# Patient Record
Sex: FEMALE | Race: WHITE | NOT HISPANIC OR LATINO | Employment: PART TIME | URBAN - METROPOLITAN AREA
[De-identification: names, ages, dates, MRNs, and addresses within clinical notes are randomized per-mention and may not be internally consistent; named-entity substitution may affect disease eponyms.]

---

## 2017-01-05 ENCOUNTER — OFFICE VISIT (OUTPATIENT)
Dept: PSYCHIATRY | Facility: CLINIC | Age: 47
End: 2017-01-05
Attending: PSYCHIATRY & NEUROLOGY
Payer: MEDICARE

## 2017-01-05 VITALS
BODY MASS INDEX: 21.74 KG/M2 | SYSTOLIC BLOOD PRESSURE: 149 MMHG | DIASTOLIC BLOOD PRESSURE: 93 MMHG | WEIGHT: 155.8 LBS | HEART RATE: 99 BPM

## 2017-01-05 DIAGNOSIS — F33.1 MAJOR DEPRESSIVE DISORDER, RECURRENT EPISODE, MODERATE (H): Primary | ICD-10-CM

## 2017-01-05 PROCEDURE — 99212 OFFICE O/P EST SF 10 MIN: CPT | Mod: ZF

## 2017-01-05 NOTE — PATIENT INSTRUCTIONS
No medication changes today. Continue therapy. Call if you do not have your January Adderall prescription at home.

## 2017-01-05 NOTE — NURSING NOTE
Chief Complaint   Patient presents with     Recheck Medication     Reviewed allergies, smoking status, and pharmacy preference  Administered abuse screening questions   Obtained weight, blood pressure and heart rate
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
## 2017-01-10 ASSESSMENT — PATIENT HEALTH QUESTIONNAIRE - PHQ9: SUM OF ALL RESPONSES TO PHQ QUESTIONS 1-9: 5

## 2017-02-06 ENCOUNTER — OFFICE VISIT (OUTPATIENT)
Dept: PSYCHIATRY | Facility: CLINIC | Age: 47
End: 2017-02-06
Attending: PSYCHIATRY & NEUROLOGY
Payer: MEDICARE

## 2017-02-06 VITALS
SYSTOLIC BLOOD PRESSURE: 146 MMHG | WEIGHT: 157.6 LBS | DIASTOLIC BLOOD PRESSURE: 102 MMHG | BODY MASS INDEX: 21.99 KG/M2 | HEART RATE: 106 BPM

## 2017-02-06 DIAGNOSIS — Z79.899 ENCOUNTER FOR LONG-TERM (CURRENT) USE OF MEDICATIONS: ICD-10-CM

## 2017-02-06 DIAGNOSIS — F33.1 MODERATE EPISODE OF RECURRENT MAJOR DEPRESSIVE DISORDER (H): ICD-10-CM

## 2017-02-06 DIAGNOSIS — Z79.899 ENCOUNTER FOR LONG-TERM (CURRENT) USE OF MEDICATIONS: Primary | ICD-10-CM

## 2017-02-06 LAB
ALBUMIN SERPL-MCNC: 3.4 G/DL (ref 3.4–5)
ALP SERPL-CCNC: 64 U/L (ref 40–150)
ALT SERPL W P-5'-P-CCNC: 17 U/L (ref 0–50)
AST SERPL W P-5'-P-CCNC: 17 U/L (ref 0–45)
BILIRUB DIRECT SERPL-MCNC: <0.1 MG/DL (ref 0–0.2)
BILIRUB SERPL-MCNC: 0.3 MG/DL (ref 0.2–1.3)
PROT SERPL-MCNC: 7 G/DL (ref 6.8–8.8)
TSH SERPL DL<=0.005 MIU/L-ACNC: 1.81 MU/L (ref 0.4–4)

## 2017-02-06 PROCEDURE — 80076 HEPATIC FUNCTION PANEL: CPT | Performed by: PSYCHIATRY & NEUROLOGY

## 2017-02-06 PROCEDURE — 36415 COLL VENOUS BLD VENIPUNCTURE: CPT | Performed by: PSYCHIATRY & NEUROLOGY

## 2017-02-06 PROCEDURE — 99212 OFFICE O/P EST SF 10 MIN: CPT | Mod: 25,ZF

## 2017-02-06 PROCEDURE — 84443 ASSAY THYROID STIM HORMONE: CPT | Performed by: PSYCHIATRY & NEUROLOGY

## 2017-02-06 RX ORDER — DEXTROAMPHETAMINE SACCHARATE, AMPHETAMINE ASPARTATE, DEXTROAMPHETAMINE SULFATE AND AMPHETAMINE SULFATE 2.5; 2.5; 2.5; 2.5 MG/1; MG/1; MG/1; MG/1
10 TABLET ORAL 2 TIMES DAILY
Qty: 60 TABLET | Refills: 0 | Status: SHIPPED | OUTPATIENT
Start: 2017-04-08 | End: 2017-04-17

## 2017-02-06 RX ORDER — VILAZODONE HYDROCHLORIDE 40 MG/1
40 TABLET ORAL DAILY
Qty: 30 TABLET | Refills: 2 | Status: SHIPPED
Start: 2017-02-06 | End: 2017-04-17

## 2017-02-06 RX ORDER — DEXTROAMPHETAMINE SACCHARATE, AMPHETAMINE ASPARTATE, DEXTROAMPHETAMINE SULFATE AND AMPHETAMINE SULFATE 2.5; 2.5; 2.5; 2.5 MG/1; MG/1; MG/1; MG/1
10 TABLET ORAL 2 TIMES DAILY
Qty: 60 TABLET | Refills: 0 | Status: SHIPPED | OUTPATIENT
Start: 2017-02-10 | End: 2017-04-17

## 2017-02-06 RX ORDER — DEXTROAMPHETAMINE SACCHARATE, AMPHETAMINE ASPARTATE, DEXTROAMPHETAMINE SULFATE AND AMPHETAMINE SULFATE 2.5; 2.5; 2.5; 2.5 MG/1; MG/1; MG/1; MG/1
10 TABLET ORAL 2 TIMES DAILY
Qty: 60 TABLET | Refills: 0 | Status: SHIPPED | OUTPATIENT
Start: 2017-03-08 | End: 2017-04-17

## 2017-02-06 RX ORDER — QUETIAPINE FUMARATE 25 MG/1
25-50 TABLET, FILM COATED ORAL
Qty: 60 TABLET | Refills: 2 | Status: SHIPPED
Start: 2017-02-06 | End: 2017-04-17

## 2017-02-06 RX ORDER — LAMOTRIGINE 150 MG/1
150 TABLET ORAL DAILY
Qty: 30 TABLET | Refills: 2 | Status: SHIPPED
Start: 2017-02-06 | End: 2017-04-17

## 2017-02-06 RX ORDER — NALTREXONE HYDROCHLORIDE 50 MG/1
150 TABLET, FILM COATED ORAL DAILY
Qty: 90 TABLET | Refills: 3 | Status: SHIPPED
Start: 2017-02-06 | End: 2017-04-17
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
## 2017-02-06 NOTE — NURSING NOTE
Chief Complaint   Patient presents with     RECHECK     Major Depressive Disorder     Reviewed allergies, smoking status, and pharmacy preference  Administered abuse screening questions   Obtained weight, blood pressure and heart rate   Hilda Parry LPN    Blood pressure HIGH,  checked x 2 patient given sticky note to give to provider.  Patient stated she has high blood pressure, not on medication currently. Did  Smoke a cigarette 10 minutes ago. Hilda Parry LPN

## 2017-02-07 ASSESSMENT — PATIENT HEALTH QUESTIONNAIRE - PHQ9: SUM OF ALL RESPONSES TO PHQ QUESTIONS 1-9: 5

## 2017-02-14 ENCOUNTER — OFFICE VISIT (OUTPATIENT)
Dept: INTERNAL MEDICINE | Facility: CLINIC | Age: 47
End: 2017-02-14

## 2017-02-14 VITALS
DIASTOLIC BLOOD PRESSURE: 97 MMHG | WEIGHT: 154.7 LBS | BODY MASS INDEX: 21.58 KG/M2 | HEART RATE: 106 BPM | SYSTOLIC BLOOD PRESSURE: 143 MMHG

## 2017-02-14 DIAGNOSIS — T50.905D MEDICATION SIDE EFFECTS, SUBSEQUENT ENCOUNTER: ICD-10-CM

## 2017-02-14 DIAGNOSIS — Z23 NEED FOR PROPHYLACTIC VACCINATION AND INOCULATION AGAINST INFLUENZA: ICD-10-CM

## 2017-02-14 DIAGNOSIS — I10 ESSENTIAL HYPERTENSION, BENIGN: Primary | ICD-10-CM

## 2017-02-14 RX ORDER — LISINOPRIL 10 MG/1
20 TABLET ORAL DAILY
Qty: 30 TABLET | Refills: 0 | Status: SHIPPED | OUTPATIENT
Start: 2017-02-14 | End: 2017-03-02

## 2017-02-14 ASSESSMENT — PAIN SCALES - GENERAL: PAINLEVEL: NO PAIN (0)

## 2017-02-14 NOTE — MR AVS SNAPSHOT
After Visit Summary   2/14/2017    Mariaelena Jean Baptiste    MRN: [id number]           Patient Information     Date Of Birth          1970        Visit Information        Provider Department      2/14/2017 10:25 AM Sav Roy MD [facility]        Today's Diagnoses     Need for prophylactic vaccination and inoculation against influenza    -  1    Medication side effects, subsequent encounter        Essential hypertension, benign          Care Instructions    Primary Care Center Medication Refill Request Information:  * Please contact your pharmacy regarding ANY request for medication refills.  ** [facility] Prescription Fax = [phone]  * Please allow 3 business days for routine medication refills.  * Please allow 5 business days for controlled substance medication refills.     Primary Care Center Test Result notification information:  *You will be notified with in 7-10 days of your appointment day regarding the results of your test.  If you are on MyChart you will be notified as soon as the provider has reviewed the results and signed off on them.          Follow-ups after your visit        Your next 10 appointments already scheduled     Feb 27, 2017 12:00 PM CST   LAB with [facility] ([facility])    [address] [place] [id number] [phone]           Patient must bring picture ID.  Patient should be prepared to give a urine specimen  Please do not eat 10-12 hours before your appointment if you are coming in fasting for labs on lipids, cholesterol, or glucose (sugar).  Pregnant women should follow their Care Team instructions. Water with medications is okay. Do not drink coffee or other fluids.   If you have concerns about taking  your medications, please ask at office or if scheduling via [employer], send a message by clicking on Secure Messaging, Message Your Care Team.            Mar 06, 2017  2:00 PM CST   Adult  Med Follow UP with Shani Ya MD   Psychiatry Clinic ([facility])    [address]  [address] [facility] [id number] [phone]            Mar 17, 2017 10:30 AM CDT   (Arrive by 10:15 AM)   Return Visit with Thalia Bradford MD   [facility] ([facility])    [address]  4th [address]  [facility] [id number] [phone]            Apr 17, 2017  1:00 PM CDT   Adult Med Follow UP with Shani Ya MD   Psychiatry Clinic ([facility])    [address]  [address] [facility] [address] [phone]              Future tests that were ordered for you today     Open Future Orders        Priority Expected Expires Ordered    Basic metabolic panel **(2 WKS) Routine 2/28/2017 2/14/2018 2/14/2017            Who to contact     Please call your clinic at [phone] to:    Ask questions about your health    Make or cancel appointments    Discuss your medicines    Learn about your test results    Speak to your doctor   If you have compliments or concerns about an experience at your clinic, or if you wish to file a complaint, please contact [facility] Patient Relations at [phone] or email us at [email or web address]         Additional Information About Your Visit        MyChart Information     [employer] gives you secure access to your electronic health record. If you see a primary care provider, you can also send messages to your care team and make appointments. If you have questions, please call your primary care clinic.  If you do not have a primary care provider, please call [phone] and they will assist you.      [employer] is an electronic gateway that provides easy, online access to your medical records. With [employer], you can request a clinic appointment, read your test results, renew a prescription or communicate with  your care team.     To access your existing account, please contact your [facility] or call [phone] for assistance.        Care EveryWhere ID     This is your Care EveryWhere ID. This could be used by other organizations to access your [place] medical records  [id number]        Your Vitals Were     Pulse BMI (Body Mass Index)                106 21.58 kg/m2           Blood Pressure from Last 3 Encounters:   02/14/17 (!) 143/97   12/07/16 141/90   09/28/16 (!) 154/108    Weight from Last 3 Encounters:   02/14/17 70.2 kg (154 lb 11.2 oz)   12/07/16 70.2 kg (154 lb 11.2 oz)   09/28/16 73.4 kg (161 lb 12.8 oz)              We Performed the Following     FLU VACCINE, 3 YRS +, IM  [[id number]]          Today's Medication Changes          These changes are accurate as of: 2/14/17 11:29 AM.  If you have any questions, ask your nurse or doctor.               Start taking these medicines.        Dose/Directions    lisinopril 10 MG tablet   Commonly known as:  PRINIVIL/ZESTRIL   Used for:  Essential hypertension, benign        Dose:  20 mg   Take 2 tablets (20 mg) by mouth daily   Quantity:  30 tablet   Refills:  0            Where to get your medicines      These medications were sent to [facility] [address] [place] [address] & [address] [address]    Hours:  24-hours Phone:  [phone]     lisinopril 10 MG tablet                Primary Care Provider Office Phone # Fax #    Thalia Bradford [phone]       [address] [address]        Thank you!     Thank you for choosing [facility]  for your care. Our goal is always to provide you with excellent care. Hearing back from our patients is one way we can continue to improve our services. Please take a few minutes to complete the written survey that you may receive in the mail after  your visit with us. Thank you!             Your Updated Medication List - Protect others around you: Learn how to safely use, store and throw away your medicines at www.disposemymeds.org.          This list is accurate as of: 2/14/17 11:29 AM.  Always use your most recent med list.                   Brand Name Dispense Instructions for use    * amphetamine-dextroamphetamine 10 MG per tablet    ADDERALL    60 tablet    Take 1 tablet (10 mg) by mouth 2 times daily       * amphetamine-dextroamphetamine 10 MG per tablet   Start taking on:  3/8/2017    ADDERALL    60 tablet    Take 1 tablet (10 mg) by mouth 2 times daily       * amphetamine-dextroamphetamine 10 MG per tablet   Start taking on:  4/8/2017    ADDERALL    60 tablet    Take 1 tablet (10 mg) by mouth 2 times daily       aspirin-acetaminophen-caffeine 250-250-65 MG per tablet    EXCEDRIN MIGRAINE    80 tablet    Take 1 tablet by mouth every 6 hours as needed for headaches       cetirizine 10 MG tablet    zyrTEC     Take 10 mg by mouth daily as needed for allergies       ibuprofen 800 MG tablet    ADVIL/MOTRIN     Take 800 mg by mouth       lamoTRIgine 150 MG tablet    LAMICTAL    30 tablet    Take 1 tablet (150 mg) by mouth daily       LANsoprazole 15 MG CR capsule    PREVACID    30 capsule    Take 1 capsule (15 mg) by mouth every morning (before breakfast)       levomilnacipran 120 MG 24 hr capsule    FETZIMA ER    30 capsule    Take 1 capsule (120 mg) by mouth daily       lisinopril 10 MG tablet    PRINIVIL/ZESTRIL    30 tablet    Take 2 tablets (20 mg) by mouth daily       naltrexone 50 MG tablet    DEPADE;REVIA    90 tablet    Take 3 tablets (150 mg) by mouth daily       polyethylene glycol Packet    MIRALAX/GLYCOLAX     Take 1 packet by mouth every evening       QUEtiapine 25 MG tablet    SEROQUEL    60 tablet    Take 1-2 tablets (25-50 mg) by mouth nightly as needed       vilazodone 40 MG Tabs tablet    VIIBRYD    30 tablet    Take 1 tablet (40 mg) by  mouth daily       VITAMIN D (CHOLECALCIFEROL) PO      Take 1,000 Units by mouth 2 times daily       * Notice:  This list has 3 medication(s) that are the same as other medications prescribed for you. Read the directions carefully, and ask your doctor or other care provider to review them with you.

## 2017-02-14 NOTE — PATIENT INSTRUCTIONS
Primary Care Center Medication Refill Request Information:  * Please contact your pharmacy regarding ANY request for medication refills.  ** [facility] Prescription Fax = [phone]  * Please allow 3 business days for routine medication refills.  * Please allow 5 business days for controlled substance medication refills.     Primary Care Center Test Result notification information:  *You will be notified with in 7-10 days of your appointment day regarding the results of your test.  If you are on MyChart you will be notified as soon as the provider has reviewed the results and signed off on them.

## 2017-02-14 NOTE — PROGRESS NOTES
PRIMARY CARE CLINIC    Resident Clinic Note        Visit Date: February 14, 2017    Mariaelena Jean Baptiste  MRN: [id number]  YOB: 1970    HPI:  Mariaelena Jean Baptiste is a 46 year old female  has a past medical history of Anxiety (1988); Chronic osteoarthritis; Depressive disorder; Dissociative identity disorder; Gastro-oesophageal reflux disease; Migraines; PTSD (post-traumatic stress disorder); and Social phobia.    Patient reports feeling good since last visit and is here today to follow up on her blood pressure.  She was previously taking 10 mg of Lisinopril, but reports that she hasn't taken this medication for 3 weeks.  She stopped taking the medication because she had been taking her BPs at the drugstore and they were still elevated (140-143/90s).  She also wanted to have a clinic visit before she continued to take the medication.  No blurred vision, chest pain, SOB, or headaches reported.  Primary care provider recommended increasing her Lisinopril if she was not having significant control on 10 mg.     Reports that her mental health is currently well managed. She is in the process of going back to school and has been able to do well taking one class this semester.    ROS:  7 point ROS was reviewed and negative other than stated in HPI    Past medical history reviewed.    Past Medical History   Diagnosis Date     Anxiety 1988     Chronic osteoarthritis      Depressive disorder      Dissociative identity disorder      Gastro-oesophageal reflux disease      Migraines      PTSD (post-traumatic stress disorder)      Social phobia        No Known Allergies    Past Surgical History   Procedure Laterality Date     Rectal prolapse repair       Laparoscopic tubal ligation  1999     Cholecystectomy         Family History   Problem Relation Age of Onset     Depression Father      Hypertension Father      Substance Abuse Father      CANCER Father      non hodgkins lymphoma     Depression Sister      CANCER Maternal  Grandmother      breast cancer (mastectomy in 40's), melanoma, leukemia     CANCER Maternal Grandfather      leukemia     Substance Abuse Maternal Grandfather      CANCER Paternal Grandmother      lung cancer, nonsmoker     Substance Abuse Brother      Substance Abuse Sister        Social History     Social History     Marital status: Single     Spouse name: N/A     Number of children: N/A     Years of education: N/A     Occupational History     Not on file.     Social History Main Topics     Smoking status: Current Every Day Smoker     Packs/day: 1.00     Years: 20.00     Types: Cigarettes     Start date: 5/1/1995     Smokeless tobacco: Former User     Alcohol use No     Drug use: No     Sexual activity: Not Currently     Partners: Female, Male     Other Topics Concern     Not on file     Social History Narrative     Current Outpatient Prescriptions   Medication     lisinopril (PRINIVIL/ZESTRIL) 10 MG tablet     [START ON 4/8/2017] amphetamine-dextroamphetamine (ADDERALL) 10 MG per tablet     [START ON 3/8/2017] amphetamine-dextroamphetamine (ADDERALL) 10 MG per tablet     amphetamine-dextroamphetamine (ADDERALL) 10 MG per tablet     lamoTRIgine (LAMICTAL) 150 MG tablet     levomilnacipran (FETZIMA ER) 120 MG 24 hr capsule     naltrexone (DEPADE;REVIA) 50 MG tablet     QUEtiapine (SEROQUEL) 25 MG tablet     vilazodone (VIIBRYD) 40 MG TABS tablet     ibuprofen (ADVIL,MOTRIN) 800 MG tablet     aspirin-acetaminophen-caffeine (EXCEDRIN MIGRAINE) 250-250-65 MG per tablet     cetirizine (ZYRTEC) 10 MG tablet     LANsoprazole (PREVACID) 15 MG capsule     VITAMIN D, CHOLECALCIFEROL, PO     polyethylene glycol (MIRALAX/GLYCOLAX) packet     No current facility-administered medications for this visit.      Vitals:  BP (!) 143/97  Pulse 106  Wt 70.2 kg (154 lb 11.2 oz)  BMI 21.58 kg/m2    Physical Exam:  General: NAD  HEENT: Oral mucosa moist and non-erythematous, PERRLA, EOM intact  CV: RRR, normal S1S2, no m/c/r  Resp: Clear  to auscultation bilaterally, no wheezes or crackles  Abd: Soft, non-tender, BS+, no masses appreciated    Assessment/Plan:  Mariaelena was seen today for hypertension.    # Essential hypertension, benign:  Patient continues to report elevated pressures on 10 mg of Lisinopril.  Recommend restarting her lisinopril with appropriate follow up labs today, increase dose to 20mg per her PCP recommendations.   -     lisinopril (PRINIVIL/ZESTRIL) 10 MG tablet; Take 2 tablets (20 mg) by mouth daily  -     Basic metabolic panel **(2 WKS); Future    Need for prophylactic vaccination and inoculation against influenza  -     FLU VACCINE, 3 YRS +, IM    Health Maintenance: Flu shot    Follow-up: Lab work in two weeks, and follow up with her PCP Dr. Bradford, in one month    Patient seen and discussed with Dr. Flakito Roy MD, [facility]  PGY-1, Internal Medicine   [phone]       Teaching Physician Note:     I discussed the case with the resident, verified the history with the patient and examined the patient. I agree with the findings and plan of care as documented in the resident's note.    Additional comments:  None.    Marli Fraga M.D.  Internal Medicine   pager [phone]

## 2017-02-14 NOTE — NURSING NOTE
Chief Complaint   Patient presents with     Hypertension     Patient here for blood pressure check.     Parris Montoya LPN at 10:36 AM on 2/14/2017.

## 2017-02-14 NOTE — NURSING NOTE
"FLU VACCINE QUESTIONNAIRE:  Ask the following questions of all parties who want influenza vaccination:     CONTRAINDICATIONS  1.  Is the patient age less than 6 months?  NO  2.  Has the person to be vaccinated ever had Guillain-Chili syndrome? NO  3.  Has the person to be vaccinated had the vaccine this year? NO  4.  Is the person to be vaccinated sick today? NO  5.  Does the person to be vaccinated have an allergy to eggs or a component of the vaccine? NO  6.  Has the person to vaccinated ever had a serious reaction to an influenza vaccination in the past? NO    If the answer to ALL of the above questions is \"No\", then please administer the influenza vaccine per the standard protocol.  If the patient answered \"Yes\" to questions 1 or 2, do not administer the vaccine. If the patient answered \"Yes\" to question 3, do not administer the vaccine unless the patient is a child receiving the vaccine in two doses. If the patient answered \"Yes\" to questions 4, 5, and/or 6, get additional details on sickness and/or reaction and refer to provider. If you have any questions regarding contraindications, please refer to the provider.                                                         INFLUENZA VACCINATION NOTE      Information sheet given to patient and questions answered.      Administered Influenza Fluzone Quadrivalent (see Immunizations in Chart Review). Patient tolerated well.  Rojelio Paula CMA at 11:32 AM on 2/14/2017       "

## 2017-02-24 ENCOUNTER — MYC MEDICAL ADVICE (OUTPATIENT)
Dept: INTERNAL MEDICINE | Facility: CLINIC | Age: 47
End: 2017-02-24

## 2017-02-27 DIAGNOSIS — T50.905D MEDICATION SIDE EFFECTS, SUBSEQUENT ENCOUNTER: ICD-10-CM

## 2017-02-27 LAB
ANION GAP SERPL CALCULATED.3IONS-SCNC: 7 MMOL/L (ref 3–14)
BUN SERPL-MCNC: 14 MG/DL (ref 7–30)
CALCIUM SERPL-MCNC: 8.5 MG/DL (ref 8.5–10.1)
CHLORIDE SERPL-SCNC: 107 MMOL/L (ref 94–109)
CO2 SERPL-SCNC: 26 MMOL/L (ref 20–32)
CREAT SERPL-MCNC: 0.66 MG/DL (ref 0.52–1.04)
GFR SERPL CREATININE-BSD FRML MDRD: NORMAL ML/MIN/1.7M2
GLUCOSE SERPL-MCNC: 93 MG/DL (ref 70–99)
POTASSIUM SERPL-SCNC: 3.8 MMOL/L (ref 3.4–5.3)
SODIUM SERPL-SCNC: 141 MMOL/L (ref 133–144)

## 2017-03-02 DIAGNOSIS — I10 ESSENTIAL HYPERTENSION, BENIGN: ICD-10-CM

## 2017-03-02 RX ORDER — LISINOPRIL 10 MG/1
20 TABLET ORAL DAILY
Qty: 60 TABLET | Refills: 0 | Status: SHIPPED | OUTPATIENT
Start: 2017-03-02 | End: 2017-03-17

## 2017-03-02 NOTE — TELEPHONE ENCOUNTER
lisinopril (PRINIVIL/ZESTRIL) 10 MG tablet  Last Written Prescription Date:  2/14/17  Last Fill Quantity: 30,   # refills: 0  Last Office Visit with [facility], [facility] or [facility] prescribing provider: 2/14/17  Future Office visit:   3/17/17    Potassium   Date Value Ref Range Status   02/27/2017 3.8 3.4 - 5.3 mmol/L Final   ]  Creatinine   Date Value Ref Range Status   02/27/2017 0.66 0.52 - 1.04 mg/dL Final   ]  BP Readings from Last 3 Encounters:   02/14/17 (!) 143/97   12/07/16 141/90   09/28/16 (!) 154/108       60 tabs to pharmacy. Pt has 1 mth f/u w/  DR Bradford 3/17/17

## 2017-03-06 ENCOUNTER — OFFICE VISIT (OUTPATIENT)
Dept: PSYCHIATRY | Facility: CLINIC | Age: 47
End: 2017-03-06
Attending: PSYCHIATRY & NEUROLOGY
Payer: MEDICARE

## 2017-03-06 VITALS
BODY MASS INDEX: 21.59 KG/M2 | SYSTOLIC BLOOD PRESSURE: 151 MMHG | WEIGHT: 154.8 LBS | HEART RATE: 101 BPM | DIASTOLIC BLOOD PRESSURE: 99 MMHG

## 2017-03-06 DIAGNOSIS — F33.1 MAJOR DEPRESSIVE DISORDER, RECURRENT EPISODE, MODERATE (H): Primary | ICD-10-CM

## 2017-03-06 PROCEDURE — 99212 OFFICE O/P EST SF 10 MIN: CPT | Mod: ZF

## 2017-03-06 NOTE — MR AVS SNAPSHOT
After Visit Summary   3/6/2017    Mariaelena Jean Baptiste    MRN: [id number]           Patient Information     Date Of Birth          1970        Visit Information        Provider Department      3/6/2017 2:00 PM Shani Ya MD [facility]        Today's Diagnoses     Major depressive disorder, recurrent episode, moderate (H)    -  1      Care Instructions    No medication changes today. Continue with therapy and follow-up with your PCP as scheduled.        Follow-ups after your visit        Follow-up notes from your care team     Return in about 1 month (around 4/6/2017).      Your next 10 appointments already scheduled     Mar 17, 2017 10:30 AM CDT   (Arrive by 10:15 AM)   Return Visit with Thalia Bradford MD   [facility] ([facility])    [address] [place] [id number] [phone]            Apr 17, 2017  1:00 PM CDT   Adult Med Follow UP with Shani Ya MD   Psychiatry Clinic ([facility])    [place] [id number] [facility] [id number] [phone]            May 15, 2017  1:00 PM CDT   Adult Med Follow UP with Shani Ya MD   Psychiatry Clinic ([facility])    [place] [id number] [facility] [id number] [phone]              Who to contact     Please call your clinic at [phone] to:    Ask questions about your health    Make or cancel appointments    Discuss your medicines    Learn about your test results    Speak to your doctor   If you have compliments or concerns about an experience at your clinic, or if you wish to file a complaint, please contact [facility] Patient Relations at [phone] or email us at [email or web address]         Additional Information About Your Visit        MyChart Information     [employer] gives you secure access to your  electronic health record. If you see a primary care provider, you can also send messages to your care team and make appointments. If you have questions, please call your primary care clinic.  If you do not have a primary care provider, please call [phone] and they will assist you.      [employer] is an electronic gateway that provides easy, online access to your medical records. With [employer], you can request a clinic appointment, read your test results, renew a prescription or communicate with your care team.     To access your existing account, please contact your [facility] or call [phone] for assistance.        Care EveryWhere ID     This is your Care EveryWhere ID. This could be used by other organizations to access your [place] medical records  [id number]        Your Vitals Were     Pulse BMI (Body Mass Index)                101 21.59 kg/m2           Blood Pressure from Last 3 Encounters:   03/06/17 (!) 151/99   02/14/17 (!) 143/97   02/06/17 (!) 146/102    Weight from Last 3 Encounters:   03/06/17 70.2 kg (154 lb 12.8 oz)   02/14/17 70.2 kg (154 lb 11.2 oz)   02/06/17 71.5 kg (157 lb 9.6 oz)              Today, you had the following     No orders found for display       Primary Care Provider Office Phone # Fax #    Thalia Lisbeth Bradford [phone]       [address] [address]        Thank you!     Thank you for choosing [facility]  for your care. Our goal is always to provide you with excellent care. Hearing back from our patients is one way we can continue to improve our services. Please take a few minutes to complete the written survey that you may receive in the mail after your visit with us. Thank you!             Your Updated Medication List - Protect others around you: Learn how to safely use, store and throw away your medicines at www.disposemymeds.org.          This list is accurate as of: 3/6/17  11:59 PM.  Always use your most recent med list.                   Brand Name Dispense Instructions for use    * amphetamine-dextroamphetamine 10 MG per tablet    ADDERALL    60 tablet    Take 1 tablet (10 mg) by mouth 2 times daily       * amphetamine-dextroamphetamine 10 MG per tablet   Start taking on:  3/8/2017    ADDERALL    60 tablet    Take 1 tablet (10 mg) by mouth 2 times daily       * amphetamine-dextroamphetamine 10 MG per tablet   Start taking on:  4/8/2017    ADDERALL    60 tablet    Take 1 tablet (10 mg) by mouth 2 times daily       aspirin-acetaminophen-caffeine 250-250-65 MG per tablet    EXCEDRIN MIGRAINE    80 tablet    Take 1 tablet by mouth every 6 hours as needed for headaches       cetirizine 10 MG tablet    zyrTEC     Take 10 mg by mouth daily as needed for allergies       ibuprofen 800 MG tablet    ADVIL/MOTRIN     Take 800 mg by mouth       lamoTRIgine 150 MG tablet    LAMICTAL    30 tablet    Take 1 tablet (150 mg) by mouth daily       LANsoprazole 15 MG CR capsule    PREVACID    30 capsule    Take 1 capsule (15 mg) by mouth every morning (before breakfast)       levomilnacipran 120 MG 24 hr capsule    FETZIMA ER    30 capsule    Take 1 capsule (120 mg) by mouth daily       lisinopril 10 MG tablet    PRINIVIL/ZESTRIL    60 tablet    Take 2 tablets (20 mg) by mouth daily       naltrexone 50 MG tablet    DEPADE;REVIA    90 tablet    Take 3 tablets (150 mg) by mouth daily       polyethylene glycol Packet    MIRALAX/GLYCOLAX     Take 1 packet by mouth every evening       QUEtiapine 25 MG tablet    SEROQUEL    60 tablet    Take 1-2 tablets (25-50 mg) by mouth nightly as needed       vilazodone 40 MG Tabs tablet    VIIBRYD    30 tablet    Take 1 tablet (40 mg) by mouth daily       VITAMIN D (CHOLECALCIFEROL) PO      Take 1,000 Units by mouth 2 times daily       * Notice:  This list has 3 medication(s) that are the same as other medications prescribed for you. Read the directions carefully, and  ask your doctor or other care provider to review them with you.

## 2017-03-06 NOTE — NURSING NOTE
Chief Complaint   Patient presents with     Recheck Medication     MDD    Reviewed allergies, smoking status, and pharmacy preference  Administered abuse screening questions   Obtained weight, blood pressure and heart rate
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
## 2017-03-07 ASSESSMENT — PATIENT HEALTH QUESTIONNAIRE - PHQ9: SUM OF ALL RESPONSES TO PHQ QUESTIONS 1-9: 5

## 2017-03-17 ENCOUNTER — OFFICE VISIT (OUTPATIENT)
Dept: INTERNAL MEDICINE | Facility: CLINIC | Age: 47
End: 2017-03-17

## 2017-03-17 VITALS
DIASTOLIC BLOOD PRESSURE: 81 MMHG | RESPIRATION RATE: 16 BRPM | HEART RATE: 113 BPM | BODY MASS INDEX: 20.81 KG/M2 | WEIGHT: 149.2 LBS | OXYGEN SATURATION: 100 % | SYSTOLIC BLOOD PRESSURE: 126 MMHG

## 2017-03-17 DIAGNOSIS — I10 ESSENTIAL HYPERTENSION, BENIGN: ICD-10-CM

## 2017-03-17 RX ORDER — LISINOPRIL 20 MG/1
20 TABLET ORAL DAILY
Qty: 90 TABLET | Refills: 3 | Status: SHIPPED | OUTPATIENT
Start: 2017-03-17 | End: 2018-03-07

## 2017-03-17 ASSESSMENT — PAIN SCALES - GENERAL: PAINLEVEL: NO PAIN (0)

## 2017-03-17 NOTE — PATIENT INSTRUCTIONS
Primary Care Center Phone Number [phone]  Primary Care Center Medication Refill Request Information:  * Please contact your pharmacy regarding ANY request for medication refills.  ** [facility] Prescription Fax = [phone]  * Please allow 3 business days for routine medication refills.  * Please allow 5 business days for controlled substance medication refills.     Primary Care Center Test Result notification information:  *You will be notified with in 7-10 days of your appointment day regarding the results of your test.  If you are on MyChart you will be notified as soon as the provider has reviewed the results and signed off on them.

## 2017-03-17 NOTE — NURSING NOTE
Chief Complaint   Patient presents with     Hypertension     pt is here for a blood pressure follow up        Janene Grider CMA at 10:17 AM on 3/17/2017

## 2017-03-17 NOTE — PROGRESS NOTES
Chief complaint:  Mariaelena Jean Baptiste is a 46 year old female presents for   Chief Complaint   Patient presents with     Hypertension     pt is here for a blood pressure follow up         SUBJECTIVE:  Recently lisinopril was increased to 20mg.  She is not having and SE.  BP is under good control today.    Lots of stress going on.  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  She has not worked as a cook for many years and is yessy but enjoying the change.  Feels that stress levels are improving.  No thoughts of hurting herself.    Medications and allergies were reviewed by me today.     SocHx:   History   Smoking Status     Current Every Day Smoker     Packs/day: 1.00     Years: 20.00     Types: Cigarettes     Start date: 5/1/1995   Smokeless Tobacco     Former User        Patient Active Problem List   Diagnosis     Suicidal ideation     Major depressive disorder, recurrent, mild (H)     Tobacco abuse     Hx of psychiatric care     Major depressive disorder, single episode, severe without psychotic features (H)     Scar       Review Of Systems   5 point ROS completed and negative except noted above, including Gen, CV, Resp, GI, MS    PE:  /81  Pulse 113  Resp 16  Wt 67.7 kg (149 lb 3.2 oz)  LMP 03/16/2017  SpO2 100%  Breastfeeding? No  BMI 20.81 kg/m2  Gen: no distress, comfortable, pleasant   Eyes: anicteric, normal extra-ocular movements   Skin: no concerning lesions, no jaundice   Psychological: appropriate mood       ASSESSMENT/PLAN:    Essential hypertension, benign  Will continue current dose of lisinopril  - lisinopril (PRINIVIL/ZESTRIL) 20 MG tablet  Dispense: 90 tablet; Refill: 3    Stress: improving with new job.  No SI/HI    >15 minutes, over half the time of the visit, spent as face to face time in discussion regarding her BP and mood      HM:  UTD    RTC: wu Coon  MD Suzette

## 2017-03-17 NOTE — MR AVS SNAPSHOT
After Visit Summary   3/17/2017    Mariaelena Jean Baptiste    MRN: [id number]           Patient Information     Date Of Birth          1970        Visit Information        Provider Department      3/17/2017 10:30 AM Thalia Bradford MD [facility]        Today's Diagnoses     Essential hypertension, benign          Care Instructions    Primary Care Center Phone Number [phone]  Primary Care Center Medication Refill Request Information:  * Please contact your pharmacy regarding ANY request for medication refills.  ** [facility] Prescription Fax = [phone]  * Please allow 3 business days for routine medication refills.  * Please allow 5 business days for controlled substance medication refills.     [facility] Center Test Result notification information:  *You will be notified with in 7-10 days of your appointment day regarding the results of your test.  If you are on MyChart you will be notified as soon as the provider has reviewed the results and signed off on them.                  Follow-ups after your visit        Your next 10 appointments already scheduled     Apr 17, 2017  1:00 PM CDT   Adult Med Follow UP with Shani Ya MD   Psychiatry Clinic ([facility])    [place] [id number] [facility] [id number] [phone]            May 15, 2017  1:00 PM CDT   Adult Med Follow UP with Shani Ya MD   Psychiatry Clinic ([facility])    [place] [id number] [facility] [id number] [phone]              Who to contact     Please call your clinic at [phone] to:    Ask questions about your health    Make or cancel appointments    Discuss your medicines    Learn about your test results    Speak to your doctor   If you have compliments or concerns about an experience at your clinic, or if you wish to file a complaint, please contact [facility]  Physicians Patient Relations at [phone] or email us at [email or web address]         Additional Information About Your Visit        AppJethart Information     Liqu[employer]t gives you secure access to your electronic health record. If you see a primary care provider, you can also send messages to your care team and make appointments. If you have questions, please call your primary care clinic.  If you do not have a primary care provider, please call [phone] and they will assist you.      GlocalReach is an electronic gateway that provides easy, online access to your medical records. With Gl[employer]Reach, you can request a clinic appointment, read your test results, renew a prescription or communicate with your care team.     To access your existing account, please contact your [facility] or call [phone] for assistance.        Care EveryWhere ID     This is your Care EveryWhere ID. This could be used by other organizations to access your [place] medical records  [id number]        Your Vitals Were     Pulse Respirations Last Period Pulse Oximetry Breastfeeding? BMI (Body Mass Index)    113 16 03/16/2017 100% No 20.81 kg/m2       Blood Pressure from Last 3 Encounters:   03/17/17 126/81   03/06/17 (!) 151/99   02/14/17 (!) 143/97    Weight from Last 3 Encounters:   03/17/17 67.7 kg (149 lb 3.2 oz)   03/06/17 70.2 kg (154 lb 12.8 oz)   02/14/17 70.2 kg (154 lb 11.2 oz)              Today, you had the following     No orders found for display         Today's Medication Changes          These changes are accurate as of: 3/17/17  4:10 PM.  If you have any questions, ask your nurse or doctor.               These medicines have changed or have updated prescriptions.        Dose/Directions    lisinopril 20 MG tablet   Commonly known as:  PRINIVIL/ZESTRIL   This may have changed:  medication strength   Used for:  Essential hypertension, benign   Changed by:  Thalia Bradford MD         Dose:  20 mg   Take 1 tablet (20 mg) by mouth daily   Quantity:  90 tablet   Refills:  3            Where to get your medicines      These medications were sent to [facility] [address] [place] [address] AT [facility]  [address] [place] [id number]    Hours:  24-hours Phone:  [phone]     lisinopril 20 MG tablet                Primary Care Provider Office Phone # Fax #    Thalia Lisbeth Bradford [phone]       [address]  [facility] [address]        Thank you!     Thank you for choosing [facility]  for your care. Our goal is always to provide you with excellent care. Hearing back from our patients is one way we can continue to improve our services. Please take a few minutes to complete the written survey that you may receive in the mail after your visit with us. Thank you!             Your Updated Medication List - Protect others around you: Learn how to safely use, store and throw away your medicines at www.disposemymeds.org.          This list is accurate as of: 3/17/17  4:10 PM.  Always use your most recent med list.                   Brand Name Dispense Instructions for use    * amphetamine-dextroamphetamine 10 MG per tablet    ADDERALL    60 tablet    Take 1 tablet (10 mg) by mouth 2 times daily       * amphetamine-dextroamphetamine 10 MG per tablet    ADDERALL    60 tablet    Take 1 tablet (10 mg) by mouth 2 times daily       * amphetamine-dextroamphetamine 10 MG per tablet   Start taking on:  4/8/2017    ADDERALL    60 tablet    Take 1 tablet (10 mg) by mouth 2 times daily       aspirin-acetaminophen-caffeine 250-250-65 MG per tablet    EXCEDRIN MIGRAINE    80 tablet    Take 1 tablet by mouth every 6 hours as needed for headaches       cetirizine 10 MG tablet    zyrTEC     Take 10 mg by mouth daily as needed for allergies       ibuprofen 800 MG tablet    ADVIL/MOTRIN     Take  800 mg by mouth       lamoTRIgine 150 MG tablet    LAMICTAL    30 tablet    Take 1 tablet (150 mg) by mouth daily       LANsoprazole 15 MG CR capsule    PREVACID    30 capsule    Take 1 capsule (15 mg) by mouth every morning (before breakfast)       levomilnacipran 120 MG 24 hr capsule    FETZIMA ER    30 capsule    Take 1 capsule (120 mg) by mouth daily       lisinopril 20 MG tablet    PRINIVIL/ZESTRIL    90 tablet    Take 1 tablet (20 mg) by mouth daily       naltrexone 50 MG tablet    DEPADE;REVIA    90 tablet    Take 3 tablets (150 mg) by mouth daily       polyethylene glycol Packet    MIRALAX/GLYCOLAX     Take 1 packet by mouth every evening       QUEtiapine 25 MG tablet    SEROQUEL    60 tablet    Take 1-2 tablets (25-50 mg) by mouth nightly as needed       vilazodone 40 MG Tabs tablet    VIIBRYD    30 tablet    Take 1 tablet (40 mg) by mouth daily       VITAMIN D (CHOLECALCIFEROL) PO      Take 1,000 Units by mouth 2 times daily       * Notice:  This list has 3 medication(s) that are the same as other medications prescribed for you. Read the directions carefully, and ask your doctor or other care provider to review them with you.

## 2017-04-17 ENCOUNTER — OFFICE VISIT (OUTPATIENT)
Dept: PSYCHIATRY | Facility: CLINIC | Age: 47
End: 2017-04-17
Attending: PSYCHIATRY & NEUROLOGY
Payer: MEDICARE

## 2017-04-17 ENCOUNTER — TELEPHONE (OUTPATIENT)
Dept: PSYCHIATRY | Facility: CLINIC | Age: 47
End: 2017-04-17

## 2017-04-17 VITALS
WEIGHT: 156 LBS | HEART RATE: 96 BPM | BODY MASS INDEX: 21.76 KG/M2 | SYSTOLIC BLOOD PRESSURE: 135 MMHG | DIASTOLIC BLOOD PRESSURE: 89 MMHG

## 2017-04-17 DIAGNOSIS — Z79.899 ENCOUNTER FOR LONG-TERM (CURRENT) USE OF MEDICATIONS: Primary | ICD-10-CM

## 2017-04-17 DIAGNOSIS — F33.1 MODERATE EPISODE OF RECURRENT MAJOR DEPRESSIVE DISORDER (H): ICD-10-CM

## 2017-04-17 PROCEDURE — 99212 OFFICE O/P EST SF 10 MIN: CPT | Mod: ZF

## 2017-04-17 RX ORDER — VILAZODONE HYDROCHLORIDE 40 MG/1
40 TABLET ORAL DAILY
Qty: 30 TABLET | Refills: 2 | Status: SHIPPED | OUTPATIENT
Start: 2017-04-17 | End: 2017-06-19

## 2017-04-17 RX ORDER — DEXTROAMPHETAMINE SACCHARATE, AMPHETAMINE ASPARTATE, DEXTROAMPHETAMINE SULFATE AND AMPHETAMINE SULFATE 2.5; 2.5; 2.5; 2.5 MG/1; MG/1; MG/1; MG/1
10 TABLET ORAL 2 TIMES DAILY
Qty: 60 TABLET | Refills: 0 | Status: SHIPPED | OUTPATIENT
Start: 2017-07-06 | End: 2017-08-07

## 2017-04-17 RX ORDER — QUETIAPINE FUMARATE 25 MG/1
25-50 TABLET, FILM COATED ORAL
Qty: 60 TABLET | Refills: 2 | Status: SHIPPED | OUTPATIENT
Start: 2017-04-17 | End: 2017-06-19

## 2017-04-17 RX ORDER — LAMOTRIGINE 150 MG/1
150 TABLET ORAL DAILY
Qty: 30 TABLET | Refills: 2 | Status: SHIPPED | OUTPATIENT
Start: 2017-04-17 | End: 2017-06-19

## 2017-04-17 RX ORDER — DEXTROAMPHETAMINE SACCHARATE, AMPHETAMINE ASPARTATE, DEXTROAMPHETAMINE SULFATE AND AMPHETAMINE SULFATE 2.5; 2.5; 2.5; 2.5 MG/1; MG/1; MG/1; MG/1
10 TABLET ORAL 2 TIMES DAILY
Qty: 60 TABLET | Refills: 0 | Status: SHIPPED | OUTPATIENT
Start: 2017-05-06 | End: 2017-05-04

## 2017-04-17 RX ORDER — NALTREXONE HYDROCHLORIDE 50 MG/1
150 TABLET, FILM COATED ORAL DAILY
Qty: 90 TABLET | Refills: 3 | Status: SHIPPED | OUTPATIENT
Start: 2017-04-17 | End: 2017-06-19

## 2017-04-17 RX ORDER — DEXTROAMPHETAMINE SACCHARATE, AMPHETAMINE ASPARTATE, DEXTROAMPHETAMINE SULFATE AND AMPHETAMINE SULFATE 2.5; 2.5; 2.5; 2.5 MG/1; MG/1; MG/1; MG/1
10 TABLET ORAL 2 TIMES DAILY
Qty: 60 TABLET | Refills: 0 | Status: SHIPPED | OUTPATIENT
Start: 2017-06-06 | End: 2017-05-04

## 2017-04-17 NOTE — TELEPHONE ENCOUNTER
Per Dr. Ya:  I called Mal (nurse at [facility]) and left a message requesting Mariaelena get back in for another series of TMS as her depression is returning. She completed her last series summer of 2016 while Dr. Blanc was still here at the [facility].     Dr. Blanc had said another series is an option for Mariaelena if her depression were to return. I requested Mal call back to see if they need anything further from me in this process and to update me on a timeline for Mariaelena.     Mal' #: [phone]

## 2017-04-17 NOTE — TELEPHONE ENCOUNTER
----- Message from Shani Ya MD sent at 4/17/2017  2:25 PM CDT -----  Regarding: KRIS PONCE   I called Mal (nurse at [facility]) and left a message requesting Mariaelena get back in for another series of TMS as her depression is returning. She completed her last series summer of 2016 while Dr. Blanc was still here at the [facility].     Dr. Blanc had said another series is an option for Mariaelena if her depression were to return. I requested Mla call back to see if they need anything further from me in this process and to update me on a timeline for Mariaelena.    Mal' #: [phone]
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
## 2017-04-17 NOTE — MR AVS SNAPSHOT
After Visit Summary   4/17/2017    Mariaelena Jean Baptiste    MRN: [id number]           Patient Information     Date Of Birth          1970        Visit Information        Provider Department      4/17/2017 1:00 PM Shani Ya MD Psychiatry Clinic        Today's Diagnoses     Encounter for long-term (current) use of medications    -  1    Moderate episode of recurrent major depressive disorder (H)          Care Instructions    Re-start TMS. No medication changes today. Continue with DBT.        Follow-ups after your visit        Follow-up notes from your care team     Return in about 1 month (around 5/17/2017).      Your next 10 appointments already scheduled     May 15, 2017  1:00 PM CDT   Adult Med Follow UP with Shani Ya MD   Psychiatry Clinic ([facility])    [address] [facility] [address] [phone]            Jun 19, 2017  1:45 PM CDT   Adult Med Follow UP with Shani Ya MD   Psychiatry Clinic ([facility])    [address] [facility] [address] [phone]              Who to contact     Please call your clinic at [phone] to:    Ask questions about your health    Make or cancel appointments    Discuss your medicines    Learn about your test results    Speak to your doctor   If you have compliments or concerns about an experience at your clinic, or if you wish to file a complaint, please contact [facility] Patient Relations at [phone] or email us at [email or web address]         Additional Information About Your Visit        MyChart Information     [employer] gives you secure access to your electronic health record. If you see a primary care provider, you can also send messages to your care team and make appointments. If you have questions, please call your primary care clinic.  If you do not have a primary  care provider, please call [phone] and they will assist you.      [employer] is an electronic gateway that provides easy, online access to your medical records. With [employer], you can request a clinic appointment, read your test results, renew a prescription or communicate with your care team.     To access your existing account, please contact your [facility] or call [phone] for assistance.        Care EveryWhere ID     This is your Care EveryWhere ID. This could be used by other organizations to access your [facility] medical records  [id number]        Your Vitals Were     Pulse BMI (Body Mass Index)                96 21.76 kg/m2           Blood Pressure from Last 3 Encounters:   04/17/17 135/89   03/17/17 126/81   03/06/17 (!) 151/99    Weight from Last 3 Encounters:   04/17/17 70.8 kg (156 lb)   03/17/17 67.7 kg (149 lb 3.2 oz)   03/06/17 70.2 kg (154 lb 12.8 oz)                 Where to get your medicines      These medications were sent to [facility] [address] [place] [address] & [address] [address]    Hours:  24-hours Phone:  [phone]     lamoTRIgine 150 MG tablet    levomilnacipran 120 MG 24 hr capsule    naltrexone 50 MG tablet    QUEtiapine 25 MG tablet    vilazodone 40 MG Tabs tablet         Some of these will need a paper prescription and others can be bought over the counter.  Ask your nurse if you have questions.     Bring a paper prescription for each of these medications     amphetamine-dextroamphetamine 10 MG per tablet    amphetamine-dextroamphetamine 10 MG per tablet    amphetamine-dextroamphetamine 10 MG per tablet          Primary Care Provider Office Phone # Fax #    Thalia Bradford [phone]       [address] [place] [address]        Thank you!     Thank you for choosing PSYCHIATRY CLINIC  for your care. Our goal  is always to provide you with excellent care. Hearing back from our patients is one way we can continue to improve our services. Please take a few minutes to complete the written survey that you may receive in the mail after your visit with us. Thank you!             Your Updated Medication List - Protect others around you: Learn how to safely use, store and throw away your medicines at www.disposemymeds.org.          This list is accurate as of: 4/17/17 11:59 PM.  Always use your most recent med list.                   Brand Name Dispense Instructions for use    * amphetamine-dextroamphetamine 10 MG per tablet   Start taking on:  5/6/2017    ADDERALL    60 tablet    Take 1 tablet (10 mg) by mouth 2 times daily       * amphetamine-dextroamphetamine 10 MG per tablet   Start taking on:  6/6/2017    ADDERALL    60 tablet    Take 1 tablet (10 mg) by mouth 2 times daily       * amphetamine-dextroamphetamine 10 MG per tablet   Start taking on:  7/6/2017    ADDERALL    60 tablet    Take 1 tablet (10 mg) by mouth 2 times daily       aspirin-acetaminophen-caffeine 250-250-65 MG per tablet    EXCEDRIN MIGRAINE    80 tablet    Take 1 tablet by mouth every 6 hours as needed for headaches       cetirizine 10 MG tablet    zyrTEC     Take 10 mg by mouth daily as needed for allergies       ibuprofen 800 MG tablet    ADVIL/MOTRIN     Take 800 mg by mouth       lamoTRIgine 150 MG tablet    LAMICTAL    30 tablet    Take 1 tablet (150 mg) by mouth daily       LANsoprazole 15 MG CR capsule    PREVACID    30 capsule    Take 1 capsule (15 mg) by mouth every morning (before breakfast)       levomilnacipran 120 MG 24 hr capsule    FETZIMA ER    30 capsule    Take 1 capsule (120 mg) by mouth daily       lisinopril 20 MG tablet    PRINIVIL/ZESTRIL    90 tablet    Take 1 tablet (20 mg) by mouth daily       naltrexone 50 MG tablet    DEPADE;REVIA    90 tablet    Take 3 tablets (150 mg) by mouth daily       polyethylene glycol Packet     MIRALAX/GLYCOLAX     Take 1 packet by mouth every evening       QUEtiapine 25 MG tablet    SEROQUEL    60 tablet    Take 1-2 tablets (25-50 mg) by mouth nightly as needed       vilazodone 40 MG Tabs tablet    VIIBRYD    30 tablet    Take 1 tablet (40 mg) by mouth daily       VITAMIN D (CHOLECALCIFEROL) PO      Take 1,000 Units by mouth 2 times daily       * Notice:  This list has 3 medication(s) that are the same as other medications prescribed for you. Read the directions carefully, and ask your doctor or other care provider to review them with you.

## 2017-04-17 NOTE — NURSING NOTE
Chief Complaint   Patient presents with     Recheck Medication     major depressive disorder     Reviewed allergies, smoking status, and pharmacy preference  Administered abuse screening questions   Obtained weight, blood pressure and heart rate

## 2017-04-18 ASSESSMENT — PATIENT HEALTH QUESTIONNAIRE - PHQ9: SUM OF ALL RESPONSES TO PHQ QUESTIONS 1-9: 18

## 2017-05-04 ENCOUNTER — OFFICE VISIT (OUTPATIENT)
Dept: INTERNAL MEDICINE | Facility: CLINIC | Age: 47
End: 2017-05-04

## 2017-05-04 VITALS
SYSTOLIC BLOOD PRESSURE: 135 MMHG | HEART RATE: 70 BPM | BODY MASS INDEX: 21.2 KG/M2 | DIASTOLIC BLOOD PRESSURE: 89 MMHG | WEIGHT: 152 LBS

## 2017-05-04 DIAGNOSIS — M54.6 ACUTE BILATERAL THORACIC BACK PAIN: Primary | ICD-10-CM

## 2017-05-04 ASSESSMENT — PAIN SCALES - GENERAL: PAINLEVEL: EXTREME PAIN (8)

## 2017-05-04 NOTE — NURSING NOTE
Post-it placed on computer screening informing MD of elevated BP. Shayy Rivera LPN May 4, 2017 3:12 PM

## 2017-05-04 NOTE — NURSING NOTE
AHA BP protocol done. Please see vitals for further details. Janene Grider CMA at 2:17 PM on 5/4/2017

## 2017-05-04 NOTE — MR AVS SNAPSHOT
After Visit Summary   5/4/2017    Mariaelena Jean Baptiste    MRN: [id number]           Patient Information     Date Of Birth          1970        Visit Information        Provider Department      5/4/2017 1:40 PM Jaylin Blackwell MD [facility]        Today's Diagnoses     Acute bilateral thoracic back pain    -  1      Care Instructions    Primary Care Center Medication Refill Request Information:  * Please contact your pharmacy regarding ANY request for medication refills.  ** [facility] Prescription Fax = [phone]  * Please allow 3 business days for routine medication refills.  * Please allow 5 business days for controlled substance medication refills.     Primary Care Center Test Result notification information:  *You will be notified within 7-10 days of your appointment day regarding the results of your test.  If you are on MyChart you will be notified as soon as the provider has reviewed the results and signed off on them.          Follow-ups after your visit        Additional Services     PHYSICAL THERAPY REFERRAL       *This therapy referral will be filtered to a centralized scheduling office at [facility] and the patient will receive a call to schedule an appointment at a [place] location most convenient for them. *     [facility] provides Physical Therapy evaluation and treatment and many specialty services across the [place] system.  If requesting a specialty program, please choose from the list below.    If you have not heard from the scheduling office within 2 business days, please call [phone] for all locations, with the exception of [place], please call [phone].  Treatment: Evaluation & Treatment  Special Instructions/Modalities:   Special Programs: None      Please be aware that coverage of these services is subject to the terms and limitations of your health insurance plan.  Call member services at your health plan  with any benefit or coverage questions.      **Note to Provider:  If you are referring outside of [place] for the therapy appointment, please list the name of the location in the  special instructions  above, print the referral and give to the patient to schedule the appointment.                  Your next 10 appointments already scheduled     May 15, 2017  1:00 PM CDT   Adult Med Follow UP with Shani Ya MD   Psychiatry Clinic ([facility])    [place] [id number] [facility] [id number] [phone]            Jun 19, 2017  1:45 PM CDT   Adult Med Follow UP with Shani Ya MD   Psychiatry Clinic ([facility])    [address] [facility] [address] [phone]              Who to contact     Please call your clinic at [phone] to:    Ask questions about your health    Make or cancel appointments    Discuss your medicines    Learn about your test results    Speak to your doctor   If you have compliments or concerns about an experience at your clinic, or if you wish to file a complaint, please contact [facility] Patient Relations at [phone] or email us at [email or web address]         Additional Information About Your Visit        [email or web address] Information     [employer] gives you secure access to your electronic health record. If you see a primary care provider, you can also send messages to your care team and make appointments. If you have questions, please call your primary care clinic.  If you do not have a primary care provider, please call [phone] and they will assist you.      [employer] is an electronic gateway that provides easy, online access to your medical records. With [employer], you can request a clinic appointment, read your test results, renew a prescription or communicate with your care team.     To access your existing account, please  contact your [facility] or call [phone] for assistance.        Care EveryWhere ID     This is your Care EveryWhere ID. This could be used by other organizations to access your [place] medical records  [id number]        Your Vitals Were     Pulse BMI (Body Mass Index)                70 21.2 kg/m2           Blood Pressure from Last 3 Encounters:   05/04/17 135/89   03/17/17 126/81   02/14/17 (!) 143/97    Weight from Last 3 Encounters:   05/04/17 68.9 kg (152 lb)   03/17/17 67.7 kg (149 lb 3.2 oz)   02/14/17 70.2 kg (154 lb 11.2 oz)              We Performed the Following     PHYSICAL THERAPY REFERRAL        Primary Care Provider Office Phone # Fax #    Thalia Bradford [phone]       [address]  [facility] [address]        Thank you!     Thank you for choosing [facility]  for your care. Our goal is always to provide you with excellent care. Hearing back from our patients is one way we can continue to improve our services. Please take a few minutes to complete the written survey that you may receive in the mail after your visit with us. Thank you!             Your Updated Medication List - Protect others around you: Learn how to safely use, store and throw away your medicines at www.disposemymeds.org.          This list is accurate as of: 5/4/17  2:18 PM.  Always use your most recent med list.                   Brand Name Dispense Instructions for use    amphetamine-dextroamphetamine 10 MG per tablet   Start taking on:  7/6/2017    ADDERALL    60 tablet    Take 1 tablet (10 mg) by mouth 2 times daily       aspirin-acetaminophen-caffeine 250-250-65 MG per tablet    EXCEDRIN MIGRAINE    80 tablet    Take 1 tablet by mouth every 6 hours as needed for headaches       cetirizine 10 MG tablet    zyrTEC     Take 10 mg by mouth daily as needed for allergies       ibuprofen 800 MG tablet    ADVIL/MOTRIN     Take  800 mg by mouth       lamoTRIgine 150 MG tablet    LAMICTAL    30 tablet    Take 1 tablet (150 mg) by mouth daily       LANsoprazole 15 MG CR capsule    PREVACID    30 capsule    Take 1 capsule (15 mg) by mouth every morning (before breakfast)       levomilnacipran 120 MG 24 hr capsule    FETZIMA ER    30 capsule    Take 1 capsule (120 mg) by mouth daily       lisinopril 20 MG tablet    PRINIVIL/ZESTRIL    90 tablet    Take 1 tablet (20 mg) by mouth daily       naltrexone 50 MG tablet    DEPADE;REVIA    90 tablet    Take 3 tablets (150 mg) by mouth daily       polyethylene glycol Packet    MIRALAX/GLYCOLAX     Take 1 packet by mouth every evening       QUEtiapine 25 MG tablet    SEROQUEL    60 tablet    Take 1-2 tablets (25-50 mg) by mouth nightly as needed       vilazodone 40 MG Tabs tablet    VIIBRYD    30 tablet    Take 1 tablet (40 mg) by mouth daily       VITAMIN D (CHOLECALCIFEROL) PO      Take 1,000 Units by mouth 2 times daily

## 2017-05-04 NOTE — PROGRESS NOTES
PRIMARY CARE CENTER      Mariaelena Jean Baptiste MRN# [id number]   YOB: 1970 Age: 46 year old     CC: Back Pain    HPI: Mariaelena is a 45 yo female with HTN, depression here for evaluation of upper back pain. Has been ongoing for several months has a dull pain in her upper back , however for the past month this has worsened , and is waking her up at night. Pain is worse on the left side along her scapula, and neck and radiates up into the head as well. 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     So far she has tried tylenol, ibuprofen, Heat and cold pack with minimal relief. Denies any other trauma, weakness, or restriction of arm movement other than the pain.    Past Medical History:   Diagnosis Date     Anxiety 1988     Chronic osteoarthritis      Depressive disorder      Dissociative identity disorder      Gastro-oesophageal reflux disease      Migraines      PTSD (post-traumatic stress disorder)      Social phobia      Past Surgical History:   Procedure Laterality Date     CHOLECYSTECTOMY       LAPAROSCOPIC TUBAL LIGATION  1999     rectal prolapse repair        No Known Allergies  Current Outpatient Prescriptions   Medication Sig Dispense Refill     lamoTRIgine (LAMICTAL) 150 MG tablet Take 1 tablet (150 mg) by mouth daily 30 tablet 2     levomilnacipran (FETZIMA ER) 120 MG 24 hr capsule Take 1 capsule (120 mg) by mouth daily 30 capsule 2     naltrexone (DEPADE;REVIA) 50 MG tablet Take 3 tablets (150 mg) by mouth daily 90 tablet 3     QUEtiapine (SEROQUEL) 25 MG tablet Take 1-2 tablets (25-50 mg) by mouth nightly as needed 60 tablet 2     vilazodone (VIIBRYD) 40 MG TABS tablet Take 1 tablet (40 mg) by mouth daily 30 tablet 2     [START ON 7/6/2017] amphetamine-dextroamphetamine (ADDERALL) 10 MG per tablet Take 1 tablet (10 mg) by mouth 2 times daily 60 tablet 0     lisinopril  (PRINIVIL/ZESTRIL) 20 MG tablet Take 1 tablet (20 mg) by mouth daily 90 tablet 3     ibuprofen (ADVIL,MOTRIN) 800 MG tablet Take 800 mg by mouth       aspirin-acetaminophen-caffeine (EXCEDRIN MIGRAINE) 250-250-65 MG per tablet Take 1 tablet by mouth every 6 hours as needed for headaches 80 tablet      cetirizine (ZYRTEC) 10 MG tablet Take 10 mg by mouth daily as needed for allergies        LANsoprazole (PREVACID) 15 MG capsule Take 1 capsule (15 mg) by mouth every morning (before breakfast) 30 capsule 0     VITAMIN D, CHOLECALCIFEROL, PO Take 1,000 Units by mouth 2 times daily        polyethylene glycol (MIRALAX/GLYCOLAX) packet Take 1 packet by mouth every evening        Family History   Problem Relation Age of Onset     Depression Father      Hypertension Father      Substance Abuse Father      CANCER Father      non hodgkins lymphoma     Depression Sister      CANCER Maternal Grandmother      breast cancer (mastectomy in 40's), melanoma, leukemia     CANCER Maternal Grandfather      leukemia     Substance Abuse Maternal Grandfather      CANCER Paternal Grandmother      lung cancer, nonsmoker     Substance Abuse Brother      Substance Abuse Sister      Social History     Social History     Marital status: Single     Spouse name: N/A     Number of children: N/A     Years of education: N/A     Occupational History     Not on file.     Social History Main Topics     Smoking status: Current Every Day Smoker     Packs/day: 1.00     Years: 20.00     Types: Cigarettes     Start date: 5/1/1995     Smokeless tobacco: Former User     Alcohol use No     Drug use: No     Sexual activity: Not Currently     Partners: Female, Male     Other Topics Concern     Not on file     Social History Narrative     ROS: a 4 point review of systems negative unless mentioned in HPI    Exam:  /89  Pulse 70  Wt 68.9 kg (152 lb)  BMI 21.2 kg/m2  General: Alert, NAD  HEENT: Sclera anicteric,  EOMI, PERRLA, mucus membranes moist, no oral  lesions.  Neck: No JVD, carotid bruits or cervical lymphadenopathy  Resp: CTAB, no wheezes/rhonchi/rhales  Cardiac: RRR, no murmur or extra heart sounds  Abdomen: Soft, nontender, nondistended. Active BS.    Extremities/Back: Tenderness to deep palpation over the left scapula, shoulder/ nape of neck. Good ROM. No bruising  Skin: Warm and dry, no jaundice or rash  Neuro: moves all extremities equally without difficulty    Reviewed prior medical records.    Assessment/ Plan:  # Upper back strain: Imaging not indicated  - Tylenol 1000 mg TID.  - instructed to stop NSAIDs 2/2 HTN  - OTC TENS unit.  - PT referral placed    Health Maintenance:   1. Follow-up Plans: As needed    Patient seen and plan discussed with Dr. Balta Blackwell MD  Internal Medicine, PGY-3  [phone]    The Patient was seen in [facility] Continuity [facility] by DANNY BLACKWELL. Patient was seen and examined by me with the resident.   I reviewed the history & exam. Assessment and plan were jointly made.    Kya Gifford MD

## 2017-05-04 NOTE — PATIENT INSTRUCTIONS
Primary Care Center Medication Refill Request Information:  * Please contact your pharmacy regarding ANY request for medication refills.  ** [facility] Prescription Fax = [phone]  * Please allow 3 business days for routine medication refills.  * Please allow 5 business days for controlled substance medication refills.     Primary Care Center Test Result notification information:  *You will be notified within 7-10 days of your appointment day regarding the results of your test.  If you are on MyChart you will be notified as soon as the provider has reviewed the results and signed off on them.

## 2017-05-04 NOTE — NURSING NOTE
Chief Complaint   Patient presents with     Back Pain     Pt states she has been having upper back pain x 1 week.      Shayy Rivera LPN May 4, 2017 1:38 PM

## 2017-05-15 ENCOUNTER — OFFICE VISIT (OUTPATIENT)
Dept: PSYCHIATRY | Facility: CLINIC | Age: 47
End: 2017-05-15
Attending: PSYCHIATRY & NEUROLOGY
Payer: MEDICARE

## 2017-05-15 VITALS
BODY MASS INDEX: 21.42 KG/M2 | DIASTOLIC BLOOD PRESSURE: 77 MMHG | WEIGHT: 153.6 LBS | SYSTOLIC BLOOD PRESSURE: 137 MMHG | HEART RATE: 103 BPM

## 2017-05-15 DIAGNOSIS — F33.1 MODERATE EPISODE OF RECURRENT MAJOR DEPRESSIVE DISORDER (H): Primary | ICD-10-CM

## 2017-05-15 PROCEDURE — 99212 OFFICE O/P EST SF 10 MIN: CPT | Mod: ZF
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
## 2017-05-15 NOTE — MR AVS SNAPSHOT
After Visit Summary   5/15/2017    Mariaelena Jean Baptiste    MRN: [id number]           Patient Information     Date Of Birth          1970        Visit Information        Provider Department      5/15/2017 1:00 PM Shani Ya MD Psychiatry Clinic        Today's Diagnoses     Moderate episode of recurrent major depressive disorder (H)    -  1      Care Instructions    No medication changes today. Let Dr. Ya know if you would like to get on our [facility] referral list (if [facility] does not work out). Get labs done (fasting) at next appt.        Follow-ups after your visit        Follow-up notes from your care team     Return in about 1 month (around 6/15/2017).      Your next 10 appointments already scheduled     Jun 19, 2017  1:45 PM CDT   Adult Med Follow UP with Shani Ya MD   Psychiatry Clinic ([facility])    [id number] [facility] [address] [phone]              Who to contact     Please call your clinic at [phone] to:    Ask questions about your health    Make or cancel appointments    Discuss your medicines    Learn about your test results    Speak to your doctor   If you have compliments or concerns about an experience at your clinic, or if you wish to file a complaint, please contact [facility] Patient Relations at [phone] or email us at [email or web address]         Additional Information About Your Visit        MyChart Information     [employer] gives you secure access to your electronic health record. If you see a primary care provider, you can also send messages to your care team and make appointments. If you have questions, please call your primary care clinic.  If you do not have a primary care provider, please call [phone] and they will assist you.      [employer] is an electronic gateway that provides easy, online access to your medical records.  With [employer], you can request a clinic appointment, read your test results, renew a prescription or communicate with your care team.     To access your existing account, please contact your [facility] or call [phone] for assistance.        Care EveryWhere ID     This is your Care EveryWhere ID. This could be used by other organizations to access your [facility] medical records  [id number]        Your Vitals Were     Pulse BMI (Body Mass Index)                103 21.42 kg/m2           Blood Pressure from Last 3 Encounters:   05/15/17 137/77   05/04/17 135/89   04/17/17 135/89    Weight from Last 3 Encounters:   05/15/17 69.7 kg (153 lb 9.6 oz)   05/04/17 68.9 kg (152 lb)   04/17/17 70.8 kg (156 lb)              Today, you had the following     No orders found for display       Primary Care Provider Office Phone # Fax #    Thalia Lisbeth Bradford [phone]       [address] [address]        Thank you!     Thank you for choosing PSYCHIATRY CLINIC  for your care. Our goal is always to provide you with excellent care. Hearing back from our patients is one way we can continue to improve our services. Please take a few minutes to complete the written survey that you may receive in the mail after your visit with us. Thank you!             Your Updated Medication List - Protect others around you: Learn how to safely use, store and throw away your medicines at www.disposemymeds.org.          This list is accurate as of: 5/15/17 11:59 PM.  Always use your most recent med list.                   Brand Name Dispense Instructions for use    amphetamine-dextroamphetamine 10 MG per tablet   Start taking on:  7/6/2017    ADDERALL    60 tablet    Take 1 tablet (10 mg) by mouth 2 times daily       aspirin-acetaminophen-caffeine 250-250-65 MG per tablet    EXCEDRIN MIGRAINE    80 tablet    Take 1 tablet by mouth every 6 hours as needed for  headaches       cetirizine 10 MG tablet    zyrTEC     Take 10 mg by mouth daily as needed for allergies       ibuprofen 800 MG tablet    ADVIL/MOTRIN     Take 800 mg by mouth       lamoTRIgine 150 MG tablet    LAMICTAL    30 tablet    Take 1 tablet (150 mg) by mouth daily       LANsoprazole 15 MG CR capsule    PREVACID    30 capsule    Take 1 capsule (15 mg) by mouth every morning (before breakfast)       levomilnacipran 120 MG 24 hr capsule    FETZIMA ER    30 capsule    Take 1 capsule (120 mg) by mouth daily       lisinopril 20 MG tablet    PRINIVIL/ZESTRIL    90 tablet    Take 1 tablet (20 mg) by mouth daily       naltrexone 50 MG tablet    DEPADE;REVIA    90 tablet    Take 3 tablets (150 mg) by mouth daily       polyethylene glycol Packet    MIRALAX/GLYCOLAX     Take 1 packet by mouth every evening       QUEtiapine 25 MG tablet    SEROQUEL    60 tablet    Take 1-2 tablets (25-50 mg) by mouth nightly as needed       vilazodone 40 MG Tabs tablet    VIIBRYD    30 tablet    Take 1 tablet (40 mg) by mouth daily       VITAMIN D (CHOLECALCIFEROL) PO      Take 1,000 Units by mouth 2 times daily

## 2017-05-15 NOTE — PATIENT INSTRUCTIONS
No medication changes today. Let Dr. Ya know if you would like to get on our [facility] TMS referral list (if [facility] does not work out). Get labs done (fasting) at next appt.

## 2017-05-17 ASSESSMENT — PATIENT HEALTH QUESTIONNAIRE - PHQ9: SUM OF ALL RESPONSES TO PHQ QUESTIONS 1-9: 14

## 2017-06-19 ENCOUNTER — OFFICE VISIT (OUTPATIENT)
Dept: PSYCHIATRY | Facility: CLINIC | Age: 47
End: 2017-06-19
Attending: PSYCHIATRY & NEUROLOGY
Payer: MEDICARE

## 2017-06-19 VITALS
DIASTOLIC BLOOD PRESSURE: 76 MMHG | BODY MASS INDEX: 20.92 KG/M2 | WEIGHT: 150 LBS | HEART RATE: 116 BPM | SYSTOLIC BLOOD PRESSURE: 128 MMHG

## 2017-06-19 DIAGNOSIS — F33.1 MODERATE EPISODE OF RECURRENT MAJOR DEPRESSIVE DISORDER (H): ICD-10-CM

## 2017-06-19 DIAGNOSIS — Z79.899 ENCOUNTER FOR LONG-TERM (CURRENT) USE OF MEDICATIONS: Primary | ICD-10-CM

## 2017-06-19 PROCEDURE — 99212 OFFICE O/P EST SF 10 MIN: CPT | Mod: ZF

## 2017-06-19 RX ORDER — VILAZODONE HYDROCHLORIDE 40 MG/1
40 TABLET ORAL DAILY
Qty: 30 TABLET | Refills: 2 | Status: SHIPPED | OUTPATIENT
Start: 2017-06-19 | End: 2017-08-07

## 2017-06-19 RX ORDER — QUETIAPINE FUMARATE 25 MG/1
25-50 TABLET, FILM COATED ORAL
Qty: 60 TABLET | Refills: 2 | Status: SHIPPED | OUTPATIENT
Start: 2017-06-19 | End: 2017-08-07

## 2017-06-19 RX ORDER — LAMOTRIGINE 150 MG/1
150 TABLET ORAL DAILY
Qty: 30 TABLET | Refills: 2 | Status: SHIPPED | OUTPATIENT
Start: 2017-06-19 | End: 2017-08-07

## 2017-06-19 RX ORDER — NALTREXONE HYDROCHLORIDE 50 MG/1
150 TABLET, FILM COATED ORAL DAILY
Qty: 90 TABLET | Refills: 3 | Status: SHIPPED | OUTPATIENT
Start: 2017-06-19 | End: 2017-08-07
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
## 2017-06-19 NOTE — MR AVS SNAPSHOT
After Visit Summary   6/19/2017    Mariaelena Jean Baptiste    MRN: [id number]           Patient Information     Date Of Birth          1970        Visit Information        Provider Department      6/19/2017 1:45 PM Shani Ya MD Psychiatry Clinic        Today's Diagnoses     Encounter for long-term (current) use of medications    -  1    Moderate episode of recurrent major depressive disorder (H)          Care Instructions    Get labs drawn. No medication changes today. Will continue to await TMS referral.          Follow-ups after your visit        Follow-up notes from your care team     Return in about 1 month (around 7/19/2017).      Your next 10 appointments already scheduled     Aug 07, 2017 12:45 PM CDT   Adult Med Follow UP with Shani Ireland MD   Psychiatry Clinic ([facility])    [address] [facility] [address] [phone]              Who to contact     Please call your clinic at [phone] to:    Ask questions about your health    Make or cancel appointments    Discuss your medicines    Learn about your test results    Speak to your doctor   If you have compliments or concerns about an experience at your clinic, or if you wish to file a complaint, please contact [facility] Patient Relations at [phone] or email us at [email or web address]         Additional Information About Your Visit        MyChart Information     [facility] gives you secure access to your electronic health record. If you see a primary care provider, you can also send messages to your care team and make appointments. If you have questions, please call your primary care clinic.  If you do not have a primary care provider, please call [phone] and they will assist you.      [facility] [place] is an electronic gateway that provides easy, online access to your medical records. With [facility], you can request a clinic appointment,  read your test results, renew a prescription or communicate with your care team.     To access your existing account, please contact your [facility] or call [phone] for assistance.        Care EveryWhere ID     This is your Care EveryWhere ID. This could be used by other organizations to access your [place] medical records  [id number]        Your Vitals Were     Pulse BMI (Body Mass Index)                116 20.92 kg/m2           Blood Pressure from Last 3 Encounters:   06/19/17 128/76   05/15/17 137/77   05/04/17 135/89    Weight from Last 3 Encounters:   06/19/17 68 kg (150 lb)   05/15/17 69.7 kg (153 lb 9.6 oz)   05/04/17 68.9 kg (152 lb)                 Where to get your medicines      These medications were sent to [facility] [address] [place] [address] & [address] [address]    Hours:  24-hours Phone:  [phone]     lamoTRIgine 150 MG tablet    levomilnacipran 120 MG 24 hr capsule    naltrexone 50 MG tablet    QUEtiapine 25 MG tablet    vilazodone 40 MG Tabs tablet          Primary Care Provider Office Phone # Fax #    Thalia Lisbeth Bradford [phone]       [address] [address]        Equal Access to Services     WAN DYKES AH: Hadii aad ku hadasho Soshawn, waaxda luqadaha, qaybta kaalmada adebrookeda, ivory marin . So [facility] [phone].    ATENCIÓN: Si habla español, tiene a alegria disposición servicios gratuitos de asistencia lingüística. Llame al [phone].    We comply with applicable federal civil rights laws and [place] laws. We do not discriminate on the basis of race, color, national origin, age, disability sex, sexual orientation or gender identity.            Thank you!     Thank you for choosing PSYCHIATRY CLINIC  for your care. Our goal is always to provide you with excellent care.  Hearing back from our patients is one way we can continue to improve our services. Please take a few minutes to complete the written survey that you may receive in the mail after your visit with us. Thank you!             Your Updated Medication List - Protect others around you: Learn how to safely use, store and throw away your medicines at www.disposemymeds.org.          This list is accurate as of: 6/19/17 11:59 PM.  Always use your most recent med list.                   Brand Name Dispense Instructions for use Diagnosis    amphetamine-dextroamphetamine 10 MG per tablet   Start taking on:  7/6/2017    ADDERALL    60 tablet    Take 1 tablet (10 mg) by mouth 2 times daily    Moderate episode of recurrent major depressive disorder (H)       aspirin-acetaminophen-caffeine 250-250-65 MG per tablet    EXCEDRIN MIGRAINE    80 tablet    Take 1 tablet by mouth every 6 hours as needed for headaches    Tension headache       cetirizine 10 MG tablet    zyrTEC     Take 10 mg by mouth daily as needed for allergies        ibuprofen 800 MG tablet    ADVIL/MOTRIN     Take 800 mg by mouth        lamoTRIgine 150 MG tablet    LAMICTAL    30 tablet    Take 1 tablet (150 mg) by mouth daily    Moderate episode of recurrent major depressive disorder (H)       LANsoprazole 15 MG CR capsule    PREVACID    30 capsule    Take 1 capsule (15 mg) by mouth every morning (before breakfast)    Esophageal reflux       levomilnacipran 120 MG 24 hr capsule    FETZIMA ER    30 capsule    Take 1 capsule (120 mg) by mouth daily    Moderate episode of recurrent major depressive disorder (H)       lisinopril 20 MG tablet    PRINIVIL/ZESTRIL    90 tablet    Take 1 tablet (20 mg) by mouth daily    Essential hypertension, benign       MAGNESIUM OXIDE PO           naltrexone 50 MG tablet    DEPADE;REVIA    90 tablet    Take 3 tablets (150 mg) by mouth daily    Moderate episode of recurrent major depressive disorder (H)       polyethylene glycol Packet     MIRALAX/GLYCOLAX     Take 1 packet by mouth every evening        QUEtiapine 25 MG tablet    SEROQUEL    60 tablet    Take 1-2 tablets (25-50 mg) by mouth nightly as needed    Moderate episode of recurrent major depressive disorder (H)       vilazodone 40 MG Tabs tablet    VIIBRYD    30 tablet    Take 1 tablet (40 mg) by mouth daily    Moderate episode of recurrent major depressive disorder (H)       VITAMIN D (CHOLECALCIFEROL) PO      Take 1,000 Units by mouth 2 times daily

## 2017-06-21 ASSESSMENT — PATIENT HEALTH QUESTIONNAIRE - PHQ9: SUM OF ALL RESPONSES TO PHQ QUESTIONS 1-9: 12

## 2017-08-07 ENCOUNTER — OFFICE VISIT (OUTPATIENT)
Dept: PSYCHIATRY | Facility: CLINIC | Age: 47
End: 2017-08-07
Attending: PSYCHIATRY & NEUROLOGY
Payer: MEDICARE

## 2017-08-07 VITALS
BODY MASS INDEX: 21.26 KG/M2 | SYSTOLIC BLOOD PRESSURE: 128 MMHG | HEART RATE: 94 BPM | WEIGHT: 152.4 LBS | DIASTOLIC BLOOD PRESSURE: 85 MMHG

## 2017-08-07 DIAGNOSIS — F17.219 CIGARETTE NICOTINE DEPENDENCE WITH NICOTINE-INDUCED DISORDER: Primary | ICD-10-CM

## 2017-08-07 DIAGNOSIS — F33.1 MODERATE EPISODE OF RECURRENT MAJOR DEPRESSIVE DISORDER (H): ICD-10-CM

## 2017-08-07 PROCEDURE — 99212 OFFICE O/P EST SF 10 MIN: CPT | Mod: ZF

## 2017-08-07 RX ORDER — VILAZODONE HYDROCHLORIDE 40 MG/1
40 TABLET ORAL DAILY
Qty: 30 TABLET | Refills: 2 | Status: SHIPPED | OUTPATIENT
Start: 2017-08-07 | End: 2017-11-13

## 2017-08-07 RX ORDER — NALTREXONE HYDROCHLORIDE 50 MG/1
150 TABLET, FILM COATED ORAL DAILY
Qty: 90 TABLET | Refills: 3 | Status: SHIPPED | OUTPATIENT
Start: 2017-08-07 | End: 2017-11-13

## 2017-08-07 RX ORDER — LAMOTRIGINE 150 MG/1
150 TABLET ORAL DAILY
Qty: 30 TABLET | Refills: 2 | Status: SHIPPED | OUTPATIENT
Start: 2017-08-07 | End: 2017-11-13

## 2017-08-07 RX ORDER — QUETIAPINE FUMARATE 25 MG/1
25-50 TABLET, FILM COATED ORAL
Qty: 60 TABLET | Refills: 2 | Status: SHIPPED | OUTPATIENT
Start: 2017-08-07 | End: 2017-11-13

## 2017-08-07 RX ORDER — DEXTROAMPHETAMINE SACCHARATE, AMPHETAMINE ASPARTATE, DEXTROAMPHETAMINE SULFATE AND AMPHETAMINE SULFATE 2.5; 2.5; 2.5; 2.5 MG/1; MG/1; MG/1; MG/1
10 TABLET ORAL 2 TIMES DAILY
Qty: 60 TABLET | Refills: 0 | Status: SHIPPED | OUTPATIENT
Start: 2017-08-07 | End: 2017-09-01
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
## 2017-08-07 NOTE — NURSING NOTE
Chief Complaint   Patient presents with     Recheck Medication     MDD     Reviewed allergies, smoking status, and pharmacy preference  Administered abuse screening question  Obtained weight, blood pressure and heart rate

## 2017-08-07 NOTE — MR AVS SNAPSHOT
After Visit Summary   8/7/2017    Mariaelena Jean Baptiste    MRN: [id number]           Patient Information     Date Of Birth          1970        Visit Information        Provider Department      8/7/2017 12:45 PM Shani Ireland MD Psychiatry Clinic        Today's Diagnoses     Cigarette nicotine dependence with nicotine-induced disorder    -  1    Moderate episode of recurrent major depressive disorder (H)           Follow-ups after your visit        Follow-up notes from your care team     Return in about 4 weeks (around 9/4/2017).      Your next 10 appointments already scheduled     Sep 01, 2017  9:55 AM CDT   Adult Med Follow UP with Shani Ireland MD   Psychiatry Clinic ([facility])    [address]  [address] [facility] [address] [phone]              Who to contact     Please call your clinic at [phone] to:    Ask questions about your health    Make or cancel appointments    Discuss your medicines    Learn about your test results    Speak to your doctor   If you have compliments or concerns about an experience at your clinic, or if you wish to file a complaint, please contact [facility] Patient Relations at [phone] or email us at [email or web address]         Additional Information About Your Visit        MyChart Information     [employer] gives you secure access to your electronic health record. If you see a primary care provider, you can also send messages to your care team and make appointments. If you have questions, please call your primary care clinic.  If you do not have a primary care provider, please call [phone] and they will assist you.      [employer] is an electronic gateway that provides easy, online access to your medical records. With [employer], you can request a clinic appointment, read your test results, renew a prescription or communicate with your care team.     To access your existing  account, please contact your [facility] or call [phone] for assistance.        Care EveryWhere ID     This is your Care EveryWhere ID. This could be used by other organizations to access your [facility] medical records  [id number]        Your Vitals Were     Pulse BMI (Body Mass Index)                94 21.26 kg/m2           Blood Pressure from Last 3 Encounters:   08/07/17 128/85   06/19/17 128/76   05/15/17 137/77    Weight from Last 3 Encounters:   08/07/17 69.1 kg (152 lb 6.4 oz)   06/19/17 68 kg (150 lb)   05/15/17 69.7 kg (153 lb 9.6 oz)              Today, you had the following     No orders found for display         Today's Medication Changes          These changes are accurate as of: 8/7/17 11:59 PM.  If you have any questions, ask your nurse or doctor.               Start taking these medicines.        Dose/Directions    varenicline 0.5 MG X 11 & 1 MG X 42 tablet   Commonly known as:  CHANTIX STARTING MONTH PAK   Used for:  Cigarette nicotine dependence with nicotine-induced disorder   Started by:  Shani Ireland MD        Take 0.5 mg tab daily for 3 days, then 0.5 mg tab twice daily for 4 days, then 1 mg twice daily.   Quantity:  53 tablet   Refills:  0            Where to get your medicines      These medications were sent to [facility] [address] [place] [address] & [address] [address]    Hours:  24-hours Phone:  [phone]     lamoTRIgine 150 MG tablet    levomilnacipran 120 MG 24 hr capsule    naltrexone 50 MG tablet    QUEtiapine 25 MG tablet    varenicline 0.5 MG X 11 & 1 MG X 42 tablet    vilazodone 40 MG Tabs tablet         Some of these will need a paper prescription and others can be bought over the counter.  Ask your nurse if you have questions.     Bring a paper prescription for each of these medications     amphetamine-dextroamphetamine 10 MG per tablet                Primary  Care Provider Office Phone # Fax #    Thalia Lisbeth Bradford [phone]       [address] [place] [address] [address]  [facility] [address]        Equal Access to Services     WAN DYKES : Hadtorsten jen sanchez adamao Soshahbazali, waaxda luqadaha, qaybta kaalmada adeegashokda, ivory scottgen renteria. [facility] [phone].    ATENCIÓN: Si habla español, tiene a alergia disposición servicios gratuitos de asistencia lingüística. [facility] [phone].    We comply with applicable federal civil rights laws and [place] laws. We do not discriminate on the basis of race, color, national origin, age, disability sex, sexual orientation or gender identity.            Thank you!     Thank you for choosing PSYCHIATRY CLINIC  for your care. Our goal is always to provide you with excellent care. Hearing back from our patients is one way we can continue to improve our services. Please take a few minutes to complete the written survey that you may receive in the mail after your visit with us. Thank you!             Your Updated Medication List - Protect others around you: Learn how to safely use, store and throw away your medicines at www.disposemymeds.org.          This list is accurate as of: 8/7/17 11:59 PM.  Always use your most recent med list.                   Brand Name Dispense Instructions for use Diagnosis    amphetamine-dextroamphetamine 10 MG per tablet    ADDERALL    60 tablet    Take 1 tablet (10 mg) by mouth 2 times daily    Moderate episode of recurrent major depressive disorder (H)       aspirin-acetaminophen-caffeine 250-250-65 MG per tablet    EXCEDRIN MIGRAINE    80 tablet    Take 1 tablet by mouth every 6 hours as needed for headaches    Tension headache       cetirizine 10 MG tablet    zyrTEC     Take 10 mg by mouth daily as needed for allergies        ibuprofen 800 MG tablet    ADVIL/MOTRIN     Take 800 mg by mouth        lamoTRIgine 150 MG tablet    LAMICTAL    30 tablet    Take 1 tablet (150 mg)  by mouth daily    Moderate episode of recurrent major depressive disorder (H)       LANsoprazole 15 MG CR capsule    PREVACID    30 capsule    Take 1 capsule (15 mg) by mouth every morning (before breakfast)    Esophageal reflux       levomilnacipran 120 MG 24 hr capsule    FETZIMA ER    30 capsule    Take 1 capsule (120 mg) by mouth daily    Moderate episode of recurrent major depressive disorder (H)       lisinopril 20 MG tablet    PRINIVIL/ZESTRIL    90 tablet    Take 1 tablet (20 mg) by mouth daily    Essential hypertension, benign       MAGNESIUM OXIDE PO           naltrexone 50 MG tablet    DEPADE;REVIA    90 tablet    Take 3 tablets (150 mg) by mouth daily    Moderate episode of recurrent major depressive disorder (H)       polyethylene glycol Packet    MIRALAX/GLYCOLAX     Take 1 packet by mouth every evening        QUEtiapine 25 MG tablet    SEROQUEL    60 tablet    Take 1-2 tablets (25-50 mg) by mouth nightly as needed    Moderate episode of recurrent major depressive disorder (H)       varenicline 0.5 MG X 11 & 1 MG X 42 tablet    CHANTIX STARTING MONTH JORDY    53 tablet    Take 0.5 mg tab daily for 3 days, then 0.5 mg tab twice daily for 4 days, then 1 mg twice daily.    Cigarette nicotine dependence with nicotine-induced disorder       vilazodone 40 MG Tabs tablet    VIIBRYD    30 tablet    Take 1 tablet (40 mg) by mouth daily    Moderate episode of recurrent major depressive disorder (H)       VITAMIN D (CHOLECALCIFEROL) PO      Take 1,000 Units by mouth 2 times daily
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
## 2017-08-10 ASSESSMENT — PATIENT HEALTH QUESTIONNAIRE - PHQ9: SUM OF ALL RESPONSES TO PHQ QUESTIONS 1-9: 7

## 2017-09-01 ENCOUNTER — OFFICE VISIT (OUTPATIENT)
Dept: PSYCHIATRY | Facility: CLINIC | Age: 47
End: 2017-09-01
Attending: PSYCHIATRY & NEUROLOGY
Payer: MEDICARE

## 2017-09-01 VITALS
HEIGHT: 71 IN | SYSTOLIC BLOOD PRESSURE: 128 MMHG | HEART RATE: 92 BPM | BODY MASS INDEX: 21.7 KG/M2 | WEIGHT: 155 LBS | DIASTOLIC BLOOD PRESSURE: 84 MMHG

## 2017-09-01 DIAGNOSIS — F33.1 MODERATE EPISODE OF RECURRENT MAJOR DEPRESSIVE DISORDER (H): ICD-10-CM

## 2017-09-01 DIAGNOSIS — F17.219 CIGARETTE NICOTINE DEPENDENCE WITH NICOTINE-INDUCED DISORDER: ICD-10-CM

## 2017-09-01 PROCEDURE — 99212 OFFICE O/P EST SF 10 MIN: CPT | Mod: ZF

## 2017-09-01 RX ORDER — DEXTROAMPHETAMINE SACCHARATE, AMPHETAMINE ASPARTATE, DEXTROAMPHETAMINE SULFATE AND AMPHETAMINE SULFATE 2.5; 2.5; 2.5; 2.5 MG/1; MG/1; MG/1; MG/1
10 TABLET ORAL 2 TIMES DAILY
Qty: 60 TABLET | Refills: 0 | Status: SHIPPED | OUTPATIENT
Start: 2017-10-07 | End: 2017-11-13

## 2017-09-01 RX ORDER — VARENICLINE TARTRATE 1 MG/1
1 TABLET, FILM COATED ORAL 2 TIMES DAILY
Qty: 60 TABLET | Refills: 1 | Status: SHIPPED | OUTPATIENT
Start: 2017-09-01 | End: 2017-10-02

## 2017-09-01 RX ORDER — DEXTROAMPHETAMINE SACCHARATE, AMPHETAMINE ASPARTATE, DEXTROAMPHETAMINE SULFATE AND AMPHETAMINE SULFATE 2.5; 2.5; 2.5; 2.5 MG/1; MG/1; MG/1; MG/1
10 TABLET ORAL 2 TIMES DAILY
Qty: 60 TABLET | Refills: 0 | Status: SHIPPED | OUTPATIENT
Start: 2017-09-07 | End: 2017-09-01

## 2017-09-01 NOTE — NURSING NOTE
Chief Complaint   Patient presents with     Recheck Medication     Cigarette nicotine disorder      Reviewed allergies, smoking status, and pharmacy preference  Obtained weight, blood pressure and heart rate     Pippa Burrows CMA
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
## 2017-09-01 NOTE — MR AVS SNAPSHOT
After Visit Summary   9/1/2017    Mariaelena Jean Baptiste    MRN: [id number]           Patient Information     Date Of Birth          1970        Visit Information        Provider Department      9/1/2017 9:55 AM Shani Ireland MD Psychiatry Clinic        Today's Diagnoses     Moderate episode of recurrent major depressive disorder (H)        Cigarette nicotine dependence with nicotine-induced disorder           Follow-ups after your visit        Follow-up notes from your care team     Return in about 4 weeks (around 9/29/2017).      Your next 10 appointments already scheduled     Oct 02, 2017 12:45 PM CDT   Adult Med Follow UP with Gloria Slater MD   Psychiatry Clinic ([facility])    [address]  [address] [facility] [address] [phone]              Who to contact     Please call your clinic at [phone] to:    Ask questions about your health    Make or cancel appointments    Discuss your medicines    Learn about your test results    Speak to your doctor   If you have compliments or concerns about an experience at your clinic, or if you wish to file a complaint, please contact [facility] Patient Relations at [phone] or email us at [email or web address]         Additional Information About Your Visit        MyChart Information     [employer] gives you secure access to your electronic health record. If you see a primary care provider, you can also send messages to your care team and make appointments. If you have questions, please call your primary care clinic.  If you do not have a primary care provider, please call [phone] and they will assist you.      [employer] is an electronic gateway that provides easy, online access to your medical records. With [employer] you can request a clinic appointment, read your test results, renew a prescription or communicate with your care team.     To access your existing  "account, please contact your [facility] or call [phone] for assistance.        Care EveryWhere ID     This is your Care EveryWhere ID. This could be used by other organizations to access your [place] medical records  [id number]        Your Vitals Were     Pulse Height BMI (Body Mass Index)             92 1.803 m (5' 11\") 21.62 kg/m2          Blood Pressure from Last 3 Encounters:   09/01/17 128/84   08/07/17 128/85   06/19/17 128/76    Weight from Last 3 Encounters:   09/01/17 70.3 kg (155 lb)   08/07/17 69.1 kg (152 lb 6.4 oz)   06/19/17 68 kg (150 lb)              Today, you had the following     No orders found for display         Today's Medication Changes          These changes are accurate as of: 9/1/17 11:59 PM.  If you have any questions, ask your nurse or doctor.               Start taking these medicines.        Dose/Directions    amphetamine-dextroamphetamine 10 MG per tablet   Commonly known as:  ADDERALL   Used for:  Moderate episode of recurrent major depressive disorder (H)        Dose:  10 mg   Start taking on:  10/7/2017   Take 1 tablet (10 mg) by mouth 2 times daily   Quantity:  60 tablet   Refills:  0         These medicines have changed or have updated prescriptions.        Dose/Directions    * varenicline 0.5 MG X 11 & 1 MG X 42 tablet   Commonly known as:  CHANTIX STARTING MONTH PAK   This may have changed:  Another medication with the same name was added. Make sure you understand how and when to take each.   Used for:  Cigarette nicotine dependence with nicotine-induced disorder        Take 0.5 mg tab daily for 3 days, then 0.5 mg tab twice daily for 4 days, then 1 mg twice daily.   Quantity:  53 tablet   Refills:  0       * varenicline 1 MG tablet   Commonly known as:  CHANTIX   This may have changed:  You were already taking a medication with the same name, and this prescription was added. Make sure you understand how and when to take each.   Used for:  " Cigarette nicotine dependence with nicotine-induced disorder        Dose:  1 mg   Take 1 tablet (1 mg) by mouth 2 times daily   Quantity:  60 tablet   Refills:  1       * Notice:  This list has 2 medication(s) that are the same as other medications prescribed for you. Read the directions carefully, and ask your doctor or other care provider to review them with you.         Where to get your medicines      [facility] [address] [place] [address] & [address] [address]    Hours:  24-hours Phone:  [phone]     varenicline 1 MG tablet         Some of these will need a paper prescription and others can be bought over the counter.  Ask your nurse if you have questions.     Bring a paper prescription for each of these medications     amphetamine-dextroamphetamine 10 MG per tablet                Primary Care Provider Office Phone # Fax #    Thalia Lisbeth Bradford [phone]       [address] [place] [address] [address] [place] [id number]        Equal Access to Services     EVELYN DYKES : Hadii jen ku hadasho Soomaali, waaxda luqadaha, qaybta kaalmada adeegyada, waxay orlandoin haygen marin . So [facility] [phone].    ATENCIÓN: Si habla español, tiene a alegria disposición servicios gratuitos de asistencia lingüística. [place] [phone].    We comply with applicable federal civil rights laws and [place] laws. We do not discriminate on the basis of race, color, national origin, age, disability sex, sexual orientation or gender identity.            Thank you!     Thank you for choosing PSYCHIATRY CLINIC  for your care. Our goal is always to provide you with excellent care. Hearing back from our patients is one way we can continue to improve our services. Please take a few minutes to complete the written survey that you may receive in the mail after your visit with us. Thank you!              Your Updated Medication List - Protect others around you: Learn how to safely use, store and throw away your medicines at www.disposemymeds.org.          This list is accurate as of: 9/1/17 11:59 PM.  Always use your most recent med list.                   Brand Name Dispense Instructions for use Diagnosis    amphetamine-dextroamphetamine 10 MG per tablet   Start taking on:  10/7/2017    ADDERALL    60 tablet    Take 1 tablet (10 mg) by mouth 2 times daily    Moderate episode of recurrent major depressive disorder (H)       aspirin-acetaminophen-caffeine 250-250-65 MG per tablet    EXCEDRIN MIGRAINE    80 tablet    Take 1 tablet by mouth every 6 hours as needed for headaches    Tension headache       cetirizine 10 MG tablet    zyrTEC     Take 10 mg by mouth daily as needed for allergies        ibuprofen 800 MG tablet    ADVIL/MOTRIN     Take 800 mg by mouth        lamoTRIgine 150 MG tablet    LAMICTAL    30 tablet    Take 1 tablet (150 mg) by mouth daily    Moderate episode of recurrent major depressive disorder (H)       LANsoprazole 15 MG CR capsule    PREVACID    30 capsule    Take 1 capsule (15 mg) by mouth every morning (before breakfast)    Esophageal reflux       levomilnacipran 120 MG 24 hr capsule    FETZIMA ER    30 capsule    Take 1 capsule (120 mg) by mouth daily    Moderate episode of recurrent major depressive disorder (H)       lisinopril 20 MG tablet    PRINIVIL/ZESTRIL    90 tablet    Take 1 tablet (20 mg) by mouth daily    Essential hypertension, benign       MAGNESIUM OXIDE PO           naltrexone 50 MG tablet    DEPADE;REVIA    90 tablet    Take 3 tablets (150 mg) by mouth daily    Moderate episode of recurrent major depressive disorder (H)       polyethylene glycol Packet    MIRALAX/GLYCOLAX     Take 1 packet by mouth every evening        QUEtiapine 25 MG tablet    SEROQUEL    60 tablet    Take 1-2 tablets (25-50 mg) by mouth nightly as needed    Moderate episode of recurrent major depressive  disorder (H)       * varenicline 0.5 MG X 11 & 1 MG X 42 tablet    CHANTIX STARTING MONTH JORDY    53 tablet    Take 0.5 mg tab daily for 3 days, then 0.5 mg tab twice daily for 4 days, then 1 mg twice daily.    Cigarette nicotine dependence with nicotine-induced disorder       * varenicline 1 MG tablet    CHANTIX    60 tablet    Take 1 tablet (1 mg) by mouth 2 times daily    Cigarette nicotine dependence with nicotine-induced disorder       vilazodone 40 MG Tabs tablet    VIIBRYD    30 tablet    Take 1 tablet (40 mg) by mouth daily    Moderate episode of recurrent major depressive disorder (H)       VITAMIN D (CHOLECALCIFEROL) PO      Take 1,000 Units by mouth 2 times daily        * Notice:  This list has 2 medication(s) that are the same as other medications prescribed for you. Read the directions carefully, and ask your doctor or other care provider to review them with you.

## 2017-10-02 ENCOUNTER — OFFICE VISIT (OUTPATIENT)
Dept: PSYCHIATRY | Facility: CLINIC | Age: 47
End: 2017-10-02
Attending: PSYCHIATRY & NEUROLOGY
Payer: MEDICARE

## 2017-10-02 VITALS
BODY MASS INDEX: 21.7 KG/M2 | DIASTOLIC BLOOD PRESSURE: 80 MMHG | SYSTOLIC BLOOD PRESSURE: 138 MMHG | WEIGHT: 155.6 LBS | HEART RATE: 101 BPM

## 2017-10-02 DIAGNOSIS — F17.219 CIGARETTE NICOTINE DEPENDENCE WITH NICOTINE-INDUCED DISORDER: ICD-10-CM

## 2017-10-02 PROCEDURE — 99212 OFFICE O/P EST SF 10 MIN: CPT | Mod: ZF

## 2017-10-02 RX ORDER — VARENICLINE TARTRATE 1 MG/1
1 TABLET, FILM COATED ORAL 2 TIMES DAILY
Qty: 60 TABLET | Refills: 1 | Status: SHIPPED | OUTPATIENT
Start: 2017-10-02 | End: 2017-11-13
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
## 2017-10-02 NOTE — MR AVS SNAPSHOT
After Visit Summary   10/2/2017    Mariaelena Jean Baptiste    MRN: [id number]           Patient Information     Date Of Birth          1970        Visit Information        Provider Department      10/2/2017 12:45 PM Gloria Slater MD Psychiatry Clinic        Today's Diagnoses     Cigarette nicotine dependence with nicotine-induced disorder           Follow-ups after your visit        Follow-up notes from your care team     Return in about 4 weeks (around 10/30/2017).      Your next 10 appointments already scheduled     Nov 13, 2017 12:45 PM CST   Adult Med Follow UP with Gloria Slater MD   Psychiatry Clinic ([facility])    [address]  [address] [facility] [id number] [phone]              Who to contact     Please call your clinic at [phone] to:    Ask questions about your health    Make or cancel appointments    Discuss your medicines    Learn about your test results    Speak to your doctor   If you have compliments or concerns about an experience at your clinic, or if you wish to file a complaint, please contact [facility] Patient Relations at [phone] or email us at [email or web address]         Additional Information About Your Visit        MyChart Information     [email or web address] gives you secure access to your electronic health record. If you see a primary care provider, you can also send messages to your care team and make appointments. If you have questions, please call your primary care clinic.  If you do not have a primary care provider, please call [phone] and they will assist you.      RACTIV is an electronic gateway that provides easy, online access to your medical records. With RACTIV, you can request a clinic appointment, read your test results, renew a prescription or communicate with your care team.     To access your existing account, please contact your [facility]  Clinic or call [phone] for assistance.        Care EveryWhere ID     This is your Care EveryWhere ID. This could be used by other organizations to access your [facility] medical records  [id number]        Your Vitals Were     Pulse BMI (Body Mass Index)                101 21.7 kg/m2           Blood Pressure from Last 3 Encounters:   10/02/17 138/80   09/01/17 128/84   08/07/17 128/85    Weight from Last 3 Encounters:   10/02/17 70.6 kg (155 lb 9.6 oz)   09/01/17 70.3 kg (155 lb)   08/07/17 69.1 kg (152 lb 6.4 oz)              Today, you had the following     No orders found for display         Today's Medication Changes          These changes are accurate as of: 10/2/17 11:59 PM.  If you have any questions, ask your nurse or doctor.               These medicines have changed or have updated prescriptions.        Dose/Directions    varenicline 1 MG tablet   Commonly known as:  CHANTIX   This may have changed:  Another medication with the same name was removed. Continue taking this medication, and follow the directions you see here.   Used for:  Cigarette nicotine dependence with nicotine-induced disorder   Changed by:  Gloria Slater MD        Dose:  1 mg   Take 1 tablet (1 mg) by mouth 2 times daily   Quantity:  60 tablet   Refills:  1            Where to get your medicines      These medications were sent to [facility] [address] [place] [address] & [address] [address]    Hours:  24-hours Phone:  [phone]     varenicline 1 MG tablet                Primary Care Provider Office Phone # Fax #    Thalia Lisbeth Bradford [phone]       [address] [place] [address] [address] [place] [id number]        Equal Access to Services     WAN DYKES AH: Harry Santos, wamazin rivera, qaangeliqueta kaalcamilo tovar, ivory renteria. So [facility] [phone].    ATENCIÓN: vanessa Samuels  alegria disposición servicios gratuitos de asistencia lingüística. Hair garcia [phone].    We comply with applicable federal civil rights laws and [place] laws. We do not discriminate on the basis of race, color, national origin, age, disability, sex, sexual orientation, or gender identity.            Thank you!     Thank you for choosing PSYCHIATRY CLINIC  for your care. Our goal is always to provide you with excellent care. Hearing back from our patients is one way we can continue to improve our services. Please take a few minutes to complete the written survey that you may receive in the mail after your visit with us. Thank you!             Your Updated Medication List - Protect others around you: Learn how to safely use, store and throw away your medicines at www.disposemymeds.org.          This list is accurate as of: 10/2/17 11:59 PM.  Always use your most recent med list.                   Brand Name Dispense Instructions for use Diagnosis    amphetamine-dextroamphetamine 10 MG per tablet    ADDERALL    60 tablet    Take 1 tablet (10 mg) by mouth 2 times daily    Moderate episode of recurrent major depressive disorder (H)       aspirin-acetaminophen-caffeine 250-250-65 MG per tablet    EXCEDRIN MIGRAINE    80 tablet    Take 1 tablet by mouth every 6 hours as needed for headaches    Tension headache       cetirizine 10 MG tablet    zyrTEC     Take 10 mg by mouth daily as needed for allergies        ibuprofen 800 MG tablet    ADVIL/MOTRIN     Take 800 mg by mouth        lamoTRIgine 150 MG tablet    LAMICTAL    30 tablet    Take 1 tablet (150 mg) by mouth daily    Moderate episode of recurrent major depressive disorder (H)       LANsoprazole 15 MG CR capsule    PREVACID    30 capsule    Take 1 capsule (15 mg) by mouth every morning (before breakfast)    Esophageal reflux       levomilnacipran 120 MG 24 hr capsule    FETZIMA ER    30 capsule    Take 1 capsule (120 mg) by mouth daily    Moderate episode of  recurrent major depressive disorder (H)       lisinopril 20 MG tablet    PRINIVIL/ZESTRIL    90 tablet    Take 1 tablet (20 mg) by mouth daily    Essential hypertension, benign       MAGNESIUM OXIDE PO           naltrexone 50 MG tablet    DEPADE;REVIA    90 tablet    Take 3 tablets (150 mg) by mouth daily    Moderate episode of recurrent major depressive disorder (H)       polyethylene glycol Packet    MIRALAX/GLYCOLAX     Take 1 packet by mouth every evening        QUEtiapine 25 MG tablet    SEROQUEL    60 tablet    Take 1-2 tablets (25-50 mg) by mouth nightly as needed    Moderate episode of recurrent major depressive disorder (H)       varenicline 1 MG tablet    CHANTIX    60 tablet    Take 1 tablet (1 mg) by mouth 2 times daily    Cigarette nicotine dependence with nicotine-induced disorder       vilazodone 40 MG Tabs tablet    VIIBRYD    30 tablet    Take 1 tablet (40 mg) by mouth daily    Moderate episode of recurrent major depressive disorder (H)       VITAMIN D (CHOLECALCIFEROL) PO      Take 1,000 Units by mouth 2 times daily

## 2017-11-13 ENCOUNTER — OFFICE VISIT (OUTPATIENT)
Dept: PSYCHIATRY | Facility: CLINIC | Age: 47
End: 2017-11-13
Attending: PSYCHIATRY & NEUROLOGY
Payer: MEDICARE

## 2017-11-13 VITALS
DIASTOLIC BLOOD PRESSURE: 70 MMHG | SYSTOLIC BLOOD PRESSURE: 121 MMHG | WEIGHT: 157.6 LBS | HEART RATE: 107 BPM | BODY MASS INDEX: 21.98 KG/M2

## 2017-11-13 DIAGNOSIS — F33.1 MODERATE EPISODE OF RECURRENT MAJOR DEPRESSIVE DISORDER (H): ICD-10-CM

## 2017-11-13 PROCEDURE — 99212 OFFICE O/P EST SF 10 MIN: CPT | Mod: ZF

## 2017-11-13 RX ORDER — DEXTROAMPHETAMINE SACCHARATE, AMPHETAMINE ASPARTATE, DEXTROAMPHETAMINE SULFATE AND AMPHETAMINE SULFATE 2.5; 2.5; 2.5; 2.5 MG/1; MG/1; MG/1; MG/1
10 TABLET ORAL 2 TIMES DAILY
Qty: 60 TABLET | Refills: 0 | Status: SHIPPED | OUTPATIENT
Start: 2017-11-13 | End: 2018-01-03

## 2017-11-13 RX ORDER — NALTREXONE HYDROCHLORIDE 50 MG/1
150 TABLET, FILM COATED ORAL DAILY
Qty: 90 TABLET | Refills: 2 | Status: SHIPPED | OUTPATIENT
Start: 2017-11-13 | End: 2018-08-06

## 2017-11-13 RX ORDER — QUETIAPINE FUMARATE 25 MG/1
25-50 TABLET, FILM COATED ORAL
Qty: 60 TABLET | Refills: 2 | Status: SHIPPED | OUTPATIENT
Start: 2017-11-13 | End: 2018-05-10

## 2017-11-13 RX ORDER — VILAZODONE HYDROCHLORIDE 40 MG/1
40 TABLET ORAL DAILY
Qty: 30 TABLET | Refills: 2 | Status: SHIPPED | OUTPATIENT
Start: 2017-11-13 | End: 2018-05-10

## 2017-11-13 RX ORDER — LAMOTRIGINE 150 MG/1
150 TABLET ORAL DAILY
Qty: 30 TABLET | Refills: 2 | Status: SHIPPED | OUTPATIENT
Start: 2017-11-13 | End: 2018-03-07

## 2017-11-13 ASSESSMENT — PATIENT HEALTH QUESTIONNAIRE - PHQ9: SUM OF ALL RESPONSES TO PHQ QUESTIONS 1-9: 5

## 2017-11-13 NOTE — NURSING NOTE
Chief Complaint   Patient presents with     Recheck Medication     MDD, tobacco dependence     Reviewed allergies, smoking status, and pharmacy preference  Administered abuse screening questions   Obtained weight, blood pressure and heart rate

## 2017-11-13 NOTE — MR AVS SNAPSHOT
After Visit Summary   11/13/2017    Mariaelena Jean Baptiste    MRN: [id number]           Patient Information     Date Of Birth          1970        Visit Information        Provider Department      11/13/2017 12:45 PM Gloria Slater MD Psychiatry Clinic        Today's Diagnoses     Moderate episode of recurrent major depressive disorder (H)           Follow-ups after your visit        Follow-up notes from your care team     Return in about 6 weeks (around 12/25/2017).      Your next 10 appointments already scheduled     Jan 03, 2018  1:45 PM CST   Adult Med Follow UP with Shani Ireland MD   Psychiatry Clinic ([facility])    [address]  [address] [facility] [id number] [phone]              Who to contact     Please call your clinic at [phone] to:    Ask questions about your health    Make or cancel appointments    Discuss your medicines    Learn about your test results    Speak to your doctor   If you have compliments or concerns about an experience at your clinic, or if you wish to file a complaint, please contact [facility] Patient Relations at [phone] or email us at [email or web address]         Additional Information About Your Visit        MyChart Information     [employer] gives you secure access to your electronic health record. If you see a primary care provider, you can also send messages to your care team and make appointments. If you have questions, please call your primary care clinic.  If you do not have a primary care provider, please call [phone] and they will assist you.      [employer] is an electronic gateway that provides easy, online access to your medical records. With [employer], you can request a clinic appointment, read your test results, renew a prescription or communicate with your care team.     To access your existing account, please contact your [facility]  Clinic or call [phone] for assistance.        Care EveryWhere ID     This is your Care EveryWhere ID. This could be used by other organizations to access your [facility] medical records  [id number]        Your Vitals Were     Pulse BMI (Body Mass Index)                107 21.98 kg/m2           Blood Pressure from Last 3 Encounters:   11/13/17 121/70   10/02/17 138/80   09/01/17 128/84    Weight from Last 3 Encounters:   11/13/17 71.5 kg (157 lb 9.6 oz)   10/02/17 70.6 kg (155 lb 9.6 oz)   09/01/17 70.3 kg (155 lb)              Today, you had the following     No orders found for display         Today's Medication Changes          These changes are accurate as of: 11/13/17 11:59 PM.  If you have any questions, ask your nurse or doctor.               Stop taking these medicines if you haven't already. Please contact your care team if you have questions.     LANsoprazole 15 MG CR capsule   Commonly known as:  PREVACID   Stopped by:  Gloria Slater MD           varenicline 1 MG tablet   Commonly known as:  CHANTIX   Stopped by:  Gloria Slater MD                Where to get your medicines      These medications were sent to [facility] [address] [place] [address] & [address] [address]    Hours:  24-hours Phone:  [phone]     lamoTRIgine 150 MG tablet    levomilnacipran 120 MG 24 hr capsule    naltrexone 50 MG tablet    QUEtiapine 25 MG tablet    vilazodone 40 MG Tabs tablet         Some of these will need a paper prescription and others can be bought over the counter.  Ask your nurse if you have questions.     Bring a paper prescription for each of these medications     amphetamine-dextroamphetamine 10 MG per tablet                Primary Care Provider Office Phone # Fax #    Thalia Bradford [phone]       [address] [place] [address] [address] [place] [id number]        Equal Access to Services     WAN  GAAR : Hadii aad ku danuta Santos, waaxda luqadaha, qaybta kailianada guy, ivory angelia winifredasif albrecht davidkhoi marin . So [facility] [phone].    ATENCIÓN: Si habla español, tiene a alegria disposición servicios gratuitos de asistencia lingüística. Llame al [phone].    We comply with applicable federal civil rights laws and [place] laws. We do not discriminate on the basis of race, color, national origin, age, disability, sex, sexual orientation, or gender identity.            Thank you!     Thank you for choosing PSYCHIATRY CLINIC  for your care. Our goal is always to provide you with excellent care. Hearing back from our patients is one way we can continue to improve our services. Please take a few minutes to complete the written survey that you may receive in the mail after your visit with us. Thank you!             Your Updated Medication List - Protect others around you: Learn how to safely use, store and throw away your medicines at www.disposemymeds.org.          This list is accurate as of: 11/13/17 11:59 PM.  Always use your most recent med list.                   Brand Name Dispense Instructions for use Diagnosis    amphetamine-dextroamphetamine 10 MG per tablet    ADDERALL    60 tablet    Take 1 tablet (10 mg) by mouth 2 times daily    Moderate episode of recurrent major depressive disorder (H)       aspirin-acetaminophen-caffeine 250-250-65 MG per tablet    EXCEDRIN MIGRAINE    80 tablet    Take 1 tablet by mouth every 6 hours as needed for headaches    Tension headache       cetirizine 10 MG tablet    zyrTEC     Take 10 mg by mouth daily as needed for allergies        ibuprofen 800 MG tablet    ADVIL/MOTRIN     Take 800 mg by mouth        lamoTRIgine 150 MG tablet    LAMICTAL    30 tablet    Take 1 tablet (150 mg) by mouth daily    Moderate episode of recurrent major depressive disorder (H)       levomilnacipran 120 MG 24 hr capsule    FETZIMA ER    30 capsule    Take 1 capsule (120 mg) by mouth  daily    Moderate episode of recurrent major depressive disorder (H)       lisinopril 20 MG tablet    PRINIVIL/ZESTRIL    90 tablet    Take 1 tablet (20 mg) by mouth daily    Essential hypertension, benign       MAGNESIUM OXIDE PO           naltrexone 50 MG tablet    DEPADE;REVIA    90 tablet    Take 3 tablets (150 mg) by mouth daily    Moderate episode of recurrent major depressive disorder (H)       polyethylene glycol Packet    MIRALAX/GLYCOLAX     Take 1 packet by mouth every evening        QUEtiapine 25 MG tablet    SEROQUEL    60 tablet    Take 1-2 tablets (25-50 mg) by mouth nightly as needed    Moderate episode of recurrent major depressive disorder (H)       vilazodone 40 MG Tabs tablet    VIIBRYD    30 tablet    Take 1 tablet (40 mg) by mouth daily    Moderate episode of recurrent major depressive disorder (H)       VITAMIN D (CHOLECALCIFEROL) PO      Take 1,000 Units by mouth 2 times daily
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
## 2017-11-13 NOTE — Clinical Note
Did not staff in clinic - treatment plan to be done with Dr. Ireland as I am covering during her maternity leave.

## 2018-01-03 ENCOUNTER — OFFICE VISIT (OUTPATIENT)
Dept: PSYCHIATRY | Facility: CLINIC | Age: 48
End: 2018-01-03
Attending: PSYCHIATRY & NEUROLOGY
Payer: MEDICARE

## 2018-01-03 VITALS
SYSTOLIC BLOOD PRESSURE: 143 MMHG | DIASTOLIC BLOOD PRESSURE: 92 MMHG | BODY MASS INDEX: 22.29 KG/M2 | HEART RATE: 106 BPM | WEIGHT: 159.8 LBS

## 2018-01-03 DIAGNOSIS — F33.1 MODERATE EPISODE OF RECURRENT MAJOR DEPRESSIVE DISORDER (H): ICD-10-CM

## 2018-01-03 PROCEDURE — G0463 HOSPITAL OUTPT CLINIC VISIT: HCPCS | Mod: ZF

## 2018-01-03 RX ORDER — DEXTROAMPHETAMINE SACCHARATE, AMPHETAMINE ASPARTATE, DEXTROAMPHETAMINE SULFATE AND AMPHETAMINE SULFATE 2.5; 2.5; 2.5; 2.5 MG/1; MG/1; MG/1; MG/1
10 TABLET ORAL 2 TIMES DAILY
Qty: 60 TABLET | Refills: 0 | Status: SHIPPED | OUTPATIENT
Start: 2018-01-03 | End: 2018-02-05
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
## 2018-01-03 NOTE — MR AVS SNAPSHOT
After Visit Summary   1/3/2018    Mariaelena Jean Baptiste    MRN: [id number]           Patient Information     Date Of Birth          1970        Visit Information        Provider Department      1/3/2018 1:45 PM Shani Ireland MD Psychiatry Clinic        Today's Diagnoses     Moderate episode of recurrent major depressive disorder (H)          Care Instructions    1. No medication changes  2. Return in 4-6 weeks or earlier if needed          Follow-ups after your visit        Follow-up notes from your care team     Return in about 4 weeks (around 1/31/2018).      Your next 10 appointments already scheduled     Feb 05, 2018  1:45 PM CST   Adult Med Follow UP with Shani Ireland MD   Psychiatry Clinic ([facility])    [address] [facility] [address] [phone]              Who to contact     Please call your clinic at [phone] to:    Ask questions about your health    Make or cancel appointments    Discuss your medicines    Learn about your test results    Speak to your doctor   If you have compliments or concerns about an experience at your clinic, or if you wish to file a complaint, please contact [facility] Patient Relations at [phone] or email us at [email or web address]         Additional Information About Your Visit        MyChart Information     [employer] gives you secure access to your electronic health record. If you see a primary care provider, you can also send messages to your care team and make appointments. If you have questions, please call your primary care clinic.  If you do not have a primary care provider, please call [phone] and they will assist you.      [employer] is an electronic gateway that provides easy, online access to your medical records. With [employer], you can request a clinic appointment, read your test results, renew a prescription or communicate with your care team.      To access your existing account, please contact your [facility] or call [phone] for assistance.        Care EveryWhere ID     This is your Care EveryWhere ID. This could be used by other organizations to access your [facility] medical records  [id number]        Your Vitals Were     Pulse BMI (Body Mass Index)                106 22.29 kg/m2           Blood Pressure from Last 3 Encounters:   01/03/18 (!) 143/92   11/13/17 121/70   10/02/17 138/80    Weight from Last 3 Encounters:   01/03/18 72.5 kg (159 lb 12.8 oz)   11/13/17 71.5 kg (157 lb 9.6 oz)   10/02/17 70.6 kg (155 lb 9.6 oz)              Today, you had the following     No orders found for display         Where to get your medicines      Some of these will need a paper prescription and others can be bought over the counter.  Ask your nurse if you have questions.     Bring a paper prescription for each of these medications     amphetamine-dextroamphetamine 10 MG per tablet          Primary Care Provider Office Phone # Fax #    Thalia Lisbeth Bradford [phone]       [address] [place] [address] [address] [place] [address]        Equal Access to Services     WAN DYKES : Hadii jen galano Soshawn, waaxda nicole, qaybta kaalmada ademichael, ivory renteria. So [facility] [phone].    ATENCIÓN: Si habla español, tiene a alegria disposición servicios gratuitos de asistencia lingüística. Llame al [phone].    We comply with applicable federal civil rights laws and [place] laws. We do not discriminate on the basis of race, color, national origin, age, disability, sex, sexual orientation, or gender identity.            Thank you!     Thank you for choosing PSYCHIATRY CLINIC  for your care. Our goal is always to provide you with excellent care. Hearing back from our patients is one way we can continue to improve our services. Please take a few minutes to complete the written survey that  you may receive in the mail after your visit with us. Thank you!             Your Updated Medication List - Protect others around you: Learn how to safely use, store and throw away your medicines at www.disposemymeds.org.          This list is accurate as of: 1/3/18 11:59 PM.  Always use your most recent med list.                   Brand Name Dispense Instructions for use Diagnosis    amphetamine-dextroamphetamine 10 MG per tablet    ADDERALL    60 tablet    Take 1 tablet (10 mg) by mouth 2 times daily    Moderate episode of recurrent major depressive disorder (H)       aspirin-acetaminophen-caffeine 250-250-65 MG per tablet    EXCEDRIN MIGRAINE    80 tablet    Take 1 tablet by mouth every 6 hours as needed for headaches    Tension headache       cetirizine 10 MG tablet    zyrTEC     Take 10 mg by mouth daily as needed for allergies        ibuprofen 800 MG tablet    ADVIL/MOTRIN     Take 800 mg by mouth        lamoTRIgine 150 MG tablet    LAMICTAL    30 tablet    Take 1 tablet (150 mg) by mouth daily    Moderate episode of recurrent major depressive disorder (H)       levomilnacipran 120 MG 24 hr capsule    FETZIMA ER    30 capsule    Take 1 capsule (120 mg) by mouth daily    Moderate episode of recurrent major depressive disorder (H)       lisinopril 20 MG tablet    PRINIVIL/ZESTRIL    90 tablet    Take 1 tablet (20 mg) by mouth daily    Essential hypertension, benign       MAGNESIUM OXIDE PO           naltrexone 50 MG tablet    DEPADE;REVIA    90 tablet    Take 3 tablets (150 mg) by mouth daily    Moderate episode of recurrent major depressive disorder (H)       polyethylene glycol Packet    MIRALAX/GLYCOLAX     Take 1 packet by mouth every evening        QUEtiapine 25 MG tablet    SEROQUEL    60 tablet    Take 1-2 tablets (25-50 mg) by mouth nightly as needed    Moderate episode of recurrent major depressive disorder (H)       vilazodone 40 MG Tabs tablet    VIIBRYD    30 tablet    Take 1 tablet (40 mg) by mouth  daily    Moderate episode of recurrent major depressive disorder (H)       VITAMIN D (CHOLECALCIFEROL) PO      Take 1,000 Units by mouth 2 times daily

## 2018-02-05 ENCOUNTER — OFFICE VISIT (OUTPATIENT)
Dept: PSYCHIATRY | Facility: CLINIC | Age: 48
End: 2018-02-05
Attending: PSYCHIATRY & NEUROLOGY
Payer: MEDICARE

## 2018-02-05 VITALS
SYSTOLIC BLOOD PRESSURE: 125 MMHG | WEIGHT: 158.8 LBS | BODY MASS INDEX: 22.15 KG/M2 | HEART RATE: 101 BPM | DIASTOLIC BLOOD PRESSURE: 87 MMHG

## 2018-02-05 DIAGNOSIS — F33.1 MODERATE EPISODE OF RECURRENT MAJOR DEPRESSIVE DISORDER (H): ICD-10-CM

## 2018-02-05 PROCEDURE — G0463 HOSPITAL OUTPT CLINIC VISIT: HCPCS | Mod: ZF

## 2018-02-05 RX ORDER — DEXTROAMPHETAMINE SACCHARATE, AMPHETAMINE ASPARTATE, DEXTROAMPHETAMINE SULFATE AND AMPHETAMINE SULFATE 2.5; 2.5; 2.5; 2.5 MG/1; MG/1; MG/1; MG/1
10 TABLET ORAL DAILY
Qty: 30 TABLET | Refills: 0 | Status: SHIPPED | OUTPATIENT
Start: 2018-02-05 | End: 2018-03-07

## 2018-02-05 RX ORDER — DEXTROAMPHETAMINE SACCHARATE, AMPHETAMINE ASPARTATE, DEXTROAMPHETAMINE SULFATE AND AMPHETAMINE SULFATE 2.5; 2.5; 2.5; 2.5 MG/1; MG/1; MG/1; MG/1
10 TABLET ORAL DAILY
Qty: 30 TABLET | Refills: 0 | Status: SHIPPED | OUTPATIENT
Start: 2018-04-08 | End: 2018-04-02

## 2018-02-05 RX ORDER — DEXTROAMPHETAMINE SACCHARATE, AMPHETAMINE ASPARTATE, DEXTROAMPHETAMINE SULFATE AND AMPHETAMINE SULFATE 2.5; 2.5; 2.5; 2.5 MG/1; MG/1; MG/1; MG/1
10 TABLET ORAL DAILY
Qty: 30 TABLET | Refills: 0 | Status: SHIPPED | OUTPATIENT
Start: 2018-03-08 | End: 2018-03-07

## 2018-02-05 ASSESSMENT — PAIN SCALES - GENERAL: PAINLEVEL: NO PAIN (0)

## 2018-02-05 NOTE — NURSING NOTE
Chief Complaint   Patient presents with     Recheck Medication     Moderate episode of recurrent major depressive disorder     Reviewed Allergies, Medications, Pharmacy, Smoking Status, and Pain Level  Administered Abuse Screening Questions   Obtained Weight, Blood Pressure, Heart Rate

## 2018-02-05 NOTE — MR AVS SNAPSHOT
After Visit Summary   2/5/2018    Mariaelena Jean Baptiste    MRN: [id number]           Patient Information     Date Of Birth          1970        Visit Information        Provider Department      2/5/2018 1:45 PM Shani Ireland MD [facility]        Today's Diagnoses     Moderate episode of recurrent major depressive disorder (H)           Follow-ups after your visit        Your next 10 appointments already scheduled     Apr 02, 2018 12:45 PM CDT   Adult Med Follow UP with Shani Ireland MD   Psychiatry Clinic ([facility])    [address]  [address] [facility] [id number] [phone]              Who to contact     Please call your clinic at [phone] to:    Ask questions about your health    Make or cancel appointments    Discuss your medicines    Learn about your test results    Speak to your doctor            Additional Information About Your Visit        MyChart Information     [employer] gives you secure access to your electronic health record. If you see a primary care provider, you can also send messages to your care team and make appointments. If you have questions, please call your primary care clinic.  If you do not have a primary care provider, please call [phone] and they will assist you.      [employer] is an electronic gateway that provides easy, online access to your medical records. With [employer], you can request a clinic appointment, read your test results, renew a prescription or communicate with your care team.     To access your existing account, please contact your [facility] or call [phone] for assistance.        Care EveryWhere ID     This is your Care EveryWhere ID. This could be used by other organizations to access your [facility] medical records  [id number]        Your Vitals Were     Pulse BMI (Body Mass Index)                101 22.15 kg/m2           Blood Pressure from Last 3 Encounters:    02/05/18 125/87   01/03/18 (!) 143/92   11/13/17 121/70    Weight from Last 3 Encounters:   02/05/18 72 kg (158 lb 12.8 oz)   01/03/18 72.5 kg (159 lb 12.8 oz)   11/13/17 71.5 kg (157 lb 9.6 oz)              Today, you had the following     No orders found for display         Today's Medication Changes          These changes are accurate as of 2/5/18 11:59 PM.  If you have any questions, ask your nurse or doctor.               These medicines have changed or have updated prescriptions.        Dose/Directions    * amphetamine-dextroamphetamine 10 MG per tablet   Commonly known as:  ADDERALL   This may have changed:  You were already taking a medication with the same name, and this prescription was added. Make sure you understand how and when to take each.   Used for:  Moderate episode of recurrent major depressive disorder (H)   Changed by:  Shani Ireland MD        Dose:  10 mg   Take 1 tablet (10 mg) by mouth daily for 30 doses   Quantity:  30 tablet   Refills:  0       * amphetamine-dextroamphetamine 10 MG per tablet   Commonly known as:  ADDERALL   This may have changed:  You were already taking a medication with the same name, and this prescription was added. Make sure you understand how and when to take each.   Used for:  Moderate episode of recurrent major depressive disorder (H)   Changed by:  Shani Ireland MD        Dose:  10 mg   Start taking on:  3/8/2018   Take 1 tablet (10 mg) by mouth daily for 30 doses   Quantity:  30 tablet   Refills:  0       * amphetamine-dextroamphetamine 10 MG per tablet   Commonly known as:  ADDERALL   This may have changed:  when to take this   Used for:  Moderate episode of recurrent major depressive disorder (H)   Changed by:  Shani Ireland MD        Dose:  10 mg   Start taking on:  4/8/2018   Take 1 tablet (10 mg) by mouth daily for 30 doses   Quantity:  30 tablet   Refills:  0       * Notice:  This list has 3 medication(s) that are the same as other medications prescribed  for you. Read the directions carefully, and ask your doctor or other care provider to review them with you.         Where to get your medicines      Some of these will need a paper prescription and others can be bought over the counter.  Ask your nurse if you have questions.     Bring a paper prescription for each of these medications     amphetamine-dextroamphetamine 10 MG per tablet    amphetamine-dextroamphetamine 10 MG per tablet    amphetamine-dextroamphetamine 10 MG per tablet                Primary Care Provider Office Phone # Fax #    Thalia Lisbeth Bradford [phone]       [address] [place] [address] [address] [place] [id number]        Equal Access to Services     [facility] Center[facility]IM[facility] : Hadii aad ku hadasho Soomaali, waaxda luqadaha, qaybta kaalmada adeegyada, ivory marin . So [facility] [phone].    ATENCIÓN: Si habla español, tiene a alegria disposición servicios gratuitos de asistencia lingüística. [facility] [phone].    We comply with applicable federal civil rights laws and [place] laws. We do not discriminate on the basis of race, color, national origin, age, disability, sex, sexual orientation, or gender identity.            Thank you!     Thank you for choosing PSYCHIATRY CLINIC  for your care. Our goal is always to provide you with excellent care. Hearing back from our patients is one way we can continue to improve our services. Please take a few minutes to complete the written survey that you may receive in the mail after your visit with us. Thank you!             Your Updated Medication List - Protect others around you: Learn how to safely use, store and throw away your medicines at www.disposemymeds.org.          This list is accurate as of 2/5/18 11:59 PM.  Always use your most recent med list.                   Brand Name Dispense Instructions for use Diagnosis    * amphetamine-dextroamphetamine 10 MG per tablet    ADDERALL    30 tablet    Take 1 tablet (10 mg) by  mouth daily for 30 doses    Moderate episode of recurrent major depressive disorder (H)       * amphetamine-dextroamphetamine 10 MG per tablet   Start taking on:  3/8/2018    ADDERALL    30 tablet    Take 1 tablet (10 mg) by mouth daily for 30 doses    Moderate episode of recurrent major depressive disorder (H)       * amphetamine-dextroamphetamine 10 MG per tablet   Start taking on:  4/8/2018    ADDERALL    30 tablet    Take 1 tablet (10 mg) by mouth daily for 30 doses    Moderate episode of recurrent major depressive disorder (H)       aspirin-acetaminophen-caffeine 250-250-65 MG per tablet    EXCEDRIN MIGRAINE    80 tablet    Take 1 tablet by mouth every 6 hours as needed for headaches    Tension headache       cetirizine 10 MG tablet    zyrTEC     Take 10 mg by mouth daily as needed for allergies        ibuprofen 800 MG tablet    ADVIL/MOTRIN     Take 800 mg by mouth        lamoTRIgine 150 MG tablet    LAMICTAL    30 tablet    Take 1 tablet (150 mg) by mouth daily    Moderate episode of recurrent major depressive disorder (H)       levomilnacipran 120 MG 24 hr capsule    FETZIMA ER    30 capsule    Take 1 capsule (120 mg) by mouth daily    Moderate episode of recurrent major depressive disorder (H)       lisinopril 20 MG tablet    PRINIVIL/ZESTRIL    90 tablet    Take 1 tablet (20 mg) by mouth daily    Essential hypertension, benign       MAGNESIUM OXIDE PO           naltrexone 50 MG tablet    DEPADE;REVIA    90 tablet    Take 3 tablets (150 mg) by mouth daily    Moderate episode of recurrent major depressive disorder (H)       polyethylene glycol Packet    MIRALAX/GLYCOLAX     Take 1 packet by mouth every evening        QUEtiapine 25 MG tablet    SEROQUEL    60 tablet    Take 1-2 tablets (25-50 mg) by mouth nightly as needed    Moderate episode of recurrent major depressive disorder (H)       vilazodone 40 MG Tabs tablet    VIIBRYD    30 tablet    Take 1 tablet (40 mg) by mouth daily    Moderate episode of  recurrent major depressive disorder (H)       VITAMIN D (CHOLECALCIFEROL) PO      Take 1,000 Units by mouth 2 times daily        * Notice:  This list has 3 medication(s) that are the same as other medications prescribed for you. Read the directions carefully, and ask your doctor or other care provider to review them with you.
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
## 2018-02-07 ASSESSMENT — PATIENT HEALTH QUESTIONNAIRE - PHQ9: SUM OF ALL RESPONSES TO PHQ QUESTIONS 1-9: 5

## 2018-03-07 ENCOUNTER — OFFICE VISIT (OUTPATIENT)
Dept: URGENT CARE | Facility: URGENT CARE | Age: 48
End: 2018-03-07
Payer: MEDICARE

## 2018-03-07 VITALS
RESPIRATION RATE: 16 BRPM | BODY MASS INDEX: 21.7 KG/M2 | WEIGHT: 155 LBS | HEIGHT: 71 IN | SYSTOLIC BLOOD PRESSURE: 144 MMHG | HEART RATE: 88 BPM | TEMPERATURE: 98.1 F | DIASTOLIC BLOOD PRESSURE: 82 MMHG

## 2018-03-07 DIAGNOSIS — M62.838 MUSCLE SPASM: Primary | ICD-10-CM

## 2018-03-07 PROCEDURE — 99213 OFFICE O/P EST LOW 20 MIN: CPT | Performed by: PHYSICIAN ASSISTANT

## 2018-03-07 RX ORDER — CYCLOBENZAPRINE HYDROCHLORIDE 7.5 MG/1
7.5 TABLET, FILM COATED ORAL 3 TIMES DAILY PRN
Qty: 30 TABLET | Refills: 0 | Status: SHIPPED | OUTPATIENT
Start: 2018-03-07 | End: 2018-06-08

## 2018-03-07 ASSESSMENT — ENCOUNTER SYMPTOMS
BACK PAIN: 1
HEADACHES: 0
JOINT SWELLING: 0
DIZZINESS: 0
SHORTNESS OF BREATH: 0
CONSTITUTIONAL NEGATIVE: 1
COUGH: 0
WEAKNESS: 0

## 2018-03-07 NOTE — MR AVS SNAPSHOT
After Visit Summary   3/7/2018    Mariaelena Jean Baptiste    MRN: [id number]           Patient Information     Date Of Birth          1970        Visit Information        Provider Department      3/7/2018 8:15 PM Angela Yang PA-C [facility]        Today's Diagnoses     Muscle spasm    -  1      Care Instructions      Back Spasm (No Trauma)    Spasm of the back muscles can occur after a sudden forceful twisting or bending force (such as in a car accident), after a simple awkward movement, or after lifting something heavy with poor body positioning. In any case, muscle spasm adds to the pain. Sleeping in an awkward position or on a poor quality mattress can also cause this. Some people respond to emotional stress by tensing the muscles of their back.  Pain that continues may need further evaluation or other types of treatment such as physical therapy.  You don't always need X-rays for the initial evaluation of back pain, unless you had a physical injury such as from a car accident or fall. If your pain continues and doesn't respond to medical treatment, X-rays and other tests may then be done.   Home care    As soon as possible, start sitting or walking again to avoid problems from prolonged bed rest (muscle weakness, worsening back stiffness and pain, blood clots in the legs).    When in bed, try to find a position of comfort. A firm mattress is best. Try lying flat on your back with pillows under your knees. You can also try lying on your side with your knees bent up toward your chest and a pillow between your knees.    Avoid prolonged sitting, long car rides, or travel. This puts more stress on the lower back than standing or walking.     During the first 24 to 72 hours after an injury or flare-up, apply an ice pack to the painful area for 20 minutes, then remove it for 20 minutes. Do this over a period of 60 to 90 minutes or several times a day. This will reduce  swelling and pain. Always wrap ice packs in a thin towel.    You can start with ice, then switch to heat. Heat (hot shower, hot bath, or heating pad) reduces pain, and works well for muscle spasms. Apply heat to the painful area for 20 minutes, then remove it for 20 minutes. Do this over a period of 60 to 90 minutes or several times a day. Do not sleep on a heating pad as it can burn or damage skin.    Alternate ice and heat therapies.    Be aware of safe lifting methods and do not lift anything over 15 pounds until all the pain is gone.  Gentle stretching will help your back heal faster. Do this simple routine 2 to 3 times a day until your back is feeling better.    Lie on your back with your knees bent and both feet on the ground    Slowly raise your left knee to your chest as you flatten your lower back against the floor. Hold for 20 to 30 seconds.    Relax and repeat the exercise with your right knee.    Do 2 to 3 of these exercises for each leg.    Repeat, hugging both knees to your chest at the same time.    Do not bounce, but use a gentle pull.  Medicines  Talk to your doctor before using medicine, especially if you have other medical problems or are taking other medicines.  You may use acetaminophen or ibuprofen to control pain, unless your healthcare provider prescribed another pain medicine. If you have a chronic condition such as diabetes, liver or kidney disease, stomach ulcer, or gastrointestinal bleeding, or are taking blood thinners, talk with your healthcare provider before taking any medicines.  Be careful if you are given prescription pain medicine, narcotics, or medicine for muscle spasm. They can cause drowsiness, affect your coordination, reflexes, or judgment. Do not drive or operate heavy machinery when taking these medicines. Take pain medicine only as prescribed by your healthcare provider.  Follow-up care  Follow up with your doctor, or as advised. Physical therapy or further tests may be  needed.  If X-rays were taken, they may be reviewed by a radiologist. You will be notified of any new findings that may affect your care.  Call 911  Seek emergency medical care if any of these occur:    Trouble breathing    Confusion    Drowsiness or trouble awakening    Fainting or loss of consciousness    Rapid or very slow heart rate    Loss of bowel or bladder control  When to seek medical advice  Call your healthcare provider right away if any of these occur:    Pain becomes worse or spreads to your legs    Weakness or numbness in one or both legs    Numbness in the groin or genital area    Unexplained fever over 100.4 F (38.0 C)    Burning or pain when passing urine  Date Last Reviewed: 6/1/2016 2000-2017 [employer]. [address] [place] [address], [place] [id number]. All rights reserved. This information is not intended as a substitute for professional medical care. Always follow your healthcare professional's instructions.                Follow-ups after your visit        Your next 10 appointments already scheduled     Apr 02, 2018 12:45 PM CDT   Adult Med Follow UP with Shani Ireland MD   Psychiatry Clinic ([facility])    [place] [id number]  [address] [place] [id number] [phone]              Who to contact     If you have questions or need follow up information about today's clinic visit or your schedule please contact [facility] directly at [phone].  Normal or non-critical lab and imaging results will be communicated to you by MyChart, letter or phone within 4 business days after the clinic has received the results. If you do not hear from us within 7 days, please contact the clinic through MyChart or phone. If you have a critical or abnormal lab result, we will notify you by phone as soon as possible.  Submit refill requests through [employer] or call your pharmacy and they will forward the refill request to us. Please  "allow 3 business days for your refill to be completed.          Additional Information About Your Visit        [employer] Information     [employer] gives you secure access to your electronic health record. If you see a primary care provider, you can also send messages to your care team and make appointments. If you have questions, please call your primary care clinic.  If you do not have a primary care provider, please call [phone] and they will assist you.        Care EveryWhere ID     This is your Care EveryWhere ID. This could be used by other organizations to access your [place] medical records  [id number]        Your Vitals Were     Pulse Temperature Respirations Height BMI (Body Mass Index)       88 98.1  F (36.7  C) (Oral) 16 5' 11\" (1.803 m) 21.62 kg/m2        Blood Pressure from Last 3 Encounters:   03/07/18 144/82   05/04/17 135/89   03/17/17 126/81    Weight from Last 3 Encounters:   03/07/18 155 lb (70.3 kg)   05/04/17 152 lb (68.9 kg)   03/17/17 149 lb 3.2 oz (67.7 kg)              Today, you had the following     No orders found for display         Today's Medication Changes          These changes are accurate as of 3/7/18  8:38 PM.  If you have any questions, ask your nurse or doctor.               Start taking these medicines.        Dose/Directions    Cyclobenzaprine HCl 7.5 MG Tabs   Used for:  Muscle spasm   Started by:  Angela Yang PA-C        Dose:  7.5 mg   Take 7.5 mg by mouth 3 times daily as needed   Quantity:  30 tablet   Refills:  0         These medicines have changed or have updated prescriptions.        Dose/Directions    amphetamine-dextroamphetamine 10 MG per tablet   Commonly known as:  ADDERALL   This may have changed:  Another medication with the same name was removed. Continue taking this medication, and follow the directions you see here.   Used for:  Moderate episode of recurrent major depressive disorder (H)   Changed by:  Angela Yang PA-C        Dose:  " 10 mg   Start taking on:  4/8/2018   Take 1 tablet (10 mg) by mouth daily for 30 doses   Quantity:  30 tablet   Refills:  0       LISINOPRIL PO   This may have changed:  Another medication with the same name was removed. Continue taking this medication, and follow the directions you see here.   Changed by:  Angela Yang PA-C        Dose:  20 mg   Take 20 mg by mouth   Refills:  0         Stop taking these medicines if you haven't already. Please contact your care team if you have questions.     aspirin-acetaminophen-caffeine 250-250-65 MG per tablet   Commonly known as:  EXCEDRIN MIGRAINE   Stopped by:  Angela Yang PA-C           cetirizine 10 MG tablet   Commonly known as:  zyrTEC   Stopped by:  Angela Yang PA-C           lamoTRIgine 150 MG tablet   Commonly known as:  LAMICTAL   Stopped by:  Angela Yang PA-C           polyethylene glycol Packet   Commonly known as:  MIRALAX/GLYCOLAX   Stopped by:  Angela Yang PA-C                Where to get your medicines      These medications were sent to [facility] [address] [place] [address] & [address] [address]    Hours:  24-hours Phone:  [phone]     Cyclobenzaprine HCl 7.5 MG Tabs                Primary Care Provider Office Phone # Fax #    Thalia Lisbeth Bradford [phone]       [address] [place] [id number]        Equal Access to Services     [facility] CenterRAIMUNDO AH: Hadii aad ku hadasho Soomaali, waaxda luqadaha, qaybta kaalmada adeegyada, waxay idiin haygen renteria. So [facility] [phone].    ATENCIÓN: Si habla español, tiene a alegria disposición servicios gratuitos de asistencia lingüística. Llame al [phone].    We comply with applicable federal civil rights laws and [place] laws. We do not discriminate on the basis of race, color, national origin, age, disability, sex, sexual  orientation, or gender identity.            Thank you!     Thank you for choosing [facility]  for your care. Our goal is always to provide you with excellent care. Hearing back from our patients is one way we can continue to improve our services. Please take a few minutes to complete the written survey that you may receive in the mail after your visit with us. Thank you!             Your Updated Medication List - Protect others around you: Learn how to safely use, store and throw away your medicines at www.disposemymeds.org.          This list is accurate as of 3/7/18  8:38 PM.  Always use your most recent med list.                   Brand Name Dispense Instructions for use Diagnosis    amphetamine-dextroamphetamine 10 MG per tablet   Start taking on:  4/8/2018    ADDERALL    30 tablet    Take 1 tablet (10 mg) by mouth daily for 30 doses    Moderate episode of recurrent major depressive disorder (H)       Cyclobenzaprine HCl 7.5 MG Tabs     30 tablet    Take 7.5 mg by mouth 3 times daily as needed    Muscle spasm       ibuprofen 800 MG tablet    ADVIL/MOTRIN     Take 800 mg by mouth        levomilnacipran 120 MG 24 hr capsule    FETZIMA ER    30 capsule    Take 1 capsule (120 mg) by mouth daily    Moderate episode of recurrent major depressive disorder (H)       LISINOPRIL PO      Take 20 mg by mouth        MAGNESIUM OXIDE PO           naltrexone 50 MG tablet    DEPADE;REVIA    90 tablet    Take 3 tablets (150 mg) by mouth daily    Moderate episode of recurrent major depressive disorder (H)       QUEtiapine 25 MG tablet    SEROQUEL    60 tablet    Take 1-2 tablets (25-50 mg) by mouth nightly as needed    Moderate episode of recurrent major depressive disorder (H)       vilazodone 40 MG Tabs tablet    VIIBRYD    30 tablet    Take 1 tablet (40 mg) by mouth daily    Moderate episode of recurrent major depressive disorder (H)       VITAMIN D (CHOLECALCIFEROL) PO      Take 1,000 Units by mouth 2  times daily

## 2018-03-08 NOTE — NURSING NOTE
"Chief Complaint   Patient presents with     Urgent Care     Musculoskeletal Problem     back muscle spasms going on for years but flared up over past few days.        Initial /82  Pulse 88  Temp 98.1  F (36.7  C) (Oral)  Resp 16  Ht 5' 11\" (1.803 m)  Wt 155 lb (70.3 kg)  BMI 21.62 kg/m2 Estimated body mass index is 21.62 kg/(m^2) as calculated from the following:    Height as of this encounter: 5' 11\" (1.803 m).    Weight as of this encounter: 155 lb (70.3 kg).  Medication Reconciliation: complete  "

## 2018-03-08 NOTE — PATIENT INSTRUCTIONS
Back Spasm (No Trauma)    Spasm of the back muscles can occur after a sudden forceful twisting or bending force (such as in a car accident), after a simple awkward movement, or after lifting something heavy with poor body positioning. In any case, muscle spasm adds to the pain. Sleeping in an awkward position or on a poor quality mattress can also cause this. Some people respond to emotional stress by tensing the muscles of their back.  Pain that continues may need further evaluation or other types of treatment such as physical therapy.  You don't always need X-rays for the initial evaluation of back pain, unless you had a physical injury such as from a car accident or fall. If your pain continues and doesn't respond to medical treatment, X-rays and other tests may then be done.   Home care    As soon as possible, start sitting or walking again to avoid problems from prolonged bed rest (muscle weakness, worsening back stiffness and pain, blood clots in the legs).    When in bed, try to find a position of comfort. A firm mattress is best. Try lying flat on your back with pillows under your knees. You can also try lying on your side with your knees bent up toward your chest and a pillow between your knees.    Avoid prolonged sitting, long car rides, or travel. This puts more stress on the lower back than standing or walking.     During the first 24 to 72 hours after an injury or flare-up, apply an ice pack to the painful area for 20 minutes, then remove it for 20 minutes. Do this over a period of 60 to 90 minutes or several times a day. This will reduce swelling and pain. Always wrap ice packs in a thin towel.    You can start with ice, then switch to heat. Heat (hot shower, hot bath, or heating pad) reduces pain, and works well for muscle spasms. Apply heat to the painful area for 20 minutes, then remove it for 20 minutes. Do this over a period of 60 to 90 minutes or several times a day. Do not sleep on a heating  pad as it can burn or damage skin.    Alternate ice and heat therapies.    Be aware of safe lifting methods and do not lift anything over 15 pounds until all the pain is gone.  Gentle stretching will help your back heal faster. Do this simple routine 2 to 3 times a day until your back is feeling better.    Lie on your back with your knees bent and both feet on the ground    Slowly raise your left knee to your chest as you flatten your lower back against the floor. Hold for 20 to 30 seconds.    Relax and repeat the exercise with your right knee.    Do 2 to 3 of these exercises for each leg.    Repeat, hugging both knees to your chest at the same time.    Do not bounce, but use a gentle pull.  Medicines  Talk to your doctor before using medicine, especially if you have other medical problems or are taking other medicines.  You may use acetaminophen or ibuprofen to control pain, unless your healthcare provider prescribed another pain medicine. If you have a chronic condition such as diabetes, liver or kidney disease, stomach ulcer, or gastrointestinal bleeding, or are taking blood thinners, talk with your healthcare provider before taking any medicines.  Be careful if you are given prescription pain medicine, narcotics, or medicine for muscle spasm. They can cause drowsiness, affect your coordination, reflexes, or judgment. Do not drive or operate heavy machinery when taking these medicines. Take pain medicine only as prescribed by your healthcare provider.  Follow-up care  Follow up with your doctor, or as advised. Physical therapy or further tests may be needed.  If X-rays were taken, they may be reviewed by a radiologist. You will be notified of any new findings that may affect your care.  Call 911  Seek emergency medical care if any of these occur:    Trouble breathing    Confusion    Drowsiness or trouble awakening    Fainting or loss of consciousness    Rapid or very slow heart rate    Loss of bowel or bladder  control  When to seek medical advice  Call your healthcare provider right away if any of these occur:    Pain becomes worse or spreads to your legs    Weakness or numbness in one or both legs    Numbness in the groin or genital area    Unexplained fever over 100.4 F (38.0 C)    Burning or pain when passing urine  Date Last Reviewed: 6/1/2016 2000-2017 [employer]. [address] [place] [address]. All rights reserved. This information is not intended as a substitute for professional medical care. Always follow your healthcare professional's instructions.

## 2018-03-08 NOTE — PROGRESS NOTES
SUBJECTIVE:   Mariaelena Jean Baptiste is a 47 year old female presenting with a chief complaint of   Chief Complaint   Patient presents with     Urgent Care     Musculoskeletal Problem     back muscle spasms going on for years but flared up over past few days.        She is an established patient of [facility].    Onset of symptoms was 3 day(s) ago.  Location: bilateral upper back and neck pain. Worse on the left side  Context:       No injury  Course of symptoms is worsening.    Severity moderate  Current and Associated symptoms: Pain, Decreased range of motion and Stiffness  Denies  Swelling, Bruising, Warmth and Redness  Aggravating Factors: repetitive motion and twisting  Therapies to improve symptoms include: ibuprofen and stretching  This is not the first time this type of problem has occurred for this patient.     Patient has had many episodes of this in the past. Has taken Flexeril without problem.     Review of Systems   Constitutional: Negative.    Respiratory: Negative for cough and shortness of breath.    Cardiovascular: Negative for chest pain.   Musculoskeletal: Positive for back pain. Negative for gait problem and joint swelling.   Neurological: Negative for dizziness, weakness and headaches.       Past Medical History:   Diagnosis Date     Anxiety 1988     Chronic osteoarthritis      Depressive disorder      Dissociative identity disorder      Gastro-oesophageal reflux disease      Migraines      PTSD (post-traumatic stress disorder)      Social phobia      Family History   Problem Relation Age of Onset     Depression Father      Hypertension Father      Substance Abuse Father      CANCER Father      non hodgkins lymphoma     Depression Sister      CANCER Maternal Grandmother      breast cancer (mastectomy in 40's), melanoma, leukemia     CANCER Maternal Grandfather      leukemia     Substance Abuse Maternal Grandfather      CANCER Paternal Grandmother      lung cancer, nonsmoker     Substance Abuse Brother   "    Substance Abuse Sister      Current Outpatient Prescriptions   Medication Sig Dispense Refill     LISINOPRIL PO Take 20 mg by mouth       Cyclobenzaprine HCl 7.5 MG TABS Take 7.5 mg by mouth 3 times daily as needed 30 tablet 0     [START ON 4/8/2018] amphetamine-dextroamphetamine (ADDERALL) 10 MG per tablet Take 1 tablet (10 mg) by mouth daily for 30 doses 30 tablet 0     levomilnacipran (FETZIMA ER) 120 MG 24 hr capsule Take 1 capsule (120 mg) by mouth daily 30 capsule 2     naltrexone (DEPADE;REVIA) 50 MG tablet Take 3 tablets (150 mg) by mouth daily 90 tablet 2     QUEtiapine (SEROQUEL) 25 MG tablet Take 1-2 tablets (25-50 mg) by mouth nightly as needed 60 tablet 2     vilazodone (VIIBRYD) 40 MG TABS tablet Take 1 tablet (40 mg) by mouth daily 30 tablet 2     ibuprofen (ADVIL,MOTRIN) 800 MG tablet Take 800 mg by mouth       VITAMIN D, CHOLECALCIFEROL, PO Take 1,000 Units by mouth 2 times daily        MAGNESIUM OXIDE PO        Social History   Substance Use Topics     Smoking status: Former Smoker     Packs/day: 1.00     Years: 20.00     Types: Cigarettes     Start date: 5/1/1995     Quit date: 10/23/2017     Smokeless tobacco: Former User     Alcohol use No       OBJECTIVE  /82  Pulse 88  Temp 98.1  F (36.7  C) (Oral)  Resp 16  Ht 5' 11\" (1.803 m)  Wt 155 lb (70.3 kg)  BMI 21.62 kg/m2    Physical Exam   Constitutional: She appears well-developed and well-nourished. No distress.   HENT:   Head: Normocephalic.   Neck: Full passive range of motion without pain. Muscular tenderness present. No spinous process tenderness present.   Musculoskeletal:        Arms:  Neurological: She is alert. She has normal strength. No cranial nerve deficit or sensory deficit.   Skin: She is not diaphoretic.       Labs:  No results found for this or any previous visit (from the past 24 hour(s)).    X-Ray was not done.    ASSESSMENT/PLAN:      ICD-10-CM    1. Muscle spasm M62.838 Cyclobenzaprine HCl 7.5 MG TABS        MS " Injury/Pain  ice, stretching and Ibuprofen  RX Flexeril for spasms. Patient has tolerated well in the past. Robaxin too expensive.     Followup:    If not improving or if condition worsens, follow up with your Primary Care Provider  Angela Yang PA-C    Patient Instructions     Back Spasm (No Trauma)    Spasm of the back muscles can occur after a sudden forceful twisting or bending force (such as in a car accident), after a simple awkward movement, or after lifting something heavy with poor body positioning. In any case, muscle spasm adds to the pain. Sleeping in an awkward position or on a poor quality mattress can also cause this. Some people respond to emotional stress by tensing the muscles of their back.  Pain that continues may need further evaluation or other types of treatment such as physical therapy.  You don't always need X-rays for the initial evaluation of back pain, unless you had a physical injury such as from a car accident or fall. If your pain continues and doesn't respond to medical treatment, X-rays and other tests may then be done.   Home care    As soon as possible, start sitting or walking again to avoid problems from prolonged bed rest (muscle weakness, worsening back stiffness and pain, blood clots in the legs).    When in bed, try to find a position of comfort. A firm mattress is best. Try lying flat on your back with pillows under your knees. You can also try lying on your side with your knees bent up toward your chest and a pillow between your knees.    Avoid prolonged sitting, long car rides, or travel. This puts more stress on the lower back than standing or walking.     During the first 24 to 72 hours after an injury or flare-up, apply an ice pack to the painful area for 20 minutes, then remove it for 20 minutes. Do this over a period of 60 to 90 minutes or several times a day. This will reduce swelling and pain. Always wrap ice packs in a thin towel.    You can start with ice,  then switch to heat. Heat (hot shower, hot bath, or heating pad) reduces pain, and works well for muscle spasms. Apply heat to the painful area for 20 minutes, then remove it for 20 minutes. Do this over a period of 60 to 90 minutes or several times a day. Do not sleep on a heating pad as it can burn or damage skin.    Alternate ice and heat therapies.    Be aware of safe lifting methods and do not lift anything over 15 pounds until all the pain is gone.  Gentle stretching will help your back heal faster. Do this simple routine 2 to 3 times a day until your back is feeling better.    Lie on your back with your knees bent and both feet on the ground    Slowly raise your left knee to your chest as you flatten your lower back against the floor. Hold for 20 to 30 seconds.    Relax and repeat the exercise with your right knee.    Do 2 to 3 of these exercises for each leg.    Repeat, hugging both knees to your chest at the same time.    Do not bounce, but use a gentle pull.  Medicines  Talk to your doctor before using medicine, especially if you have other medical problems or are taking other medicines.  You may use acetaminophen or ibuprofen to control pain, unless your healthcare provider prescribed another pain medicine. If you have a chronic condition such as diabetes, liver or kidney disease, stomach ulcer, or gastrointestinal bleeding, or are taking blood thinners, talk with your healthcare provider before taking any medicines.  Be careful if you are given prescription pain medicine, narcotics, or medicine for muscle spasm. They can cause drowsiness, affect your coordination, reflexes, or judgment. Do not drive or operate heavy machinery when taking these medicines. Take pain medicine only as prescribed by your healthcare provider.  Follow-up care  Follow up with your doctor, or as advised. Physical therapy or further tests may be needed.  If X-rays were taken, they may be reviewed by a radiologist. You will be  notified of any new findings that may affect your care.  Call 911  Seek emergency medical care if any of these occur:    Trouble breathing    Confusion    Drowsiness or trouble awakening    Fainting or loss of consciousness    Rapid or very slow heart rate    Loss of bowel or bladder control  When to seek medical advice  Call your healthcare provider right away if any of these occur:    Pain becomes worse or spreads to your legs    Weakness or numbness in one or both legs    Numbness in the groin or genital area    Unexplained fever over 100.4 F (38.0 C)    Burning or pain when passing urine  Date Last Reviewed: 6/1/2016 2000-2017 [employer]. [address] [place] [address]. All rights reserved. This information is not intended as a substitute for professional medical care. Always follow your healthcare professional's instructions.

## 2018-04-02 ENCOUNTER — OFFICE VISIT (OUTPATIENT)
Dept: PSYCHIATRY | Facility: CLINIC | Age: 48
End: 2018-04-02
Attending: PSYCHIATRY & NEUROLOGY
Payer: MEDICARE

## 2018-04-02 VITALS
DIASTOLIC BLOOD PRESSURE: 93 MMHG | WEIGHT: 165.4 LBS | HEART RATE: 99 BPM | SYSTOLIC BLOOD PRESSURE: 155 MMHG | BODY MASS INDEX: 23.07 KG/M2

## 2018-04-02 DIAGNOSIS — F33.41 MAJOR DEPRESSIVE DISORDER, RECURRENT EPISODE, IN PARTIAL REMISSION (H): Primary | ICD-10-CM

## 2018-04-02 PROCEDURE — G0463 HOSPITAL OUTPT CLINIC VISIT: HCPCS | Mod: ZF

## 2018-04-02 RX ORDER — DEXTROAMPHETAMINE SACCHARATE, AMPHETAMINE ASPARTATE, DEXTROAMPHETAMINE SULFATE AND AMPHETAMINE SULFATE 2.5; 2.5; 2.5; 2.5 MG/1; MG/1; MG/1; MG/1
10 TABLET ORAL DAILY
Qty: 30 TABLET | Refills: 0 | Status: SHIPPED | OUTPATIENT
Start: 2018-05-03 | End: 2019-02-04

## 2018-04-02 RX ORDER — DEXTROAMPHETAMINE SACCHARATE, AMPHETAMINE ASPARTATE, DEXTROAMPHETAMINE SULFATE AND AMPHETAMINE SULFATE 2.5; 2.5; 2.5; 2.5 MG/1; MG/1; MG/1; MG/1
10 TABLET ORAL DAILY
Qty: 30 TABLET | Refills: 0 | Status: SHIPPED | OUTPATIENT
Start: 2018-06-03 | End: 2019-02-04

## 2018-04-02 RX ORDER — DEXTROAMPHETAMINE SACCHARATE, AMPHETAMINE ASPARTATE, DEXTROAMPHETAMINE SULFATE AND AMPHETAMINE SULFATE 2.5; 2.5; 2.5; 2.5 MG/1; MG/1; MG/1; MG/1
10 TABLET ORAL DAILY
Qty: 30 TABLET | Refills: 0 | Status: SHIPPED | OUTPATIENT
Start: 2018-04-02 | End: 2019-02-04

## 2018-04-02 ASSESSMENT — PAIN SCALES - GENERAL: PAINLEVEL: NO PAIN (0)

## 2018-04-02 NOTE — MR AVS SNAPSHOT
After Visit Summary   4/2/2018    Mariaelena Jean Baptiste    MRN: [id number]           Patient Information     Date Of Birth          1970        Visit Information        Provider Department      4/2/2018 12:45 PM Shani Ireland MD [facility]        Today's Diagnoses     Major depressive disorder, recurrent episode, in partial remission (H)    -  1       Follow-ups after your visit        Your next 10 appointments already scheduled     Jun 11, 2018 12:45 PM CDT   Adult Med Follow UP with Shani Ireland MD   Psychiatry Clinic ([facility])    [address]  [address] [id number] [phone]              Who to contact     Please call your clinic at [phone] to:    Ask questions about your health    Make or cancel appointments    Discuss your medicines    Learn about your test results    Speak to your doctor            Additional Information About Your Visit        MyChart Information     [employer] gives you secure access to your electronic health record. If you see a primary care provider, you can also send messages to your care team and make appointments. If you have questions, please call your primary care clinic.  If you do not have a primary care provider, please call [phone] and they will assist you.      [employer] is an electronic gateway that provides easy, online access to your medical records. With [employer], you can request a clinic appointment, read your test results, renew a prescription or communicate with your care team.     To access your existing account, please contact your [facility] or call [phone] for assistance.        Care EveryWhere ID     This is your Care EveryWhere ID. This could be used by other organizations to access your [place] medical records  [id number]        Your Vitals Were     Pulse BMI (Body Mass Index)                99 23.07 kg/m2           Blood Pressure from  Last 3 Encounters:   04/02/18 (!) 155/93   03/07/18 144/82   02/05/18 125/87    Weight from Last 3 Encounters:   04/02/18 75 kg (165 lb 6.4 oz)   03/07/18 70.3 kg (155 lb)   02/05/18 72 kg (158 lb 12.8 oz)              Today, you had the following     No orders found for display         Today's Medication Changes          These changes are accurate as of 4/2/18 11:59 PM.  If you have any questions, ask your nurse or doctor.               These medicines have changed or have updated prescriptions.        Dose/Directions    * amphetamine-dextroamphetamine 10 MG per tablet   Commonly known as:  ADDERALL   This may have changed:  Another medication with the same name was added. Make sure you understand how and when to take each.   Used for:  Major depressive disorder, recurrent episode, in partial remission (H)   Changed by:  Shani Ireland MD        Dose:  10 mg   Take 1 tablet (10 mg) by mouth daily   Quantity:  30 tablet   Refills:  0       * amphetamine-dextroamphetamine 10 MG per tablet   Commonly known as:  ADDERALL   This may have changed:  You were already taking a medication with the same name, and this prescription was added. Make sure you understand how and when to take each.   Used for:  Major depressive disorder, recurrent episode, in partial remission (H)   Changed by:  Shani Ireland MD        Dose:  10 mg   Start taking on:  5/3/2018   Take 1 tablet (10 mg) by mouth daily   Quantity:  30 tablet   Refills:  0       * amphetamine-dextroamphetamine 10 MG per tablet   Commonly known as:  ADDERALL   This may have changed:  You were already taking a medication with the same name, and this prescription was added. Make sure you understand how and when to take each.   Used for:  Major depressive disorder, recurrent episode, in partial remission (H)   Changed by:  Shani Ireland MD        Dose:  10 mg   Start taking on:  6/3/2018   Take 1 tablet (10 mg) by mouth daily   Quantity:  30 tablet   Refills:  0       *  Notice:  This list has 3 medication(s) that are the same as other medications prescribed for you. Read the directions carefully, and ask your doctor or other care provider to review them with you.         Where to get your medicines      Some of these will need a paper prescription and others can be bought over the counter.  Ask your nurse if you have questions.     Bring a paper prescription for each of these medications     amphetamine-dextroamphetamine 10 MG per tablet    amphetamine-dextroamphetamine 10 MG per tablet    amphetamine-dextroamphetamine 10 MG per tablet                Primary Care Provider Office Phone # Fax #    Thalia Lisbeth Bradford [phone]       [address]  [facility] [address]        Equal Access to Services     WAN DYKES : Hadii jen Santos, waaxda nicole, qaybta kaalmada guy, ivory renteria. So [facility] [phone].    ATENCIÓN: Si habla español, tiene a alegria disposición servicios gratuitos de asistencia lingüística. Llame al [phone].    We comply with applicable federal civil rights laws and [place] laws. We do not discriminate on the basis of race, color, national origin, age, disability, sex, sexual orientation, or gender identity.            Thank you!     Thank you for choosing [facility]  for your care. Our goal is always to provide you with excellent care. Hearing back from our patients is one way we can continue to improve our services. Please take a few minutes to complete the written survey that you may receive in the mail after your visit with us. Thank you!             Your Updated Medication List - Protect others around you: Learn how to safely use, store and throw away your medicines at www.disposemymeds.org.          This list is accurate as of 4/2/18 11:59 PM.  Always use your most recent med list.                   Brand Name Dispense Instructions for use Diagnosis    *  amphetamine-dextroamphetamine 10 MG per tablet    ADDERALL    30 tablet    Take 1 tablet (10 mg) by mouth daily    Major depressive disorder, recurrent episode, in partial remission (H)       * amphetamine-dextroamphetamine 10 MG per tablet   Start taking on:  5/3/2018    ADDERALL    30 tablet    Take 1 tablet (10 mg) by mouth daily    Major depressive disorder, recurrent episode, in partial remission (H)       * amphetamine-dextroamphetamine 10 MG per tablet   Start taking on:  6/3/2018    ADDERALL    30 tablet    Take 1 tablet (10 mg) by mouth daily    Major depressive disorder, recurrent episode, in partial remission (H)       Cyclobenzaprine HCl 7.5 MG Tabs     30 tablet    Take 7.5 mg by mouth 3 times daily as needed    Muscle spasm       ibuprofen 800 MG tablet    ADVIL/MOTRIN     Take 800 mg by mouth        levomilnacipran 120 MG 24 hr capsule    FETZIMA ER    30 capsule    Take 1 capsule (120 mg) by mouth daily    Moderate episode of recurrent major depressive disorder (H)       LISINOPRIL PO      Take 20 mg by mouth        MAGNESIUM OXIDE PO           naltrexone 50 MG tablet    DEPADE;REVIA    90 tablet    Take 3 tablets (150 mg) by mouth daily    Moderate episode of recurrent major depressive disorder (H)       QUEtiapine 25 MG tablet    SEROQUEL    60 tablet    Take 1-2 tablets (25-50 mg) by mouth nightly as needed    Moderate episode of recurrent major depressive disorder (H)       vilazodone 40 MG Tabs tablet    VIIBRYD    30 tablet    Take 1 tablet (40 mg) by mouth daily    Moderate episode of recurrent major depressive disorder (H)       VITAMIN D (CHOLECALCIFEROL) PO      Take 1,000 Units by mouth 2 times daily        * Notice:  This list has 3 medication(s) that are the same as other medications prescribed for you. Read the directions carefully, and ask your doctor or other care provider to review them with you.

## 2018-04-02 NOTE — NURSING NOTE
Chief Complaint   Patient presents with     Recheck Medication     Moderate episode of recurrent major depressive disorder      Reviewed allergies, smoking status, pharmacy preference, and medications  Administered abuse screening questions  Obtained weight, blood pressure and heart rate X2
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
## 2018-04-06 DIAGNOSIS — I10 ESSENTIAL HYPERTENSION, BENIGN: ICD-10-CM

## 2018-04-08 RX ORDER — LISINOPRIL 20 MG/1
20 TABLET ORAL DAILY
Qty: 90 TABLET | Refills: 3 | OUTPATIENT
Start: 2018-04-08

## 2018-06-08 ENCOUNTER — OFFICE VISIT (OUTPATIENT)
Dept: INTERNAL MEDICINE | Facility: CLINIC | Age: 48
End: 2018-06-08
Payer: MEDICARE

## 2018-06-08 VITALS
HEART RATE: 105 BPM | DIASTOLIC BLOOD PRESSURE: 89 MMHG | SYSTOLIC BLOOD PRESSURE: 152 MMHG | BODY MASS INDEX: 22.92 KG/M2 | WEIGHT: 164.3 LBS

## 2018-06-08 DIAGNOSIS — S29.012S UPPER BACK STRAIN, SEQUELA: Primary | ICD-10-CM

## 2018-06-08 RX ORDER — CYCLOBENZAPRINE HCL 5 MG
5-10 TABLET ORAL 3 TIMES DAILY PRN
Qty: 20 TABLET | Refills: 0 | Status: SHIPPED | OUTPATIENT
Start: 2018-06-08 | End: 2018-11-06

## 2018-06-08 ASSESSMENT — PAIN SCALES - GENERAL: PAINLEVEL: SEVERE PAIN (7)

## 2018-06-08 NOTE — NURSING NOTE
Chief Complaint   Patient presents with     Back Pain     Patient here for upper back pain x2 weeks.       Maria L Fernandez CMA at 12:20 PM on 6/8/2018.

## 2018-06-08 NOTE — MR AVS SNAPSHOT
After Visit Summary   6/8/2018    Mariaelena Jean Baptiste    MRN: [id number]           Patient Information     Date Of Birth          1970        Visit Information        Provider Department      6/8/2018 11:40 AM Digna Vuong MD [facility]        Today's Diagnoses     Upper back strain, sequela    -  1      Care Instructions    Primary Care Center: [phone]     Primary Care Center Medication Refill Request Information:  * Please contact your pharmacy regarding ANY request for medication refills.  ** [facility] Prescription Fax = [phone]  * Please allow 3 business days for routine medication refills.  * Please allow 5 business days for controlled substance medication refills.     Primary Care Center Test Result notification information:  *You will be notified with in 7-10 days of your appointment day regarding the results of your test.  If you are on MyChart you will be notified as soon as the provider has reviewed the results and signed off on them.      Physical Therapy [phone] for all [facility] locations            Follow-ups after your visit        Additional Services     PHYSICAL THERAPY REFERRAL       *This therapy referral will be filtered to a centralized scheduling office at [facility] and the patient will receive a call to schedule an appointment at a [facility] location most convenient for them. *     [facility] provides Physical Therapy evaluation and treatment and many specialty services across the [facility] system.  If requesting a specialty program, please choose from the list below.    If you have not heard from the scheduling office within 2 business days, please call [phone] for all locations, with the exception of [place], please call [phone] and [facility], please call [phone]  Treatment: Evaluation & Treatment  Special Instructions/Modalities:   Special Programs: None    Please be aware that  "coverage of these services is subject to the terms and limitations of your health insurance plan.  Call member services at your health plan with any benefit or coverage questions.      **Note to Provider:  If you are referring outside of [facility] for the therapy appointment, please list the name of the location in the \"special instructions\" above, print the referral and give to the patient to schedule the appointment.                  Your next 10 appointments already scheduled     Jun 11, 2018 12:45 PM CDT   Adult Med Follow UP with Shani Ireland MD   Psychiatry Clinic ([facility])    [address]  [address] [id number] [phone]            Jul 20, 2018 10:30 AM CDT   (Arrive by 10:15 AM)   PHYSICAL with Thalia Bradford MD   [facility] ([facility])    [address] [address] [phone]              Who to contact     Please call your clinic at [phone] to:    Ask questions about your health    Make or cancel appointments    Discuss your medicines    Learn about your test results    Speak to your doctor            Additional Information About Your Visit        [employer] Information     [employer] gives you secure access to your electronic health record. If you see a primary care provider, you can also send messages to your care team and make appointments. If you have questions, please call your primary care clinic.  If you do not have a primary care provider, please call [phone] and they will assist you.      [employer] is an electronic gateway that provides easy, online access to your medical records. With [employer], you can request a clinic appointment, read your test results, renew a prescription or communicate with your care team.     To access your existing account, please contact your [facility] or call [phone] for " assistance.        Care EveryWhere ID     This is your Care EveryWhere ID. This could be used by other organizations to access your [facility] medical records  [id number]        Your Vitals Were     Pulse Breastfeeding? BMI (Body Mass Index)             105 No 22.92 kg/m2          Blood Pressure from Last 3 Encounters:   06/08/18 152/89   03/07/18 144/82   05/04/17 135/89    Weight from Last 3 Encounters:   06/08/18 74.5 kg (164 lb 4.8 oz)   03/07/18 70.3 kg (155 lb)   05/04/17 68.9 kg (152 lb)              We Performed the Following     PHYSICAL THERAPY REFERRAL          Today's Medication Changes          These changes are accurate as of 6/8/18 12:41 PM.  If you have any questions, ask your nurse or doctor.               These medicines have changed or have updated prescriptions.        Dose/Directions    cyclobenzaprine 5 MG tablet   Commonly known as:  FLEXERIL   This may have changed:    - medication strength  - how much to take  - reasons to take this   Used for:  Upper back strain, sequela   Changed by:  Digna Vuong MD        Dose:  5-10 mg   Take 1-2 tablets (5-10 mg) by mouth 3 times daily as needed for muscle spasms (do not drive after taking or take with alcohol)   Quantity:  20 tablet   Refills:  0            Where to get your medicines      These medications were sent to [facility] [address] [place] [address] & [address] [address]     Phone:  [phone]     cyclobenzaprine 5 MG tablet                Primary Care Provider Office Phone # Fax #    Thalia Lisbeth Bradford [phone]       [address] [place] [address] [address] [place] [address]        Equal Access to Services     EVELYN DYKES AH: Harry Santos, anh rivera, halina tovar, ivory renteria. So [facility] [phone].    ATENCIÓN: Si habla español, tiene a alegria disposición servicios gratuitos  de asistencia lingüística. Hair garcia [phone].    We comply with applicable federal civil rights laws and [place] laws. We do not discriminate on the basis of race, color, national origin, age, disability, sex, sexual orientation, or gender identity.            Thank you!     Thank you for choosing [facility]  for your care. Our goal is always to provide you with excellent care. Hearing back from our patients is one way we can continue to improve our services. Please take a few minutes to complete the written survey that you may receive in the mail after your visit with us. Thank you!             Your Updated Medication List - Protect others around you: Learn how to safely use, store and throw away your medicines at www.disposemymeds.org.          This list is accurate as of 6/8/18 12:41 PM.  Always use your most recent med list.                   Brand Name Dispense Instructions for use Diagnosis    amphetamine-dextroamphetamine 10 MG per tablet    ADDERALL    30 tablet    Take 1 tablet (10 mg) by mouth daily    Major depressive disorder, recurrent episode, in partial remission (H)       cyclobenzaprine 5 MG tablet    FLEXERIL    20 tablet    Take 1-2 tablets (5-10 mg) by mouth 3 times daily as needed for muscle spasms (do not drive after taking or take with alcohol)    Upper back strain, sequela       ibuprofen 800 MG tablet    ADVIL/MOTRIN     Take 800 mg by mouth        levomilnacipran 120 MG 24 hr capsule    FETZIMA ER    30 capsule    Take 1 capsule (120 mg) by mouth daily    Moderate episode of recurrent major depressive disorder (H)       LISINOPRIL PO      Take 20 mg by mouth        MAGNESIUM OXIDE PO           naltrexone 50 MG tablet    DEPADE;REVIA    90 tablet    Take 3 tablets (150 mg) by mouth daily    Moderate episode of recurrent major depressive disorder (H)       QUEtiapine 25 MG tablet    SEROQUEL    60 tablet    Take 1-2 tablets (25-50 mg) by mouth nightly as needed     Moderate episode of recurrent major depressive disorder (H)       vilazodone 40 MG Tabs tablet    VIIBRYD    30 tablet    Take 1 tablet (40 mg) by mouth daily    Moderate episode of recurrent major depressive disorder (H)       VITAMIN D (CHOLECALCIFEROL) PO      Take 1,000 Units by mouth 2 times daily

## 2018-06-08 NOTE — PATIENT INSTRUCTIONS
[facility]: [phone]     [facility] Medication Refill Request Information:  * Please contact your pharmacy regarding ANY request for medication refills.  ** [facility] Prescription Fax = [phone]  * Please allow 3 business days for routine medication refills.  * Please allow 5 business days for controlled substance medication refills.     Primary Care Center Test Result notification information:  *You will be notified with in 7-10 days of your appointment day regarding the results of your test.  If you are on MyChart you will be notified as soon as the provider has reviewed the results and signed off on them.      Physical Therapy [phone] for all [place] locations

## 2018-06-08 NOTE — PROGRESS NOTES
[facility]  Primary Care Center   Digna Vuong MD  06/08/2018      Chief Complaint:   Upper back and shoulder pain      History of Present Illness:   Mariaelena Jean Baptiste is a 47 year old female with a history of dissociative disorder, social phobia, recurrent major depression, and chronic osteoarthritis, who presents alone for evaluation of upper back and shoulder pain. 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  About a year ago, the patient was provided a physical therapy referral, however she was unable to use this service as she had a lapse of insurance coverage. The patient mentions experiencing a flare up of her pain a couple of times annually. She has been attempting to manage her pain with massages and cyclobenzaprine. She denies any unwanted side effects taking this medication. She has also been managing using applied heat, topical lidocaine, and Ibuprofen. She is looking for another referral to physical therapy as she is currently covered under insurance. She denies current weakness and coordination in regard to her pain.     Review of Systems:   Pertinent items are noted in HPI, remainder of complete ROS is negative.      Active Medications:      amphetamine-dextroamphetamine (ADDERALL) 10 MG per tablet, Take 1 tablet (10 mg) by mouth daily, Disp: 30 tablet, Rfl: 0     Cyclobenzaprine HCl 7.5 MG TABS, Take 7.5 mg by mouth 3 times daily as needed, Disp: 30 tablet, Rfl: 0     ibuprofen (ADVIL,MOTRIN) 800 MG tablet, Take 800 mg by mouth, Disp: , Rfl:      levomilnacipran (FETZIMA ER) 120 MG 24 hr capsule, Take 1 capsule (120 mg) by mouth daily, Disp: 30 capsule, Rfl: 1     LISINOPRIL PO, Take 20 mg by mouth, Disp: , Rfl:      MAGNESIUM OXIDE PO, , Disp: , Rfl:      naltrexone (DEPADE;REVIA) 50 MG tablet, Take 3 tablets (150 mg) by mouth daily, Disp: 90 tablet, Rfl:  2     QUEtiapine (SEROQUEL) 25 MG tablet, Take 1-2 tablets (25-50 mg) by mouth nightly as needed, Disp: 60 tablet, Rfl: 1     vilazodone (VIIBRYD) 40 MG TABS tablet, Take 1 tablet (40 mg) by mouth daily, Disp: 30 tablet, Rfl: 1     VITAMIN D, CHOLECALCIFEROL, PO, Take 1,000 Units by mouth 2 times daily , Disp: , Rfl:       Allergies:   Review of patient's allergies indicates no known allergies.      Past Medical History:  Anxiety   Chronic osteoarthritis   Depressive disorder   Dissociative identity disorder   Gastroesophageal reflux disease (GERD)   Migraines   Posttraumatic stress disorder (PTSD)   Social phobia   Suicidal ideation   Recurrent major depressive disorder without psychotic features   Tobacco abuse   H/o psychiatric care      Past Surgical History:  Cholecystectomy    Laparoscopic tubal ligation   Rectal prolapse repair     Family History:   Depression   Hypertension   Substance abuse   Non Hodgkin's lymphoma   Breast cancer   Leukemia   Lung cancer       Social History:   The patient is single, a former 1 pack/day smoker (quit date: 10/2017), a former E-cigarette user, and does not consume alcohol.      Physical Exam:   /89  Pulse 105  Wt 74.5 kg (164 lb 4.8 oz)  Breastfeeding? No  BMI 22.92 kg/m2      Constitutional: Alert, oriented, pleasant, no acute distress  Cardiovascular: Regular rate and rhythm, no murmurs, rubs or gallops, peripheral pulses full/symmetric  Respiratory: Good air movement bilaterally, lungs clear, no wheezes/rales/rhonchi  Musculoskeletal: No edema, normal muscle tone, normal gait. Neck ROM intact. No focal spinal tenderness. Tender over the left trapezius muscles. Left shoulder ROM intact. Strength intact.   Psychiatric: normal mentation, affect and mood     Assessment and Plan:  1. Upper back strain, sequela  Patient evaluated for upper back and shoulder muscle strain. She has a fairly good knowledge of strategies to manage her pain including heat, massage, topical  therapies, and NSAIDs. She requests physical therapy referral and muscle relaxer to use p.r.n.  - PHYSICAL THERAPY REFERRAL  - cyclobenzaprine (FLEXERIL) 5 MG tablet; Take 1-2 tablets (5-10 mg) by mouth 3 times daily as needed for muscle spasms (do not drive after taking or take with alcohol)  Dispense: 20 tablet; Refill: 0       Follow-up: Patient should follow up in clinic on a p.r.n. basis.         Scribe Disclosure:  I, Gina Conrad, am serving as a scribe to document services personally performed by Digna Vuong MD at this visit, based upon the provider's statements to me. All documentation has been reviewed by the aforementioned provider prior to being entered into the official medical record.     Portions of this medical record were completed by a scribe. UPON MY REVIEW AND AUTHENTICATION BY ELECTRONIC SIGNATURE, this confirms (a) I performed the applicable clinical services, and (b) the record is accurate.   Digna Vuong MD  Internal Medicine

## 2018-06-11 ENCOUNTER — OFFICE VISIT (OUTPATIENT)
Dept: PSYCHIATRY | Facility: CLINIC | Age: 48
End: 2018-06-11
Attending: PSYCHIATRY & NEUROLOGY
Payer: MEDICARE

## 2018-06-11 VITALS
HEART RATE: 103 BPM | SYSTOLIC BLOOD PRESSURE: 135 MMHG | DIASTOLIC BLOOD PRESSURE: 90 MMHG | BODY MASS INDEX: 22.98 KG/M2 | WEIGHT: 164.8 LBS

## 2018-06-11 DIAGNOSIS — F33.41 MAJOR DEPRESSIVE DISORDER, RECURRENT EPISODE, IN PARTIAL REMISSION (H): ICD-10-CM

## 2018-06-11 PROCEDURE — G0463 HOSPITAL OUTPT CLINIC VISIT: HCPCS | Mod: ZF

## 2018-06-11 RX ORDER — DEXTROAMPHETAMINE SACCHARATE, AMPHETAMINE ASPARTATE MONOHYDRATE, DEXTROAMPHETAMINE SULFATE AND AMPHETAMINE SULFATE 2.5; 2.5; 2.5; 2.5 MG/1; MG/1; MG/1; MG/1
10 CAPSULE, EXTENDED RELEASE ORAL DAILY
Qty: 30 CAPSULE | Refills: 0 | Status: SHIPPED | OUTPATIENT
Start: 2018-07-02 | End: 2019-02-04

## 2018-06-11 RX ORDER — DEXTROAMPHETAMINE SACCHARATE, AMPHETAMINE ASPARTATE MONOHYDRATE, DEXTROAMPHETAMINE SULFATE AND AMPHETAMINE SULFATE 2.5; 2.5; 2.5; 2.5 MG/1; MG/1; MG/1; MG/1
10 CAPSULE, EXTENDED RELEASE ORAL DAILY
Qty: 30 CAPSULE | Refills: 0 | Status: SHIPPED | OUTPATIENT
Start: 2018-09-02 | End: 2019-02-04

## 2018-06-11 RX ORDER — DEXTROAMPHETAMINE SACCHARATE, AMPHETAMINE ASPARTATE MONOHYDRATE, DEXTROAMPHETAMINE SULFATE AND AMPHETAMINE SULFATE 2.5; 2.5; 2.5; 2.5 MG/1; MG/1; MG/1; MG/1
10 CAPSULE, EXTENDED RELEASE ORAL DAILY
Qty: 30 CAPSULE | Refills: 0 | Status: SHIPPED | OUTPATIENT
Start: 2018-08-02 | End: 2019-02-04

## 2018-06-11 ASSESSMENT — PAIN SCALES - GENERAL: PAINLEVEL: NO PAIN (0)

## 2018-06-11 NOTE — MR AVS SNAPSHOT
After Visit Summary   6/11/2018    Mariaelena Jean Baptiste    MRN: [id number]           Patient Information     Date Of Birth          1970        Visit Information        Provider Department      6/11/2018 12:45 PM Shani Ireland MD [facility]        Today's Diagnoses     Major depressive disorder, recurrent episode, in partial remission (H)           Follow-ups after your visit        Your next 10 appointments already scheduled     Jul 20, 2018 10:30 AM CDT   (Arrive by 10:15 AM)   PHYSICAL with Thalia Bradford MD   [facility] ([facility])    [address]  [address] [facility] [id number] [phone]            Aug 06, 2018  1:00 PM CDT   Adult Med Follow UP with Gucci Campbell MD   [facility]    [place] [id number]  [address] [address] [phone]              Who to contact     Please call your clinic at [phone] to:    Ask questions about your health    Make or cancel appointments    Discuss your medicines    Learn about your test results    Speak to your doctor            Additional Information About Your Visit        MyChart Information     My1login gives you secure access to your electronic health record. If you see a primary care provider, you can also send messages to your care team and make appointments. If you have questions, please call your primary care clinic.  If you do not have a primary care provider, please call [phone] and they will assist you.      [employer] is an electronic gateway that provides easy, online access to your medical records. With My1login, you can request a clinic appointment, read your test results, renew a prescription or communicate with your care team.     To access your existing account, please contact your [facility] or call [phone] for assistance.        Care EveryWhere ID      This is your Care EveryWhere ID. This could be used by other organizations to access your [facility] medical records  [id number]        Your Vitals Were     Pulse BMI (Body Mass Index)                103 22.98 kg/m2           Blood Pressure from Last 3 Encounters:   06/11/18 135/90   06/08/18 152/89   04/02/18 (!) 155/93    Weight from Last 3 Encounters:   06/11/18 74.8 kg (164 lb 12.8 oz)   06/08/18 74.5 kg (164 lb 4.8 oz)   04/02/18 75 kg (165 lb 6.4 oz)              Today, you had the following     No orders found for display         Today's Medication Changes          These changes are accurate as of 6/11/18  1:37 PM.  If you have any questions, ask your nurse or doctor.               These medicines have changed or have updated prescriptions.        Dose/Directions    * amphetamine-dextroamphetamine 10 MG per tablet   Commonly known as:  ADDERALL   This may have changed:  Another medication with the same name was added. Make sure you understand how and when to take each.   Used for:  Major depressive disorder, recurrent episode, in partial remission (H)   Changed by:  Shani Ireland MD        Dose:  10 mg   Take 1 tablet (10 mg) by mouth daily   Quantity:  30 tablet   Refills:  0       * amphetamine-dextroamphetamine 10 MG per 24 hr capsule   Commonly known as:  ADDERALL XR   This may have changed:  You were already taking a medication with the same name, and this prescription was added. Make sure you understand how and when to take each.   Used for:  Major depressive disorder, recurrent episode, in partial remission (H)   Changed by:  Shani Ireladn MD        Dose:  10 mg   Start taking on:  7/2/2018   Take 1 capsule (10 mg) by mouth daily   Quantity:  30 capsule   Refills:  0       * amphetamine-dextroamphetamine 10 MG per 24 hr capsule   Commonly known as:  ADDERALL XR   This may have changed:  You were already taking a medication with the same name, and this prescription was added. Make sure you understand  how and when to take each.   Used for:  Major depressive disorder, recurrent episode, in partial remission (H)   Changed by:  Shani Ireland MD        Dose:  10 mg   Start taking on:  8/2/2018   Take 1 capsule (10 mg) by mouth daily   Quantity:  30 capsule   Refills:  0       * amphetamine-dextroamphetamine 10 MG per 24 hr capsule   Commonly known as:  ADDERALL XR   This may have changed:  You were already taking a medication with the same name, and this prescription was added. Make sure you understand how and when to take each.   Used for:  Major depressive disorder, recurrent episode, in partial remission (H)   Changed by:  Shani Ireland MD        Dose:  10 mg   Start taking on:  9/2/2018   Take 1 capsule (10 mg) by mouth daily   Quantity:  30 capsule   Refills:  0       * Notice:  This list has 4 medication(s) that are the same as other medications prescribed for you. Read the directions carefully, and ask your doctor or other care provider to review them with you.         Where to get your medicines      Some of these will need a paper prescription and others can be bought over the counter.  Ask your nurse if you have questions.     Bring a paper prescription for each of these medications     amphetamine-dextroamphetamine 10 MG per 24 hr capsule    amphetamine-dextroamphetamine 10 MG per 24 hr capsule    amphetamine-dextroamphetamine 10 MG per 24 hr capsule                Primary Care Provider Office Phone # Fax #    Thalia Lisbeth Bradford [phone]       [address] [place] [address] [address] [place] [address]        Equal Access to Services     [facility] Center[facility]: Hadii jen galano Soshawn, waaxda luqadaha, qaybta kaalmada adeegyada, ivory renteria. So [facility] [phone].    ATENCIÓN: Si habla español, tiene a alegria disposición servicios gratuitos de asistencia lingüística. Llame al [phone].    We comply with applicable federal civil rights laws and [place] laws. We do  not discriminate on the basis of race, color, national origin, age, disability, sex, sexual orientation, or gender identity.            Thank you!     Thank you for choosing PS[facility] CLINIC  for your care. Our goal is always to provide you with excellent care. Hearing back from our patients is one way we can continue to improve our services. Please take a few minutes to complete the written survey that you may receive in the mail after your visit with us. Thank you!             Your Updated Medication List - Protect others around you: Learn how to safely use, store and throw away your medicines at www.disposemymeds.org.          This list is accurate as of 6/11/18  1:37 PM.  Always use your most recent med list.                   Brand Name Dispense Instructions for use Diagnosis    * amphetamine-dextroamphetamine 10 MG per tablet    ADDERALL    30 tablet    Take 1 tablet (10 mg) by mouth daily    Major depressive disorder, recurrent episode, in partial remission (H)       * amphetamine-dextroamphetamine 10 MG per 24 hr capsule   Start taking on:  7/2/2018    ADDERALL XR    30 capsule    Take 1 capsule (10 mg) by mouth daily    Major depressive disorder, recurrent episode, in partial remission (H)       * amphetamine-dextroamphetamine 10 MG per 24 hr capsule   Start taking on:  8/2/2018    ADDERALL XR    30 capsule    Take 1 capsule (10 mg) by mouth daily    Major depressive disorder, recurrent episode, in partial remission (H)       * amphetamine-dextroamphetamine 10 MG per 24 hr capsule   Start taking on:  9/2/2018    ADDERALL XR    30 capsule    Take 1 capsule (10 mg) by mouth daily    Major depressive disorder, recurrent episode, in partial remission (H)       cyclobenzaprine 5 MG tablet    FLEXERIL    20 tablet    Take 1-2 tablets (5-10 mg) by mouth 3 times daily as needed for muscle spasms (do not drive after taking or take with alcohol)    Upper back strain, sequela       ibuprofen 800 MG tablet     ADVIL/MOTRIN     Take 800 mg by mouth        levomilnacipran 120 MG 24 hr capsule    FETZIMA ER    30 capsule    Take 1 capsule (120 mg) by mouth daily    Moderate episode of recurrent major depressive disorder (H)       LISINOPRIL PO      Take 20 mg by mouth        MAGNESIUM OXIDE PO           naltrexone 50 MG tablet    DEPADE;REVIA    90 tablet    Take 3 tablets (150 mg) by mouth daily    Moderate episode of recurrent major depressive disorder (H)       QUEtiapine 25 MG tablet    SEROQUEL    60 tablet    Take 1-2 tablets (25-50 mg) by mouth nightly as needed    Moderate episode of recurrent major depressive disorder (H)       vilazodone 40 MG Tabs tablet    VIIBRYD    30 tablet    Take 1 tablet (40 mg) by mouth daily    Moderate episode of recurrent major depressive disorder (H)       VITAMIN D (CHOLECALCIFEROL) PO      Take 1,000 Units by mouth 2 times daily        * Notice:  This list has 4 medication(s) that are the same as other medications prescribed for you. Read the directions carefully, and ask your doctor or other care provider to review them with you.

## 2018-06-11 NOTE — NURSING NOTE
Chief Complaint   Patient presents with     Recheck Medication     Major depressive disorder, recurrent episode, in partial remission
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
## 2018-06-12 ASSESSMENT — PATIENT HEALTH QUESTIONNAIRE - PHQ9: SUM OF ALL RESPONSES TO PHQ QUESTIONS 1-9: 6

## 2018-06-14 ENCOUNTER — THERAPY VISIT (OUTPATIENT)
Dept: PHYSICAL THERAPY | Facility: CLINIC | Age: 48
End: 2018-06-14
Payer: MEDICARE

## 2018-06-14 DIAGNOSIS — G89.29 CHRONIC BILATERAL THORACIC BACK PAIN: Primary | ICD-10-CM

## 2018-06-14 DIAGNOSIS — M54.6 CHRONIC BILATERAL THORACIC BACK PAIN: Primary | ICD-10-CM

## 2018-06-14 PROCEDURE — 97162 PT EVAL MOD COMPLEX 30 MIN: CPT | Mod: GP | Performed by: PHYSICAL THERAPIST

## 2018-06-14 PROCEDURE — G8982 BODY POS GOAL STATUS: HCPCS | Mod: GP | Performed by: PHYSICAL THERAPIST

## 2018-06-14 PROCEDURE — 97110 THERAPEUTIC EXERCISES: CPT | Mod: GP | Performed by: PHYSICAL THERAPIST

## 2018-06-14 PROCEDURE — G8981 BODY POS CURRENT STATUS: HCPCS | Mod: GP | Performed by: PHYSICAL THERAPIST

## 2018-06-14 PROCEDURE — 97112 NEUROMUSCULAR REEDUCATION: CPT | Mod: GP | Performed by: PHYSICAL THERAPIST

## 2018-06-14 NOTE — LETTER
DEPARTMENT OF HEALTH AND HUMAN SERVICES  CENT[facility] FOR MEDICARE & MEDICAID SERVICES    PLAN/UPDATED PLAN OF PROGRESS FOR OUTPATIENT REHABILITATION    PATIENTS NAME:  Mariaelena Jean Baptiste     : 1970    PROVIDER NUMBER:    [id number]    HICN:  [id number]     PROVIDER NAME: [employer] [facility] [employer] [facility]    MEDICAL RECORD NUMBER: [id number]     START OF CARE DATE:  SOC Date: 18   TYPE:  PT    PRIMARY/TREATMENT DIAGNOSIS: (Pertinent Medical Diagnosis)  Chronic bilateral thoracic back pain    VISITS FROM START OF CARE:  Rxs Used: 1     [facility] Initial Evaluation  Subjective:  Patient is a 47 year old female presenting with rehab cervical spine hpi.   Mariaelena Jean Baptiste is a 47 year old female with a thoracic spine condition.      This is a chronic and recurrent condition  Over the past year, not able to give an exact onset date, pt has noticed upper back and neck pain.  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      Pt visited MD, no imaging.    Pt has tried massage therapy in the past which gives her good pain relief typically.  Pt goes to [employer] for massage currently, 3x over the past year.    Pt was previously working as a , and recently went back to that type of work.     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    Pt works at [employer] at [employer] [facility] [employer].    Date of MD appt: 2018.    Site of Pain: L shoulder and neck, into medial to L scapula, rarely R side near scapula.  Radiates to:  None.  Quality: varies, from aching to stabby. and is constant and reported as 5/10.  Associated symptoms:  Headache (headaches 2-3x/week). Pain is the same all the time.  Exacerbated by: lying on L side. Relieved by: massage.  Since onset symptoms are gradually worsening.    Previous  treatment: has tried trapezius stretching.    General health as reported by patient is good.  Pertinent medical history includes:  Depression, high blood pressure, migraines/headaches and smoking.      Current medications:  Anti-depressants.          Barriers include:  None as reported by the patient.    Red flags:  None as reported by the patient.      PATIENTS NAME:  Mariaelena Jean Baptiste   : 1970                  [place] Cervical Evaluation  Posture:  Sitting: poor  Standing: poor  Protruding Head: yes  Wry Neck: no  Correction of Posture: no effect  Movement Loss:  Protrusion (PRO): nil  Flexion (Flex): min  Retraction (RET): mod  Extension (EXT): mod  Lateral Flexion Right (LF R): min and pain  Lateral Flexion Left (LF L): mod and pain  Rotation Right (ROT R): min and pain  Rotation Left (ROT L): mod  Test Movements:  RET:   Repeat RET: During: produces  After: no worse  Mechanical Response: IncROM  Conclusion: derangement  Principle of Treatment:  Extension: x    Assessment/Plan:    Patient is a 47 year old female with cervical complaints.    Patient has the following significant findings with corresponding treatment plan.                Diagnosis 1:  Cervical/thoracic pain      Pain -  hot/cold therapy, manual therapy, self management, education, directional preference exercise and home program  Decreased ROM/flexibility - manual therapy and therapeutic exercise  Impaired muscle performance - neuro re-education  Decreased function - therapeutic activities  Impaired posture - neuro re-education    Therapy Evaluation Codes:   1) History comprised of:   Personal factors that impact the plan of care:      Gender, Past/current experiences, Profession and Time since onset of symptoms.    Comorbidity factors that impact the plan of care are:      Depression, High blood pressure, Migraines/headaches, Osteoarthritis and Smoking.     Medications impacting care: Anti-depressant.  2) Examination of Body Systems  "comprised of:   Body structures and functions that impact the plan of care:      Cervical spine, Shoulder and Thoracic Spine.   Activity limitations that impact the plan of care are:      Cooking, Reading/Computer work and Working.  3) Clinical presentation characteristics are:   Evolving/Changing.  4) Decision-Making    Moderate complexity using standardized patient assessment instrument and/or measureable assessment of functional outcome.  Cumulative Therapy Evaluation is: Moderate complexity.      PATIENTS NAME:  Mariaelena Jean Baptiste   : 1970    Previous and current functional limitations:  (See Goal Flow Sheet for this information)    Short term and Long term goals: (See Goal Flow Sheet for this information)     Communication ability:  Patient appears to be able to clearly communicate and understand verbal and written communication and follow directions correctly.  Treatment Explanation - The following has been discussed with the patient:   RX ordered/plan of care  Anticipated outcomes  Possible risks and side effects  This patient would benefit from PT intervention to resume normal activities.   Rehab potential is good.    Frequency:  1 X week, once daily  Duration:  for 6 weeks  Discharge Plan:  Achieve all LTG.  Independent in home treatment program.  Reach maximal therapeutic benefit.    Please refer to the daily flowsheet for treatment today, total treatment time and time spent performing 1:1 timed codes.         Caregiver Signature/Credentials _____________________________ Date ________       Treating Provider: Matty Albarran DPT   I have reviewed and certified the need for these services and plan of treatment while under my care.        PHYSICIAN'S SIGNATURE:   _____________________________________  Date___________     Digna Vuong MD    Certification period:  Beginning of Cert date period: 18 to End of Cert period date: 18     Functional Level Progress Report: Please see attached \"Goal " "Flow sheet for Functional level.\"    ____X____ Continue Services or       ________ DC Services                Service dates: From  SOC Date: 06/14/18 date to present                         "

## 2018-06-14 NOTE — PROGRESS NOTES
[facility] Initial Evaluation  Subjective:  Patient is a 47 year old female presenting with rehab cervical spine hpi.   Mariaelena Jean Baptiste is a 47 year old female with a thoracic spine condition.      This is a chronic and recurrent condition  Over the past year, not able to give an exact onset date, pt has noticed upper back and neck pain.  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      Pt visited MD, no imaging.    Pt has tried massage therapy in the past which gives her good pain relief typically.  Pt goes to [employer] for massage currently, 3x over the past year.    Pt was previously working as a , and recently went back to that type of work.     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    Pt works at [employer] at [employer] [facility].    Date of MD appt: 6/8/2018.    Site of Pain: L shoulder and neck, into medial to L scapula, rarely R side near scapula.  Radiates to:  None.  Quality: varies, from aching to stabby. and is constant and reported as 5/10.  Associated symptoms:  Headache (headaches 2-3x/week). Pain is the same all the time.  Exacerbated by: lying on L side. Relieved by: massage.  Since onset symptoms are gradually worsening.    Previous treatment: has tried trapezius stretching.    General health as reported by patient is good.  Pertinent medical history includes:  Depression, high blood pressure, migraines/headaches and smoking.      Current medications:  Anti-depressants.          Barriers include:  None as reported by the patient.    Red flags:  None as reported by the patient.                        Objective:  System    Physical Exam    [place] Cervical Evaluation    Posture:  Sitting: poor  Standing: poor  Protruding Head: yes  Wry Neck: no  Correction of Posture: no  effect    Movement Loss:  Protrusion (PRO): nil  Flexion (Flex): min  Retraction (RET): mod  Extension (EXT): mod  Lateral Flexion Right (LF R): min and pain  Lateral Flexion Left (LF L): mod and pain  Rotation Right (ROT R): min and pain  Rotation Left (ROT L): mod  Test Movements:      RET:   Repeat RET: During: produces  After: no worse  Mechanical Response: IncROM                          Conclusion: derangement  Principle of Treatment:      Extension: x                                             ROS    Assessment/Plan:    Patient is a 47 year old female with cervical complaints.    Patient has the following significant findings with corresponding treatment plan.                Diagnosis 1:  Cervical/thoracic pain      Pain -  hot/cold therapy, manual therapy, self management, education, directional preference exercise and home program  Decreased ROM/flexibility - manual therapy and therapeutic exercise  Impaired muscle performance - neuro re-education  Decreased function - therapeutic activities  Impaired posture - neuro re-education    Therapy Evaluation Codes:   1) History comprised of:   Personal factors that impact the plan of care:      Gender, Past/current experiences, Profession and Time since onset of symptoms.    Comorbidity factors that impact the plan of care are:      Depression, High blood pressure, Migraines/headaches, Osteoarthritis and Smoking.     Medications impacting care: Anti-depressant.  2) Examination of Body Systems comprised of:   Body structures and functions that impact the plan of care:      Cervical spine, Shoulder and Thoracic Spine.   Activity limitations that impact the plan of care are:      Cooking, Reading/Computer work and Working.  3) Clinical presentation characteristics are:   Evolving/Changing.  4) Decision-Making    Moderate complexity using standardized patient assessment instrument and/or measureable assessment of functional outcome.  Cumulative Therapy Evaluation is:  Moderate complexity.    Previous and current functional limitations:  (See Goal Flow Sheet for this information)    Short term and Long term goals: (See Goal Flow Sheet for this information)     Communication ability:  Patient appears to be able to clearly communicate and understand verbal and written communication and follow directions correctly.  Treatment Explanation - The following has been discussed with the patient:   RX ordered/plan of care  Anticipated outcomes  Possible risks and side effects  This patient would benefit from PT intervention to resume normal activities.   Rehab potential is good.    Frequency:  1 X week, once daily  Duration:  for 6 weeks  Discharge Plan:  Achieve all LTG.  Independent in home treatment program.  Reach maximal therapeutic benefit.    Please refer to the daily flowsheet for treatment today, total treatment time and time spent performing 1:1 timed codes.

## 2018-06-20 ENCOUNTER — THERAPY VISIT (OUTPATIENT)
Dept: PHYSICAL THERAPY | Facility: CLINIC | Age: 48
End: 2018-06-20
Payer: MEDICARE

## 2018-06-20 DIAGNOSIS — G89.29 CHRONIC BILATERAL THORACIC BACK PAIN: ICD-10-CM

## 2018-06-20 DIAGNOSIS — M54.6 CHRONIC BILATERAL THORACIC BACK PAIN: ICD-10-CM

## 2018-06-20 PROCEDURE — 97112 NEUROMUSCULAR REEDUCATION: CPT | Mod: GP | Performed by: PHYSICAL THERAPIST

## 2018-06-20 PROCEDURE — 97110 THERAPEUTIC EXERCISES: CPT | Mod: GP | Performed by: PHYSICAL THERAPIST

## 2018-07-09 ENCOUNTER — MYC MEDICAL ADVICE (OUTPATIENT)
Dept: PSYCHIATRY | Facility: CLINIC | Age: 48
End: 2018-07-09

## 2018-07-09 DIAGNOSIS — F33.1 MODERATE EPISODE OF RECURRENT MAJOR DEPRESSIVE DISORDER (H): ICD-10-CM

## 2018-07-09 RX ORDER — QUETIAPINE FUMARATE 25 MG/1
25-50 TABLET, FILM COATED ORAL
Qty: 60 TABLET | Refills: 0 | Status: SHIPPED | OUTPATIENT
Start: 2018-07-09 | End: 2018-08-06

## 2018-07-09 NOTE — TELEPHONE ENCOUNTER
Last seen: 6/11/18 (Shu)  RTC: 2 months  Cancel: none  No-show: none  Next appt: 8/6/18 (Shannan)     Medication requested: QUEtiapine (SEROQUEL) 25 MG tablet  Directions: Take 1-2 tablets (25-50 mg) by mouth nightly as needed - Oral  Qty: 60     Medication refill approved per refill protocol  30 d/s sent to pt's pharmacy  Notified pt via [address]

## 2018-08-06 ENCOUNTER — OFFICE VISIT (OUTPATIENT)
Dept: PSYCHIATRY | Facility: CLINIC | Age: 48
End: 2018-08-06
Attending: PSYCHIATRY & NEUROLOGY
Payer: MEDICARE

## 2018-08-06 VITALS
BODY MASS INDEX: 23.1 KG/M2 | WEIGHT: 165.6 LBS | DIASTOLIC BLOOD PRESSURE: 98 MMHG | SYSTOLIC BLOOD PRESSURE: 145 MMHG | HEART RATE: 100 BPM

## 2018-08-06 DIAGNOSIS — F33.1 MODERATE EPISODE OF RECURRENT MAJOR DEPRESSIVE DISORDER (H): Primary | ICD-10-CM

## 2018-08-06 PROCEDURE — G0463 HOSPITAL OUTPT CLINIC VISIT: HCPCS | Mod: ZF

## 2018-08-06 RX ORDER — NALTREXONE HYDROCHLORIDE 50 MG/1
150 TABLET, FILM COATED ORAL DAILY
Qty: 90 TABLET | Refills: 3 | Status: SHIPPED | OUTPATIENT
Start: 2018-08-06 | End: 2018-11-16

## 2018-08-06 RX ORDER — VILAZODONE HYDROCHLORIDE 40 MG/1
40 TABLET ORAL DAILY
Qty: 30 TABLET | Refills: 3 | Status: SHIPPED | OUTPATIENT
Start: 2018-08-06 | End: 2018-12-03

## 2018-08-06 RX ORDER — QUETIAPINE FUMARATE 25 MG/1
50 TABLET, FILM COATED ORAL AT BEDTIME
Qty: 60 TABLET | Refills: 3 | Status: SHIPPED | OUTPATIENT
Start: 2018-08-06 | End: 2018-12-03

## 2018-08-06 RX ORDER — MULTIVITAMIN,THERAPEUTIC
1 TABLET ORAL DAILY
COMMUNITY

## 2018-08-06 ASSESSMENT — PAIN SCALES - GENERAL: PAINLEVEL: NO PAIN (0)

## 2018-08-06 NOTE — PATIENT INSTRUCTIONS
1. Please come fasting to your next primary care appointment and have labs drawn (orders are in place, no need to make a lab appointment). Please also get an EKG with your primary doctor: we will send her a note about this.    Thank you for coming to the PSYCHIATRY CLINIC.    Lab Testing:  If you had lab testing today and your results are reassuring or normal they will be mailed to you or sent through [facility] within 7 days.   If the lab tests need quick action we will call you with the results.  The phone number we will call with results is # [phone] (home) . If this is not the best number please call our clinic and change the number.    Medication Refills:  If you need any refills please call your pharmacy and they will contact us. Our fax number for refills is [phone]. Please allow three business for refill processing.   If you need to  your refill at a new pharmacy, please contact the new pharmacy directly. The new pharmacy will help you get your medications transferred.     Scheduling:  If you have any concerns about today's visit or wish to schedule another appointment please call our office during normal business hours [phone] (8-5:00 M-F)    Contact Us:  Please call [phone] during business hours (8-5:00 M-F).  If after clinic hours, or on the weekend, please call  [phone].    Financial Assistance [phone]  [facility] [phone]  [facility] [phone]  Medical Records [phone]      MENTAL HEALTH CRISIS NUMBERS:  [facility]:   [facility] - [phone]   Crisis Residence [facility] [place] - [facility] - [phone]   Walk-In Counseling Center [facility] [place] - [phone]   COPE 24/7 [facility] Mobile Team for Adults - [[phone]]; Child - [[phone]]     Crisis Connection - [phone]     [facility]:   [facility] - [phone]   Walk-in counseling [facility] - [phone]   Walk-in counseling [facility]  [facility] - [phone]   Crisis Residence [facility] - [phone]   Urgent Care Adult Mental Health:   --Drop-in, 24/7 crisis line, and [facility] Mobile Team [[phone]]    CRISIS TEXT LINE: Text [phone] from anywhere, anytime, any crisis 24/7;    OR SEE [email or web address]     Poison Control Center - [phone]    CHILD: [facility] needs assessment team - [phone]     [facility] Lifeline - [phone]; or [employer] [facility] - [phone]    If you have a medical emergency please call 911or go to the nearest ER.                    _____________________________________________    Again thank you for choosing PSYCHIATRY CLINIC and please let us know how we can best partner with you to improve you and your family's health.  You may be receiving a survey in the mail regarding this appointment. We would love to have your feedback, both positive and negative, so please fill out the survey and return it using the provided envelope. The survey is done by an external company, so your answers are anonymous.
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
## 2018-08-06 NOTE — MR AVS SNAPSHOT
After Visit Summary   8/6/2018    Mariaelena Jean Baptiste    MRN: [id number]           Patient Information     Date Of Birth          1970        Visit Information        Provider Department      8/6/2018 1:00 PM Gucci Campbell MD [facility]        Today's Diagnoses     Moderate episode of recurrent major depressive disorder (H)    -  1      Care Instructions    1. Please come fasting to your next primary care appointment and have labs drawn (orders are in place, no need to make a lab appointment). Please also get an EKG with your primary doctor: we will send her a note about this.    Thank you for coming to the PSYCHIATRY CLINIC.    Lab Testing:  If you had lab testing today and your results are reassuring or normal they will be mailed to you or sent through [employer] within 7 days.   If the lab tests need quick action we will call you with the results.  The phone number we will call with results is # [phone] (home) . If this is not the best number please call our clinic and change the number.    Medication Refills:  If you need any refills please call your pharmacy and they will contact us. Our fax number for refills is [phone]. Please allow three business for refill processing.   If you need to  your refill at a new pharmacy, please contact the new pharmacy directly. The new pharmacy will help you get your medications transferred.     Scheduling:  If you have any concerns about today's visit or wish to schedule another appointment please call our office during normal business hours [phone] (8-5:00 M-F)    Contact Us:  Please call [phone] during business hours (8-5:00 M-F).  If after clinic hours, or on the weekend, please call  [phone].    Financial Assistance [phone]  [facility] [phone]  [facility] [phone]  Medical Records [phone]      MENTAL HEALTH CRISIS NUMBERS:  [facility]:   [facility] - [phone]    Crisis Residence [facility] - [phone]   Walk-In Counseling Center [facility] - [phone]   COPE 24/7 [facility] Mobile Team for Adults - [[phone]]; Child - [[phone]]     Crisis Connection - [phone]     [facility]:   [facility] - [phone]   Walk-in counseling [facility] - [phone]   Walk-in counseling [facility] - [phone]   Crisis Residence [facility] - [phone]   Urgent Care Adult Mental Health:   --Drop-in, 24/7 crisis line, and [place] Mobile Team [[phone]]    CRISIS TEXT LINE: Text [phone] from anywhere, anytime, any crisis 24/7;    OR SEE www.crisistextline.org     Poison Control Center - [phone]    CHILD: [facility] needs assessment team - [phone]     [facility] Lifeline - [phone]; or [employer] Lifeline - 1-195.443.8703    If you have a medical emergency please call 911or go to the nearest ER.                    _____________________________________________    Again thank you for choosing PSYCHIATRY CLINIC and please let us know how we can best partner with you to improve you and your family's health.  You may be receiving a survey in the mail regarding this appointment. We would love to have your feedback, both positive and negative, so please fill out the survey and return it using the provided envelope. The survey is done by an external company, so your answers are anonymous.             Follow-ups after your visit        Follow-up notes from your care team     Return in about 2 months (around 10/6/2018) for MFU 60 min, visit #2.      Your next 10 appointments already scheduled     Oct 08, 2018 10:00 AM CDT   Adult Med Follow UP with Gucci Campbell MD   Psychiatry Clinic ([facility])    [place] [id number] [id number] [phone]              Who to contact     Please call your clinic at  [phone] to:    Ask questions about your health    Make or cancel appointments    Discuss your medicines    Learn about your test results    Speak to your doctor            Additional Information About Your Visit        [facility] [facility] Information     [employer] gives you secure access to your electronic health record. If you see a primary care provider, you can also send messages to your care team and make appointments. If you have questions, please call your primary care clinic.  If you do not have a primary care provider, please call [phone] and they will assist you.      [employer] is an electronic gateway that provides easy, online access to your medical records. With [employer], you can request a clinic appointment, read your test results, renew a prescription or communicate with your care team.     To access your existing account, please contact your [facility] or call [phone] for assistance.        Care EveryWhere ID     This is your Care EveryWhere ID. This could be used by other organizations to access your [facility] medical records  [id number]        Your Vitals Were     Pulse BMI (Body Mass Index)                100 23.1 kg/m2           Blood Pressure from Last 3 Encounters:   08/06/18 (!) 145/98   06/11/18 135/90   06/08/18 152/89    Weight from Last 3 Encounters:   08/06/18 75.1 kg (165 lb 9.6 oz)   06/11/18 74.8 kg (164 lb 12.8 oz)   06/08/18 74.5 kg (164 lb 4.8 oz)              Today, you had the following     No orders found for display         Today's Medication Changes          These changes are accurate as of 8/6/18 11:48 PM.  If you have any questions, ask your nurse or doctor.               These medicines have changed or have updated prescriptions.        Dose/Directions    QUEtiapine 25 MG tablet   Commonly known as:  SEROQUEL   This may have changed:    - how much to take  - when to take this  - reasons to take this   Used for:  Moderate episode of recurrent  major depressive disorder (H)   Changed by:  Gucci Campbell MD        Dose:  50 mg   Take 2 tablets (50 mg) by mouth At Bedtime   Quantity:  60 tablet   Refills:  3            Where to get your medicines      These medications were sent to [facility] [address] [place] [address] AT [facility]  [address] [place] [id number]     Phone:  [phone]     levomilnacipran 120 MG 24 hr capsule    naltrexone 50 MG tablet    QUEtiapine 25 MG tablet    vilazodone 40 MG Tabs tablet                Primary Care Provider Office Phone # Fax #    Thalia Lisbeth Bradford [phone]       [address] [place] [address] [address] [place] [address]        Equal Access to Services     WAN DYKES AH: Hadii jen sanchez hadasho Soomaali, waaxda luqadaha, qaybta kaalmada adeegyada, ivory galan haygen renteria. So [facility] [phone].    ATENCIÓN: Si habla español, tiene a alegria disposición servicios gratuitos de asistencia lingüística. [facility] [phone].    We comply with applicable federal civil rights laws and [place] laws. We do not discriminate on the basis of race, color, national origin, age, disability, sex, sexual orientation, or gender identity.            Thank you!     Thank you for choosing PSYCHIATRY CLINIC  for your care. Our goal is always to provide you with excellent care. Hearing back from our patients is one way we can continue to improve our services. Please take a few minutes to complete the written survey that you may receive in the mail after your visit with us. Thank you!             Your Updated Medication List - Protect others around you: Learn how to safely use, store and throw away your medicines at www.disposemymeds.org.          This list is accurate as of 8/6/18 11:48 PM.  Always use your most recent med list.                   Brand Name Dispense Instructions for use Diagnosis    * amphetamine-dextroamphetamine 10 MG per 24 hr  capsule    ADDERALL XR    30 capsule    Take 1 capsule (10 mg) by mouth daily    Major depressive disorder, recurrent episode, in partial remission (H)       * amphetamine-dextroamphetamine 10 MG per 24 hr capsule   Start taking on:  9/2/2018    ADDERALL XR    30 capsule    Take 1 capsule (10 mg) by mouth daily    Major depressive disorder, recurrent episode, in partial remission (H)       cyclobenzaprine 5 MG tablet    FLEXERIL    20 tablet    Take 1-2 tablets (5-10 mg) by mouth 3 times daily as needed for muscle spasms (do not drive after taking or take with alcohol)    Upper back strain, sequela       ibuprofen 800 MG tablet    ADVIL/MOTRIN     Take 800 mg by mouth        levomilnacipran 120 MG 24 hr capsule    FETZIMA ER    30 capsule    Take 1 capsule (120 mg) by mouth daily    Moderate episode of recurrent major depressive disorder (H)       LISINOPRIL PO      Take 20 mg by mouth        MAGNESIUM OXIDE PO           multivitamin, therapeutic Tabs tablet      Take 1 tablet by mouth daily        naltrexone 50 MG tablet    DEPADE;REVIA    90 tablet    Take 3 tablets (150 mg) by mouth daily    Moderate episode of recurrent major depressive disorder (H)       QUEtiapine 25 MG tablet    SEROQUEL    60 tablet    Take 2 tablets (50 mg) by mouth At Bedtime    Moderate episode of recurrent major depressive disorder (H)       vilazodone 40 MG Tabs tablet    VIIBRYD    30 tablet    Take 1 tablet (40 mg) by mouth daily    Moderate episode of recurrent major depressive disorder (H)       VITAMIN D (CHOLECALCIFEROL) PO      Take 1,000 Units by mouth 2 times daily        * Notice:  This list has 2 medication(s) that are the same as other medications prescribed for you. Read the directions carefully, and ask your doctor or other care provider to review them with you.

## 2018-08-07 ASSESSMENT — PATIENT HEALTH QUESTIONNAIRE - PHQ9: SUM OF ALL RESPONSES TO PHQ QUESTIONS 1-9: 4

## 2018-08-14 DIAGNOSIS — I10 BENIGN ESSENTIAL HYPERTENSION: ICD-10-CM

## 2018-08-14 RX ORDER — LISINOPRIL 20 MG/1
20 TABLET ORAL DAILY
Qty: 30 TABLET | Refills: 1 | Status: SHIPPED | OUTPATIENT
Start: 2018-08-14 | End: 2018-11-06

## 2018-10-08 ENCOUNTER — OFFICE VISIT (OUTPATIENT)
Dept: PSYCHIATRY | Facility: CLINIC | Age: 48
End: 2018-10-08
Attending: PSYCHIATRY & NEUROLOGY
Payer: MEDICARE

## 2018-10-08 VITALS
SYSTOLIC BLOOD PRESSURE: 128 MMHG | BODY MASS INDEX: 22.82 KG/M2 | WEIGHT: 163.6 LBS | HEART RATE: 108 BPM | DIASTOLIC BLOOD PRESSURE: 83 MMHG

## 2018-10-08 DIAGNOSIS — Z23 NEED FOR PROPHYLACTIC VACCINATION AND INOCULATION AGAINST INFLUENZA: Primary | ICD-10-CM

## 2018-10-08 DIAGNOSIS — F33.42 RECURRENT MAJOR DEPRESSIVE DISORDER, IN FULL REMISSION (H): ICD-10-CM

## 2018-10-08 PROCEDURE — 90686 IIV4 VACC NO PRSV 0.5 ML IM: CPT | Mod: ZF

## 2018-10-08 PROCEDURE — G0008 ADMIN INFLUENZA VIRUS VAC: HCPCS | Mod: ZF

## 2018-10-08 PROCEDURE — 25000128 H RX IP 250 OP 636: Mod: ZF

## 2018-10-08 PROCEDURE — G0463 HOSPITAL OUTPT CLINIC VISIT: HCPCS | Mod: ZF

## 2018-10-08 RX ORDER — DEXTROAMPHETAMINE SACCHARATE, AMPHETAMINE ASPARTATE MONOHYDRATE, DEXTROAMPHETAMINE SULFATE AND AMPHETAMINE SULFATE 2.5; 2.5; 2.5; 2.5 MG/1; MG/1; MG/1; MG/1
10 CAPSULE, EXTENDED RELEASE ORAL DAILY
Qty: 30 CAPSULE | Refills: 0 | Status: SHIPPED | OUTPATIENT
Start: 2018-10-08 | End: 2018-11-06

## 2018-10-08 RX ORDER — DEXTROAMPHETAMINE SACCHARATE, AMPHETAMINE ASPARTATE MONOHYDRATE, DEXTROAMPHETAMINE SULFATE AND AMPHETAMINE SULFATE 2.5; 2.5; 2.5; 2.5 MG/1; MG/1; MG/1; MG/1
10 CAPSULE, EXTENDED RELEASE ORAL DAILY
Qty: 30 CAPSULE | Refills: 0 | Status: SHIPPED | OUTPATIENT
Start: 2018-11-08 | End: 2018-11-06

## 2018-10-08 RX ORDER — DEXTROAMPHETAMINE SACCHARATE, AMPHETAMINE ASPARTATE MONOHYDRATE, DEXTROAMPHETAMINE SULFATE AND AMPHETAMINE SULFATE 2.5; 2.5; 2.5; 2.5 MG/1; MG/1; MG/1; MG/1
10 CAPSULE, EXTENDED RELEASE ORAL DAILY
Qty: 30 CAPSULE | Refills: 0 | Status: SHIPPED | OUTPATIENT
Start: 2018-12-09 | End: 2019-02-04

## 2018-10-08 ASSESSMENT — PAIN SCALES - GENERAL: PAINLEVEL: NO PAIN (0)
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
## 2018-10-08 NOTE — NURSING NOTE
Prior to injection verified patient identity using patient's name and date of birth.  Due to injection administration, patient instructed to remain in clinic for 15 minutes  afterwards, and to report any adverse reaction to me immediately.    Nely Buckley MA

## 2018-10-08 NOTE — PROGRESS NOTES

## 2018-10-08 NOTE — MR AVS SNAPSHOT
After Visit Summary   10/8/2018    Mariaelena Jean Baptiste    MRN: [id number]           Patient Information     Date Of Birth          1970        Visit Information        Provider Department      10/8/2018 10:00 AM Gucci Campbell MD Psychiatry [facility]        Today's Diagnoses     Need for prophylactic vaccination and inoculation against influenza    -  1    Recurrent major depressive disorder, in full remission (H)          Care Instructions    Thank you for coming to the PSYCHIATRY CLINIC.    Lab Testing:  If you had lab testing today and your results are reassuring or normal they will be mailed to you or sent through [employer] within 7 days.   If the lab tests need quick action we will call you with the results.  The phone number we will call with results is # [phone] (home) . If this is not the best number please call our clinic and change the number.    Medication Refills:  If you need any refills please call your pharmacy and they will contact us. Our fax number for refills is [phone]. Please allow three business for refill processing.   If you need to  your refill at a new pharmacy, please contact the new pharmacy directly. The new pharmacy will help you get your medications transferred.     Scheduling:  If you have any concerns about today's visit or wish to schedule another appointment please call our office during normal business hours [phone] (8-5:00 M-F)    Contact Us:  Please call [phone] during business hours (8-5:00 M-F).  If after clinic hours, or on the weekend, please call  [phone].    Financial Assistance [phone]  [employer] Billing [phone]  [place] Billing [phone]  Medical Records [phone]      MENTAL HEALTH CRISIS NUMBERS:  [facility]:   [facility] - [phone]   Crisis Residence [facility] - [phone]   Walk-In Counseling Center [facility] - [phone]   COPE 24/7 [place] Mobile Team for Adults  - [[phone]]; Child - [[phone]]     Crisis Connection - [phone]     [facility]:   [facility] - [phone]   Walk-in counseling [facility] - [phone]   Walk-in counseling [facility] - [phone]   Crisis Residence [facility] - [phone]   Urgent Care Adult Mental Health:   --Drop-in, 24/7 crisis line, and [place] Mobile Team [[phone]]    CRISIS TEXT LINE: Text 222-212 from anywhere, anytime, any crisis 24/7;    OR SEE www.crisistextline.org     Poison Control Center - [phone]    CHILD: [facility] needs assessment team - [phone]     [facility] Lif[facility] - [phone]; or [employer] Lif[facility] - 1-318.909.9654    If you have a medical emergency please call 911or go to the nearest ER.                    _____________________________________________    Again thank you for choosing PSYCHIATRY CLINIC and please let us know how we can best partner with you to improve you and your family's health.  You may be receiving a survey in the mail regarding this appointment. We would love to have your feedback, both positive and negative, so please fill out the survey and return it using the provided envelope. The survey is done by an external company, so your answers are anonymous.             Follow-ups after your visit        Follow-up notes from your care team     Return in about 8 weeks (around 12/3/2018) for MFU 30 min.      Your next 10 appointments already scheduled     Dec 03, 2018  1:30 PM CST   Adult Med Follow UP with Gucci Campbell MD   Psychiatry Clinic ([facility])    [address] [address] [phone]              Who to contact     Please call your clinic at [phone] to:    Ask questions about your health    Make or cancel appointments    Discuss your medicines    Learn about your test results    Speak to your doctor             Additional Information About Your Visit        [employer] Information     [employer] gives you secure access to your electronic health record. If you see a primary care provider, you can also send messages to your care team and make appointments. If you have questions, please call your primary care clinic.  If you do not have a primary care provider, please call [phone] and they will assist you.      [employer] is an electronic gateway that provides easy, online access to your medical records. With [employer], you can request a clinic appointment, read your test results, renew a prescription or communicate with your care team.     To access your existing account, please contact your [facility] or call [phone] for assistance.        Care EveryWhere ID     This is your Care EveryWhere ID. This could be used by other organizations to access your [place] medical records  [id number]        Your Vitals Were     Pulse BMI (Body Mass Index)                108 22.82 kg/m2           Blood Pressure from Last 3 Encounters:   10/08/18 128/83   08/06/18 (!) 145/98   06/11/18 135/90    Weight from Last 3 Encounters:   10/08/18 74.2 kg (163 lb 9.6 oz)   08/06/18 75.1 kg (165 lb 9.6 oz)   06/11/18 74.8 kg (164 lb 12.8 oz)              We Performed the Following     FLU VACCINE, IM (QUADRIVALENT W/PRESERVATIVES/MULTI-DOSE) [58797]- >3 YRS     Vaccine Administration, Initial [50649]          Today's Medication Changes          These changes are accurate as of 10/8/18 11:59 PM.  If you have any questions, ask your nurse or doctor.               Start taking these medicines.        Dose/Directions    * amphetamine-dextroamphetamine 10 MG per 24 hr capsule   Commonly known as:  ADDERALL XR   Used for:  Recurrent major depressive disorder, in full remission (H)   Started by:  Gucci Campbell MD        Dose:  10 mg   Take 1 capsule (10 mg) by mouth daily   Quantity:  30 capsule   Refills:  0        * amphetamine-dextroamphetamine 10 MG per 24 hr capsule   Commonly known as:  ADDERALL XR   Used for:  Recurrent major depressive disorder, in full remission (H)   Started by:  Gucci Campbell MD        Dose:  10 mg   Start taking on:  11/8/2018   Take 1 capsule (10 mg) by mouth daily   Quantity:  30 capsule   Refills:  0       * amphetamine-dextroamphetamine 10 MG per 24 hr capsule   Commonly known as:  ADDERALL XR   Used for:  Recurrent major depressive disorder, in full remission (H)   Started by:  Gucci Campbell MD        Dose:  10 mg   Start taking on:  12/9/2018   Take 1 capsule (10 mg) by mouth daily   Quantity:  30 capsule   Refills:  0       * Notice:  This list has 3 medication(s) that are the same as other medications prescribed for you. Read the directions carefully, and ask your doctor or other care provider to review them with you.         Where to get your medicines      Some of these will need a paper prescription and others can be bought over the counter.  Ask your nurse if you have questions.     Bring a paper prescription for each of these medications     amphetamine-dextroamphetamine 10 MG per 24 hr capsule    amphetamine-dextroamphetamine 10 MG per 24 hr capsule    amphetamine-dextroamphetamine 10 MG per 24 hr capsule                Primary Care Provider Office Phone # Fax #    Thalia Lisbeth Bradford [phone]       [address] [place] [address]        Equal Access to Services     WAN DYKES : Harry galano Soshawn, waaxda luqadaha, qaybta kaalmada ademichael, ivory renteria. So [facility] [phone].    ATENCIÓN: Si habla español, tiene a alegria disposición servicios gratuitos de asistencia lingüística. Llame al [phone].    We comply with applicable federal civil rights laws and [place] laws. We do not discriminate on the basis of race, color, national origin, age, disability, sex, sexual orientation, or gender  identity.            Thank you!     Thank you for choosing PSYCHIATRY CLINIC  for your care. Our goal is always to provide you with excellent care. Hearing back from our patients is one way we can continue to improve our services. Please take a few minutes to complete the written survey that you may receive in the mail after your visit with us. Thank you!             Your Updated Medication List - Protect others around you: Learn how to safely use, store and throw away your medicines at www.disposemymeds.org.          This list is accurate as of 10/8/18 11:59 PM.  Always use your most recent med list.                   Brand Name Dispense Instructions for use Diagnosis    * amphetamine-dextroamphetamine 10 MG per 24 hr capsule    ADDERALL XR    30 capsule    Take 1 capsule (10 mg) by mouth daily    Recurrent major depressive disorder, in full remission (H)       * amphetamine-dextroamphetamine 10 MG per 24 hr capsule   Start taking on:  11/8/2018    ADDERALL XR    30 capsule    Take 1 capsule (10 mg) by mouth daily    Recurrent major depressive disorder, in full remission (H)       * amphetamine-dextroamphetamine 10 MG per 24 hr capsule   Start taking on:  12/9/2018    ADDERALL XR    30 capsule    Take 1 capsule (10 mg) by mouth daily    Recurrent major depressive disorder, in full remission (H)       cyclobenzaprine 5 MG tablet    FLEXERIL    20 tablet    Take 1-2 tablets (5-10 mg) by mouth 3 times daily as needed for muscle spasms (do not drive after taking or take with alcohol)    Upper back strain, sequela       ibuprofen 800 MG tablet    ADVIL/MOTRIN     Take 800 mg by mouth        levomilnacipran 120 MG 24 hr capsule    FETZIMA ER    30 capsule    Take 1 capsule (120 mg) by mouth daily    Moderate episode of recurrent major depressive disorder (H)       lisinopril 20 MG tablet    PRINIVIL/ZESTRIL    30 tablet    Take 1 tablet (20 mg) by mouth daily    Benign essential hypertension       MAGNESIUM OXIDE PO            multivitamin, therapeutic Tabs tablet      Take 1 tablet by mouth daily        naltrexone 50 MG tablet    DEPADE;REVIA    90 tablet    Take 3 tablets (150 mg) by mouth daily    Moderate episode of recurrent major depressive disorder (H)       QUEtiapine 25 MG tablet    SEROQUEL    60 tablet    Take 2 tablets (50 mg) by mouth At Bedtime    Moderate episode of recurrent major depressive disorder (H)       vilazodone 40 MG Tabs tablet    VIIBRYD    30 tablet    Take 1 tablet (40 mg) by mouth daily    Moderate episode of recurrent major depressive disorder (H)       VITAMIN D (CHOLECALCIFEROL) PO      Take 1,000 Units by mouth 2 times daily        * Notice:  This list has 3 medication(s) that are the same as other medications prescribed for you. Read the directions carefully, and ask your doctor or other care provider to review them with you.

## 2018-10-08 NOTE — NURSING NOTE
Chief Complaint   Patient presents with     RECHECK     Moderate episode of recurrent major depressive disorder

## 2018-10-08 NOTE — PATIENT INSTRUCTIONS
Thank you for coming to the PSYCHIATRY CLINIC.    Lab Testing:  If you had lab testing today and your results are reassuring or normal they will be mailed to you or sent through [employer] within 7 days.   If the lab tests need quick action we will call you with the results.  The phone number we will call with results is # [phone] (home) . If this is not the best number please call our clinic and change the number.    Medication Refills:  If you need any refills please call your pharmacy and they will contact us. Our fax number for refills is [phone]. Please allow three business for refill processing.   If you need to  your refill at a new pharmacy, please contact the new pharmacy directly. The new pharmacy will help you get your medications transferred.     Scheduling:  If you have any concerns about today's visit or wish to schedule another appointment please call our office during normal business hours [phone] (8-5:00 M-F)    Contact Us:  Please call [phone] during business hours (8-5:00 M-F).  If after clinic hours, or on the weekend, please call  [phone].    Financial Assistance [phone]  [employer] Billing [phone]  [employer] Billing [phone]  Medical Records [phone]      MENTAL HEALTH CRISIS NUMBERS:  [facility]:   [facility] - [phone]   Crisis Residence [facility] - [phone]   Walk-In Counseling [facility] [phone]   COPE 24/7 [place] Mobile Team for Adults - [[phone]]; Child - [[phone]]     Crisis Connection - [phone]     [facility]:   [facility] - [phone]   Walk-in counseling [facility] - [phone]   Walk-in counseling [facility] - [phone]   Crisis Residence [facility] - [phone]   Urgent Care Adult Mental Health:   --Drop-in, 24/7 crisis line, and [place] Mobile Team [[phone]]    CRISIS TEXT  LINE: Text 741-972 from anywhere, anytime, any crisis 24/7;    OR SEE www.crisistextline.org     Poison Control Center - [phone]    CHILD: [facility] needs assessment team - [phone]     [facility] - [phone]; or [employer] Lifeline - [phone]    If you have a medical emergency please call 911or go to the nearest ER.                    _____________________________________________    Again thank you for choosing PSYCHIATRY CLINIC and please let us know how we can best partner with you to improve you and your family's health.  You may be receiving a survey in the mail regarding this appointment. We would love to have your feedback, both positive and negative, so please fill out the survey and return it using the provided envelope. The survey is done by an external company, so your answers are anonymous.

## 2018-11-06 ENCOUNTER — OFFICE VISIT (OUTPATIENT)
Dept: INTERNAL MEDICINE | Facility: CLINIC | Age: 48
End: 2018-11-06
Payer: MEDICARE

## 2018-11-06 VITALS
SYSTOLIC BLOOD PRESSURE: 146 MMHG | WEIGHT: 166.4 LBS | BODY MASS INDEX: 23.21 KG/M2 | DIASTOLIC BLOOD PRESSURE: 91 MMHG | OXYGEN SATURATION: 100 % | HEART RATE: 109 BPM

## 2018-11-06 DIAGNOSIS — M79.644 PAIN IN FINGER OF BOTH HANDS: ICD-10-CM

## 2018-11-06 DIAGNOSIS — M79.645 PAIN IN FINGER OF BOTH HANDS: ICD-10-CM

## 2018-11-06 DIAGNOSIS — M79.645 PAIN IN FINGER OF BOTH HANDS: Primary | ICD-10-CM

## 2018-11-06 DIAGNOSIS — M79.644 PAIN IN FINGER OF BOTH HANDS: Primary | ICD-10-CM

## 2018-11-06 DIAGNOSIS — I10 BENIGN ESSENTIAL HYPERTENSION: ICD-10-CM

## 2018-11-06 LAB — ERYTHROCYTE [SEDIMENTATION RATE] IN BLOOD BY WESTERGREN METHOD: 8 MM/H (ref 0–20)

## 2018-11-06 PROCEDURE — 86431 RHEUMATOID FACTOR QUANT: CPT | Performed by: NURSE PRACTITIONER

## 2018-11-06 PROCEDURE — 86038 ANTINUCLEAR ANTIBODIES: CPT | Performed by: NURSE PRACTITIONER

## 2018-11-06 RX ORDER — LISINOPRIL 20 MG/1
20 TABLET ORAL DAILY
Qty: 90 TABLET | Refills: 3 | Status: SHIPPED | OUTPATIENT
Start: 2018-11-06 | End: 2019-11-10

## 2018-11-06 ASSESSMENT — PAIN SCALES - GENERAL: PAINLEVEL: MILD PAIN (2)

## 2018-11-06 NOTE — PATIENT INSTRUCTIONS
[facility] Medication Refill Request Information:  * Please contact your pharmacy regarding ANY request for medication refills.  ** [facility] Prescription Fax = [phone]  * Please allow 3 business days for routine medication refills.  * Please allow 5 business days for controlled substance medication refills.     [facility] Test Result notification information:  *You will be notified with in 7-10 days of your appointment day regarding the results of your test.  If you are on MyChart you will be notified as soon as the provider has reviewed the results and signed off on them.    [facility]: [phone]

## 2018-11-06 NOTE — MR AVS SNAPSHOT
After Visit Summary   11/6/2018    Mariaelena Jean Baptiste    MRN: [id number]           Patient Information     Date Of Birth          1970        Visit Information        Provider Department      11/6/2018 10:00 AM Livia Vigil, APRN CNP [facility]        Today's Diagnoses     Pain in finger of both hands    -  1    Benign essential hypertension          Care Instructions    Primary Care Center Medication Refill Request Information:  * Please contact your pharmacy regarding ANY request for medication refills.  ** [facility] Prescription Fax = [phone]  * Please allow 3 business days for routine medication refills.  * Please allow 5 business days for controlled substance medication refills.     Primary Care Center Test Result notification information:  *You will be notified with in 7-10 days of your appointment day regarding the results of your test.  If you are on MyChart you will be notified as soon as the provider has reviewed the results and signed off on them.    Primary Care Center: [phone]             Follow-ups after your visit        Your next 10 appointments already scheduled     Dec 03, 2018  1:30 PM CST   Adult Med Follow UP with Gucci Campbell MD   Psychiatry Clinic ([facility])    [address]  [address] [id number] [phone]              Future tests that were ordered for you today     Open Future Orders        Priority Expected Expires Ordered    Rheumatoid factor Routine 11/6/2018 11/6/2019 11/6/2018    Erythrocyte sedimentation rate Routine 11/6/2018 11/6/2019 11/6/2018            Who to contact     Please call your clinic at [phone] to:    Ask questions about your health    Make or cancel appointments    Discuss your medicines    Learn about your test results    Speak to your doctor            Additional Information About Your Visit        MyChart Information     [employer] ([employer] Quotient)t gives you secure  access to your electronic health record. If you see a primary care provider, you can also send messages to your care team and make appointments. If you have questions, please call your primary care clinic.  If you do not have a primary care provider, please call [phone] and they will assist you.      My Own Med is an electronic gateway that provides easy, online access to your medical records. With My Own Med, you can request a clinic appointment, read your test results, renew a prescription or communicate with your care team.     To access your existing account, please contact your [facility] or call [phone] for assistance.        Care EveryWhere ID     This is your Care EveryWhere ID. This could be used by other organizations to access your [place] medical records  [id number]        Your Vitals Were     Pulse Pulse Oximetry BMI (Body Mass Index)             109 100% 23.21 kg/m2          Blood Pressure from Last 3 Encounters:   11/06/18 (!) 146/91   06/08/18 152/89   03/07/18 144/82    Weight from Last 3 Encounters:   11/06/18 75.5 kg (166 lb 6.4 oz)   06/08/18 74.5 kg (164 lb 4.8 oz)   03/07/18 70.3 kg (155 lb)              We Performed the Following     Anti Nuclear Aminah IgG by IFA with Reflex          Today's Medication Changes          These changes are accurate as of 11/6/18 10:24 AM.  If you have any questions, ask your nurse or doctor.               These medicines have changed or have updated prescriptions.        Dose/Directions    amphetamine-dextroamphetamine 10 MG per 24 hr capsule   Commonly known as:  ADDERALL XR   This may have changed:  Another medication with the same name was removed. Continue taking this medication, and follow the directions you see here.   Used for:  Recurrent major depressive disorder, in full remission (H)   Changed by:  Livia Vigil APRN CNP        Dose:  10 mg   Start taking on:  12/9/2018   Take 1 capsule (10 mg) by mouth daily    Quantity:  30 capsule   Refills:  0         Stop taking these medicines if you haven't already. Please contact your care team if you have questions.     cyclobenzaprine 5 MG tablet   Commonly known as:  FLEXERIL   Stopped by:  Livia Vigil APRN CNP                Where to get your medicines      These medications were sent to [facility] [address] [place] [address] AT [facility] & [address] [address]     Phone:  [phone]     lisinopril 20 MG tablet                Primary Care Provider Office Phone # Fax #    Thalia Lisbeth Bradford [phone]       [address] [place] [address] [address] [place] [address]        Equal Access to Services     WAN DYKES : Hadii jen sanchez hadasho Soomaali, waaxda luqadaha, qaybta kaalmada adeegyada, waxjuno perryin haygen marin . So [facility] [phone].    ATENCIÓN: Si habla español, tiene a alegria disposición servicios gratuitos de asistencia lingüística. [facility] [phone].    We comply with applicable federal civil rights laws and [place] laws. We do not discriminate on the basis of race, color, national origin, age, disability, sex, sexual orientation, or gender identity.            Thank you!     Thank you for choosing [facility] PRIMARY CARE CLINIC  for your care. Our goal is always to provide you with excellent care. Hearing back from our patients is one way we can continue to improve our services. Please take a few minutes to complete the written survey that you may receive in the mail after your visit with us. Thank you!             Your Updated Medication List - Protect others around you: Learn how to safely use, store and throw away your medicines at www.disposemymeds.org.          This list is accurate as of 11/6/18 10:24 AM.  Always use your most recent med list.                   Brand Name Dispense Instructions for use Diagnosis    amphetamine-dextroamphetamine 10 MG per 24  hr capsule   Start taking on:  12/9/2018    ADDERALL XR    30 capsule    Take 1 capsule (10 mg) by mouth daily    Recurrent major depressive disorder, in full remission (H)       ibuprofen 800 MG tablet    ADVIL/MOTRIN     Take 800 mg by mouth        levomilnacipran 120 MG 24 hr capsule    FETZIMA ER    30 capsule    Take 1 capsule (120 mg) by mouth daily    Moderate episode of recurrent major depressive disorder (H)       lisinopril 20 MG tablet    PRINIVIL/ZESTRIL    90 tablet    Take 1 tablet (20 mg) by mouth daily    Benign essential hypertension       MAGNESIUM OXIDE PO           multivitamin, therapeutic Tabs tablet      Take 1 tablet by mouth daily        naltrexone 50 MG tablet    DEPADE;REVIA    90 tablet    Take 3 tablets (150 mg) by mouth daily    Moderate episode of recurrent major depressive disorder (H)       QUEtiapine 25 MG tablet    SEROQUEL    60 tablet    Take 2 tablets (50 mg) by mouth At Bedtime    Moderate episode of recurrent major depressive disorder (H)       vilazodone 40 MG Tabs tablet    VIIBRYD    30 tablet    Take 1 tablet (40 mg) by mouth daily    Moderate episode of recurrent major depressive disorder (H)       VITAMIN D (CHOLECALCIFEROL) PO      Take 1,000 Units by mouth 2 times daily

## 2018-11-06 NOTE — PROGRESS NOTES
"[facility] [place]   Livia Vigil, ALYSSA CNP  11/06/2018      Chief Complaint: Joint Pain     History of Present Illness:   Mariaelena Jean Baptiste is a 47 year old female with a history of hypertension, depression, and anxiety who presents for evaluation of hand and toe pain. The patient reports that her hands and toe joints have recently been feeling achy, stiff, and warm inside. She reports that it started 4-5 days ago and is worse in the right hand, especially her 3rd, 4th, and 5th fingers. In her toes it is primarily location in the bilateral 3rd, 4th, and great toes. She is not in a significant amount of pain, but is worried about the sudden onset. She reports a history of carpal tunnel 10 years ago and current mild wrist pain, but is unsure if it is normal given her high use of her hands at work. She works as a , primarily on the weekends, and her job involves a lot of standing, lifting, and hand movements. She endorses regular headaches for which she takes 400-800 mg of Ibuprofen daily. She reports that it does not help her hands much. She denies a burning sensation or fevers. She denies having experienced this in the past. She is unaware of a family history of Rheumatoid Arthritis.       Other concerns discussed:  She states that her right pinky finger does not move well in the morning until it \"snaps into place\". This began 3-4 weeks ago.     The patient reports that she was diagnosed with knee arthritis 7 years ago. She states she was tested for Lyme's disease and lupus at that time and the results were negative. This was done at [facility].     The patient is currently taking Lisinopril 20 mg daily for hypertension. She is currently out of her medication and would like refill. BP upon arrival is 146/91.     The patient is currently taking Naltrexone 150 mg daily to prevent urges for self harm.     Review of Systems:   Pertinent items are noted in HPI, remainder of complete ROS is negative.  "     Active Medications:      [START ON 12/9/2018] amphetamine-dextroamphetamine (ADDERALL XR) 10 MG per 24 hr capsule, Take 1 capsule (10 mg) by mouth daily, Disp: 30 capsule, Rfl: 0     ibuprofen (ADVIL,MOTRIN) 800 MG tablet, Take 800 mg by mouth, Disp: , Rfl:      levomilnacipran (FETZIMA ER) 120 MG 24 hr capsule, Take 1 capsule (120 mg) by mouth daily, Disp: 30 capsule, Rfl: 3     lisinopril (PRINIVIL/ZESTRIL) 20 MG tablet, Take 1 tablet (20 mg) by mouth daily, Disp: 30 tablet, Rfl: 1     MAGNESIUM OXIDE PO, , Disp: , Rfl:      multivitamin, therapeutic (THERA-VIT) TABS tablet, Take 1 tablet by mouth daily, Disp: , Rfl:      naltrexone (DEPADE;REVIA) 50 MG tablet, Take 3 tablets (150 mg) by mouth daily, Disp: 90 tablet, Rfl: 3     QUEtiapine (SEROQUEL) 25 MG tablet, Take 2 tablets (50 mg) by mouth At Bedtime, Disp: 60 tablet, Rfl: 3     vilazodone (VIIBRYD) 40 MG TABS tablet, Take 1 tablet (40 mg) by mouth daily, Disp: 30 tablet, Rfl: 3     VITAMIN D, CHOLECALCIFEROL, PO, Take 1,000 Units by mouth 2 times daily , Disp: , Rfl:       Allergies:   Review of patient's allergies indicates no known allergies.      Past Medical History:  Anxiety  Chronic osteoarthritis  Major depressive disorder, recurrent   Tobacco abuse  Dissociative identity disorder  Gastroesophageal reflux disease  Migraines  Post-traumatic stress disorder  Social phobia  Chronic bilateral thoracic back pain  Suicidal ideation    Past Surgical History:  Cholecystectomy  Tubal Ligation  Rectal Prolapse Repair    Family History:   Father - Depression, Hypertension, Substance Abuse, Non Hodgkin's Lymphoma  Sister - Depression, Substance abuse  Maternal Grandmother - Breast cancer, Melanoma, Leukemia  Maternal Grandfather - Leukemia, Substance abuse  Paternal Grandmother - Lung cancer  Brother - Substance abuse    Social History:   Presents to the clinic alone  Tobacco Use: Previous smoker, quit 10/23/2017  Alcohol Use: No alcohol use.  PCP: Thalia  Lisbeth Bradford      Physical Exam:   BP (!) 146/91 (BP Location: Right arm, Patient Position: Sitting, Cuff Size: Adult Regular)  Pulse 109  Wt 75.5 kg (166 lb 6.4 oz)  SpO2 100%  BMI 23.21 kg/m2     Wt Readings from Last 1 Encounters:   11/06/18 75.5 kg (166 lb 6.4 oz)     Constitutional: no distress, comfortable, pleasant   Hands: No redness or swelling.  Nojoint abnormalities.  Full ROM.   Skin: No concerning lesions, no jaundice, temp normal   Neurological: Normal speech, no tremor. A and O x 3, good historian.  Psychological: Appropriate mood, good eye contact, normal affect     Assessment and Plan:  Benign essential hypertension - BP above goal. Refilled Lisinopril 20 mg daily today.   - lisinopril (PRINIVIL/ZESTRIL) 20 MG tablet  Dispense: 90 tablet; Refill: 3    Pain in finger of both hands - Patient presents with diffuse hand and toe joint pain for 4-5 days. Suspect osteoarthritis, but ordered labs to further assess for possible inflammatory cause. Discussed using over the counter pain medication as needed.   - Rheumatoid factor  - Erythrocyte sedimentation rate  - Anti Nuclear Amniah IgG by IFA with Reflex         Scribe Disclosure:   We, Digna Fermin and Pamela Valentine, are serving as scribes to document services personally performed by ALYSSA Pruitt CNP at this visit, based upon the provider's statements to us. All documentation has been reviewed by the aforementioned provider prior to being entered into the official medical record.     Portions of this medical record were completed by a scribe. UPON MY REVIEW AND AUTHENTICATION BY ELECTRONIC SIGNATURE, this confirms (a) I performed the applicable clinical services, and (b) the record is accurate.    Total time spent 25 minutes.  More than 50% of the time spent with Ms. Jean Baptiste on counseling / coordinating her care.  Livia SHAH CNP

## 2018-11-06 NOTE — NURSING NOTE
Chief Complaint   Patient presents with     Arthritis     pain in both hands/fingers joints, swelling in knees per pt x5 days       Korey Dykes, EMT 9:51 AM on 11/6/2018.

## 2018-11-07 LAB
ANA SER QL IF: NEGATIVE
RHEUMATOID FACT SER NEPH-ACNC: <20 IU/ML (ref 0–20)

## 2018-11-08 ENCOUNTER — MYC MEDICAL ADVICE (OUTPATIENT)
Dept: INTERNAL MEDICINE | Facility: CLINIC | Age: 48
End: 2018-11-08

## 2018-11-16 ENCOUNTER — OFFICE VISIT (OUTPATIENT)
Dept: INTERNAL MEDICINE | Facility: CLINIC | Age: 48
End: 2018-11-16
Payer: MEDICARE

## 2018-11-16 VITALS
BODY MASS INDEX: 22.76 KG/M2 | WEIGHT: 163.2 LBS | HEART RATE: 120 BPM | DIASTOLIC BLOOD PRESSURE: 79 MMHG | SYSTOLIC BLOOD PRESSURE: 121 MMHG

## 2018-11-16 DIAGNOSIS — M25.542 ARTHRALGIA OF BOTH HANDS: Primary | ICD-10-CM

## 2018-11-16 DIAGNOSIS — M25.541 ARTHRALGIA OF BOTH HANDS: Primary | ICD-10-CM

## 2018-11-16 DIAGNOSIS — G89.29 CHRONIC PAIN OF LEFT KNEE: ICD-10-CM

## 2018-11-16 DIAGNOSIS — M25.562 CHRONIC PAIN OF LEFT KNEE: ICD-10-CM

## 2018-11-16 ASSESSMENT — PAIN SCALES - GENERAL: PAINLEVEL: MILD PAIN (3)

## 2018-11-16 NOTE — MR AVS SNAPSHOT
After Visit Summary   11/16/2018    Mariaelena Jean Baptiste    MRN: [id number]           Patient Information     Date Of Birth          1970        Visit Information        Provider Department      11/16/2018 3:00 PM Thalia Bradford MD [facility]        Today's Diagnoses     Arthralgia of both hands    -  1    Chronic pain of left knee          Care Instructions    Primary Care Center Medication Refill Request Information:  * Please contact your pharmacy regarding ANY request for medication refills.  ** [facility] Prescription Fax = [phone]  * Please allow 3 business days for routine medication refills.  * Please allow 5 business days for controlled substance medication refills.     Primary Care Center Test Result notification information:  *You will be notified with in 7-10 days of your appointment day regarding the results of your test.  If you are on MyChart you will be notified as soon as the provider has reviewed the results and signed off on them.    [facility]: [phone]       RHEUMATOLOGY CLINIC  Clinic & Surgery Clinic  [address], [address]  Appointments: [phone], option 2    [place] 24/7 Appointment Scheduling [phone]          Follow-ups after your visit        Additional Services     RHEUMATOLOGY REFERRAL       Your provider has referred you to: PREFERRED PROVIDERS:    Please be aware that coverage of these services is subject to the terms and limitations of your health insurance plan.  Call member services at your health plan with any benefit or coverage questions.      Please bring the following with you to your appointment:    (1) Any X-Rays, CTs or MRIs which have been performed.  Contact the facility where they were done to arrange for  prior to your scheduled appointment.    (2) List of current medications   (3) This referral request   (4) Any documents/labs given to you for this referral                  Your next 10 appointments  already scheduled     Dec 03, 2018  1:30 PM CST   Adult Med Follow UP with Gucci Campbell MD   Psychiatry Clinic ([facility])    [place] [id number]  [address] [address] [phone]            Dec 05, 2018 10:00 AM CST   (Arrive by 9:45 AM)   New Patient Visit with ALYSSA Isidro [facility] Rheumatology ([facility] [place])    [address]  [address]  [facility] [address] [phone]              Future tests that were ordered for you today     Open Future Orders        Priority Expected Expires Ordered    Basic metabolic panel Routine 11/16/2018 11/30/2018 11/16/2018            Who to contact     Please call your clinic at [phone] to:    Ask questions about your health    Make or cancel appointments    Discuss your medicines    Learn about your test results    Speak to your doctor            Additional Information About Your Visit        [employer] Information     [employer] gives you secure access to your electronic health record. If you see a primary care provider, you can also send messages to your care team and make appointments. If you have questions, please call your primary care clinic.  If you do not have a primary care provider, please call [phone] and they will assist you.      [employer] is an electronic gateway that provides easy, online access to your medical records. With [employer], you can request a clinic appointment, read your test results, renew a prescription or communicate with your care team.     To access your existing account, please contact your [facility] or call [phone] for assistance.        Care EveryWhere ID     This is your Care EveryWhere ID. This could be used by other organizations to access your [place] medical records  [id number]        Your Vitals Were     Pulse BMI (Body Mass Index)                120 22.76 kg/m2           Blood Pressure  from Last 3 Encounters:   11/16/18 121/79   11/06/18 (!) 146/91   10/08/18 128/83    Weight from Last 3 Encounters:   11/16/18 74 kg (163 lb 3.2 oz)   11/06/18 75.5 kg (166 lb 6.4 oz)   10/08/18 74.2 kg (163 lb 9.6 oz)              We Performed the Following     RHEUMATOLOGY REFERRAL          Today's Medication Changes          These changes are accurate as of 11/16/18  3:54 PM.  If you have any questions, ask your nurse or doctor.               Start taking these medicines.        Dose/Directions    diclofenac 1 % Gel topical gel   Commonly known as:  VOLTAREN   Used for:  Chronic pain of left knee   Started by:  Thalia Bradford MD        Apply 4 grams to knees or 2 grams to hands four times daily using enclosed dosing card.   Quantity:  100 g   Refills:  1            Where to get your medicines      These medications were sent to [facility] [address] [place] [address] & [address] [address]     Phone:  [phone]     diclofenac 1 % Gel topical gel                Primary Care Provider Office Phone # Fax #    Thalia Bradford [phone]       [address] [place] [address]        Equal Access to Services     WAN DYKES AH: Hadii jen ku hadasho Soomaali, waaxda luqadaha, qaybta kaalmada adeegyada, waxay orlandoin haygen renteria. So [facility] [place] [phone].    ATENCIÓN: Si habla español, tiene a alegria disposición servicios gratuitos de asistencia lingüística. Llame al [phone].    We comply with applicable federal civil rights laws and [place] laws. We do not discriminate on the basis of race, color, national origin, age, disability, sex, sexual orientation, or gender identity.            Thank you!     Thank you for choosing [facility]  for your care. Our goal is always to provide you with excellent care. Hearing back from our patients is one way we can  continue to improve our services. Please take a few minutes to complete the written survey that you may receive in the mail after your visit with us. Thank you!             Your Updated Medication List - Protect others around you: Learn how to safely use, store and throw away your medicines at www.disposemymeds.org.          This list is accurate as of 11/16/18  3:54 PM.  Always use your most recent med list.                   Brand Name Dispense Instructions for use Diagnosis    amphetamine-dextroamphetamine 10 MG per 24 hr capsule   Start taking on:  12/9/2018    ADDERALL XR    30 capsule    Take 1 capsule (10 mg) by mouth daily    Recurrent major depressive disorder, in full remission (H)       diclofenac 1 % Gel topical gel    VOLTAREN    100 g    Apply 4 grams to knees or 2 grams to hands four times daily using enclosed dosing card.    Chronic pain of left knee       ibuprofen 800 MG tablet    ADVIL/MOTRIN     Take 800 mg by mouth        levomilnacipran 120 MG 24 hr capsule    FETZIMA ER    30 capsule    Take 1 capsule (120 mg) by mouth daily    Moderate episode of recurrent major depressive disorder (H)       lisinopril 20 MG tablet    PRINIVIL/ZESTRIL    90 tablet    Take 1 tablet (20 mg) by mouth daily    Benign essential hypertension       MAGNESIUM OXIDE PO           multivitamin, therapeutic Tabs tablet      Take 1 tablet by mouth daily        QUEtiapine 25 MG tablet    SEROQUEL    60 tablet    Take 2 tablets (50 mg) by mouth At Bedtime    Moderate episode of recurrent major depressive disorder (H)       vilazodone 40 MG Tabs tablet    VIIBRYD    30 tablet    Take 1 tablet (40 mg) by mouth daily    Moderate episode of recurrent major depressive disorder (H)       VITAMIN D (CHOLECALCIFEROL) PO      Take 1,000 Units by mouth 2 times daily

## 2018-11-16 NOTE — NURSING NOTE
Chief Complaint   Patient presents with     Recheck Medication     MDD     Reviewed allergies, smoking status, and pharmacy preference  Administered abuse screening questions   Obtained weight, blood pressure and heart rate     
No

## 2018-11-16 NOTE — NURSING NOTE
Chief Complaint   Patient presents with     Arthritis     pt states she has joint pain in hands and feet       Nakia Downing CMA at 3:03 PM on 11/16/2018.

## 2018-11-16 NOTE — PATIENT INSTRUCTIONS
Primary Care Center Medication Refill Request Information:  * Please contact your pharmacy regarding ANY request for medication refills.  ** [facility] Prescription Fax = [phone]  * Please allow 3 business days for routine medication refills.  * Please allow 5 business days for controlled substance medication refills.     Primary Care Center Test Result notification information:  *You will be notified with in 7-10 days of your appointment day regarding the results of your test.  If you are on MyChart you will be notified as soon as the provider has reviewed the results and signed off on them.    [facility]: [phone]       RHEUMATOLOGY CLINIC  Clinic & Surgery Clinic  [address]oor [address], [address]  Appointments: [phone], option 2    [place] 24/7 Appointment Scheduling [phone]

## 2018-11-16 NOTE — PROGRESS NOTES
Chief Complaint   Mariaelena Jean Baptiste is a 47 year old female presents for   Chief Complaint   Patient presents with     Arthritis     pt states she has joint pain in hands and feet        SUBJECTIVE:  Has pain in hands and feet.  Has been diagnosed with OA in knees ni the past.  No recent change in activities.      Currently pain in the hands and toes.  Some swelling in her fingers.  Not usually severe, but will notice trouble getting ring off.      Swelling in kjnees usually happens with standing for prolonged periods.  Better at home at night.    No rashes.  ~1 week ago felt feverish, but did not check.  No fevers since.      Has tried ibuprofen and hot showers.  Also OTC aspercreme.  Some improvement in pain with these.  Takes ibuprofen on days she is going to be active.  Takes 800mg 1-3 times per day.  She takes some almost every day.    Pain in her hands is the worst.  Takes an hour int h emorning before she can clinch her fist.  Does note pain in her fingers with going out into the cold, but does not notice any change in color.    Medications and allergies were reviewed by me today.     Social History   History   Smoking Status     Former Smoker     Packs/day: 1.00     Years: 20.00     Types: Cigarettes, Other     Start date: 5/1/1995     Quit date: 10/23/2017   Smokeless Tobacco     Former User     Comment: E-cig       PHQ-2 ( 1999 [employer]) 8/25/2016 8/24/2016   Q1: Little interest or pleasure in doing things 0 0   Q2: Feeling down, depressed or hopeless 0 0   PHQ-2 Score 0 0     PHQ-9 SCORE 2/5/2018 6/11/2018 8/6/2018   Total Score 5 6 4       Past Medical History   Patient Active Problem List   Diagnosis     Suicidal ideation     Major depressive disorder, recurrent, mild (H)     Tobacco abuse     Hx of psychiatric care     Major depressive disorder, single episode, severe without psychotic features (H)     Scar     Chronic bilateral thoracic back pain       Review of Systems   5 point ROS completed and negative  except noted above, including Gen, CV, Resp, GI, MS    Physical Exam   /79  Pulse 120  Wt 74 kg (163 lb 3.2 oz)  BMI 22.76 kg/m2  Gen: no distress, comfortable, pleasant   Eyes: anicteric, normal extra-ocular movements   MSK: no appreciable joint swelling of the hands.  Some tenderness over the MCP and PIP joints of the 5th, 4th, and 3rd fingers on the right  Skin: no concerning lesions, no jaundice   Psychological: appropriate mood     Assessment & Plan      Arthralgia of both hands  Recently labs showed normal RA, ROSEMARIE, and ESR.  However, she has a few red flags so will refer to rheumatology  - RHEUMATOLOGY REFERRAL  - check BMP given chronic ibuprofen use  - encouraged her to alternate ibuprofen and tylenol for pain    Chronic pain of left knee  - diclofenac (VOLTAREN) 1 % GEL topical gel  Dispense: 100 g; Refill: 1      RTC: No Follow-up on file.    Thalia Bradford MD

## 2018-12-03 ENCOUNTER — OFFICE VISIT (OUTPATIENT)
Dept: PSYCHIATRY | Facility: CLINIC | Age: 48
End: 2018-12-03
Attending: PSYCHIATRY & NEUROLOGY
Payer: MEDICARE

## 2018-12-03 VITALS
SYSTOLIC BLOOD PRESSURE: 129 MMHG | WEIGHT: 166 LBS | DIASTOLIC BLOOD PRESSURE: 78 MMHG | HEART RATE: 125 BPM | BODY MASS INDEX: 23.15 KG/M2

## 2018-12-03 DIAGNOSIS — F33.1 MODERATE EPISODE OF RECURRENT MAJOR DEPRESSIVE DISORDER (H): ICD-10-CM

## 2018-12-03 PROCEDURE — G0463 HOSPITAL OUTPT CLINIC VISIT: HCPCS | Mod: ZF

## 2018-12-03 RX ORDER — VILAZODONE HYDROCHLORIDE 40 MG/1
40 TABLET ORAL DAILY
Qty: 30 TABLET | Refills: 3 | Status: SHIPPED | OUTPATIENT
Start: 2018-12-03 | End: 2019-03-20

## 2018-12-03 RX ORDER — LAMOTRIGINE 150 MG/1
150 TABLET ORAL DAILY
Qty: 30 TABLET | Refills: 3 | Status: SHIPPED | OUTPATIENT
Start: 2018-12-03 | End: 2019-04-15

## 2018-12-03 RX ORDER — DEXTROAMPHETAMINE SACCHARATE, AMPHETAMINE ASPARTATE, DEXTROAMPHETAMINE SULFATE AND AMPHETAMINE SULFATE 2.5; 2.5; 2.5; 2.5 MG/1; MG/1; MG/1; MG/1
10 TABLET ORAL DAILY
Qty: 30 TABLET | Refills: 0 | Status: SHIPPED | OUTPATIENT
Start: 2019-02-03 | End: 2019-03-07

## 2018-12-03 RX ORDER — DEXTROAMPHETAMINE SACCHARATE, AMPHETAMINE ASPARTATE, DEXTROAMPHETAMINE SULFATE AND AMPHETAMINE SULFATE 2.5; 2.5; 2.5; 2.5 MG/1; MG/1; MG/1; MG/1
10 TABLET ORAL DAILY
Qty: 30 TABLET | Refills: 0 | Status: SHIPPED | OUTPATIENT
Start: 2019-01-03 | End: 2019-02-04

## 2018-12-03 RX ORDER — DEXTROAMPHETAMINE SACCHARATE, AMPHETAMINE ASPARTATE, DEXTROAMPHETAMINE SULFATE AND AMPHETAMINE SULFATE 2.5; 2.5; 2.5; 2.5 MG/1; MG/1; MG/1; MG/1
10 TABLET ORAL DAILY
Qty: 30 TABLET | Refills: 0 | Status: SHIPPED | OUTPATIENT
Start: 2018-12-03 | End: 2019-02-04

## 2018-12-03 RX ORDER — QUETIAPINE FUMARATE 25 MG/1
50 TABLET, FILM COATED ORAL AT BEDTIME
Qty: 60 TABLET | Refills: 3 | Status: SHIPPED | OUTPATIENT
Start: 2018-12-03 | End: 2019-03-20

## 2018-12-03 ASSESSMENT — PATIENT HEALTH QUESTIONNAIRE - PHQ9: SUM OF ALL RESPONSES TO PHQ QUESTIONS 1-9: 6

## 2018-12-03 ASSESSMENT — PAIN SCALES - GENERAL: PAINLEVEL: MILD PAIN (2)
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
## 2018-12-03 NOTE — PATIENT INSTRUCTIONS
Medications: decrease naltrexone to 100 mg daily for 4 weeks, then to 50 mg for 4 weeks, then discontinue. Please call if there is any increase in symptoms such as self harm urges.      Thank you for coming to the PSYCHIATRY CLINIC.    Lab Testing:  If you had lab testing today and your results are reassuring or normal they will be mailed to you or sent through [employer] within 7 days.   If the lab tests need quick action we will call you with the results.  The phone number we will call with results is # [phone] (home) . If this is not the best number please call our clinic and change the number.    Medication Refills:  If you need any refills please call your pharmacy and they will contact us. Our fax number for refills is [phone]. Please allow three business for refill processing.   If you need to  your refill at a new pharmacy, please contact the new pharmacy directly. The new pharmacy will help you get your medications transferred.     Scheduling:  If you have any concerns about today's visit or wish to schedule another appointment please call our office during normal business hours [phone] (8-5:00 M-F)    Contact Us:  Please call [phone] during business hours (8-5:00 M-F).  If after clinic hours, or on the weekend, please call  [phone].    Financial Assistance [phone]  [facility] Billing [phone]  [facility] Billing [phone]  Medical Records [phone]      MENTAL HEALTH CRISIS NUMBERS:  [facility]:   [facility] - [phone]   Crisis Residence [facility]idence - [phone]   Walk-In Counseling Center [facility] - [phone]   COPE 24/7 [facility] Mobile Team for Adults - [[phone]]; Child - [[phone]]     Crisis Connection - [phone]     [facility]:   [facility] - [phone]   Walk-in counseling [facility] - [phone]   Walk-in counseling [facility] - [phone]    Crisis Residence  [facility] Residence - [phone]   Urgent Care Adult Mental Health:   --Drop-in, 24/7 crisis line, and [facility] Mobile Team [[phone]]    CRISIS TEXT LINE: Text [phone] from anywhere, anytime, any crisis 24/7;    OR SEE www.crisistextline.org     Poison Control Center - [phone]    CHILD: [facility] needs assessment team - [phone]     [facility] Lifeline - [phone]; or [employer] Lifeline - [phone]    If you have a medical emergency please call 911or go to the nearest ER.                    _____________________________________________    Again thank you for choosing PSYCHIATRY CLINIC and please let us know how we can best partner with you to improve you and your family's health.  You may be receiving a survey in the mail regarding this appointment. We would love to have your feedback, both positive and negative, so please fill out the survey and return it using the provided envelope. The survey is done by an external company, so your answers are anonymous.

## 2018-12-05 ENCOUNTER — OFFICE VISIT (OUTPATIENT)
Dept: DERMATOLOGY | Facility: CLINIC | Age: 48
End: 2018-12-05
Payer: MEDICARE

## 2018-12-05 ENCOUNTER — RADIANT APPOINTMENT (OUTPATIENT)
Dept: GENERAL RADIOLOGY | Facility: CLINIC | Age: 48
End: 2018-12-05
Attending: NURSE PRACTITIONER
Payer: MEDICARE

## 2018-12-05 ENCOUNTER — OFFICE VISIT (OUTPATIENT)
Dept: RHEUMATOLOGY | Facility: CLINIC | Age: 48
End: 2018-12-05
Attending: NURSE PRACTITIONER
Payer: MEDICARE

## 2018-12-05 VITALS
HEIGHT: 71 IN | WEIGHT: 164.4 LBS | HEART RATE: 104 BPM | DIASTOLIC BLOOD PRESSURE: 79 MMHG | BODY MASS INDEX: 23.02 KG/M2 | OXYGEN SATURATION: 100 % | SYSTOLIC BLOOD PRESSURE: 142 MMHG

## 2018-12-05 DIAGNOSIS — M25.542 ARTHRALGIA OF BOTH HANDS: ICD-10-CM

## 2018-12-05 DIAGNOSIS — Z12.83 SKIN CANCER SCREENING: ICD-10-CM

## 2018-12-05 DIAGNOSIS — L81.6 POIKILODERMA: ICD-10-CM

## 2018-12-05 DIAGNOSIS — Z11.59 ENCOUNTER FOR SCREENING FOR OTHER VIRAL DISEASES: ICD-10-CM

## 2018-12-05 DIAGNOSIS — K08.9 POOR DENTITION: ICD-10-CM

## 2018-12-05 DIAGNOSIS — Z80.8 FAMILY HISTORY OF MELANOMA: ICD-10-CM

## 2018-12-05 DIAGNOSIS — M25.541 ARTHRALGIA OF BOTH HANDS: ICD-10-CM

## 2018-12-05 DIAGNOSIS — Z79.1 NSAID LONG-TERM USE: ICD-10-CM

## 2018-12-05 DIAGNOSIS — Z79.899 OTHER LONG TERM (CURRENT) DRUG THERAPY: ICD-10-CM

## 2018-12-05 DIAGNOSIS — L81.4 LENTIGINES: ICD-10-CM

## 2018-12-05 DIAGNOSIS — R23.8 OTHER SKIN CHANGES: ICD-10-CM

## 2018-12-05 DIAGNOSIS — D48.5 NEOPLASM OF UNCERTAIN BEHAVIOR OF SKIN: Primary | ICD-10-CM

## 2018-12-05 DIAGNOSIS — K08.9 POOR DENTITION: Primary | ICD-10-CM

## 2018-12-05 LAB
ALBUMIN SERPL-MCNC: 3.6 G/DL (ref 3.4–5)
ALBUMIN UR-MCNC: NEGATIVE MG/DL
ALT SERPL W P-5'-P-CCNC: 24 U/L (ref 0–50)
APPEARANCE UR: CLEAR
AST SERPL W P-5'-P-CCNC: 19 U/L (ref 0–45)
BILIRUB UR QL STRIP: NEGATIVE
COLOR UR AUTO: ABNORMAL
CREAT SERPL-MCNC: 0.62 MG/DL (ref 0.52–1.04)
CRP SERPL-MCNC: <2.9 MG/L (ref 0–8)
ERYTHROCYTE [DISTWIDTH] IN BLOOD BY AUTOMATED COUNT: 12.6 % (ref 10–15)
ERYTHROCYTE [SEDIMENTATION RATE] IN BLOOD BY WESTERGREN METHOD: 10 MM/H (ref 0–20)
GFR SERPL CREATININE-BSD FRML MDRD: >90 ML/MIN/1.7M2
GLUCOSE UR STRIP-MCNC: NEGATIVE MG/DL
HCT VFR BLD AUTO: 39.7 % (ref 35–47)
HGB BLD-MCNC: 12.7 G/DL (ref 11.7–15.7)
HGB UR QL STRIP: NEGATIVE
KETONES UR STRIP-MCNC: NEGATIVE MG/DL
LEUKOCYTE ESTERASE UR QL STRIP: NEGATIVE
MCH RBC QN AUTO: 29 PG (ref 26.5–33)
MCHC RBC AUTO-ENTMCNC: 32 G/DL (ref 31.5–36.5)
MCV RBC AUTO: 91 FL (ref 78–100)
MUCOUS THREADS #/AREA URNS LPF: PRESENT /LPF
NITRATE UR QL: NEGATIVE
PH UR STRIP: 6 PH (ref 5–7)
PLATELET # BLD AUTO: 405 10E9/L (ref 150–450)
RBC # BLD AUTO: 4.38 10E12/L (ref 3.8–5.2)
RBC #/AREA URNS AUTO: <1 /HPF (ref 0–2)
SOURCE: ABNORMAL
SP GR UR STRIP: 1 (ref 1–1.03)
SQUAMOUS #/AREA URNS AUTO: <1 /HPF (ref 0–1)
TSH SERPL DL<=0.005 MIU/L-ACNC: 1.2 MU/L (ref 0.4–4)
UROBILINOGEN UR STRIP-MCNC: 0 MG/DL (ref 0–2)
WBC # BLD AUTO: 6.5 10E9/L (ref 4–11)
WBC #/AREA URNS AUTO: <1 /HPF (ref 0–5)

## 2018-12-05 PROCEDURE — 84460 ALANINE AMINO (ALT) (SGPT): CPT | Performed by: NURSE PRACTITIONER

## 2018-12-05 PROCEDURE — 85652 RBC SED RATE AUTOMATED: CPT | Performed by: NURSE PRACTITIONER

## 2018-12-05 PROCEDURE — G0499 HEPB SCREEN HIGH RISK INDIV: HCPCS | Performed by: NURSE PRACTITIONER

## 2018-12-05 PROCEDURE — 86480 TB TEST CELL IMMUN MEASURE: CPT | Performed by: NURSE PRACTITIONER

## 2018-12-05 PROCEDURE — 36415 COLL VENOUS BLD VENIPUNCTURE: CPT | Performed by: NURSE PRACTITIONER

## 2018-12-05 PROCEDURE — 87389 HIV-1 AG W/HIV-1&-2 AB AG IA: CPT | Performed by: NURSE PRACTITIONER

## 2018-12-05 PROCEDURE — 88305 TISSUE EXAM BY PATHOLOGIST: CPT | Mod: TC | Performed by: PHYSICIAN ASSISTANT

## 2018-12-05 PROCEDURE — 86140 C-REACTIVE PROTEIN: CPT | Performed by: NURSE PRACTITIONER

## 2018-12-05 PROCEDURE — 86200 CCP ANTIBODY: CPT | Performed by: NURSE PRACTITIONER

## 2018-12-05 PROCEDURE — 84443 ASSAY THYROID STIM HORMONE: CPT | Performed by: NURSE PRACTITIONER

## 2018-12-05 PROCEDURE — 82565 ASSAY OF CREATININE: CPT | Performed by: NURSE PRACTITIONER

## 2018-12-05 PROCEDURE — G0463 HOSPITAL OUTPT CLINIC VISIT: HCPCS | Mod: ZF

## 2018-12-05 PROCEDURE — 82306 VITAMIN D 25 HYDROXY: CPT | Performed by: NURSE PRACTITIONER

## 2018-12-05 PROCEDURE — 82040 ASSAY OF SERUM ALBUMIN: CPT | Performed by: NURSE PRACTITIONER

## 2018-12-05 PROCEDURE — 81001 URINALYSIS AUTO W/SCOPE: CPT | Performed by: NURSE PRACTITIONER

## 2018-12-05 PROCEDURE — 86803 HEPATITIS C AB TEST: CPT | Performed by: NURSE PRACTITIONER

## 2018-12-05 PROCEDURE — 86704 HEP B CORE ANTIBODY TOTAL: CPT | Performed by: NURSE PRACTITIONER

## 2018-12-05 PROCEDURE — 85027 COMPLETE CBC AUTOMATED: CPT | Performed by: NURSE PRACTITIONER

## 2018-12-05 PROCEDURE — 84450 TRANSFERASE (AST) (SGOT): CPT | Performed by: NURSE PRACTITIONER

## 2018-12-05 RX ORDER — LIDOCAINE HYDROCHLORIDE AND EPINEPHRINE 10; 10 MG/ML; UG/ML
1 INJECTION, SOLUTION INFILTRATION; PERINEURAL ONCE
Qty: 1 ML | Refills: 0 | OUTPATIENT
Start: 2018-12-05 | End: 2018-12-05

## 2018-12-05 ASSESSMENT — PAIN SCALES - GENERAL
PAINLEVEL: MILD PAIN (2)
PAINLEVEL: NO PAIN (0)
PAINLEVEL: NO PAIN (0)

## 2018-12-05 NOTE — NURSING NOTE
"Chief Complaint   Patient presents with     Consult     Arthralgia      /79  Pulse 104  Ht 1.803 m (5' 11\")  Wt 74.6 kg (164 lb 6.4 oz)  LMP 11/15/2018  SpO2 100%  BMI 22.93 kg/m2  Eric Burk, JOSE G    "

## 2018-12-05 NOTE — LETTER
12/5/2018       RE: Mariaelena Jean Baptiste  [address]  [address]  [place] [address]     Dear Colleague,    Thank you for referring your patient, Mariaelena Jean Baptiste, to the [facility] DERMATOLOGY at [facility]. Please see a copy of my visit note below.    [facility] Dermatology Note      Dermatology Problem List:  1. Skin Cancer Screening   2. NUB on the left shoulder. DDx includes irritated benign Nevus vs Other s/p bx     Encounter Date: Dec 5, 2018    CC:  Chief Complaint   Patient presents with     Skin Check     Mariaelena is here today for a skin check- notes no areas of concern.          History of Present Illness:  Ms. Mariaelena Jean Baptiste is a 48 year old female who is new to the dermatology clinic that is her for a full body skin examination. At today's visit the patient states that she used to lay out in the sun a lot when she was young. Hx of at least one blistering sunburn on the back as a child. The patient does not have a history of skin cancer but a distant family history of melanoma (paternal grandmother). She applies sun screen only when she knows that she will be in the sun for a long period of time. She does frequently wear clothing with SPF in it. She states that she has scars on her body that if they get exposed to much sun light then they will blister. The patient states that there is a lesion under neath her bra strap that bothers her and gets irritated, would like this removed if possible. The patient denies painful, itching, tingling or bleeding lesions unless otherwise noted.      Past Medical History:   Patient Active Problem List   Diagnosis     Suicidal ideation     Major depressive disorder, recurrent, mild (H)     Tobacco abuse     Hx of psychiatric care     Major depressive disorder, single episode, severe without psychotic features (H)     Scar     Chronic bilateral thoracic back pain     Past Medical History:   Diagnosis Date     Anxiety  1988     Chronic osteoarthritis      Depressive disorder      Dissociative identity disorder (H)      Gastro-oesophageal reflux disease      Migraines      PTSD (post-traumatic stress disorder)      Social phobia      Past Surgical History:   Procedure Laterality Date     CHOLECYSTECTOMY       LAPAROSCOPIC TUBAL LIGATION  1999     rectal prolapse repair         Social History:   reports that she quit smoking about 13 months ago. Her smoking use included Cigarettes and Other. She started smoking about 23 years ago. She has a 20.00 pack-year smoking history. She has quit using smokeless tobacco. She reports that she does not drink alcohol or use illicit drugs. Hx of blistering sun burns on the upper back.    Family History:  Family History   Problem Relation Age of Onset     Depression Father      Hypertension Father      Substance Abuse Father      Cancer Father      non hodgkins lymphoma     Depression Sister      Cancer Maternal Grandmother      breast cancer (mastectomy in 40's), melanoma, leukemia     Melanoma Maternal Grandmother      Cancer Maternal Grandfather      leukemia     Substance Abuse Maternal Grandfather      Cancer Paternal Grandmother      lung cancer, nonsmoker     Substance Abuse Brother      Substance Abuse Sister      Skin Cancer No family hx of        Medications:  Current Outpatient Prescriptions   Medication Sig Dispense Refill     amphetamine-dextroamphetamine (ADDERALL) 10 MG tablet Take 1 tablet (10 mg) by mouth daily 30 tablet 0     [START ON 1/3/2019] amphetamine-dextroamphetamine (ADDERALL) 10 MG tablet Take 1 tablet (10 mg) by mouth daily 30 tablet 0     [START ON 2/3/2019] amphetamine-dextroamphetamine (ADDERALL) 10 MG tablet Take 1 tablet (10 mg) by mouth daily 30 tablet 0     ibuprofen (ADVIL,MOTRIN) 800 MG tablet Take 800 mg by mouth       lamoTRIgine (LAMICTAL) 150 MG tablet Take 1 tablet (150 mg) by mouth daily 30 tablet 3     levomilnacipran (FETZIMA ER) 120 MG 24 hr capsule  Take 1 capsule (120 mg) by mouth daily 30 capsule 3     lisinopril (PRINIVIL/ZESTRIL) 20 MG tablet Take 1 tablet (20 mg) by mouth daily 90 tablet 3     MAGNESIUM OXIDE PO        multivitamin, therapeutic (THERA-VIT) TABS tablet Take 1 tablet by mouth daily       QUEtiapine (SEROQUEL) 25 MG tablet Take 2 tablets (50 mg) by mouth At Bedtime 60 tablet 3     vilazodone (VIIBRYD) 40 MG TABS tablet Take 1 tablet (40 mg) by mouth daily 30 tablet 3     VITAMIN D, CHOLECALCIFEROL, PO Take 1,000 Units by mouth 2 times daily        [START ON 12/9/2018] amphetamine-dextroamphetamine (ADDERALL XR) 10 MG per 24 hr capsule Take 1 capsule (10 mg) by mouth daily (Patient not taking: Reported on 12/5/2018) 30 capsule 0     diclofenac (VOLTAREN) 1 % GEL topical gel Apply 4 grams to knees or 2 grams to hands four times daily using enclosed dosing card. (Patient not taking: Reported on 12/5/2018) 100 g 1       No Known Allergies    Review of Systems:  -Constitutional: The patient denies fatigue, fevers, chills, unintended weight loss, and night sweats.  -Skin: As above in HPI. No additional skin concerns.  -Heme/Lymph: no concerning bumps, no bleeding or bruising problems     Physical exam:  Vitals: [facility] 11/15/2018  GEN: This is a well developed, well-nourished female in no acute distress, in a pleasant mood.    SKIN: Full skin, which includes the head/face, both arms, chest, back, abdomen,both legs, genitalia and/or groin buttocks, digits and/or nails, was examined.  -Max's skin type I, less then 100 Nevi  - asael reddish/brown pigmentation with telangiectasias on the lateral aspects of the neck sparing the submental region   - 3 mm pedunculated papule on the Left shoulder  -numerous linear scars on the bilateral extremities - some requiring grafting 2/2 self mutilation  -numerous tattoos on the skin  -No other lesions of concern on areas examined.       Impression/Plan:  1. Solar lentigines - upper back - patient with hx of  blistering sun burns as a child    Reassured benign     No further intervention required. Patient to report changes.     2. Skin Cancer Screening     ABCDs of melanoma were discussed and self skin checks were advised.     Sunscreen: Apply 20 minutes prior to going outdoors and reapply every two hours, when wet or sweating. We recommend using an SPF 30 or higher, and to use one that is water resistant.       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 We recommend using an SPF 30 or higher, and to use one that is water resistant.       4. Neoplasm of uncertain behavior on the left shouder. The differential diagnosis includes irritated benign nevus vs skin tag vs other     Shave biopsy:  After discussion of benefits and risks including but not limited to bleeding/bruising, pain/swelling, infection, scar, incomplete removal, nerve damage/numbness, recurrence, and non-diagnostic biopsy, written consent, verbal consent and photographs were obtained. Time-out was performed. The area was cleaned with isopropyl alcohol. 0.5ml of 1% lidocaine with 1:100,000 epinephrine was injected to obtain adequate anesthesia. A shave biopsy was performed. Hemostasis was achieved with aluminium chloride. Vaseline and a sterile dressing were applied. The patient tolerated the procedure and no complications were noted. The patient was provided with verbal and written post care instructions.    Photograph was obtained for clinical monitoring and inclusion in medical record.      CC Dr. Fishman on close of this encounter.  Follow-up in 2 years, earlier for new or changing lesions.       Staff Involved:  Staff/Scribe    Scribe Disclosure:  Muriel ACEVEDO am serving as a scribe to document services personally performed by Giovanna Sales PA-C, based on data collection and the provider's statements to  me.     Provider Disclosure:   The documentation recorded by the scribe accurately reflects the services I personally performed and the decisions made by me.    All risks, benefits and alternatives were discussed with patient.  Patient is in agreement and understands the assessment and plan.  All questions were answered.    Giovanna Sales PA-C  [facility]: Phone: [phone], Fax: [phone]                      Again, thank you for allowing me to participate in the care of your patient.      Sincerely,    Giovanna Sales PA-C

## 2018-12-05 NOTE — NURSING NOTE
Dermatology Rooming Note    Mariaelena Jean Baptiste's goals for this visit include:   Chief Complaint   Patient presents with     Skin Check     Mariaelena is here today for a skin check- notes no areas of concern.      KEE Dhillon

## 2018-12-05 NOTE — MR AVS SNAPSHOT
After Visit Summary   12/5/2018    Mariaelena Jean Baptiste    MRN: [id number]           Patient Information     Date Of Birth          1970        Visit Information        Provider Department      12/5/2018 1:00 PM Giovanna Sales PA-C [facility]        Today's Diagnoses     Neoplasm of uncertain behavior of skin    -  1      Care Instructions          The ABCDEs of Melanoma    Skin cancer can develop anywhere on the skin. Ask someone for help when checking your skin, especially in hard to see places. If you notice a mole different from others, or that changes, enlarges, itches, or bleeds (even if it is small), you should see a dermatologist.          Wound Care After a Biopsy    What is a skin biopsy?  A skin biopsy allows the doctor to examine a very small piece of tissue under the microscope to determine the diagnosis and the best treatment for the skin condition. A local anesthetic (numbing medicine)  is injected with a very small needle into the skin area to be tested. A small piece of skin is taken from the area. Sometimes a suture (stitch) is used.     What are the risks of a skin biopsy?  I will experience scar, bleeding, swelling, pain, crusting and redness. I may experience incomplete removal or recurrence. Risks of this procedure are excessive bleeding, bruising, infection, nerve damage, numbness, thick (hypertrophic or keloidal) scar and non-diagnostic biopsy.    How should I care for my wound for the first 24 hours?    Keep the wound dry and covered for 24 hours    If it bleeds, hold direct pressure on the area for 15 minutes. If bleeding does not stop then go to the emergency room    Avoid strenuous exercise the first 1-2 days or as your doctor instructs you    How should I care for the wound after 24 hours?    After 24 hours, remove the bandage    You may bathe or shower as normal    If you had a scalp biopsy, you can shampoo as usual and can use shower water to clean the biopsy  site daily    Clean the wound twice a day with gentle soap and water    Do not scrub, be gentle    Apply white petroleum/Vaseline after cleaning the wound with a cotton swab or a clean finger, and keep the site covered with a Bandaid /bandage. Bandages are not necessary with a scalp biopsy    If you are unable to cover the site with a Bandaid /bandage, re-apply ointment 2-3 times a day to keep the site moist. Moisture will help with healing    Avoid strenuous activity for first 1-2 days    Avoid lakes, rivers, pools, and oceans until the stitches are removed or the site is healed    How do I clean my wound?    Wash hands thoroughly with soap or use hand  before all wound care    Clean the wound with gentle soap and water    Apply white petroleum/Vaseline  to wound after it is clean    Replace the Bandaid /bandage to keep the wound covered for the first few days or as instructed by your doctor    If you had a scalp biopsy, warm shower water to the area on a daily basis should suffice    What should I use to clean my wound?     Cotton-tipped applicators (Qtips )    White petroleum jelly (Vaseline ). Use a clean new container and use Q-tips to apply.    Bandaids   as needed    Gentle soap     How should I care for my wound long term?    Do not get your wound dirty    Keep up with wound care for one week or until the area is healed.    A small scab will form and fall off by itself when the area is completely healed. The area will be red and will become pink in color as it heals. Sun protection is very important for how your scar will turn out. Sunscreen with an SPF 30 or greater is recommended once the area is healed.    You should have some soreness but it should be mild and slowly go away over several days. Talk to your doctor about using tylenol for pain,    When should I call my doctor?  If you have increased:     Pain or swelling    Pus or drainage (clear or slightly yellow drainage is ok)    Temperature  over 100F    Spreading redness or warmth around wound    When will I hear about my results?  The biopsy results can take 2-3 weeks to come back. The clinic will call you with the results, send you a mychart message, or have you schedule a follow-up clinic or phone time to discuss the results. Contact our clinics if you do not hear from us in 3 weeks.     Who should I call with questions?    [facility]: [phone]     [facility]: [phone]    For urgent needs outside of business hours call the [facility] at [phone] and ask for the dermatology resident on call              Follow-ups after your visit        Your next 10 appointments already scheduled     Dec 05, 2018  2:15 PM CST   XR HAND BILATERAL 2 VIEWS with UCXR1   [facility] ([facility])    [address] [address] [phone]           How do I prepare for my exam? (Food and drink instructions) No Food and Drink Restrictions.  How do I prepare for my exam? (Other instructions) You do not need to do anything special for this exam.  What should I wear: Wear comfortable clothes.  How long does the exam take: Most scans take less than 5 minutes.  What should I bring: Bring a list of your medicines, including vitamins, minerals and over-the-counter drugs. It is safest to leave personal items at home.  Do I need a :  No  is needed.  What do I need to tell my doctor: Tell your doctor if there s any chance you are pregnant.  What should I do after the exam: No restrictions, You may resume normal activities.  What is this test: An image of a specific body part shown in shades of black and white.  Who should I call with questions: If you have any questions, please call the Imaging Department where you will have your exam. Directions, parking instructions, and other information is available on our  website, [email or web address].            Jan 16, 2019 11:30 AM CST   (Arrive by 11:15 AM)   Return Visit with ALYSSA Isidro Counts include 234 beds at the [facility] ([facility])    [address] [facility]  [address]  [facility] [id number] [phone]            Feb 04, 2019  1:30 PM CST   Adult Med Follow UP with Gucci Campbell MD   Psychiatry Clinic ([facility])    [place] [id number]  [address] [id number] [phone]              Who to contact     Please call your clinic at [phone] to:    Ask questions about your health    Make or cancel appointments    Discuss your medicines    Learn about your test results    Speak to your doctor            Additional Information About Your Visit        [facility] Information     [facility] [facility] gives you secure access to your electronic health record. If you see a primary care provider, you can also send messages to your care team and make appointments. If you have questions, please call your primary care clinic.  If you do not have a primary care provider, please call [phone] and they will assist you.      [employer] is an electronic gateway that provides easy, online access to your medical records. With [employer] [facility], you can request a clinic appointment, read your test results, renew a prescription or communicate with your care team.     To access your existing account, please contact your [facility] or call [phone] for assistance.        Care EveryWhere ID     This is your Care EveryWhere ID. This could be used by other organizations to access your [place] medical records  [id number]        Your Vitals Were     Last Period                   11/15/2018            Blood Pressure from Last 3 Encounters:   12/05/18 142/79   11/16/18 121/79   11/06/18 (!) 146/91    Weight from Last 3 Encounters:   12/05/18 74.6 kg (164 lb 6.4 oz)   11/16/18 74 kg (163 lb 3.2 oz)    11/06/18 75.5 kg (166 lb 6.4 oz)              Today, you had the following     No orders found for display       Primary Care Provider Office Phone # Fax #    Thalia Lisbeth Bradford [phone]       [address] [facility] [address]  [facility] [address]        Equal Access to Services     WAN DYKES : Hadii aad ku hadasho Soomaali, waaxda luqadaha, qaybta kaalmada adeegyada, waxay idiin hayaan adeeg kharash la'aan . So [facility] [phone].    ATENCIÓN: Si habla español, tiene a alegria disposición servicios gratuitos de asistencia lingüística. Llame al [phone].    We comply with applicable federal civil rights laws and [place] laws. We do not discriminate on the basis of race, color, national origin, age, disability, sex, sexual orientation, or gender identity.            Thank you!     Thank you for choosing [facility] DERMATOLOG[facility]  for your care. Our goal is always to provide you with excellent care. Hearing back from our patients is one way we can continue to improve our services. Please take a few minutes to complete the written survey that you may receive in the mail after your visit with us. Thank you!             Your Updated Medication List - Protect others around you: Learn how to safely use, store and throw away your medicines at www.disposemymeds.org.          This list is accurate as of 12/5/18  1:44 PM.  Always use your most recent med list.                   Brand Name Dispense Instructions for use Diagnosis    * amphetamine-dextroamphetamine 10 MG tablet    ADDERALL    30 tablet    Take 1 tablet (10 mg) by mouth daily    Moderate episode of recurrent major depressive disorder (H)       * amphetamine-dextroamphetamine 10 MG 24 hr capsule   Start taking on:  12/9/2018    ADDERALL XR    30 capsule    Take 1 capsule (10 mg) by mouth daily    Recurrent major depressive disorder, in full remission (H)       * amphetamine-dextroamphetamine 10 MG tablet   Start taking on:  1/3/2019    ADDERALL     30 tablet    Take 1 tablet (10 mg) by mouth daily    Moderate episode of recurrent major depressive disorder (H)       * amphetamine-dextroamphetamine 10 MG tablet   Start taking on:  2/3/2019    ADDERALL    30 tablet    Take 1 tablet (10 mg) by mouth daily    Moderate episode of recurrent major depressive disorder (H)       diclofenac 1 % topical gel    VOLTAREN    100 g    Apply 4 grams to knees or 2 grams to hands four times daily using enclosed dosing card.    Chronic pain of left knee       ibuprofen 800 MG tablet    ADVIL/MOTRIN     Take 800 mg by mouth        lamoTRIgine 150 MG tablet    LAMICTAL    30 tablet    Take 1 tablet (150 mg) by mouth daily    Moderate episode of recurrent major depressive disorder (H)       levomilnacipran 120 MG 24 hr capsule    FETZIMA ER    30 capsule    Take 1 capsule (120 mg) by mouth daily    Moderate episode of recurrent major depressive disorder (H)       lisinopril 20 MG tablet    PRINIVIL/ZESTRIL    90 tablet    Take 1 tablet (20 mg) by mouth daily    Benign essential hypertension       MAGNESIUM OXIDE PO           multivitamin, therapeutic Tabs tablet      Take 1 tablet by mouth daily        QUEtiapine 25 MG tablet    SEROQUEL    60 tablet    Take 2 tablets (50 mg) by mouth At Bedtime    Moderate episode of recurrent major depressive disorder (H)       vilazodone 40 MG Tabs tablet    VIIBRYD    30 tablet    Take 1 tablet (40 mg) by mouth daily    Moderate episode of recurrent major depressive disorder (H)       VITAMIN D (CHOLECALCIFEROL) PO      Take 1,000 Units by mouth 2 times daily        * Notice:  This list has 4 medication(s) that are the same as other medications prescribed for you. Read the directions carefully, and ask your doctor or other care provider to review them with you.

## 2018-12-05 NOTE — MR AVS SNAPSHOT
After Visit Summary   12/5/2018    Mariaelena Jean Baptiste    MRN: [id number]           Patient Information     Date Of Birth          1970        Visit Information        Provider Department      12/5/2018 10:00 AM Alejandro Tejada APRN CNP [facility]        Today's Diagnoses     Poor dentition    -  1    Arthralgia of both hands        Other skin changes        Family history of melanoma        NSAID long-term use        Encounter for screening for other viral diseases         Other long term (current) drug therapy            Follow-ups after your visit        Additional Services     DENTAL REFERRAL       Your provider has referred you to: [facility]: Dental Clinic (Adult) [facility] [phone]   [email or web address]    Type:   Urgency: Routine  Please be aware that coverage of these services is subject to the terms and limitations of your health insurance plan.  Call member services at your health plan with any benefit or coverage questions.      Please bring the following with you to your appointment:    (1) Any X-Rays, CTs or MRIs which have been performed.  Contact the facility where they were done to arrange for  prior to your scheduled appointment.  Any new CT, MRI or other procedures ordered by your specialist must be performed at a [place] facility or coordinated by your clinic's referral office.    (2) List of current medications   (3) This referral request   (4) Any documents/labs given to you for this referral            DERMATOLOGY REFERRAL       Your provider has referred you to: [facility]: Dermatology Clinic [facility] [phone]   [email or web address]     Please be aware that coverage of these services is subject to the terms and limitations of your health insurance plan.  Call member services at your health plan with any benefit or coverage questions.      Please bring the following with you to your  appointment:    (1) Any X-Rays, CTs or MRIs which have been performed.  Contact the facility where they were done to arrange for  prior to your scheduled appointment.    (2) List of current medications  (3) This referral request   (4) Any documents/labs given to you for this referral                  Follow-up notes from your care team     Return in about 6 weeks (around 1/16/2019) for Labs + X-rays Today, Annual Physical Exam with Primary Care Provider.      Your next 10 appointments already scheduled     Dec 05, 2018 12:15 PM CST   Lab with [facility] LAB   [facility] ([facility])    [address] [place] [id number]   [phone]            Dec 05, 2018  1:00 PM CST   (Arrive by 12:45 PM)   New Patient Visit with Giovanna Sales PA-C   [facility] D[facility]at[facility] ([facility])    [address] [place] [address]   [phone]            Dec 05, 2018  2:15 PM CST   XR HAND BILATERAL 2 VIEWS with UCXR1   [facility] ([facility])    [address] [place] [address]   [phone]           How do I prepare for my exam? (Food and drink instructions) No Food and Drink Restrictions.  How do I prepare for my exam? (Other instructions) You do not need to do anything special for this exam.  What should I wear: Wear comfortable clothes.  How long does the exam take: Most scans take less than 5 minutes.  What should I bring: Bring a list of your medicines, including vitamins, minerals and over-the-counter drugs. It is safest to leave personal items at home.  Do I need a :  No  is needed.  What do I need to tell my doctor: Tell your doctor if there s any chance you are pregnant.  What should I do after the exam: No restrictions, You may resume normal activities.  What is this test: An image of a specific body part shown in shades of black and white.   Who should I call with questions: If you have any questions, please call the Imaging Department where you will have your exam. Directions, parking instructions, and other information is available on our website, [email or web address].            Jan 16, 2019 11:30 AM CST   (Arrive by 11:15 AM)   Return Visit with ALYSSA Isidro [facility] ([facility])    [address] [facility]  [address]  [facility] [id number] [phone]            Feb 04, 2019  1:30 PM CST   Adult Med Follow UP with Gucci Campbell MD   Psychiatry Clinic ([facility])    [address]  [address] [phone]              Future tests that were ordered for you today     Open Future Orders        Priority Expected Expires Ordered    Hepatitis B surface antigen Routine 12/5/2018 1/18/2019 12/5/2018    Hepatitis C antibody Routine 12/5/2018 1/18/2019 12/5/2018    Quantiferon TB Gold Plus Routine 12/5/2018 1/18/2019 12/5/2018    Cyclic Citrullinated Peptide Antibody IgG Routine 12/5/2018 1/18/2019 12/5/2018    TSH with free T4 reflex Routine 12/5/2018 1/18/2019 12/5/2018    Vitamin D Deficiency Routine 12/5/2018 1/18/2019 12/5/2018    XR Hand Bilateral 2 Views Routine 12/5/2018 1/18/2019 12/5/2018    HIV Antigen Antibody Combo Routine 12/5/2018 1/18/2019 12/5/2018    CRP inflammation Routine 12/5/2018 1/18/2019 12/5/2018    Erythrocyte sedimentation rate auto Routine 12/5/2018 1/18/2019 12/5/2018    UA with Microscopic Routine 12/5/2018 1/18/2019 12/5/2018    CBC with platelets Routine 12/5/2018 1/18/2019 12/5/2018    AST Routine 12/5/2018 1/18/2019 12/5/2018    ALT Routine 12/5/2018 1/18/2019 12/5/2018    Albumin level Routine 12/5/2018 1/18/2019 12/5/2018    Creatinine Routine 12/5/2018 1/18/2019 12/5/2018    Hepatitis B core antibody Routine 12/5/2018 1/18/2019 12/5/2018            Who to contact     If you have questions or  "need follow up information about today's clinic visit or your schedule please contact [facility] RHEUMATOLOG[facility] directly at [phone].  Normal or non-critical lab and imaging results will be communicated to you by MyChart, letter or phone within 4 business days after the clinic has received the results. If you do not hear from us within 7 days, please contact the clinic through [employer] or phone. If you have a critical or abnormal lab result, we will notify you by phone as soon as possible.  Submit refill requests through [employer] or call your pharmacy and they will forward the refill request to us. Please allow 3 business days for your refill to be completed.          Additional Information About Your Visit        [employer] Information     [employer] gives you secure access to your electronic health record. If you see a primary care provider, you can also send messages to your care team and make appointments. If you have questions, please call your primary care clinic.  If you do not have a primary care provider, please call [phone] and they will assist you.        Care EveryWhere ID     This is your Care EveryWhere ID. This could be used by other organizations to access your [place] medical records  [id number]        Your Vitals Were     Pulse Height Last Period Pulse Oximetry BMI (Body Mass Index)       104 1.803 m (5' 11\") 11/15/2018 100% 22.93 kg/m2        Blood Pressure from Last 3 Encounters:   12/05/18 142/79   11/16/18 121/79   11/06/18 (!) 146/91    Weight from Last 3 Encounters:   12/05/18 74.6 kg (164 lb 6.4 oz)   11/16/18 74 kg (163 lb 3.2 oz)   11/06/18 75.5 kg (166 lb 6.4 oz)              We Performed the Following     DENTAL REFERRAL     DERMATOLOGY REFERRAL        Primary Care Provider Office Phone # Fax #    Thalia Bradford [phone]       [address] [place] [address]  [facility] [address]        Equal Access to Services     WAN DYKES AH: Harry Santos, " anh rivera, qaybta kasoham tovar, ivory scottaaasif ah. So [facility] [phone].    ATENCIÓN: Si jennifer gibson, tiene a alegria disposición servicios gratuitos de asistencia lingüística. Llame al [phone].    We comply with applicable federal civil rights laws and [place] laws. We do not discriminate on the basis of race, color, national origin, age, disability, sex, sexual orientation, or gender identity.            Thank you!     Thank you for choosing [facility] RHEUMATOLOGY  for your care. Our goal is always to provide you with excellent care. Hearing back from our patients is one way we can continue to improve our services. Please take a few minutes to complete the written survey that you may receive in the mail after your visit with us. Thank you!             Your Updated Medication List - Protect others around you: Learn how to safely use, store and throw away your medicines at www.disposemymeds.org.          This list is accurate as of 12/5/18 12:07 PM.  Always use your most recent med list.                   Brand Name Dispense Instructions for use Diagnosis    * amphetamine-dextroamphetamine 10 MG tablet    ADDERALL    30 tablet    Take 1 tablet (10 mg) by mouth daily    Moderate episode of recurrent major depressive disorder (H)       * amphetamine-dextroamphetamine 10 MG 24 hr capsule   Start taking on:  12/9/2018    ADDERALL XR    30 capsule    Take 1 capsule (10 mg) by mouth daily    Recurrent major depressive disorder, in full remission (H)       * amphetamine-dextroamphetamine 10 MG tablet   Start taking on:  1/3/2019    ADDERALL    30 tablet    Take 1 tablet (10 mg) by mouth daily    Moderate episode of recurrent major depressive disorder (H)       * amphetamine-dextroamphetamine 10 MG tablet   Start taking on:  2/3/2019    ADDERALL    30 tablet    Take 1 tablet (10 mg) by mouth daily    Moderate episode of recurrent major depressive disorder (H)       diclofenac 1 %  topical gel    VOLTAREN    100 g    Apply 4 grams to knees or 2 grams to hands four times daily using enclosed dosing card.    Chronic pain of left knee       ibuprofen 800 MG tablet    ADVIL/MOTRIN     Take 800 mg by mouth        lamoTRIgine 150 MG tablet    LAMICTAL    30 tablet    Take 1 tablet (150 mg) by mouth daily    Moderate episode of recurrent major depressive disorder (H)       levomilnacipran 120 MG 24 hr capsule    FETZIMA ER    30 capsule    Take 1 capsule (120 mg) by mouth daily    Moderate episode of recurrent major depressive disorder (H)       lisinopril 20 MG tablet    PRINIVIL/ZESTRIL    90 tablet    Take 1 tablet (20 mg) by mouth daily    Benign essential hypertension       MAGNESIUM OXIDE PO           multivitamin, therapeutic Tabs tablet      Take 1 tablet by mouth daily        QUEtiapine 25 MG tablet    SEROQUEL    60 tablet    Take 2 tablets (50 mg) by mouth At Bedtime    Moderate episode of recurrent major depressive disorder (H)       vilazodone 40 MG Tabs tablet    VIIBRYD    30 tablet    Take 1 tablet (40 mg) by mouth daily    Moderate episode of recurrent major depressive disorder (H)       VITAMIN D (CHOLECALCIFEROL) PO      Take 1,000 Units by mouth 2 times daily        * Notice:  This list has 4 medication(s) that are the same as other medications prescribed for you. Read the directions carefully, and ask your doctor or other care provider to review them with you.

## 2018-12-05 NOTE — PATIENT INSTRUCTIONS
The ABCDEs of Melanoma    Skin cancer can develop anywhere on the skin. Ask someone for help when checking your skin, especially in hard to see places. If you notice a mole different from others, or that changes, enlarges, itches, or bleeds (even if it is small), you should see a dermatologist.          Wound Care After a Biopsy    What is a skin biopsy?  A skin biopsy allows the doctor to examine a very small piece of tissue under the microscope to determine the diagnosis and the best treatment for the skin condition. A local anesthetic (numbing medicine)  is injected with a very small needle into the skin area to be tested. A small piece of skin is taken from the area. Sometimes a suture (stitch) is used.     What are the risks of a skin biopsy?  I will experience scar, bleeding, swelling, pain, crusting and redness. I may experience incomplete removal or recurrence. Risks of this procedure are excessive bleeding, bruising, infection, nerve damage, numbness, thick (hypertrophic or keloidal) scar and non-diagnostic biopsy.    How should I care for my wound for the first 24 hours?    Keep the wound dry and covered for 24 hours    If it bleeds, hold direct pressure on the area for 15 minutes. If bleeding does not stop then go to the emergency room    Avoid strenuous exercise the first 1-2 days or as your doctor instructs you    How should I care for the wound after 24 hours?    After 24 hours, remove the bandage    You may bathe or shower as normal    If you had a scalp biopsy, you can shampoo as usual and can use shower water to clean the biopsy site daily    Clean the wound twice a day with gentle soap and water    Do not scrub, be gentle    Apply white petroleum/Vaseline after cleaning the wound with a cotton swab or a clean finger, and keep the site covered with a Bandaid /bandage. Bandages are not necessary with a scalp biopsy    If you are unable to cover the site with a Bandaid /bandage, re-apply  ointment 2-3 times a day to keep the site moist. Moisture will help with healing    Avoid strenuous activity for first 1-2 days    Avoid lakes, rivers, pools, and oceans until the stitches are removed or the site is healed    How do I clean my wound?    Wash hands thoroughly with soap or use hand  before all wound care    Clean the wound with gentle soap and water    Apply white petroleum/Vaseline  to wound after it is clean    Replace the Bandaid /bandage to keep the wound covered for the first few days or as instructed by your doctor    If you had a scalp biopsy, warm shower water to the area on a daily basis should suffice    What should I use to clean my wound?     Cotton-tipped applicators (Qtips )    White petroleum jelly (Vaseline ). Use a clean new container and use Q-tips to apply.    Bandaids   as needed    Gentle soap     How should I care for my wound long term?    Do not get your wound dirty    Keep up with wound care for one week or until the area is healed.    A small scab will form and fall off by itself when the area is completely healed. The area will be red and will become pink in color as it heals. Sun protection is very important for how your scar will turn out. Sunscreen with an SPF 30 or greater is recommended once the area is healed.    You should have some soreness but it should be mild and slowly go away over several days. Talk to your doctor about using tylenol for pain,    When should I call my doctor?  If you have increased:     Pain or swelling    Pus or drainage (clear or slightly yellow drainage is ok)    Temperature over 100F    Spreading redness or warmth around wound    When will I hear about my results?  The biopsy results can take 2-3 weeks to come back. The clinic will call you with the results, send you a [employer] message, or have you schedule a follow-up clinic or phone time to discuss the results. Contact our clinics if you do not hear from us in 3 weeks.     Who  should I call with questions?    [facility]: [phone]     [facility]: [phone]    For urgent needs outside of business hours call the [facility] at [phone] and ask for the dermatology resident on call

## 2018-12-05 NOTE — LETTER
12/5/2018      RE: Mariaelena Jean Baptiste  [address]  [address]  [place] [address]       [facility] - Rheumatology Clinic Visit    Mariaelena Jean Baptiste  is a 48 year old consultation for arthralgias of hands. -RF -ROSEMARIE -ESR     About a month ago pain in fingers (MCPs and PIPs) and toes, been constant pain 4 out 10. Ibuprofen 800 mg AM then 600 mg HS tolerating has done this for about over 6 months (reason for headache, pain in upper back). No swelling or  Redness. Mornings stiff in hand for 1 hours. No infections no travel or tick bites. No other joint bothers. Rare in shoulders or elbows <30 min. Depression in remission. Denies any fever, chills, SOB, URIAS, night sweats, or chest pain, or cough. Reports healthy.     PMH:  Injury-No   Medical-anxiety, chronic OSTEOARTHRITIS , depression, dissociative identity disorder, GERD, PTSD, migraines, social phobia, thoracic pain, smoker, past suicidal ideation, irritable bowel     Patient Active Problem List    Diagnosis Date Noted     Chronic bilateral thoracic back pain 06/14/2018     Priority: Medium     Scar 12/07/2016     Priority: Medium     Major depressive disorder, single episode, severe without psychotic features (H) 07/05/2016     Priority: Medium     Hx of psychiatric care 07/04/2016     Priority: Medium     PAST PSYCH MED TRIALS   Numerous including TCAs, MAOI's, Prozac, Zoloft, Viibryd, Brintellix, Celexa, Effexor, Cymbalta, lithium, clonidine, Provigil, cytomel, naltrexone (for SIB), dextroamphetamine, lithium, lamictal, clozapine (used for self-harm urges and cross-titrated to Latuda in 2016 in anticipation of TMS).     Per discussion with  Dr. Matthews 2/2016:  Both naltrexone and clozapine have reduced SIB immensely, with return when taper off has been initiated in the past.        Tobacco abuse 05/26/2016     Priority: Medium     Major depressive disorder, recurrent, mild (H) 05/03/2016     Priority: Medium     Suicidal ideation  09/19/2013     Priority: Medium       Surgical-skin grafts, rectal prolapse   Past Surgical History:   Procedure Laterality Date     CHOLECYSTECTOMY       LAPAROSCOPIC TUBAL LIGATION  1999     rectal prolapse repair         FH:  No autoimmune disorders, psoriasis, UC, crohn's, SLE, RA, PsA, gout, autoimmune thyroid.  No MS in family  Mother-unknown   Father-depression, hypertension, substance abuse, NLH, COPD/CHF/kidney failure. OSTEOARTHRITIS  With replacements joints, blood clots legs with filter-passed    Siblings-sister depression substance abuse and brother substance abuse    Children-none   MGM-breast cancer, melanoma, leukemia-passed    MGF-leukemia -passed   MGF substance abuse   PGM lung cancer -passed       SH:  Early 20s heavy alcohol-not use. Former Smoking quit 1 year ago (does vape did smoker for 23 yrs). No IVDU. Used to pot 20 yr ago. No Children. Right handed. Single. Work in . Some more pain the next mornings after work     PMSH personally reviewed and updated by me.    ROS:  Negative raynaud s phenomena, hairloss, sun sensitivities, keratoconjunctivitis sicca, pleurisy, inflammatory joint symptoms, significant rashes like malar, oral/nasal or ulcerations, inflammatory eye disease, inflammatory bowel disease, dactylitis, tenosynovitis, or enthespathy. Negative blood clots, gout, psoriasis, UC, crohn's. No temporal headache, no jaw claudication, no scalp tenderness, vision changes, carotidynia, cough. No STD or pregnancy symptoms. No Parotid swelling.   +dry mouth   +right forearm or shoulder left rare tendonitis   +fatigue   --due for physical and cancer screening.  Some low back pain no radiculopathy. Upper neck MSK tension    CONSTITUTIONAL: No fevers, night sweats or unintentional weight change. No acute distress, swollen glands  EYES: No vision change, diplopia, pain in eyes or red eyes   EARS, NOSE, MOUTH, THROAT: No tinnitus or hearing change, no epistaxis or nasal discharge, no oral  "lesions, throat clear. Normal saliva pool.  No drymouth. No thyroid enlargement.   CARDIOVASCULAR: No chest pain, palpitations, or pain with walking, no orthopnea or PND   RESPIRATORY: No dyspnea, cough, shortness of breath or wheezing. No pleurisy.   GI: No nausea, vomiting, diarrhea or constipation, no abdominal pain, or blood in stools.   : No change in urine, no dysuria or hematuria   MUSCKL: No swollen, tender, red. No nodules. No enthesitis, plantar fascitis or heel pain.   INTEGUMENTARY: No concerning lesions or moles   NEURO: No loss of strength or sensation, no numbness or tingling, no tremor, no dizziness, no headache. No falls   ENDO: No polyuria or polydipsia, no temperature intolerance   HEME/LYMPH:No concerning bumps, bleeding problems, or swollen lymph nodes. No recent infections, hospitalizations or new illnesses.   ALLERGY: No environmental allergies   PSYCH: controlled depression/anxiety, no sleep problems.  Otherwise 14 point ROS obtained, reviewed and found negative.     Physical exam:  Vitals: Blood pressure 142/79, pulse 104, height 1.803 m (5' 11\"), weight 74.6 kg (164 lb 6.4 oz), last menstrual period 11/15/2018, SpO2 100 %, not currently breastfeeding.  Wt Readings from Last 4 Encounters:   12/05/18 74.6 kg (164 lb 6.4 oz)   11/16/18 74 kg (163 lb 3.2 oz)   11/06/18 75.5 kg (166 lb 6.4 oz)   06/08/18 74.5 kg (164 lb 4.8 oz)     Constitutional: WD-WN-WG cooperative  Eyes: nl EOM, PERRLA, vision, conjunctiva, sclera, No Unilateral or bilateral external ear inflammation   ENT: nl external ears, nose, hearing, throat. No saddle nose  Poor dentitian with missing teeth and noted tatar  Neck: no mass or thyroid enlargement  Resp: lungs clear to auscultation, nl to palpation, nl effort  CV: RRR, no murmurs, rubs or gallops, no edema  GI: no ABD mass or tenderness, no HSM  : not tested  Lymph: no cervical or epitrochlear nodes  MS: hyperflexion of fingers. Tender in PIPs MTPS and left shoulder " infraspinatus +test but no impingement. All TMJ, neck, shoulder, elbow, wrist, hand, spine, hip, knee, ankle, and foot joints were examined and otherwise found normal. Normal  strength. No active synovitis or deformity. Full ROM. Normal prayer sign. Negative Negative Lhermitte's sign. No periuncle erythema  Skin: no nail pitting, alopecia, rash +moles. Skin grafting on arms from prior self harm (not now). Tatoos   Neuro: nl cranial nerves, strength, sensation, DTRs.   Psych: nl judgement, orientation, memory, affect.      Labs/Imaging:  Results for orders placed or performed in visit on 11/06/18   Rheumatoid factor   Result Value Ref Range    Rheumatoid Factor <20 <20 IU/mL   Erythrocyte sedimentation rate   Result Value Ref Range    Sed Rate 8 0 - 20 mm/h   Anti Nuclear Aminah IgG by IFA with Reflex   Result Value Ref Range    ROSEMARIE interpretation Negative NEG^Negative       Impression/Plan:    1. Hand arthralgias, no clear swelling more tenderness over PIPs. No clear autoimmune process, or Inflammatory arthritis. Discussion of risk factors of poor dentitian and smoking history as risk factors. Higher on the differential is osteoarthritis  Which we discussed studies dont show clear benefits to immunosuppression therapy. The RF and ROSEMARIE are negative. I will do hand x-rays to look for OSTEOARTHRITIS  Or joint changes, and labs will include vitamin D and TSH, liver, blood counts, HIV, hepatitis B/C, MTB QG, CCP (more specific for RHEUMATOID ARTHRITIS (RA), and kidney tests. We discussed the long-term risks of NSAID use including RF, ASCVD, hypertension, bleeding.   2. Skin moles, with family history of melanoma and smoking history will have her see derm for a complete skin check   3. Poor dentitian. Will refer her to dental clinic for comprehensive check  4. History of smoking. Due for complete annual physical for cancer screening, bone health, ASCVD and discussion of etiology for chronic NSAID use for headaches.       The patient understood the rationale for the diagnosis and treatment plan. Patient shared in the decision making. All questions were answered to best of my ability and the patient's satisfaction  Alejandro SHAH, CNP, MSN  [facility]  Department of Rheumatology & Autoimmune Disorders    1. Immunization: Reviewed CDC guidelines with patient updated, information provided to patient based on CDC guidelines for vaccination recommendations, patient will discuss with primary provider  2. Bone Health:Educated on adequate calcium and vitamin D intake, Educated on exercise, Glucocorticoid education discussed risks, benefits & potential long term side effects from use  3. Discussed routine annual physicals, cancer screening, cardiac risk monitoring, bone health and primary care with primary care provider.   4. Educated on diagnosis, prognosis, labs, imaging, treatment options (including risks, benefits, risk of no treatment), medications (use, dose, side-effects, risks of medications including infection/cancer/bone marrow suppression, lab monitoring), infections what to do, plan of cares, goals of treatment.       ALYSSA Young CNP

## 2018-12-05 NOTE — PROGRESS NOTES
[facility] - Rheumatology Clinic Visit    Mariaelena Jean Baptiste  is a 48 year old consultation for arthralgias of hands. -RF -ROSEMARIE -ESR     About a month ago pain in fingers (MCPs and PIPs) and toes, been constant pain 4 out 10. Ibuprofen 800 mg AM then 600 mg HS tolerating has done this for about over 6 months (reason for headache, pain in upper back). No swelling or  Redness. Mornings stiff in hand for 1 hours. No infections no travel or tick bites. No other joint bothers. Rare in shoulders or elbows <30 min. Depression in remission. Denies any fever, chills, SOB, URIAS, night sweats, or chest pain, or cough. Reports healthy.     PMH:  Injury-No   Medical-anxiety, chronic OSTEOARTHRITIS , depression, dissociative identity disorder, GERD, PTSD, migraines, social phobia, thoracic pain, smoker, past suicidal ideation, irritable bowel     Patient Active Problem List    Diagnosis Date Noted     Chronic bilateral thoracic back pain 06/14/2018     Priority: Medium     Scar 12/07/2016     Priority: Medium     Major depressive disorder, single episode, severe without psychotic features (H) 07/05/2016     Priority: Medium     Hx of psychiatric care 07/04/2016     Priority: Medium     PAST PSYCH MED TRIALS   Numerous including TCAs, MAOI's, Prozac, Zoloft, Viibryd, Brintellix, Celexa, Effexor, Cymbalta, lithium, clonidine, Provigil, cytomel, naltrexone (for SIB), dextroamphetamine, lithium, lamictal, clozapine (used for self-harm urges and cross-titrated to Latuda in 2016 in anticipation of TMS).     Per discussion with  Dr. Matthews 2/2016:  Both naltrexone and clozapine have reduced SIB immensely, with return when taper off has been initiated in the past.        Tobacco abuse 05/26/2016     Priority: Medium     Major depressive disorder, recurrent, mild (H) 05/03/2016     Priority: Medium     Suicidal ideation 09/19/2013     Priority: Medium       Surgical-skin grafts, rectal prolapse   Past Surgical History:    Procedure Laterality Date     CHOLECYSTECTOMY       LAPAROSCOPIC TUBAL LIGATION  1999     rectal prolapse repair         FH:  No autoimmune disorders, psoriasis, UC, crohn's, SLE, RA, PsA, gout, autoimmune thyroid.  No MS in family  Mother-unknown   Father-depression, hypertension, substance abuse, NLH, COPD/CHF/kidney failure. OSTEOARTHRITIS  With replacements joints, blood clots legs with filter-passed    Siblings-sister depression substance abuse and brother substance abuse    Children-none   MGM-breast cancer, melanoma, leukemia-passed    MGF-leukemia -passed   MGF substance abuse   PGM lung cancer -passed       SH:  Early 20s heavy alcohol-not use. Former Smoking quit 1 year ago (does vape did smoker for 23 yrs). No IVDU. Used to pot 20 yr ago. No Children. Right handed. Single. Work in . Some more pain the next mornings after work     PMSH personally reviewed and updated by me.    ROS:  Negative raynaud s phenomena, hairloss, sun sensitivities, keratoconjunctivitis sicca, pleurisy, inflammatory joint symptoms, significant rashes like malar, oral/nasal or ulcerations, inflammatory eye disease, inflammatory bowel disease, dactylitis, tenosynovitis, or enthespathy. Negative blood clots, gout, psoriasis, UC, crohn's. No temporal headache, no jaw claudication, no scalp tenderness, vision changes, carotidynia, cough. No STD or pregnancy symptoms. No Parotid swelling.   +dry mouth   +right forearm or shoulder left rare tendonitis   +fatigue   --due for physical and cancer screening.  Some low back pain no radiculopathy. Upper neck MSK tension    CONSTITUTIONAL: No fevers, night sweats or unintentional weight change. No acute distress, swollen glands  EYES: No vision change, diplopia, pain in eyes or red eyes   EARS, NOSE, MOUTH, THROAT: No tinnitus or hearing change, no epistaxis or nasal discharge, no oral lesions, throat clear. Normal saliva pool.  No drymouth. No thyroid enlargement.   CARDIOVASCULAR:  "No chest pain, palpitations, or pain with walking, no orthopnea or PND   RESPIRATORY: No dyspnea, cough, shortness of breath or wheezing. No pleurisy.   GI: No nausea, vomiting, diarrhea or constipation, no abdominal pain, or blood in stools.   : No change in urine, no dysuria or hematuria   MUSCKL: No swollen, tender, red. No nodules. No enthesitis, plantar fascitis or heel pain.   INTEGUMENTARY: No concerning lesions or moles   NEURO: No loss of strength or sensation, no numbness or tingling, no tremor, no dizziness, no headache. No falls   ENDO: No polyuria or polydipsia, no temperature intolerance   HEME/LYMPH:No concerning bumps, bleeding problems, or swollen lymph nodes. No recent infections, hospitalizations or new illnesses.   ALLERGY: No environmental allergies   PSYCH: controlled depression/anxiety, no sleep problems.  Otherwise 14 point ROS obtained, reviewed and found negative.     Physical exam:  Vitals: Blood pressure 142/79, pulse 104, height 1.803 m (5' 11\"), weight 74.6 kg (164 lb 6.4 oz), last menstrual period 11/15/2018, SpO2 100 %, not currently breastfeeding.  Wt Readings from Last 4 Encounters:   12/05/18 74.6 kg (164 lb 6.4 oz)   11/16/18 74 kg (163 lb 3.2 oz)   11/06/18 75.5 kg (166 lb 6.4 oz)   06/08/18 74.5 kg (164 lb 4.8 oz)     Constitutional: WD-WN-WG cooperative  Eyes: nl EOM, PERRLA, vision, conjunctiva, sclera, No Unilateral or bilateral external ear inflammation   ENT: nl external ears, nose, hearing, throat. No saddle nose  Poor dentitian with missing teeth and noted tatar  Neck: no mass or thyroid enlargement  Resp: lungs clear to auscultation, nl to palpation, nl effort  CV: RRR, no murmurs, rubs or gallops, no edema  GI: no ABD mass or tenderness, no HSM  : not tested  Lymph: no cervical or epitrochlear nodes  MS: hyperflexion of fingers. Tender in PIPs MTPS and left shoulder infraspinatus +test but no impingement. All TMJ, neck, shoulder, elbow, wrist, hand, spine, hip, " knee, ankle, and foot joints were examined and otherwise found normal. Normal  strength. No active synovitis or deformity. Full ROM. Normal prayer sign. Negative Negative Lhermitte's sign. No periuncle erythema  Skin: no nail pitting, alopecia, rash +moles. Skin grafting on arms from prior self harm (not now). Tatoos   Neuro: nl cranial nerves, strength, sensation, DTRs.   Psych: nl judgement, orientation, memory, affect.      Labs/Imaging:  Results for orders placed or performed in visit on 11/06/18   Rheumatoid factor   Result Value Ref Range    Rheumatoid Factor <20 <20 IU/mL   Erythrocyte sedimentation rate   Result Value Ref Range    Sed Rate 8 0 - 20 mm/h   Anti Nuclear Aminah IgG by IFA with Reflex   Result Value Ref Range    ROSEMARIE interpretation Negative NEG^Negative       Impression/Plan:    1. Hand arthralgias, no clear swelling more tenderness over PIPs. No clear autoimmune process, or Inflammatory arthritis. Discussion of risk factors of poor dentitian and smoking history as risk factors. Higher on the differential is osteoarthritis  Which we discussed studies dont show clear benefits to immunosuppression therapy. The RF and ROSEMARIE are negative. I will do hand x-rays to look for OSTEOARTHRITIS  Or joint changes, and labs will include vitamin D and TSH, liver, blood counts, HIV, hepatitis B/C, MTB QG, CCP (more specific for RHEUMATOID ARTHRITIS (RA), and kidney tests. We discussed the long-term risks of NSAID use including RF, ASCVD, hypertension, bleeding.   2. Skin moles, with family history of melanoma and smoking history will have her see derm for a complete skin check   3. Poor dentitian. Will refer her to dental clinic for comprehensive check  4. History of smoking. Due for complete annual physical for cancer screening, bone health, ASCVD and discussion of etiology for chronic NSAID use for headaches.      The patient understood the rationale for the diagnosis and treatment plan. Patient shared in the  decision making. All questions were answered to best of my ability and the patient's satisfaction  Alejandro SHAH, CNP, MSN  [facility]  Department of Rheumatology & Autoimmune Disorders    1. Immunization: Reviewed CDC guidelines with patient updated, information provided to patient based on CDC guidelines for vaccination recommendations, patient will discuss with primary provider  2. Bone Health:Educated on adequate calcium and vitamin D intake, Educated on exercise, Glucocorticoid education discussed risks, benefits & potential long term side effects from use  3. Discussed routine annual physicals, cancer screening, cardiac risk monitoring, bone health and primary care with primary care provider.   4. Educated on diagnosis, prognosis, labs, imaging, treatment options (including risks, benefits, risk of no treatment), medications (use, dose, side-effects, risks of medications including infection/cancer/bone marrow suppression, lab monitoring), infections what to do, plan of cares, goals of treatment.

## 2018-12-05 NOTE — NURSING NOTE
Lidocaine-epinephrine 1-1:395016 % injection   0.5mL once for one use, starting 12/5/2018 ending 12/5/2018,  2mL disp, R-0, injection  Injected by KEE Dhillon
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
## 2018-12-06 LAB
CCP AB SER IA-ACNC: 2 U/ML
DEPRECATED CALCIDIOL+CALCIFEROL SERPL-MC: 34 UG/L (ref 20–75)
GAMMA INTERFERON BACKGROUND BLD IA-ACNC: 0.03 IU/ML
HBV CORE AB SERPL QL IA: NONREACTIVE
HBV SURFACE AG SERPL QL IA: NONREACTIVE
HCV AB SERPL QL IA: NONREACTIVE
HIV 1+2 AB+HIV1 P24 AG SERPL QL IA: NONREACTIVE
M TB IFN-G BLD-IMP: NEGATIVE
M TB IFN-G CD4+ BCKGRND COR BLD-ACNC: 8.28 IU/ML
MITOGEN IGNF BCKGRD COR BLD-ACNC: 0 IU/ML
MITOGEN IGNF BCKGRD COR BLD-ACNC: 0.01 IU/ML

## 2018-12-06 NOTE — PROGRESS NOTES
Your HIV, hepatitis B and C, RHEUMATOID ARTHRITIS (RA) labs, tuberculosis labs are negative  Youy vitamin D is 34. I would continue your vitamin D or multivitamin  So far, no clear evidence of Inflammatory arthritis  Or autoimmune process.     Alejandro SHAH, CNP, MSN

## 2018-12-07 LAB — COPATH REPORT: NORMAL

## 2019-02-04 ENCOUNTER — OFFICE VISIT (OUTPATIENT)
Dept: PSYCHIATRY | Facility: CLINIC | Age: 49
End: 2019-02-04
Attending: PSYCHIATRY & NEUROLOGY
Payer: MEDICARE

## 2019-02-04 VITALS
HEART RATE: 103 BPM | BODY MASS INDEX: 23.71 KG/M2 | WEIGHT: 170 LBS | DIASTOLIC BLOOD PRESSURE: 94 MMHG | SYSTOLIC BLOOD PRESSURE: 157 MMHG

## 2019-02-04 DIAGNOSIS — F33.42 RECURRENT MAJOR DEPRESSIVE DISORDER, IN FULL REMISSION (H): Primary | ICD-10-CM

## 2019-02-04 PROCEDURE — G0463 HOSPITAL OUTPT CLINIC VISIT: HCPCS | Mod: ZF

## 2019-02-04 RX ORDER — DEXTROAMPHETAMINE SACCHARATE, AMPHETAMINE ASPARTATE MONOHYDRATE, DEXTROAMPHETAMINE SULFATE AND AMPHETAMINE SULFATE 2.5; 2.5; 2.5; 2.5 MG/1; MG/1; MG/1; MG/1
10 CAPSULE, EXTENDED RELEASE ORAL DAILY
Qty: 30 CAPSULE | Refills: 0 | Status: SHIPPED | OUTPATIENT
Start: 2019-03-05 | End: 2019-03-07 | Stop reason: DRUGHIGH

## 2019-02-04 RX ORDER — DEXTROAMPHETAMINE SACCHARATE, AMPHETAMINE ASPARTATE MONOHYDRATE, DEXTROAMPHETAMINE SULFATE AND AMPHETAMINE SULFATE 2.5; 2.5; 2.5; 2.5 MG/1; MG/1; MG/1; MG/1
10 CAPSULE, EXTENDED RELEASE ORAL DAILY
Qty: 30 CAPSULE | Refills: 0 | Status: SHIPPED | OUTPATIENT
Start: 2019-04-05 | End: 2019-03-07 | Stop reason: DRUGHIGH

## 2019-02-04 ASSESSMENT — PATIENT HEALTH QUESTIONNAIRE - PHQ9: SUM OF ALL RESPONSES TO PHQ QUESTIONS 1-9: 8

## 2019-02-04 ASSESSMENT — PAIN SCALES - GENERAL: PAINLEVEL: NO PAIN (0)
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
## 2019-02-04 NOTE — PATIENT INSTRUCTIONS
Thank you for coming to the PSYCHIATRY CLINIC.    Lab Testing:  If you had lab testing today and your results are reassuring or normal they will be mailed to you or sent through [employer] within 7 days.   If the lab tests need quick action we will call you with the results.  The phone number we will call with results is # [phone] (home) . If this is not the best number please call our clinic and change the number.    Medication Refills:  If you need any refills please call your pharmacy and they will contact us. Our fax number for refills is [phone]. Please allow three business for refill processing.   If you need to  your refill at a new pharmacy, please contact the new pharmacy directly. The new pharmacy will help you get your medications transferred.     Scheduling:  If you have any concerns about today's visit or wish to schedule another appointment please call our office during normal business hours [phone] (8-5:00 M-F)    Contact Us:  Please call [phone] during business hours (8-5:00 M-F).  If after clinic hours, or on the weekend, please call  [phone].    Financial Assistance [phone]  [facility] Billing [phone]  [facility]illing [phone]  Medical Records [phone]      MENTAL HEALTH CRISIS NUMBERS:  [facility]:   [facility] - [phone]   Crisis Residence [facility] - [phone]   Walk-In Counseling [facility] [phone]   COPE 24/7 [place] Mobile Team for Adults - [[phone]]; Child - [[phone]]     Crisis Connection - [phone]     [facility]:   [facility] - [phone]   Walk-in counseling [facility] - [phone]   Walk-in counseling [facility] - [phone]   Crisis Residence [facility] - [phone]   Urgent Care Adult Mental Health:   --Drop-in, 24/7 crisis line, and [place] Mobile Team [[phone]]    CRISIS TEXT  LINE: Text [phone] from anywhere, anytime, any crisis 24/7;    OR SEE www.crisistextline.org     Poison Control Center - [phone]    CHILD: [facility] needs assessment team - [phone]     [facility] - [phone]; or [employer] [facility] - [phone]    If you have a medical emergency please call 911or go to the nearest ER.                    _____________________________________________    Again thank you for choosing PSYCHIATRY CLINIC and please let us know how we can best partner with you to improve you and your family's health.  You may be receiving a survey in the mail regarding this appointment. We would love to have your feedback, both positive and negative, so please fill out the survey and return it using the provided envelope. The survey is done by an external company, so your answers are anonymous.

## 2019-02-13 PROBLEM — G89.29 CHRONIC BILATERAL THORACIC BACK PAIN: Status: RESOLVED | Noted: 2018-06-14 | Resolved: 2019-02-13

## 2019-02-13 PROBLEM — M54.6 CHRONIC BILATERAL THORACIC BACK PAIN: Status: RESOLVED | Noted: 2018-06-14 | Resolved: 2019-02-13

## 2019-02-13 NOTE — PROGRESS NOTES
Discharge Note    Progress reporting period is from initial eval/last PN to Jun 20, 2018.     Mariaelena failed to return and current status is unknown.  Please see information below for last relevant information on current status.  Patient seen for 2 visits.  SUBJECTIVE  Subjective changes noted by patient:  Pt notes that the constant ache and stabby sensation has decreased in her upper back, still notices twinges in her neck as she rotates her head to look over shoulder.     .  Current pain level is 3/10(can get up to 5/10).     Previous pain level was  7/10.   Changes in function:  Yes (See Goal flowsheet attached for changes in current functional level)  Adverse reaction to treatment or activity: None    OBJECTIVE  Changes noted in objective findings: CROM: flex wnl, ext min loss, rot R and L wnl, SB R wnl, SB L mod loss and pain (after ret/ext min loss)     ASSESSMENT/PLAN  Diagnosis: cervical/upper thoracic pain   DIAGP:  The encounter diagnosis was Chronic bilateral thoracic back pain.  STG/LTGs have been met or progress has been made towards goals:  Yes, please see goal flowsheet for most current information  Assessment of Progress: current status is unknown.    Last current status: Pt is progressing well   Self Management Plans:  HEP  I have re-evaluated this patient and find that the nature, scope, duration and intensity of the therapy is appropriate for the medical condition of the patient.  Mariaelena continues to require the following intervention to meet STG and LTG's:  HEP.    Recommendations:  Discharge with current home program.  Patient to follow up with MD as needed.    Please refer to the daily flowsheet for treatment today, total treatment time and time spent performing 1:1 timed codes.

## 2019-03-20 DIAGNOSIS — F33.1 MODERATE EPISODE OF RECURRENT MAJOR DEPRESSIVE DISORDER (H): ICD-10-CM

## 2019-03-20 RX ORDER — QUETIAPINE FUMARATE 25 MG/1
50 TABLET, FILM COATED ORAL AT BEDTIME
Qty: 60 TABLET | Refills: 0 | Status: SHIPPED | OUTPATIENT
Start: 2019-03-20 | End: 2019-04-15

## 2019-03-20 RX ORDER — VILAZODONE HYDROCHLORIDE 40 MG/1
40 TABLET ORAL DAILY
Qty: 30 TABLET | Refills: 0 | Status: SHIPPED | OUTPATIENT
Start: 2019-03-20 | End: 2019-04-15

## 2019-03-20 NOTE — TELEPHONE ENCOUNTER
Medication requested: Quetiapine 25 mg tabs, Fetzima 120 mg caps, Viibryd 40 mg tabs  Last refilled: 2-18-19  Qty: 30 days      Last seen: 2-4-19  RTC: 2 mo  Cancel: 0  No-show: 0  Next appt: 4-1-19    Refill decision:   Refill for Quetiapine pended and routed to the provider for review/determination due to last note and medication list/refill request do not match - note states 25-75 PRN - med list/refill request 50 q hs    Refilled for 30 days per protocol - Fetzima and Viibryd      Kathleen M Doege RN

## 2019-04-01 ENCOUNTER — TELEPHONE (OUTPATIENT)
Dept: PSYCHIATRY | Facility: CLINIC | Age: 49
End: 2019-04-01

## 2019-04-01 NOTE — TELEPHONE ENCOUNTER
Writer confirmed that pharmacy has pt's script with start date 4/5/19 on file. Writer requested that it be filled.  Writer left voice mail message for pt, identifying that order was on file and would be filled on 4/5.

## 2019-04-01 NOTE — TELEPHONE ENCOUNTER
----- Message from Javon Muniz sent at 4/1/2019 10:32 AM CDT -----  Regarding: Refill Request  Contact: [phone]  Caller:  Mariaelena Jean Baptiste  Relationship to pt: self  Medication:   amphetamine-dextroamphetamine (ADDERALL) 10 MG tablet  How many days left of med do you have left (if >3 day supply, redirect to pharmacy): 7 days (1 week)  Pharmacy and location:   Pending appt date:  4/15 @1pm  Okay to leave detailed VM:  YES    Patient had appointment with Dr. Campbell today, but due to Dr. Campbell being out sick, will run out of Adderall before the date we were able to reschedule for. Patient was aware that we needed a physical copy of the prescription, so wanted to get this process started sooner rather than later.

## 2019-04-15 ENCOUNTER — OFFICE VISIT (OUTPATIENT)
Dept: PSYCHIATRY | Facility: CLINIC | Age: 49
End: 2019-04-15
Attending: PSYCHIATRY & NEUROLOGY
Payer: MEDICARE

## 2019-04-15 VITALS
BODY MASS INDEX: 23.15 KG/M2 | WEIGHT: 166 LBS | SYSTOLIC BLOOD PRESSURE: 119 MMHG | HEART RATE: 97 BPM | DIASTOLIC BLOOD PRESSURE: 82 MMHG

## 2019-04-15 DIAGNOSIS — F33.42 RECURRENT MAJOR DEPRESSIVE DISORDER, IN FULL REMISSION (H): Primary | ICD-10-CM

## 2019-04-15 DIAGNOSIS — F43.10 PTSD (POST-TRAUMATIC STRESS DISORDER): ICD-10-CM

## 2019-04-15 DIAGNOSIS — Z79.899 ENCOUNTER FOR LONG-TERM (CURRENT) USE OF MEDICATIONS: ICD-10-CM

## 2019-04-15 PROCEDURE — G0463 HOSPITAL OUTPT CLINIC VISIT: HCPCS | Mod: ZF

## 2019-04-15 RX ORDER — QUETIAPINE FUMARATE 25 MG/1
50 TABLET, FILM COATED ORAL AT BEDTIME
Qty: 60 TABLET | Refills: 2 | Status: SHIPPED | OUTPATIENT
Start: 2019-04-15 | End: 2019-06-17

## 2019-04-15 RX ORDER — DEXTROAMPHETAMINE SACCHARATE, AMPHETAMINE ASPARTATE, DEXTROAMPHETAMINE SULFATE AND AMPHETAMINE SULFATE 2.5; 2.5; 2.5; 2.5 MG/1; MG/1; MG/1; MG/1
10 TABLET ORAL DAILY
Qty: 30 TABLET | Refills: 0 | Status: SHIPPED | OUTPATIENT
Start: 2019-06-06 | End: 2019-07-06

## 2019-04-15 RX ORDER — DEXTROAMPHETAMINE SACCHARATE, AMPHETAMINE ASPARTATE, DEXTROAMPHETAMINE SULFATE AND AMPHETAMINE SULFATE 2.5; 2.5; 2.5; 2.5 MG/1; MG/1; MG/1; MG/1
10 TABLET ORAL DAILY
Qty: 30 TABLET | Refills: 0 | Status: SHIPPED | OUTPATIENT
Start: 2019-05-06 | End: 2019-06-05

## 2019-04-15 RX ORDER — LAMOTRIGINE 150 MG/1
150 TABLET ORAL DAILY
Qty: 30 TABLET | Refills: 2 | Status: SHIPPED | OUTPATIENT
Start: 2019-04-15 | End: 2019-06-17

## 2019-04-15 RX ORDER — VILAZODONE HYDROCHLORIDE 40 MG/1
40 TABLET ORAL DAILY
Qty: 30 TABLET | Refills: 2 | Status: SHIPPED | OUTPATIENT
Start: 2019-04-15 | End: 2019-06-17

## 2019-04-15 ASSESSMENT — PATIENT HEALTH QUESTIONNAIRE - PHQ9: SUM OF ALL RESPONSES TO PHQ QUESTIONS 1-9: 6

## 2019-04-15 ASSESSMENT — PAIN SCALES - GENERAL: PAINLEVEL: NO PAIN (0)

## 2019-04-15 NOTE — NURSING NOTE
Chief Complaint   Patient presents with     Recheck Medication     Recurrent major depressive disorder, in full remission

## 2019-04-15 NOTE — PATIENT INSTRUCTIONS
Thank you for coming to the PSYCHIATRY CLINIC.    Lab Testing:  If you had lab testing today and your results are reassuring or normal they will be mailed to you or sent through [employer] within 7 days.   If the lab tests need quick action we will call you with the results.  The phone number we will call with results is # [phone] (home) . If this is not the best number please call our clinic and change the number.    Medication Refills:  If you need any refills please call your pharmacy and they will contact us. Our fax number for refills is [phone]. Please allow three business for refill processing.   If you need to  your refill at a new pharmacy, please contact the new pharmacy directly. The new pharmacy will help you get your medications transferred.     Scheduling:  If you have any concerns about today's visit or wish to schedule another appointment please call our office during normal business hours [phone] (8-5:00 M-F)    Contact Us:  Please call [phone] during business hours (8-5:00 M-F).  If after clinic hours, or on the weekend, please call  [phone].    Financial Assistance [phone]  [facility] Billing [phone]  [facility] Billing [phone]  Medical Records [phone]      MENTAL HEALTH CRISIS NUMBERS:  [facility]:   [facility] - [phone]   Crisis Residence [facility] - [phone]   Walk-In Counseling [facility] [phone]   COPE 24/7 [place] Mobile Team for Adults - [[phone]]; Child - [[phone]]        [facility]:   [facility] - [phone]   Walk-in counseling [facility] - [phone]   Walk-in counseling [facility] - [phone]   Crisis Residence [facility] - [phone]   Urgent Care Adult Mental Health:   --Drop-in, 24/7 crisis line, and [place] Mobile Team [[phone]]    CRISIS TEXT LINE: Text [phone] from anywhere,  anytime, any crisis 24/7;    OR SEE [email or web address]     Poison Control Center - [phone]    CHILD: [facility] needs assessment team - [phone]     [facility] - [phone]; or [facility] - [phone]    If you have a medical emergency please call 911or go to the nearest ER.                    _____________________________________________    Again thank you for choosing PSYCHIATRY CLINIC and please let us know how we can best partner with you to improve you and your family's health.  You may be receiving a survey in the mail regarding this appointment. We would love to have your feedback, both positive and negative, so please fill out the survey and return it using the provided envelope. The survey is done by an external company, so your answers are anonymous.
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
## 2019-06-17 ENCOUNTER — OFFICE VISIT (OUTPATIENT)
Dept: PSYCHIATRY | Facility: CLINIC | Age: 49
End: 2019-06-17
Attending: PSYCHIATRY & NEUROLOGY
Payer: MEDICARE

## 2019-06-17 VITALS
BODY MASS INDEX: 23.29 KG/M2 | SYSTOLIC BLOOD PRESSURE: 125 MMHG | HEART RATE: 98 BPM | WEIGHT: 167 LBS | DIASTOLIC BLOOD PRESSURE: 81 MMHG

## 2019-06-17 DIAGNOSIS — F33.42 RECURRENT MAJOR DEPRESSIVE DISORDER, IN FULL REMISSION (H): ICD-10-CM

## 2019-06-17 PROCEDURE — G0463 HOSPITAL OUTPT CLINIC VISIT: HCPCS | Mod: ZF

## 2019-06-17 RX ORDER — QUETIAPINE FUMARATE 25 MG/1
50 TABLET, FILM COATED ORAL AT BEDTIME
Qty: 60 TABLET | Refills: 2 | Status: SHIPPED | OUTPATIENT
Start: 2019-06-17 | End: 2019-09-04

## 2019-06-17 RX ORDER — DEXTROAMPHETAMINE SACCHARATE, AMPHETAMINE ASPARTATE, DEXTROAMPHETAMINE SULFATE AND AMPHETAMINE SULFATE 2.5; 2.5; 2.5; 2.5 MG/1; MG/1; MG/1; MG/1
10 TABLET ORAL DAILY
Qty: 30 TABLET | Refills: 0 | Status: SHIPPED | OUTPATIENT
Start: 2019-07-06 | End: 2019-08-05

## 2019-06-17 RX ORDER — VILAZODONE HYDROCHLORIDE 40 MG/1
40 TABLET ORAL DAILY
Qty: 30 TABLET | Refills: 2 | Status: SHIPPED | OUTPATIENT
Start: 2019-06-17 | End: 2019-09-04

## 2019-06-17 RX ORDER — DEXTROAMPHETAMINE SACCHARATE, AMPHETAMINE ASPARTATE, DEXTROAMPHETAMINE SULFATE AND AMPHETAMINE SULFATE 2.5; 2.5; 2.5; 2.5 MG/1; MG/1; MG/1; MG/1
10 TABLET ORAL DAILY
Qty: 30 TABLET | Refills: 0 | Status: SHIPPED | OUTPATIENT
Start: 2019-08-06 | End: 2019-07-31

## 2019-06-17 RX ORDER — LAMOTRIGINE 150 MG/1
150 TABLET ORAL DAILY
Qty: 30 TABLET | Refills: 2 | Status: SHIPPED | OUTPATIENT
Start: 2019-06-17 | End: 2019-09-04

## 2019-06-17 ASSESSMENT — PAIN SCALES - GENERAL: PAINLEVEL: NO PAIN (0)

## 2019-06-17 NOTE — PATIENT INSTRUCTIONS
Thank you for coming to the PSYCHIATRY CLINIC.    Lab Testing:  If you had lab testing today and your results are reassuring or normal they will be mailed to you or sent through [employer] within 7 days.   If the lab tests need quick action we will call you with the results.  The phone number we will call with results is # [phone] (home) . If this is not the best number please call our clinic and change the number.    Medication Refills:  If you need any refills please call your pharmacy and they will contact us. Our fax number for refills is [phone]. Please allow three business for refill processing.   If you need to  your refill at a new pharmacy, please contact the new pharmacy directly. The new pharmacy will help you get your medications transferred.     Scheduling:  If you have any concerns about today's visit or wish to schedule another appointment please call our office during normal business hours [phone] (8-5:00 M-F)    Contact Us:  Please call [phone] during business hours (8-5:00 M-F).  If after clinic hours, or on the weekend, please call  [phone].    Financial Assistance [phone]  [facility] Billing [phone]  [facility] Billing [phone]  Medical Records [phone]      MENTAL HEALTH CRISIS NUMBERS:  [facility]:   [facility] - [phone]   Crisis Residence [facility] - [phone]   Walk-In Counseling [facility] [phone]   COPE 24/7 [place] Mobile Team for Adults - [[phone]]; Child - [[phone]]        [facility]:   [facility] - [phone]   Walk-in counseling [facility] - [phone]   Walk-in counseling [facility] - [phone]   Crisis Residence [facility] - [phone]   Urgent Care Adult Mental Health:   --Drop-in, 24/7 crisis line, and [place] Mobile Team [[phone]]    CRISIS TEXT LINE: Text [phone] from anywhere,  anytime, any crisis 24/7;    OR SEE [email or web address]     Poison Control Center - [phone]    CHILD: [facility] needs assessment team - [phone]     [facility] - [phone]; or [employer] [facility] - [phone]    If you have a medical emergency please call 911or go to the nearest ER.                    _____________________________________________    Again thank you for choosing PSYCHIATRY CLINIC and please let us know how we can best partner with you to improve you and your family's health.  You may be receiving a survey in the mail regarding this appointment. We would love to have your feedback, both positive and negative, so please fill out the survey and return it using the provided envelope. The survey is done by an external company, so your answers are anonymous.
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
## 2019-06-18 ASSESSMENT — PATIENT HEALTH QUESTIONNAIRE - PHQ9: SUM OF ALL RESPONSES TO PHQ QUESTIONS 1-9: 14

## 2019-06-24 ENCOUNTER — OFFICE VISIT (OUTPATIENT)
Dept: ORTHOPEDICS | Facility: CLINIC | Age: 49
End: 2019-06-24
Payer: MEDICARE

## 2019-06-24 ENCOUNTER — ANCILLARY PROCEDURE (OUTPATIENT)
Dept: GENERAL RADIOLOGY | Facility: CLINIC | Age: 49
End: 2019-06-24
Attending: FAMILY MEDICINE
Payer: MEDICARE

## 2019-06-24 VITALS
SYSTOLIC BLOOD PRESSURE: 134 MMHG | BODY MASS INDEX: 22.96 KG/M2 | HEIGHT: 71 IN | DIASTOLIC BLOOD PRESSURE: 79 MMHG | WEIGHT: 164 LBS | HEART RATE: 114 BPM

## 2019-06-24 DIAGNOSIS — M25.561 ACUTE PAIN OF RIGHT KNEE: Primary | ICD-10-CM

## 2019-06-24 ASSESSMENT — MIFFLIN-ST. JEOR: SCORE: 1470.03

## 2019-06-24 NOTE — PROGRESS NOTES
SPORTS & ORTHOPEDIC WALK-IN VISIT 6/24/2019    Primary Care Physician: Dr. Bradford    While at work, felt her knee possibly hyper-extend (snap backwards). Then she felt a flare of intense pain behind her kneecap, but once she was able to put her knee in a better position, the pain subsided.   Now she also has pain on the lateral aspect of her knee, she also notices radiating pain going down into her calf.   Since yesterday, she has had this similar knee event about 8 times with consistent level of pain.    Has prior nerve damage, due to skin grafts below the knee.    Reason for visit:     What part of your body is injured / painful?  right knee    What caused the injury /pain? Unsure    How long ago did your injury occur or pain begin? Yesterday 6/23/19    What are your most bothersome symptoms? Pain and Tingling (towards her ankle)    How would you characterize your symptom?  aching and sharp    What makes your symptoms better? Rest and Ice    What makes your symptoms worse? Walking    Have you been previously seen for this problem? No    Medical History:    Any recent changes to your medical history? No    Any new medication prescribed since last visit? No    Have you had surgery on this body part before? No    Social History:    Occupation: Line cook    Handedness: Right    Exercise: work (stress of a kitchen)    Review of Systems:    Do you have fever, chills, weight loss? No    Do you have any vision problems? No    Do you have any chest pain or edema? No    Do you have any shortness of breath or wheezing?  No    Do you have stomach problems? No    Do you have any numbness or focal weakness? No    Do you have diabetes? No    Do you have problems with bleeding or clotting? No    Do you have an rashes or other skin lesions? No
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
## 2019-06-24 NOTE — PROGRESS NOTES
[facility]  Orthopedics  Mauricio Crespo MD  2019     Name: Mariaelena Jean Baptiste  MRN: [id number]  Age: 48 year old  : 1970  Referring provider: Ty Wolff     Chief Complaint: Pain of the Right Knee    Date of Injury: 2019    History of Present Illness:   Mariaelena Jean Baptiste is a 48 year old, female who presents today for evaluation of right knee pain. The patient reports yesterday she hyperextended her knee that caused pain. Since this initial incident, her knee has hyperextended 8 times with associated pain. She notes she has had similar hyperextension episodes of the right knee in the past, but they have been isolated incidents with no lasting pain. Her pain is localized in the lateral aspect of the knee with some radiating pain into her calf. She describes the pain as aching and sharp in nature. She has pain while at rest. Her pain is exacerbated with knee extension. Her pain is alleviated with rest, ice and when she keeps her knee flexed while walking. She voices no further concerns at this time.     Review of Systems:   A 10-point review of systems was obtained and is negative except for as noted in the HPI.     Medications:     Current Outpatient Medications:      [START ON 2019] amphetamine-dextroamphetamine (ADDERALL) 10 MG tablet, Take 1 tablet (10 mg) by mouth daily, Disp: 30 tablet, Rfl: 0     [START ON 2019] amphetamine-dextroamphetamine (ADDERALL) 10 MG tablet, Take 1 tablet (10 mg) by mouth daily, Disp: 30 tablet, Rfl: 0     amphetamine-dextroamphetamine (ADDERALL) 10 MG tablet, Take 1 tablet (10 mg) by mouth daily, Disp: 30 tablet, Rfl: 0     ibuprofen (ADVIL,MOTRIN) 800 MG tablet, Take 800 mg by mouth, Disp: , Rfl:      lamoTRIgine (LAMICTAL) 150 MG tablet, Take 1 tablet (150 mg) by mouth daily, Disp: 30 tablet, Rfl: 2     levomilnacipran (FETZIMA ER) 120 MG 24 hr capsule, Take 1 capsule (120 mg) by mouth daily, Disp: 30 capsule, Rfl: 2     lisinopril (PRINIVIL/ZESTRIL) 20 MG tablet,  "Take 1 tablet (20 mg) by mouth daily, Disp: 90 tablet, Rfl: 3     MAGNESIUM OXIDE PO, , Disp: , Rfl:      multivitamin, therapeutic (THERA-VIT) TABS tablet, Take 1 tablet by mouth daily, Disp: , Rfl:      QUEtiapine (SEROQUEL) 25 MG tablet, Take 2 tablets (50 mg) by mouth At Bedtime, Disp: 60 tablet, Rfl: 2     vilazodone (VIIBRYD) 40 MG TABS tablet, Take 1 tablet (40 mg) by mouth daily, Disp: 30 tablet, Rfl: 2     VITAMIN D, CHOLECALCIFEROL, PO, Take 1,000 Units by mouth 2 times daily , Disp: , Rfl:      diclofenac (VOLTAREN) 1 % GEL topical gel, Apply 4 grams to knees or 2 grams to hands four times daily using enclosed dosing card. (Patient not taking: Reported on 12/5/2018), Disp: 100 g, Rfl: 1    Allergies:  No Known Allergies    Past Medical History:  Anxiety   Chronic osteoarthritis   Dissociative identity disorder  Gastro-esophageal reflux disease   Migraines   PTSD  Social phobia     Past Surgical History:  Cholecysterectomy   Laparoscopic tubal ligation      Social History:  Patient is single. She admits to prior tobacco use (quit 5/1/1995) and denies alcohol use.     Family History:  Father - depression, hypertension, non-Hodgkin's lymphoma  Sister - depression  Maternal grandmother - breast cancer, melanoma, leukemia  Paternal grandmother - lung cancer     Physical Examination:  Blood pressure 134/79, pulse 114, height 1.803 m (5' 11\"), weight 74.4 kg (164 lb), not currently breastfeeding.  Patient is alert, No acute distress, pleasant and conversational.    Gait: nonantalgic. Normal heel toe gait.    right knee:   Skin intact. No erythema or ecchymosis.  No effusion or soft tissue swelling.    AROM: Zero to approximately 135  without restriction or reported pain.    Palpation:   TTP over lateral aspect of lateral femoral condyle  No medial or lateral facet joint tenderness.  No posterior medial or posterior lateral joint line tenderness     Special Tests:  Negative bounce test, negative forced flexion " and negative Monica's.  No ligamentous laxity or pain with valgus or varus stress.  Negative Lachman's, Anterior Drawer and Posterior Drawer     Full Isometric quad strength, extensor mechanism in place     Neurovascularly intact in the lower extremity    Hip and Ankle with full AROM and nontender    Imaging:   Radiographs of the right knee - 3 views (06/24/2019)  Surgical clips form previous skin grafting visible in lower leg otherwise normal knee.     I have independently reviewed the above imaging studies; the results were discussed with the patient.     Assessment:   48 year old, female with IT band friction syndrome.     Plan:   Reviewed the imaging with the patient in detail.   Provided a home exercise program for IT band friction syndrome. She will call if she would like PT referral  Provided a hinged knee brace to be worn for comfort.  Follow-up with me PRN or if symptoms do not improve over the next 3 weeks.     Mauricio Crespo MD      Scribe Disclosure:  I, Nael Mercado, am serving as a scribe to document services personally performed by Mauricio Crespo MD at this visit, based upon the provider's statements to me. All documentation has been reviewed by the aforementioned provider prior to being entered into the official medical record.

## 2019-07-31 ENCOUNTER — OFFICE VISIT (OUTPATIENT)
Dept: PSYCHIATRY | Facility: CLINIC | Age: 49
End: 2019-07-31
Attending: PSYCHIATRY & NEUROLOGY
Payer: MEDICARE

## 2019-07-31 VITALS
SYSTOLIC BLOOD PRESSURE: 123 MMHG | HEART RATE: 99 BPM | DIASTOLIC BLOOD PRESSURE: 84 MMHG | BODY MASS INDEX: 23.15 KG/M2 | WEIGHT: 166 LBS

## 2019-07-31 DIAGNOSIS — F33.42 RECURRENT MAJOR DEPRESSIVE DISORDER, IN FULL REMISSION (H): ICD-10-CM

## 2019-07-31 PROCEDURE — G0463 HOSPITAL OUTPT CLINIC VISIT: HCPCS | Mod: ZF

## 2019-07-31 RX ORDER — DEXTROAMPHETAMINE SACCHARATE, AMPHETAMINE ASPARTATE, DEXTROAMPHETAMINE SULFATE AND AMPHETAMINE SULFATE 2.5; 2.5; 2.5; 2.5 MG/1; MG/1; MG/1; MG/1
10 TABLET ORAL 2 TIMES DAILY
Qty: 45 TABLET | Refills: 0 | Status: SHIPPED | OUTPATIENT
Start: 2019-07-31 | End: 2019-09-04

## 2019-07-31 ASSESSMENT — PAIN SCALES - GENERAL: PAINLEVEL: NO PAIN (0)

## 2019-07-31 NOTE — PATIENT INSTRUCTIONS
1. Recommend a trial of a second dose of Adderall IR 10mg around 4-5hrs after your first dose to help improve your mood while you wait for TMS.    2. Please call [place] to determine if you will need a full intake appointment or if this can be bypassed.    [facility]  Mental [facility]  [address], [address]  [address]  [address]  [place] [address]  Appointments: [phone]    3. Return to clinic in 4-6 weeks.
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
## 2019-08-02 ASSESSMENT — PATIENT HEALTH QUESTIONNAIRE - PHQ9: SUM OF ALL RESPONSES TO PHQ QUESTIONS 1-9: 11

## 2019-09-04 ENCOUNTER — OFFICE VISIT (OUTPATIENT)
Dept: PSYCHIATRY | Facility: CLINIC | Age: 49
End: 2019-09-04
Attending: PSYCHIATRY & NEUROLOGY
Payer: MEDICARE

## 2019-09-04 VITALS
HEART RATE: 91 BPM | DIASTOLIC BLOOD PRESSURE: 84 MMHG | WEIGHT: 171 LBS | SYSTOLIC BLOOD PRESSURE: 125 MMHG | BODY MASS INDEX: 23.85 KG/M2

## 2019-09-04 DIAGNOSIS — F33.42 RECURRENT MAJOR DEPRESSIVE DISORDER, IN FULL REMISSION (H): ICD-10-CM

## 2019-09-04 PROCEDURE — G0463 HOSPITAL OUTPT CLINIC VISIT: HCPCS | Mod: ZF

## 2019-09-04 RX ORDER — QUETIAPINE FUMARATE 25 MG/1
50 TABLET, FILM COATED ORAL AT BEDTIME
Qty: 60 TABLET | Refills: 2 | Status: SHIPPED | OUTPATIENT
Start: 2019-09-04 | End: 2019-11-20

## 2019-09-04 RX ORDER — DEXTROAMPHETAMINE SACCHARATE, AMPHETAMINE ASPARTATE, DEXTROAMPHETAMINE SULFATE AND AMPHETAMINE SULFATE 2.5; 2.5; 2.5; 2.5 MG/1; MG/1; MG/1; MG/1
10 TABLET ORAL 2 TIMES DAILY
Qty: 60 TABLET | Refills: 0 | Status: SHIPPED | OUTPATIENT
Start: 2019-09-04 | End: 2019-10-08

## 2019-09-04 RX ORDER — LAMOTRIGINE 150 MG/1
150 TABLET ORAL DAILY
Qty: 30 TABLET | Refills: 2 | Status: SHIPPED | OUTPATIENT
Start: 2019-09-04 | End: 2019-11-20

## 2019-09-04 RX ORDER — VILAZODONE HYDROCHLORIDE 40 MG/1
40 TABLET ORAL DAILY
Qty: 30 TABLET | Refills: 2 | Status: SHIPPED | OUTPATIENT
Start: 2019-09-04 | End: 2019-11-20

## 2019-09-04 ASSESSMENT — PATIENT HEALTH QUESTIONNAIRE - PHQ9: SUM OF ALL RESPONSES TO PHQ QUESTIONS 1-9: 16

## 2019-09-04 ASSESSMENT — PAIN SCALES - GENERAL: PAINLEVEL: NO PAIN (0)
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
## 2019-09-04 NOTE — PATIENT INSTRUCTIONS
Thank you for coming to the PSYCHIATRY CLINIC.    Lab Testing:  If you had lab testing today and your results are reassuring or normal they will be mailed to you or sent through [employer] within 7 days.   If the lab tests need quick action we will call you with the results.  The phone number we will call with results is # [phone] (home) . If this is not the best number please call our clinic and change the number.    Medication Refills:  If you need any refills please call your pharmacy and they will contact us. Our fax number for refills is [phone]. Please allow three business for refill processing.   If you need to  your refill at a new pharmacy, please contact the new pharmacy directly. The new pharmacy will help you get your medications transferred.     Scheduling:  If you have any concerns about today's visit or wish to schedule another appointment please call our office during normal business hours [phone] (8-5:00 M-F)    Contact Us:  Please call [phone] during business hours (8-5:00 M-F).  If after clinic hours, or on the weekend, please call  [phone].    Financial Assistance [phone]  [employer] Billing [phone]  [place] Billing Office, [employer]: [phone]  [place] Billing [phone]  Medical Records [phone]      MENTAL HEALTH CRISIS NUMBERS:  [facility]:   [facility] - [phone]   Crisis Residence [facility] - [phone]   Walk-In Counseling [facility] [phone]   COPE 24/7 [place] Mobile Team for Adults - [[phone]]; Child - [[phone]]        [facility]:   [facility] - [phone]   Walk-in counseling [facility] - [phone]   Walk-in counseling [facility] - [phone]   Crisis Residence [facility] - [phone]   Urgent Care Adult Mental Health:   --Drop-in, 24/7 crisis line, and [facility] Mobile Team  [[phone]]    CRISIS TEXT LINE: Text [phone] from anywhere, anytime, any crisis 24/7;    OR SEE [email or web address]     Poison Control Center - [phone]    CHILD: [facility] needs assessment team - [phone]     [facility] - [phone]; or [facility] Lifeline - [phone]    If you have a medical emergency please call 911or go to the nearest ER.                    _____________________________________________    Again thank you for choosing PSYCHIATRY CLINIC and please let us know how we can best partner with you to improve you and your family's health.  You may be receiving a survey in the mail regarding this appointment. We would love to have your feedback, both positive and negative, so please fill out the survey and return it using the provided envelope. The survey is done by an external company, so your answers are anonymous.
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
## 2019-09-09 ENCOUNTER — OFFICE VISIT (OUTPATIENT)
Dept: PSYCHIATRY | Facility: CLINIC | Age: 49
End: 2019-09-09
Payer: MEDICARE

## 2019-09-09 VITALS
SYSTOLIC BLOOD PRESSURE: 121 MMHG | BODY MASS INDEX: 24.13 KG/M2 | WEIGHT: 173 LBS | DIASTOLIC BLOOD PRESSURE: 83 MMHG | HEART RATE: 104 BPM

## 2019-09-09 DIAGNOSIS — Z53.9 ERRONEOUS ENCOUNTER--DISREGARD: Primary | ICD-10-CM

## 2019-09-09 RX ORDER — LAMOTRIGINE 150 MG/1
TABLET ORAL
Qty: 90 TABLET | Refills: 2 | OUTPATIENT
Start: 2019-09-09

## 2019-09-09 ASSESSMENT — PAIN SCALES - GENERAL: PAINLEVEL: MILD PAIN (2)

## 2019-09-09 ASSESSMENT — ANXIETY QUESTIONNAIRES
7. FEELING AFRAID AS IF SOMETHING AWFUL MIGHT HAPPEN: NOT AT ALL
3. WORRYING TOO MUCH ABOUT DIFFERENT THINGS: SEVERAL DAYS
4. TROUBLE RELAXING: NEARLY EVERY DAY
1. FEELING NERVOUS, ANXIOUS, OR ON EDGE: SEVERAL DAYS
2. NOT BEING ABLE TO STOP OR CONTROL WORRYING: NOT AT ALL
6. BECOMING EASILY ANNOYED OR IRRITABLE: SEVERAL DAYS
5. BEING SO RESTLESS THAT IT IS HARD TO SIT STILL: NOT AT ALL
GAD7 TOTAL SCORE: 6

## 2019-09-09 ASSESSMENT — PATIENT HEALTH QUESTIONNAIRE - PHQ9: SUM OF ALL RESPONSES TO PHQ QUESTIONS 1-9: 16

## 2019-09-10 ASSESSMENT — ANXIETY QUESTIONNAIRES: GAD7 TOTAL SCORE: 6

## 2019-09-29 ENCOUNTER — HEALTH MAINTENANCE LETTER (OUTPATIENT)
Age: 49
End: 2019-09-29

## 2019-10-08 ENCOUNTER — OFFICE VISIT (OUTPATIENT)
Dept: PSYCHIATRY | Facility: CLINIC | Age: 49
End: 2019-10-08
Attending: PSYCHIATRY & NEUROLOGY
Payer: MEDICARE

## 2019-10-08 VITALS
HEART RATE: 103 BPM | WEIGHT: 171.8 LBS | BODY MASS INDEX: 23.96 KG/M2 | DIASTOLIC BLOOD PRESSURE: 82 MMHG | SYSTOLIC BLOOD PRESSURE: 118 MMHG

## 2019-10-08 DIAGNOSIS — F33.42 RECURRENT MAJOR DEPRESSIVE DISORDER, IN FULL REMISSION (H): ICD-10-CM

## 2019-10-08 PROCEDURE — G0463 HOSPITAL OUTPT CLINIC VISIT: HCPCS | Mod: ZF

## 2019-10-08 RX ORDER — DEXTROAMPHETAMINE SACCHARATE, AMPHETAMINE ASPARTATE, DEXTROAMPHETAMINE SULFATE AND AMPHETAMINE SULFATE 2.5; 2.5; 2.5; 2.5 MG/1; MG/1; MG/1; MG/1
10 TABLET ORAL 2 TIMES DAILY
Qty: 60 TABLET | Refills: 0 | Status: SHIPPED | OUTPATIENT
Start: 2019-10-08 | End: 2019-11-20

## 2019-10-08 ASSESSMENT — PATIENT HEALTH QUESTIONNAIRE - PHQ9: SUM OF ALL RESPONSES TO PHQ QUESTIONS 1-9: 17

## 2019-10-08 ASSESSMENT — PAIN SCALES - GENERAL: PAINLEVEL: NO PAIN (0)
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
## 2019-10-16 ENCOUNTER — TELEPHONE (OUTPATIENT)
Dept: PSYCHIATRY | Facility: CLINIC | Age: 49
End: 2019-10-16

## 2019-10-16 NOTE — TELEPHONE ENCOUNTER
----- Message from Jeff Means MD sent at 10/3/2019  3:41 PM CDT -----  Regarding: RE: tms  Yes, if they can get for a 2nd course of TMS, now 2 years after first series. Saw Yong last time around and had dramatically positive response. Should see Callie to see if they should repeat same parameters or do something differently    sco  ----- Message -----  From: Luz Garcia RN  Sent: 10/3/2019   2:27 PM CDT  To: Jeff Means MD  Subject: FW: tms                                          Patient called in wanting to know where she is on TMS list.  Are you approving her for TMS?  If so can you let me know?    Thanks!    Anahi  ----- Message -----  From: Maida Day CMA  Sent: 10/2/2019  10:46 AM CDT  To: Luz Garcia RN  Subject: tms                                              Patient wants to know where she is at on the wait list for TMS.     Call [phone]

## 2019-11-10 DIAGNOSIS — I10 BENIGN ESSENTIAL HYPERTENSION: ICD-10-CM

## 2019-11-11 RX ORDER — LISINOPRIL 20 MG/1
20 TABLET ORAL DAILY
Qty: 90 TABLET | Refills: 0 | Status: SHIPPED | OUTPATIENT
Start: 2019-11-11 | End: 2020-03-06

## 2019-11-11 NOTE — TELEPHONE ENCOUNTER
lisinopril (PRINIVIL/ZESTRIL) 20 MG tablet  Last Written Prescription Date:  11/6/18  Last Fill Quantity: 90,   # refills: 3  Last Office Visit : 11/16/18  Future Office visit:  none    90 day to pharmacy. Patient instructed to call clinic to schedule annual appointment.     Scheduling has been notified to contact the pt for appointment.

## 2019-11-13 NOTE — TELEPHONE ENCOUNTER
What is the concern that needs to be addressed by a nurse? Patient is calling and would like a call to discuss was suppose to get a call to start TMS since two months ago and did not.Please call patient back.    May a detailed message be left on voicemail? yes    Date of last office visit: 09/09/2019    Message routed to: [facility]

## 2019-11-13 NOTE — TELEPHONE ENCOUNTER
-Writer called patient back and let her know she is still on the waiting list and we will call her when a slot opens up.  Patient verbalized understanding.

## 2019-11-19 ENCOUNTER — OFFICE VISIT (OUTPATIENT)
Dept: PSYCHIATRY | Facility: CLINIC | Age: 49
End: 2019-11-19
Attending: PSYCHIATRY & NEUROLOGY
Payer: MEDICARE

## 2019-11-19 VITALS
WEIGHT: 175.2 LBS | HEART RATE: 102 BPM | DIASTOLIC BLOOD PRESSURE: 98 MMHG | SYSTOLIC BLOOD PRESSURE: 150 MMHG | BODY MASS INDEX: 24.44 KG/M2

## 2019-11-19 DIAGNOSIS — Z23 NEED FOR PROPHYLACTIC VACCINATION AND INOCULATION AGAINST INFLUENZA: Primary | ICD-10-CM

## 2019-11-19 DIAGNOSIS — F33.42 RECURRENT MAJOR DEPRESSIVE DISORDER, IN FULL REMISSION (H): ICD-10-CM

## 2019-11-19 PROCEDURE — G0008 ADMIN INFLUENZA VIRUS VAC: HCPCS

## 2019-11-19 PROCEDURE — G0008 ADMIN INFLUENZA VIRUS VAC: HCPCS | Mod: ZF

## 2019-11-19 PROCEDURE — 25000128 H RX IP 250 OP 636: Mod: ZF

## 2019-11-19 PROCEDURE — G0463 HOSPITAL OUTPT CLINIC VISIT: HCPCS | Mod: ZF

## 2019-11-19 PROCEDURE — 90686 IIV4 VACC NO PRSV 0.5 ML IM: CPT | Mod: ZF

## 2019-11-19 ASSESSMENT — PATIENT HEALTH QUESTIONNAIRE - PHQ9: SUM OF ALL RESPONSES TO PHQ QUESTIONS 1-9: 17

## 2019-11-19 ASSESSMENT — PAIN SCALES - GENERAL: PAINLEVEL: MODERATE PAIN (4)

## 2019-11-19 NOTE — PATIENT INSTRUCTIONS
Thank you for coming to the PSYCHIATRY CLINIC.    Lab Testing:  If you had lab testing today and your results are reassuring or normal they will be mailed to you or sent through [employer] within 7 days.   If the lab tests need quick action we will call you with the results.  The phone number we will call with results is # [phone] (home) . If this is not the best number please call our clinic and change the number.    Medication Refills:  If you need any refills please call your pharmacy and they will contact us. Our fax number for refills is [phone]. Please allow three business for refill processing.   If you need to  your refill at a new pharmacy, please contact the new pharmacy directly. The new pharmacy will help you get your medications transferred.     Scheduling:  If you have any concerns about today's visit or wish to schedule another appointment please call our office during normal business hours [phone] (8-5:00 M-F)    Contact Us:  Please call [phone] during business hours (8-5:00 M-F).  If after clinic hours, or on the weekend, please call  [phone].    Financial Assistance [phone]  [facility] Billing [phone]  [facility] Billing Office, [facility]: [phone]  [facility] Billing [phone]  Medical Records [phone]      MENTAL HEALTH CRISIS NUMBERS:  [facility]:   [facility] - [phone]   Crisis Residence [facility] - [phone]   Walk-In Counseling [facility] [phone]   COPE 24/7 [place] Mobile Team for Adults - [[phone]]; Child - [[phone]]        [facility]:   [facility] - [phone]   Walk-in counseling [facility] - [phone]   Walk-in counseling [facility] - [phone]   Crisis Residence [facility] - [phone]   Urgent Care Adult Mental Health:   --Drop-in, 24/7 crisis line, and [facility] Mobile Team  [[phone]]    CRISIS TEXT LINE: Text [phone] from anywhere, anytime, any crisis 24/7;    OR SEE [email or web address]     Poison Control Center - [phone]    CHILD: [facility] needs assessment team - [phone]     [facility] - [phone]; or [facility] - [phone]    If you have a medical emergency please call 911or go to the nearest ER.                    _____________________________________________    Again thank you for choosing PSYCHIATRY CLINIC and please let us know how we can best partner with you to improve you and your family's health.  You may be receiving a survey in the mail regarding this appointment. We would love to have your feedback, both positive and negative, so please fill out the survey and return it using the provided envelope. The survey is done by an external company, so your answers are anonymous.
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
## 2019-11-19 NOTE — NURSING NOTE
Clinic Administered Medication Documentation    MEDICATION LIST:   Injectable Medication Documentation    Patient was given Fluzone. Prior to medication administration, verified patients identity using patient s name and date of birth. Please see MAR and medication order for additional information. Patient instructed to remain in clinic for 15 minutes.      Was entire vial of medication used? Yes  Vial/Syringe: Syringe  Expiration Date: 6/30/2020  Was this medication supplied by the patient? No

## 2019-11-20 ENCOUNTER — CARE COORDINATION (OUTPATIENT)
Dept: PSYCHIATRY | Facility: CLINIC | Age: 49
End: 2019-11-20

## 2019-11-20 ENCOUNTER — TELEPHONE (OUTPATIENT)
Dept: PSYCHIATRY | Facility: CLINIC | Age: 49
End: 2019-11-20

## 2019-11-20 DIAGNOSIS — F33.42 RECURRENT MAJOR DEPRESSIVE DISORDER, IN FULL REMISSION (H): ICD-10-CM

## 2019-11-20 RX ORDER — LAMOTRIGINE 150 MG/1
150 TABLET ORAL DAILY
Qty: 30 TABLET | Refills: 2 | Status: SHIPPED | OUTPATIENT
Start: 2019-11-20 | End: 2019-11-20

## 2019-11-20 RX ORDER — LAMOTRIGINE 150 MG/1
150 TABLET ORAL DAILY
Qty: 90 TABLET | Refills: 0 | Status: SHIPPED | OUTPATIENT
Start: 2019-11-20 | End: 2020-02-13

## 2019-11-20 RX ORDER — VILAZODONE HYDROCHLORIDE 40 MG/1
40 TABLET ORAL DAILY
Qty: 30 TABLET | Refills: 2 | Status: SHIPPED | OUTPATIENT
Start: 2019-11-20 | End: 2020-02-07

## 2019-11-20 RX ORDER — DEXTROAMPHETAMINE SACCHARATE, AMPHETAMINE ASPARTATE, DEXTROAMPHETAMINE SULFATE AND AMPHETAMINE SULFATE 2.5; 2.5; 2.5; 2.5 MG/1; MG/1; MG/1; MG/1
10 TABLET ORAL 2 TIMES DAILY
Qty: 60 TABLET | Refills: 0 | Status: SHIPPED | OUTPATIENT
Start: 2019-11-20 | End: 2019-12-30

## 2019-11-20 RX ORDER — QUETIAPINE FUMARATE 25 MG/1
50 TABLET, FILM COATED ORAL AT BEDTIME
Qty: 60 TABLET | Refills: 2 | Status: SHIPPED | OUTPATIENT
Start: 2019-11-20 | End: 2020-02-07

## 2019-11-20 NOTE — PROGRESS NOTES
Received fax from [employer] indicating pt is requesting 90-day supply of lamotrigine 150 mg.  Pended and routed to provider.        Katharine Burnett MD Snyder, David J, RN   Caller: Unspecified (Today,  3:11 PM)             Yes! Totally ok.    Previous Messages      ----- Message -----   From: Ihsan Chung RN   Sent: 11/20/2019   3:14 PM CST   To: Katharine Burnett MD, Ihsan Chung RN     ----- Message from Ihsan Chung RN sent at 11/20/2019  3:14 PM CST -----   Dr. Burnett:   Is it OK to provide this pt with a 90-day supply of lamotrigine 150 mg (per patient request)?   You saw her on 11/19.   hIsan PALAFOX

## 2019-11-22 ENCOUNTER — ALLIED HEALTH/NURSE VISIT (OUTPATIENT)
Dept: PSYCHIATRY | Facility: CLINIC | Age: 49
End: 2019-11-22
Payer: MEDICARE

## 2019-11-22 ENCOUNTER — OFFICE VISIT (OUTPATIENT)
Dept: PSYCHIATRY | Facility: CLINIC | Age: 49
End: 2019-11-22
Payer: MEDICARE

## 2019-11-22 VITALS — HEART RATE: 99 BPM | SYSTOLIC BLOOD PRESSURE: 129 MMHG | DIASTOLIC BLOOD PRESSURE: 88 MMHG

## 2019-11-22 DIAGNOSIS — F33.1 MODERATE EPISODE OF RECURRENT MAJOR DEPRESSIVE DISORDER (H): Primary | ICD-10-CM

## 2019-11-22 DIAGNOSIS — F33.2 SEVERE RECURRENT MAJOR DEPRESSION WITHOUT PSYCHOTIC FEATURES (H): Primary | ICD-10-CM

## 2019-11-22 ASSESSMENT — PATIENT HEALTH QUESTIONNAIRE - PHQ9: SUM OF ALL RESPONSES TO PHQ QUESTIONS 1-9: 18

## 2019-11-22 NOTE — PROGRESS NOTES
[facility] TMS Program  [address], [address]  [place] [address]  TMS Procedure Note   Mariaelena Jean Baptiste MRN# [id number]  Age: 48 year old year old YOB: 1970    Pre-Procedure:  History and Physical: Reviewed in medical record  Consent Signed by: Mariaelena Jean Baptiste  On: 11/22/2019    Clinical Narrative:  Patient returns for another acute course of treatment of depression.  Patient had remission of her depression for 2 years after 1st course of treatment.  They were able to take some college classes in this time.      Indications for TMS:  MDD, recurrent, severe; 4+ medication trials (from 2+ classes) ineffective; Psychotherapy ineffective.     Pre-Procedure Diagnosis:  MDD, recurrent, severe F33.2    Treatment Hx:  Treatment number this series: 1  Total lifetime treatment number: 31    No Known Allergies   /88 (BP Location: Left arm, Patient Position: Sitting, Cuff Size: Adult Regular)   Pulse 99         Pause for the Cause  Right patient:  Yes  Right procedure/correct coil:  Yes; rTMS; cpt 96453; H1 coil.   Earplugs in place:  Yes     Procedure  Patient was seated in procedure chair. Identity and procedure was verified. Ear plugs were placed in ears and patient-specific cap was placed on head and tightened appropriately. Ruler locations were placed on head per  specifications. Coil was placed at 0- 6-16.5 and stimulator was set to initial 50%.  Mapping pulses were delivered and response was quantified by either visual inspection.  MT was found with specific location and energy detailed below. Coil was then placed at treatment location and stimulator was set to parameters described below. A test train was delivered and pt tolerated train. Given pt tolerance, 55 treatment trains were delivered. Pt tolerated procedure well with no movement in extremities but a little face movement.    Motor Threshold Determination  Distance from nasion to inion: 36.0  MT 1: 0 - 6.5 - 16 @ 47% on  11/22/2019    Stimulation Parameters  Frequency: 18 Hz     Train duration: 2 sec  Total pulses delivered: 1980  Inter-train interval: 20 sec  Tx Loc: 0 - eyebrows  - 15  Energy: 47% (100% MT)  Trains: 55 trains      Rating Scales:  Date MADRS IDS-SR PHQ-9   11/22/19 25 43 18    --       Psychiatric Examination  Appearance:  awake, alert and adequately groomed  Attitude:  cooperative  Eye Contact:  good  Mood:  depressed  Affect:  mood congruent  Speech:  clear, coherent  Psychomotor Behavior:  no evidence of tardive dyskinesia, dystonia, or tics  Thought Process:  logical  Associations:  no loose associations  Thought Content:  no evidence of suicidal ideation or homicidal ideation  Insight:  good  Judgment:  intact  Oriented to:  time, person, and place  Attention Span and Concentration:  intact  Recent and Remote Memory:  intact  Language: Able to name objects, Able to repeat phrases and Able to read and write  Fund of Knowledge: appropriate  Muscle Strength and Tone: normal  Gait and Station: Normal    Post-Procedure Diagnosis:  MDD, recurrent, severe 296.33    Plan   - Cont TMS  - Increase energy as tolerated     I determined the motor threshold and checked on clinical symptoms. Afterwards, I did not see the patient but remained available in the clinic throughout the TMS session.  Jennifer Perez MD  [facility] Neuromodulation

## 2019-11-22 NOTE — PROGRESS NOTES
TMS Education     Mariaelena Jean Baptiste MRN# [id number]  Age: 48 year old year old YOB: 1970        Patient is here in clinic for TMS education and consenting.  Patient will be starting TMS today on the [employer] Machine.  Writer and patient reviewed mechanics of TMS.  Discussed using magnetic pulses to stimulate and induce an electrical field inside the brain.  Discussed the difference between F8 and H1 coil.  Patient was able to verbalize understanding of this.  Discussed that the idea is that we are treating the DLPFC of the brain, as it has been shown by in studies that people who have depression have decreased activity in this part of the brain, the idea is that we stimulate this part of the brain to increase activity.       Reviewed processing of mapping and how visit today would go.  Encouraged patient to communicate with staff if treatment at anytime became painful.         Discussed side effects of TMS.  Side effects include headaches after treatment, hearing loss, and seizures.  Discussed ways that TMS Clinic helps with preventing these side effects.  Such as retesting motor thresholds weekly and having patient wear ear plugs.  Instructed patient that she can take OTC pain relievers such as tylenol or IBU if she has a headache after today's treatment.       Patient was able to ask questions regarding procedure.  Encouraged patient to communicate with treatment team should there be any changes.    Consent was signed by patient today.

## 2019-11-25 ENCOUNTER — OFFICE VISIT (OUTPATIENT)
Dept: PSYCHIATRY | Facility: CLINIC | Age: 49
End: 2019-11-25
Payer: MEDICARE

## 2019-11-25 ENCOUNTER — TELEPHONE (OUTPATIENT)
Dept: PSYCHIATRY | Facility: CLINIC | Age: 49
End: 2019-11-25

## 2019-11-25 VITALS — DIASTOLIC BLOOD PRESSURE: 94 MMHG | HEART RATE: 101 BPM | SYSTOLIC BLOOD PRESSURE: 148 MMHG

## 2019-11-25 DIAGNOSIS — F33.2 SEVERE RECURRENT MAJOR DEPRESSION WITHOUT PSYCHOTIC FEATURES (H): Primary | ICD-10-CM

## 2019-11-25 ASSESSMENT — PATIENT HEALTH QUESTIONNAIRE - PHQ9: SUM OF ALL RESPONSES TO PHQ QUESTIONS 1-9: 20

## 2019-11-25 NOTE — PROGRESS NOTES
[facility] TMS Program  [address], [address]  [place] [address]  TMS Procedure Note   Mariaelena Jean Baptiste MRN# [id number]  Age: 48 year old year old YOB: 1970    Pre-Procedure:  History and Physical: Reviewed in medical record  Consent Signed by: Mariaelena Jean Baptiste  On: 11/22/2019    Clinical Narrative:  Patient tolerating treatment. Patient report mild headache after treatment on Friday. States mood is typically low on Mondays. She recently started a new job and is still adjusting to it.     Indications for TMS:  MDD, recurrent, severe; 4+ medication trials (from 2+ classes) ineffective; Psychotherapy ineffective.     Pre-Procedure Diagnosis:  MDD, recurrent, severe F33.2    Treatment Hx:  Treatment number this series: 2  Total lifetime treatment number: 32    No Known Allergies   BP (!) 148/94 (BP Location: Right arm, Patient Position: Sitting, Cuff Size: Adult Regular)   Pulse 101       Pause for the Cause  Right patient:  Yes  Right procedure/correct coil:  Yes; rTMS; cpt 89919; H1 coil.   Earplugs in place:  Yes    Procedure  Patient was seated in procedure chair. Identity and procedure was verified. Ear plugs were placed in ears and patient-specific cap was placed on head and tightened appropriately. Ruler locations were verified. Coil was placed at treatment location and stimulator was set to parameters described below. A test train was delivered and pt tolerated train. Given pt tolerance, 55 treatment trains were delivered. Pt tolerated procedure well with no motor movement.    Motor Threshold Determination  Distance from nasion to inion: 36.0  MT 1: 0 - 6.5 - 16 @ 47% on 11/22/2019    Stimulation Parameters  Frequency: 18 Hz     Train duration: 2 sec  Total pulses delivered: 1980  Inter-train interval: 20 sec  Tx Loc: 0 - eyebrows  - 15  Energy: 51% (110% MT)  Trains: 55 trains      Rating Scales:  Date MADRS IDS-SR PHQ-9   11/22/19 25 43 18                 [facility]  [facility] Neuromodulation    Post-Procedure Diagnosis:  MDD, recurrent, severe 296.33    Plan   - Cont TMS  - Increase energy as tolerated     I did not see patient but remained available at the clinic throughout the TMS session      Jennifer Perez MD  [facility] Neuromodulation

## 2019-11-25 NOTE — TELEPHONE ENCOUNTER
Social Work   Incoming/Outgoing Call  [facility]    Outgoing Call To: Mariaelena Jean Baptiste    Reason for Call:  Calling to provide Mariaelena with resources to help get a .     Response/Plan:  Mariaelena did not respond. Writer left a message and provided Mariaelena information to contact [facility] ([phone]) and [facility] ([phone]). Writer encouraged Mariaelena to call writer back if she has any questions or would like additional resources.    Mike Gonzalez, SW Candidate  ([phone])

## 2019-11-26 ENCOUNTER — OFFICE VISIT (OUTPATIENT)
Dept: PSYCHIATRY | Facility: CLINIC | Age: 49
End: 2019-11-26
Payer: MEDICARE

## 2019-11-26 VITALS — HEART RATE: 99 BPM | DIASTOLIC BLOOD PRESSURE: 104 MMHG | SYSTOLIC BLOOD PRESSURE: 147 MMHG

## 2019-11-26 DIAGNOSIS — F33.2 SEVERE RECURRENT MAJOR DEPRESSION WITHOUT PSYCHOTIC FEATURES (H): Primary | ICD-10-CM

## 2019-11-26 ASSESSMENT — PATIENT HEALTH QUESTIONNAIRE - PHQ9: SUM OF ALL RESPONSES TO PHQ QUESTIONS 1-9: 20

## 2019-11-26 NOTE — PROGRESS NOTES
[facility] TMS Program  [address], [address]  [place] [address]  TMS Procedure Note   Mariaelena Jean Baptiste MRN# [id number]  Age: 48 year old year old YOB: 1970    Pre-Procedure:  History and Physical: Reviewed in medical record  Consent Signed by: Mariaelena Jean Baptiste  On: 11/22/2019    Clinical Narrative:  Patient tolerating treatment. Patient reports no side effects.     Indications for TMS:  MDD, recurrent, severe; 4+ medication trials (from 2+ classes) ineffective; Psychotherapy ineffective.     Pre-Procedure Diagnosis:  MDD, recurrent, severe F33.2    Treatment Hx:  Treatment number this series: 3  Total lifetime treatment number: 33    No Known Allergies   BP (!) 147/104 (BP Location: Right arm, Patient Position: Sitting, Cuff Size: Adult Regular)   Pulse 99       Pause for the Cause  Right patient:  Yes  Right procedure/correct coil:  Yes; rTMS; cpt 91984; H1 coil.   Earplugs in place:  Yes    Procedure  Patient was seated in procedure chair. Identity and procedure was verified. Ear plugs were placed in ears and patient-specific cap was placed on head and tightened appropriately. Ruler locations were verified. Coil was placed at treatment location and stimulator was set to parameters described below. A test train was delivered and pt tolerated train. Given pt tolerance, 55 treatment trains were delivered. Pt tolerated procedure well with minor jaw movement.    Motor Threshold Determination  Distance from nasion to inion: 36.0  MT 1: 0 - 6.5 - 16 @ 47% on 11/22/2019    Stimulation Parameters  Frequency: 18 Hz     Train duration: 2 sec  Total pulses delivered: 1980  Inter-train interval: 20 sec  Tx Loc: 0 - eyebrows  - 15  Energy: 56% (120% MT)  Trains: 55 trains      Rating Scales:  Date MADRS IDS-SR PHQ-9   11/22/19 25 43 18                 Maida Day  [facility]  Mental [facility] Neuromodulation    Post-Procedure Diagnosis:  MDD, recurrent, severe 296.33    Plan   - Cont  TMS      I did not see the patient during/after treatment but remained available in the clinic during  treatment.    Moni Mcgee MD  [facility] Neuromodulation

## 2019-11-27 ENCOUNTER — OFFICE VISIT (OUTPATIENT)
Dept: PSYCHIATRY | Facility: CLINIC | Age: 49
End: 2019-11-27
Payer: MEDICARE

## 2019-11-27 VITALS — DIASTOLIC BLOOD PRESSURE: 113 MMHG | HEART RATE: 101 BPM | SYSTOLIC BLOOD PRESSURE: 153 MMHG

## 2019-11-27 DIAGNOSIS — F33.2 SEVERE RECURRENT MAJOR DEPRESSION WITHOUT PSYCHOTIC FEATURES (H): Primary | ICD-10-CM

## 2019-11-27 ASSESSMENT — PATIENT HEALTH QUESTIONNAIRE - PHQ9: SUM OF ALL RESPONSES TO PHQ QUESTIONS 1-9: 19

## 2019-11-27 NOTE — PROGRESS NOTES
[facility] TMS Program  [address], [address]  [place] [address]  TMS Procedure Note   Mariaelena Jean Baptiste MRN# [id number]  Age: 48 year old year old YOB: 1970    Pre-Procedure:  History and Physical: Reviewed in medical record  Consent Signed by: Mariaelena Jean Baptiste  On: 11/22/2019    Clinical Narrative:  Patient tolerating treatment. Patient reports no side effects. Yesterday was a good day.     Indications for TMS:  MDD, recurrent, severe; 4+ medication trials (from 2+ classes) ineffective; Psychotherapy ineffective.     Pre-Procedure Diagnosis:  MDD, recurrent, severe F33.2    Treatment Hx:  Treatment number this series: 4  Total lifetime treatment number: 34    No Known Allergies   BP (!) 153/113 (BP Location: Right arm, Patient Position: Sitting, Cuff Size: Adult Regular)   Pulse 101       Pause for the Cause  Right patient:  Yes  Right procedure/correct coil:  Yes; rTMS; cpt 20609; H1 coil.   Earplugs in place:  Yes    Procedure  Patient was seated in procedure chair. Identity and procedure was verified. Ear plugs were placed in ears and patient-specific cap was placed on head and tightened appropriately. Ruler locations were verified. Coil was placed at treatment location and stimulator was set to parameters described below. A test train was delivered and pt tolerated train. Given pt tolerance, 55 treatment trains were delivered. Pt tolerated procedure well with minor jaw movement.    Motor Threshold Determination  Distance from nasion to inion: 36.0  MT 1: 0 - 6.5 - 16 @ 47% on 11/22/2019    Stimulation Parameters  Frequency: 18 Hz     Train duration: 2 sec  Total pulses delivered: 1980  Inter-train interval: 20 sec  Tx Loc: 0 - eyebrows  - 15  Energy: 56% (120% MT)  Trains: 55 trains      Rating Scales:  Date MADRS IDS-SR PHQ-9   11/22/19 25 43 18                 Maida Day  [facility] Neuromodulation    Post-Procedure Diagnosis:  MDD, recurrent, severe  296.33    Plan   - Cont TMS      I did not see patient but remained available throughout the TMS session.    Jeferson Viveros MD  [facility] Neuromodulation

## 2019-11-29 ENCOUNTER — OFFICE VISIT (OUTPATIENT)
Dept: PSYCHIATRY | Facility: CLINIC | Age: 49
End: 2019-11-29
Payer: MEDICARE

## 2019-11-29 VITALS — DIASTOLIC BLOOD PRESSURE: 99 MMHG | SYSTOLIC BLOOD PRESSURE: 156 MMHG | HEART RATE: 103 BPM

## 2019-11-29 DIAGNOSIS — F33.2 SEVERE RECURRENT MAJOR DEPRESSION WITHOUT PSYCHOTIC FEATURES (H): Primary | ICD-10-CM

## 2019-11-29 ASSESSMENT — PATIENT HEALTH QUESTIONNAIRE - PHQ9: SUM OF ALL RESPONSES TO PHQ QUESTIONS 1-9: 18

## 2019-11-29 NOTE — PROGRESS NOTES
[facility] TMS Program  [address], [address]  [place] [address]  TMS Procedure Note   Mariaelena Jean Baptiste MRN# [id number]  Age: 48 year old year old YOB: 1970    Pre-Procedure:  History and Physical: Reviewed in medical record  Consent Signed by: Mariaelena Jean Baptiste  On: 11/22/2019    Clinical Narrative:  Patient tolerating treatment.  Denies any side effects from TMS currently.  States that TMS has been going well.  Patient reports having a good thanksgiving.       Indications for TMS:  MDD, recurrent, severe; 4+ medication trials (from 2+ classes) ineffective; Psychotherapy ineffective.     Pre-Procedure Diagnosis:  MDD, recurrent, severe F33.2    Treatment Hx:  Treatment number this series: 5  Total lifetime treatment number: 35    No Known Allergies   BP (!) 156/99   Pulse 103       Pause for the Cause  Right patient:  Yes  Right procedure/correct coil:  Yes; rTMS; cpt 44845; H1 coil.   Earplugs in place:  Yes    Procedure  Patient was seated in procedure chair. Identity and procedure was verified. Ear plugs were placed in ears and patient-specific cap was placed on head and tightened appropriately. Ruler locations were verified. Coil was placed at treatment location and stimulator was set to parameters described below. A test train was delivered and pt tolerated train. Given pt tolerance, 55 treatment trains were delivered. Pt tolerated procedure well with minor jaw movement.    Motor Threshold Determination  Distance from nasion to inion: 36.0  MT 1: 0 - 6.5 - 16 @ 47% on 11/22/2019    Stimulation Parameters  Frequency: 18 Hz     Train duration: 2 sec  Total pulses delivered: 1980  Inter-train interval: 20 sec  Tx Loc: 0 - eyebrows  - 15  Energy: 56% (120% MT)  Trains: 55 trains      Rating Scales:  Date MADRS IDS-SR PHQ-9   11/22/19 25 43 18   11/29/19  45 18           Luz Garcia RN   [facility]  Mental Health Neuromodulation    Post-Procedure Diagnosis:  MDD,  recurrent, severe 296.33    Plan   - Cont TMS    I did not see patient but remained available in the clinic throughout the TMS session    Jennifer Perez MD  [facility] Neuromodulation

## 2019-12-02 ENCOUNTER — OFFICE VISIT (OUTPATIENT)
Dept: PSYCHIATRY | Facility: CLINIC | Age: 49
End: 2019-12-02
Payer: MEDICARE

## 2019-12-02 ENCOUNTER — TELEPHONE (OUTPATIENT)
Dept: PSYCHIATRY | Facility: CLINIC | Age: 49
End: 2019-12-02

## 2019-12-02 VITALS — DIASTOLIC BLOOD PRESSURE: 105 MMHG | SYSTOLIC BLOOD PRESSURE: 156 MMHG | HEART RATE: 98 BPM

## 2019-12-02 DIAGNOSIS — F33.2 SEVERE RECURRENT MAJOR DEPRESSION WITHOUT PSYCHOTIC FEATURES (H): Primary | ICD-10-CM

## 2019-12-02 ASSESSMENT — PATIENT HEALTH QUESTIONNAIRE - PHQ9: SUM OF ALL RESPONSES TO PHQ QUESTIONS 1-9: 19

## 2019-12-02 NOTE — TELEPHONE ENCOUNTER
Social Work   Incoming/Outgoing Call  [facility]      Outgoing Call To: Mariaelena Ita    Reason for Call:  Providing resources for getting an .     Response/Plan:  Mariaelena did not respond. Writer left a voicemail and provided Mariaelena with a name and number of an  from [facility] (Linda Bess, [phone]). Writer encouraged Mariaelena to call the number provided and to call writer back.     ISAAK Strong Student Intern  Direct Phone: [phone]    [facility]  [facility], 2nd floor  [address], [address]  [place] [address]

## 2019-12-02 NOTE — PROGRESS NOTES
[facility] TMS Program  [address], [address]  [place] [address]  TMS Procedure Note   Mariaelena Jean Baptiste MRN# [id number]  Age: 48 year old year old YOB: 1970    Pre-Procedure:  History and Physical: Reviewed in medical record  Consent Signed by: Mariaelena Jean Baptiste  On: 11/22/2019    Clinical Narrative:  Patient tolerating treatment. Patient reports feeling slightly better but a little crabby this morning.  She had a good busy weekend.      Indications for TMS:  MDD, recurrent, severe; 4+ medication trials (from 2+ classes) ineffective; Psychotherapy ineffective.     Pre-Procedure Diagnosis:  MDD, recurrent, severe F33.2    Treatment Hx:  Treatment number this series: 6  Total lifetime treatment number: 36    No Known Allergies   BP (!) 156/105 (BP Location: Right arm, Patient Position: Sitting, Cuff Size: Adult Regular)   Pulse 98       Pause for the Cause  Right patient:  Yes  Right procedure/correct coil:  Yes; rTMS; cpt 58540; H1 coil.   Earplugs in place:  Yes    Procedure  Patient was seated in procedure chair. Identity and procedure was verified. Ear plugs were placed in ears and patient-specific cap was placed on head and tightened appropriately. Ruler locations were verified. Coil was placed at treatment location and stimulator was set to parameters described below. A test train was delivered and pt tolerated train. Given pt tolerance, 55 treatment trains were delivered. Pt tolerated procedure well with minor jaw movement.    Motor Threshold Determination  Distance from nasion to inion: 36.0  MT 1: 0 - 6.5 - 16 @ 47% on 11/22/2019    Stimulation Parameters  Frequency: 18 Hz     Train duration: 2 sec  Total pulses delivered: 1980  Inter-train interval: 20 sec  Tx Loc: 0 - eyebrows  - 15  Energy: 56% (120% MT)  Trains: 55 trains      Rating Scales:  Date MADRS IDS-SR PHQ-9   11/22/19 25 43 18   11/29/19  45 18                 Maida Day  [facility]  Neuromodulation    Post-Procedure Diagnosis:  MDD, recurrent, severe 296.33    Plan   - Cont TMS    I did not see the patient but remained available in the clinic throughout the TMS session      Jennifer Perez MD  [facility] Neuromodulation

## 2019-12-03 ENCOUNTER — OFFICE VISIT (OUTPATIENT)
Dept: PSYCHIATRY | Facility: CLINIC | Age: 49
End: 2019-12-03
Payer: MEDICARE

## 2019-12-03 VITALS — HEART RATE: 106 BPM | SYSTOLIC BLOOD PRESSURE: 142 MMHG | DIASTOLIC BLOOD PRESSURE: 93 MMHG

## 2019-12-03 DIAGNOSIS — F33.2 SEVERE RECURRENT MAJOR DEPRESSION WITHOUT PSYCHOTIC FEATURES (H): Primary | ICD-10-CM

## 2019-12-03 ASSESSMENT — PATIENT HEALTH QUESTIONNAIRE - PHQ9: SUM OF ALL RESPONSES TO PHQ QUESTIONS 1-9: 19

## 2019-12-03 NOTE — PROGRESS NOTES
[facility] TMS Program  [address], [address]  [place] [address]  TMS Procedure Note   Mariaelena Jean Baptiste MRN# [id number]  Age: 48 year old year old YOB: 1970    Pre-Procedure:  History and Physical: Reviewed in medical record  Consent Signed by: Mariaelena Jean Baptiste  On: 11/22/2019    Clinical Narrative:  Patient tolerating treatment. Patient reports no change in mood.     Indications for TMS:  MDD, recurrent, severe; 4+ medication trials (from 2+ classes) ineffective; Psychotherapy ineffective.     Pre-Procedure Diagnosis:  MDD, recurrent, severe F33.2    Treatment Hx:  Treatment number this series: 7  Total lifetime treatment number: 37    No Known Allergies   BP (!) 142/93 (BP Location: Right arm, Patient Position: Sitting, Cuff Size: Adult Regular)   Pulse 106       Pause for the Cause  Right patient:  Yes  Right procedure/correct coil:  Yes; rTMS; cpt 37424; H1 coil.   Earplugs in place:  Yes    Procedure  Patient was seated in procedure chair. Identity and procedure was verified. Ear plugs were placed in ears and patient-specific cap was placed on head and tightened appropriately. Ruler locations were verified. Coil was placed at treatment location and stimulator was set to parameters described below. A test train was delivered and pt tolerated train. Given pt tolerance, 55 treatment trains were delivered. Pt tolerated procedure well with minor jaw movement.    Motor Threshold Determination  Distance from nasion to inion: 36.0  MT 1: 0 - 6.5 - 16 @ 47% on 11/22/2019    Stimulation Parameters  Frequency: 18 Hz     Train duration: 2 sec  Total pulses delivered: 1980  Inter-train interval: 20 sec  Tx Loc: 0 - eyebrows  - 15  Energy: 56% (120% MT)  Trains: 55 trains      Rating Scales:  Date MADRS IDS-SR PHQ-9   11/22/19 25 43 18   11/29/19  45 18                 Maida Day  [facility] Neuromodulation    Post-Procedure Diagnosis:  MDD, recurrent, severe  296.33    Plan   - Cont TMS      I did not see the patient during/after treatment but remained available in the clinic during  treatment.    Moni Mcgee MD  [facility] Neuromodulation
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
## 2019-12-04 ENCOUNTER — OFFICE VISIT (OUTPATIENT)
Dept: PSYCHIATRY | Facility: CLINIC | Age: 49
End: 2019-12-04
Attending: PSYCHIATRY & NEUROLOGY
Payer: MEDICARE

## 2019-12-04 ENCOUNTER — OFFICE VISIT (OUTPATIENT)
Dept: PSYCHIATRY | Facility: CLINIC | Age: 49
End: 2019-12-04
Payer: MEDICARE

## 2019-12-04 VITALS — SYSTOLIC BLOOD PRESSURE: 153 MMHG | HEART RATE: 98 BPM | DIASTOLIC BLOOD PRESSURE: 93 MMHG

## 2019-12-04 VITALS
WEIGHT: 171 LBS | SYSTOLIC BLOOD PRESSURE: 139 MMHG | DIASTOLIC BLOOD PRESSURE: 82 MMHG | BODY MASS INDEX: 23.85 KG/M2 | HEART RATE: 105 BPM

## 2019-12-04 DIAGNOSIS — F32.2 MAJOR DEPRESSIVE DISORDER, SINGLE EPISODE, SEVERE WITHOUT PSYCHOTIC FEATURES (H): Primary | ICD-10-CM

## 2019-12-04 DIAGNOSIS — F33.2 SEVERE RECURRENT MAJOR DEPRESSION WITHOUT PSYCHOTIC FEATURES (H): Primary | ICD-10-CM

## 2019-12-04 PROCEDURE — G0463 HOSPITAL OUTPT CLINIC VISIT: HCPCS | Mod: ZF

## 2019-12-04 ASSESSMENT — PAIN SCALES - GENERAL: PAINLEVEL: NO PAIN (0)

## 2019-12-04 NOTE — PROGRESS NOTES
[facility] TMS Program  [address], [address]  [place] [address]  TMS Procedure Note   Mariaelena Jean Baptiste MRN# [id number]  Age: 48 year old year old YOB: 1970    Pre-Procedure:  History and Physical: Reviewed in medical record  Consent Signed by: Mariaelena Jean Baptiste  On: 11/22/2019    Clinical Narrative:  Patient tolerating treatment. Patient reports no change in mood.     Indications for TMS:  MDD, recurrent, severe; 4+ medication trials (from 2+ classes) ineffective; Psychotherapy ineffective.     Pre-Procedure Diagnosis:  MDD, recurrent, severe F33.2    Treatment Hx:  Treatment number this series: 8  Total lifetime treatment number: 38    No Known Allergies   BP (!) 153/93 (BP Location: Right arm, Patient Position: Sitting, Cuff Size: Adult Regular)   Pulse 98       Pause for the Cause  Right patient:  Yes  Right procedure/correct coil:  Yes; rTMS; cpt 14467; H1 coil.   Earplugs in place:  Yes    Procedure  Patient was seated in procedure chair. Identity and procedure was verified. Ear plugs were placed in ears and patient-specific cap was placed on head and tightened appropriately. Ruler locations were verified. Coil was placed at treatment location and stimulator was set to parameters described below. A test train was delivered and pt tolerated train. Given pt tolerance, 55 treatment trains were delivered. Pt tolerated procedure well with minor jaw movement.    Motor Threshold Determination  Distance from nasion to inion: 36.0  MT 1: 0 - 6.5 - 16 @ 47% on 11/22/2019    Stimulation Parameters  Frequency: 18 Hz     Train duration: 2 sec  Total pulses delivered: 1980  Inter-train interval: 20 sec  Tx Loc: 0 - eyebrows  - 15  Energy: 56% (120% MT)  Trains: 55 trains      Rating Scales:  Date MADRS IDS-SR PHQ-9   11/22/19 25 43 18   11/29/19  45 18                 Maida Day  [facility] Neuromodulation    Post-Procedure Diagnosis:  MDD, recurrent, severe  296.33    Plan   - Cont TMS      Jareth Finch MD  [facility]  [facility] Neuromodulation

## 2019-12-05 ENCOUNTER — OFFICE VISIT (OUTPATIENT)
Dept: PSYCHIATRY | Facility: CLINIC | Age: 49
End: 2019-12-05
Payer: MEDICARE

## 2019-12-05 VITALS — DIASTOLIC BLOOD PRESSURE: 102 MMHG | HEART RATE: 92 BPM | SYSTOLIC BLOOD PRESSURE: 155 MMHG

## 2019-12-05 DIAGNOSIS — F32.2 CURRENT SEVERE EPISODE OF MAJOR DEPRESSIVE DISORDER WITHOUT PSYCHOTIC FEATURES WITHOUT PRIOR EPISODE (H): Primary | ICD-10-CM

## 2019-12-05 ASSESSMENT — PATIENT HEALTH QUESTIONNAIRE - PHQ9
SUM OF ALL RESPONSES TO PHQ QUESTIONS 1-9: 19
SUM OF ALL RESPONSES TO PHQ QUESTIONS 1-9: 18

## 2019-12-06 ENCOUNTER — TELEPHONE (OUTPATIENT)
Dept: PSYCHIATRY | Facility: CLINIC | Age: 49
End: 2019-12-06

## 2019-12-06 ENCOUNTER — OFFICE VISIT (OUTPATIENT)
Dept: PSYCHIATRY | Facility: CLINIC | Age: 49
End: 2019-12-06
Payer: MEDICARE

## 2019-12-06 VITALS — SYSTOLIC BLOOD PRESSURE: 157 MMHG | DIASTOLIC BLOOD PRESSURE: 97 MMHG | HEART RATE: 96 BPM

## 2019-12-06 DIAGNOSIS — F32.2 CURRENT SEVERE EPISODE OF MAJOR DEPRESSIVE DISORDER WITHOUT PSYCHOTIC FEATURES WITHOUT PRIOR EPISODE (H): Primary | ICD-10-CM

## 2019-12-06 ASSESSMENT — PATIENT HEALTH QUESTIONNAIRE - PHQ9: SUM OF ALL RESPONSES TO PHQ QUESTIONS 1-9: 18

## 2019-12-06 NOTE — TELEPHONE ENCOUNTER
Writer spoke with pt regarding her need for records, past year, for SSDI. Writer told pt that the quickest way to obtain her records would be to come to Medical Records and request them herself. Pt was agreeable to come to the clinic and writer would take her to Medical Records. Pt agreed.Martha Arriaga LPN

## 2019-12-06 NOTE — PROGRESS NOTES
[facility] TMS Program  [address], [address]  [place] [address]  TMS Procedure Note   Mariaelena Jean Baptiste MRN# [id number]  Age: 48 year old year old YOB: 1970    Pre-Procedure:  History and Physical: Reviewed in medical record  Consent Signed by: Mariaelena Jean Baptiste  On: 11/22/2019    Clinical Narrative:  Patient tolerating treatment. Patient reports seeing some change in improvement.     Indications for TMS:  MDD, recurrent, severe; 4+ medication trials (from 2+ classes) ineffective; Psychotherapy ineffective.     Pre-Procedure Diagnosis:  MDD, recurrent, severe F33.2    Treatment Hx:  Treatment number this series: 10  Total lifetime treatment number: 40    No Known Allergies   BP (!) 157/97 (BP Location: Left arm, Patient Position: Sitting, Cuff Size: Adult Regular)   Pulse 96       Pause for the Cause  Right patient:  Yes  Right procedure/correct coil:  Yes; rTMS; cpt 52460; H1 coil.   Earplugs in place:  Yes    Procedure  Patient was seated in procedure chair. Identity and procedure was verified. Ear plugs were placed in ears and patient-specific cap was placed on head and tightened appropriately. Ruler locations were verified. Coil was placed at treatment location and stimulator was set to parameters described below. A test train was delivered and pt tolerated train. Given pt tolerance, 55 treatment trains were delivered. Pt tolerated procedure well with minor jaw movement.    Motor Threshold Determination  Distance from nasion to inion: 36.0  MT 1: 0 - 6.5 - 16 @ 47% on 11/22/2019    Stimulation Parameters  Frequency: 18 Hz     Train duration: 2 sec  Total pulses delivered: 1980  Inter-train interval: 20 sec  Tx Loc: 0 - eyebrows  - 15  Energy: 56% (120% MT)  Trains: 55 trains      Rating Scales:  Date MADRS IDS-SR PHQ-9   11/22/19 25 43 18   11/29/19  45 18   12/6/19  45 18               Post-Procedure Diagnosis:  MDD, recurrent, severe 296.33    [facility]  [facility] Neuromodulation    Plan   - Cont TMS      I did not see the patient during/after treatment but remained available in the clinic during  treatment.    Moni Mcgee MD  [facility] Neuromodulation

## 2019-12-09 ENCOUNTER — OFFICE VISIT (OUTPATIENT)
Dept: PSYCHIATRY | Facility: CLINIC | Age: 49
End: 2019-12-09
Payer: MEDICARE

## 2019-12-09 VITALS — DIASTOLIC BLOOD PRESSURE: 99 MMHG | SYSTOLIC BLOOD PRESSURE: 146 MMHG | HEART RATE: 96 BPM

## 2019-12-09 DIAGNOSIS — F32.2 CURRENT SEVERE EPISODE OF MAJOR DEPRESSIVE DISORDER WITHOUT PSYCHOTIC FEATURES WITHOUT PRIOR EPISODE (H): Primary | ICD-10-CM

## 2019-12-09 ASSESSMENT — PATIENT HEALTH QUESTIONNAIRE - PHQ9: SUM OF ALL RESPONSES TO PHQ QUESTIONS 1-9: 17

## 2019-12-09 NOTE — PROGRESS NOTES
[facility] TMS Program  [address], [address]  [place] [address]  TMS Procedure Note   Mariaelena Jean Baptiste MRN# [id number]  Age: 48 year old year old YOB: 1970    Pre-Procedure:  History and Physical: Reviewed in medical record  Consent Signed by: Mariaelena Jean Baptiste  On: 11/22/2019    Clinical Narrative:  Patient tolerating treatment. Patient reports seeing some change in improvement. 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     Indications for TMS:  MDD, recurrent, severe; 4+ medication trials (from 2+ classes) ineffective; Psychotherapy ineffective.     Pre-Procedure Diagnosis:  MDD, recurrent, severe F33.2    Treatment Hx:  Treatment number this series: 11  Total lifetime treatment number: 41    No Known Allergies   BP (!) 146/99 (BP Location: Left arm, Patient Position: Sitting, Cuff Size: Adult Regular)   Pulse 96       Pause for the Cause  Right patient:  Yes  Right procedure/correct coil:  Yes; rTMS; cpt 70003; H1 coil.   Earplugs in place:  Yes    Procedure  Patient was seated in procedure chair. Identity and procedure was verified. Ear plugs were placed in ears and patient-specific cap was placed on head and tightened appropriately. Ruler locations were verified. Coil was placed at treatment location and stimulator was set to parameters described below. A test train was delivered and pt tolerated train. Given pt tolerance, 55 treatment trains were delivered. Pt tolerated procedure well with minor jaw movement.    Motor Threshold Determination  Distance from nasion to inion: 36.0  MT 1: 0 - 6.5 - 16 @ 47% on 11/22/2019    Stimulation Parameters  Frequency: 18 Hz     Train duration: 2 sec  Total pulses delivered: 1980  Inter-train interval: 20 sec  Tx Loc: 0 - eyebrows  - 15  Energy: 56% (120% MT)  Trains: 55 trains      Rating Scales:  Date MADRS IDS-SR PHQ-9   11/22/19 25 43 18   11/29/19  45 18   12/6/19  45 18               Post-Procedure Diagnosis:  MDD,  recurrent, severe 296.33    [facility]  Mental Health Neuromodulation    Plan   - Cont TMS    I was available in the clinic throughout the treatment but did not evaluate the patient.  Ty Shabazz M.D., Ph.D.     Dept. of Psychiatry   [facility]

## 2019-12-10 ENCOUNTER — OFFICE VISIT (OUTPATIENT)
Dept: PSYCHIATRY | Facility: CLINIC | Age: 49
End: 2019-12-10
Payer: MEDICARE

## 2019-12-10 VITALS — SYSTOLIC BLOOD PRESSURE: 147 MMHG | DIASTOLIC BLOOD PRESSURE: 96 MMHG | HEART RATE: 92 BPM

## 2019-12-10 DIAGNOSIS — F32.2 CURRENT SEVERE EPISODE OF MAJOR DEPRESSIVE DISORDER WITHOUT PSYCHOTIC FEATURES WITHOUT PRIOR EPISODE (H): Primary | ICD-10-CM

## 2019-12-10 ASSESSMENT — PATIENT HEALTH QUESTIONNAIRE - PHQ9: SUM OF ALL RESPONSES TO PHQ QUESTIONS 1-9: 17

## 2019-12-10 NOTE — PROGRESS NOTES
Interventional Psychiatry Program  [address] [facility], [address]  [place] [address]  TMS Procedure Note   Mariaelena Jean Baptiste MRN# [id number]  Age: 49 year old year old YOB: 1970    Pre-Procedure:  History and Physical: Reviewed in medical record  Consent Signed by: Mariaelena Jean Baptiste for this course of treatment.  On: 11/22/2019    Clinical Narrative:  Patient is tolerating treatment.  Patient does not report changes in the last 24 hours.  Patient reported mood: depressed.  States she has been having some increase in stress.  Reports that she is in the process of appealing her disability.  States she has a hearing in front of a  next week.       Indications for TMS:  MDD, recurrent, severe; 4+ medication trials (from 2+ classes) ineffective; Psychotherapy ineffective     Procedure Diagnosis:  No diagnosis found.    Treatment Hx:  Treatment number this series:  12 and Total lifetime treatment number: 42    No Known Allergies   BP (!) 147/96   Pulse 92     Pause for the Cause  Right patient: Yes  Right procedure/correct coil:  Yes; rTMS; [id number]; H1 coil.   Earplugs in place:  Yes    Procedure  Patient was seated in procedure chair. Identity and procedure was verified. Ear plugs were placed in ears and patient-specific cap was placed on head and tightened appropriately. Ruler locations were verified. Coil was placed at treatment location and stimulator was set to parameters described below. A test train was delivered and pt tolerated train. Given pt tolerance, 55 treatment trains were delivered. Pt tolerated procedure with some slight hand movement.    Motor Threshold Determination  Distance from nasion to inion: 36.0 cm  MT 1: 0 - 6.5 - 16 @ 47% on 11/22/2019    Standard LDLPFC    Frequency: 18  Train Duration: 2  Total pulses delivered: 1980  Inter-train interval: 20  Tx Loc: 0 - eyebrows - 15  Energy: 56% (120%MT)  Trains: 55    Date MADRS IDS-SR PHQ-9   11/22/19 25 43 18   11/29/19 -- 45  18   12/6/19  45 18                                   PHQ-9 SCORE 12/6/2019 12/9/2019 12/10/2019   PHQ-9 Total Score 18 17 17       Technician: RADHA GASTELUM RN    Plan   - Cont TMS     I did not see the patient during/after treatment but remained available in the clinic during  treatment.    Reji Sandoval MD  [facility] Neuromodulation

## 2019-12-11 ENCOUNTER — CARE COORDINATION (OUTPATIENT)
Dept: PSYCHIATRY | Facility: CLINIC | Age: 49
End: 2019-12-11

## 2019-12-11 ENCOUNTER — OFFICE VISIT (OUTPATIENT)
Dept: PSYCHIATRY | Facility: CLINIC | Age: 49
End: 2019-12-11
Payer: MEDICARE

## 2019-12-11 VITALS — DIASTOLIC BLOOD PRESSURE: 97 MMHG | SYSTOLIC BLOOD PRESSURE: 152 MMHG | HEART RATE: 105 BPM

## 2019-12-11 DIAGNOSIS — F33.2 SEVERE RECURRENT MAJOR DEPRESSION WITHOUT PSYCHOTIC FEATURES (H): Primary | ICD-10-CM

## 2019-12-11 ASSESSMENT — PATIENT HEALTH QUESTIONNAIRE - PHQ9: SUM OF ALL RESPONSES TO PHQ QUESTIONS 1-9: 23

## 2019-12-11 NOTE — PROGRESS NOTES
Writer received from Dr. Burnett a letter and completed mental functioning questionnaire for the pt. Dr. Burnett reported she faxed it to Stacia Lozano at [employer] at [phone].  Routed to scanning.

## 2019-12-11 NOTE — PROGRESS NOTES
Interventional Psychiatry Program  [address] [facility], [address]  [place] [address]  TMS Procedure Note   Mariaelena Jean Baptiste MRN# [id number]  Age: 49 year old year old YOB: 1970    Pre-Procedure:  History and Physical: Reviewed in medical record  Consent Signed by: Mariaelena Jean Baptiste for this course of treatment.  On: 11/22/2019    Clinical Narrative:  Patient is tolerating treatment. Patient reported mood: depressed.  Patient stated she received a letter from disability last night about overpayment. She's expected to pay back about $11k in 30 days. She's stressed and not sure what to do. Her plan is to possibly have it reconsider. She has an hearing next week. Patient states she feels safe going home and if things gets worse she can always contact her therapist who she will also be seeing next week.     Indications for TMS:  MDD, recurrent, severe; 4+ medication trials (from 2+ classes) ineffective; Psychotherapy ineffective     Procedure Diagnosis:  F33.2    Treatment Hx:  Treatment number this series: 13  Total lifetime treatment number: 43    No Known Allergies   BP (!) 152/97 (BP Location: Right arm, Patient Position: Sitting, Cuff Size: Adult Regular)   Pulse 105     Pause for the Cause  Right patient: Yes  Right procedure/correct coil:  Yes; rTMS; cpt 53800; H1 coil.   Earplugs in place:  Yes    Procedure  Patient was seated in procedure chair. Identity and procedure was verified. Ear plugs were placed in ears and patient-specific cap was placed on head and tightened appropriately. Ruler locations were verified. Coil was placed at treatment location and stimulator was set to parameters described below. A test train was delivered and pt tolerated train. Given pt tolerance, 55 treatment trains were delivered. Pt tolerated procedure with some slight hand movement.    Motor Threshold Determination  Distance from nasion to inion: 36.0 cm  MT 1: 0 - 6.5 - 16 @ 47% on 11/22/2019    Stimulation  Parameters  Frequency: 18  Train Duration: 2  Total pulses delivered: 1980  Inter-train interval: 20  Tx Loc: 0 - eyebrows - 15  Energy: 56% (120%MT)  Trains: 55    Date MADRS IDS-SR PHQ-9   11/22/19 25 43 18   11/29/19 -- 45 18   12/6/19  45 18                                   PHQ-9 SCORE 12/9/2019 12/10/2019 12/11/2019   PHQ-9 Total Score 17 17 23   Some encounter information is confidential and restricted. Go to Review Flowsheets activity to see all data.       Technician: Maida Day    Plan   - Cont TMS   - ReMT    I did not see patient but remained available throughout the TMS session.    Jeferson Viveros MD  [facility] Neuromodulation

## 2019-12-13 ENCOUNTER — OFFICE VISIT (OUTPATIENT)
Dept: PSYCHIATRY | Facility: CLINIC | Age: 49
End: 2019-12-13
Payer: MEDICARE

## 2019-12-13 VITALS — HEART RATE: 111 BPM | DIASTOLIC BLOOD PRESSURE: 87 MMHG | SYSTOLIC BLOOD PRESSURE: 158 MMHG

## 2019-12-13 DIAGNOSIS — F33.2 SEVERE RECURRENT MAJOR DEPRESSION WITHOUT PSYCHOTIC FEATURES (H): Primary | ICD-10-CM

## 2019-12-13 ASSESSMENT — PATIENT HEALTH QUESTIONNAIRE - PHQ9: SUM OF ALL RESPONSES TO PHQ QUESTIONS 1-9: 20

## 2019-12-13 NOTE — PROGRESS NOTES
Interventional Psychiatry Program  [address] [facility], [address]  [place] [address]  TMS Procedure Note   Mariaelena Jean Baptiste MRN# [id number]  Age: 49 year old year old YOB: 1970    Pre-Procedure:  History and Physical: Reviewed in medical record  Consent Signed by: Mariaelena Jean Baptiste for this course of treatment.  On: 11/22/2019    Clinical Narrative:  Patient is tolerating treatment. Patient reported mood: depressed.  Patient stated she received a letter from disability last night about overpayment. She's expected to pay back about $11k in 30 days. She's stressed and not sure what to do. Her plan is to possibly have it reconsider. She has an hearing next week. States she has had a hard week but we were able to talk through this and get to a point where our conversation was about food and cooking (her passion). She left smiling today.  Indications for TMS:  MDD, recurrent, severe; 4+ medication trials (from 2+ classes) ineffective; Psychotherapy ineffective     Procedure Diagnosis:  F33.2    Treatment Hx:  Treatment number this series: 14  Total lifetime treatment number: 44    No Known Allergies   BP (!) 158/87   Pulse 111     Pause for the Cause  Right patient: Yes  Right procedure/correct coil:  Yes; rTMS; cpt 65858; H1 coil.   Earplugs in place:  Yes    Procedure  Patient was seated in procedure chair. Identity and procedure was verified. Ear plugs were placed in ears and patient-specific cap was placed on head and tightened appropriately. Ruler locations were verified. Coil was placed at treatment location and stimulator was set to parameters described below. A test train was delivered and pt tolerated train. Given pt tolerance, 55 treatment trains were delivered. Pt tolerated procedure with some slight hand movement.    Motor Threshold Determination  Distance from nasion to inion: 36.0 cm  MT 1: 0 - 6.5 - 16 @ 47% on 11/22/2019    Stimulation Parameters  Frequency: 18  Train Duration:  2  Total pulses delivered: 1980  Inter-train interval: 20  Tx Loc: 0 - eyebrows - 15  Energy: 56% (120%MT)  Trains: 55    Date MADRS IDS-SR PHQ-9   11/22/19 25 43 18   11/29/19 -- 45 18   12/6/19  45 18   12/13/19  47 20                             PHQ-9 SCORE 12/9/2019 12/10/2019 12/11/2019   PHQ-9 Total Score 17 17 23   Some encounter information is confidential and restricted. Go to Review Flowsheets activity to see all data.       Praneeth King LPN  [facility]  Neuromodulation Clinic    Plan   - Cont TMS   - ReMT    I did not see patient but remained available throughout the TMS session.    Jennifer Perez MD  [facility]   Mental Health Neuromodulation

## 2019-12-16 ENCOUNTER — OFFICE VISIT (OUTPATIENT)
Dept: PSYCHIATRY | Facility: CLINIC | Age: 49
End: 2019-12-16
Payer: MEDICARE

## 2019-12-16 VITALS — SYSTOLIC BLOOD PRESSURE: 124 MMHG | DIASTOLIC BLOOD PRESSURE: 86 MMHG | HEART RATE: 109 BPM

## 2019-12-16 DIAGNOSIS — F33.2 SEVERE RECURRENT MAJOR DEPRESSION WITHOUT PSYCHOTIC FEATURES (H): Primary | ICD-10-CM

## 2019-12-16 ASSESSMENT — PATIENT HEALTH QUESTIONNAIRE - PHQ9: SUM OF ALL RESPONSES TO PHQ QUESTIONS 1-9: 18

## 2019-12-16 NOTE — PROGRESS NOTES
Interventional Psychiatry Program  [address] [facility], [address]  [place] [address]  TMS Procedure Note   Mariaelena Jean Baptiste MRN# [id number]  Age: 49 year old year old YOB: 1970    Pre-Procedure:  History and Physical: Reviewed in medical record  Consent Signed by: Mariaelena Jean Baptiste for this course of treatment.  On: 11/22/2019    Clinical Narrative:  Patient is tolerating treatment. Patient reported mood: depressed.  Patient has been very stressed out dealing with her disability benefits, but recognizes that if she weren't doing TMS at this time, she would probably be hospitalized due to the stress.  She is trying to stay optimistic.      Indications for TMS:  MDD, recurrent, severe; 4+ medication trials (from 2+ classes) ineffective; Psychotherapy ineffective     Procedure Diagnosis:  MDD, recurrent, severe F33.2    Treatment Hx:  Treatment number this series: 15  Total lifetime treatment number: 45    No Known Allergies   /86 (BP Location: Right arm, Patient Position: Sitting, Cuff Size: Adult Regular)   Pulse 109     Pause for the Cause  Right patient: Yes  Right procedure/correct coil:  Yes; rTMS; cpt 17965; H1 coil.   Earplugs in place:  Yes    Procedure  Patient was seated in procedure chair. Identity and procedure was verified. Ear plugs were placed in ears and patient-specific cap was placed on head and tightened appropriately. Ruler locations were verified. Coil was placed at treatment location and stimulator was set to parameters described below. A test train was delivered and pt tolerated train. Given pt tolerance, 55 treatment trains were delivered. Pt tolerated procedure with some slight hand movement.    Motor Threshold Determination  Distance from nasion to inion: 36.0 cm  MT 1: 0 - 6.5 - 16 @ 47% on 11/22/2019    Stimulation Parameters  Frequency: 18  Train Duration: 2  Total pulses delivered: 1980  Inter-train interval: 20  Tx Loc: 0 - eyebrows - 15  Energy: 56%  (120%MT)  Trains: 55    Date MADRS IDS-SR PHQ-9   11/22/19 25 43 18   11/29/19 -- 45 18   12/6/19  45 18   12/13/19  47 20                             PHQ-9 SCORE 12/10/2019 12/11/2019 12/13/2019   PHQ-9 Total Score 17 23 20   Some encounter information is confidential and restricted. Go to Review Flowsheets activity to see all data.       Praneeth King LPN  [facility]  Neuromodulation Clinic    Plan   - Cont TMS   - ReMT    I did not see patient but remained available at the clinic throughout the TMS session.    Jennifer Perez MD  [facility]   Mental Health Neuromodulation

## 2019-12-17 ENCOUNTER — OFFICE VISIT (OUTPATIENT)
Dept: PSYCHIATRY | Facility: CLINIC | Age: 49
End: 2019-12-17
Payer: MEDICARE

## 2019-12-17 VITALS — SYSTOLIC BLOOD PRESSURE: 124 MMHG | HEART RATE: 102 BPM | DIASTOLIC BLOOD PRESSURE: 82 MMHG

## 2019-12-17 DIAGNOSIS — F33.2 SEVERE RECURRENT MAJOR DEPRESSION WITHOUT PSYCHOTIC FEATURES (H): Primary | ICD-10-CM

## 2019-12-17 ASSESSMENT — PATIENT HEALTH QUESTIONNAIRE - PHQ9: SUM OF ALL RESPONSES TO PHQ QUESTIONS 1-9: 14

## 2019-12-17 NOTE — PROGRESS NOTES
Interventional Psychiatry Program  [address] [facility], [address]  [place] [address]  TMS Procedure Note   Mariaelena Jean Baptiste MRN# [id number]  Age: 49 year old year old YOB: 1970    Pre-Procedure:  History and Physical: Reviewed in medical record  Consent Signed by: Mariaelena Jean Baptiste for this course of treatment.  On: 11/22/2019    Clinical Narrative:  Patient is tolerating treatment. Patient reports feeling better. She was able to get her social disability clear up yesterday. It was all an error and the state will be fixing it and she'll still continue to receive benefits as before. Patient stated she notice TMS is working in some way since she's been able to handle the situation yesterday a lot better than expected.     Indications for TMS:  MDD, recurrent, severe; 4+ medication trials (from 2+ classes) ineffective; Psychotherapy ineffective     Procedure Diagnosis:  F33.2    Treatment Hx:  Treatment number this series: 16  Total lifetime treatment number: 46    No Known Allergies   /82 (BP Location: Right arm, Patient Position: Sitting, Cuff Size: Adult Regular)   Pulse 102     Pause for the Cause  Right patient: Yes  Right procedure/correct coil:  Yes; rTMS; cpt 46023; H1 coil.   Earplugs in place:  Yes    Procedure  Patient was seated in procedure chair. Identity and procedure was verified. Ear plugs were placed in ears and patient-specific cap was placed on head and tightened appropriately. Ruler locations were verified. Coil was placed at treatment location and stimulator was set to parameters described below. A test train was delivered and pt tolerated train. Given pt tolerance, 55 treatment trains were delivered. Pt tolerated procedure with some slight hand movement.    Motor Threshold Determination  Distance from nasion to inion: 36.0 cm  MT 1: 0 - 6.5 - 16 @ 47% on 11/22/2019    Stimulation Parameters  Frequency: 18  Train Duration: 2  Total pulses delivered: 1980  Inter-train  interval: 20  Tx Loc: 0 - eyebrows - 15  Energy: 56% (120%MT)  Trains: 55    Date MADRS IDS-SR PHQ-9   11/22/19 25 43 18   11/29/19 -- 45 18   12/6/19  45 18   12/13/19  47 20                             PHQ-9 SCORE 12/13/2019 12/16/2019 12/17/2019   PHQ-9 Total Score 20 18 14   Some encounter information is confidential and restricted. Go to Review Flowsheets activity to see all data.       Maida Day  [facility]  Neuromodulation Clinic    Plan   - Cont TMS   - ReMT    I did not see the patient during/after treatment but remained available in the clinic during  treatment.    Moni Mcgee MD  [facility]  Mental Health Neuromodulation

## 2019-12-19 ENCOUNTER — OFFICE VISIT (OUTPATIENT)
Dept: PSYCHIATRY | Facility: CLINIC | Age: 49
End: 2019-12-19
Payer: MEDICARE

## 2019-12-19 VITALS — HEART RATE: 114 BPM | SYSTOLIC BLOOD PRESSURE: 102 MMHG | DIASTOLIC BLOOD PRESSURE: 58 MMHG

## 2019-12-19 DIAGNOSIS — F33.2 SEVERE RECURRENT MAJOR DEPRESSION WITHOUT PSYCHOTIC FEATURES (H): Primary | ICD-10-CM

## 2019-12-19 ASSESSMENT — PATIENT HEALTH QUESTIONNAIRE - PHQ9: SUM OF ALL RESPONSES TO PHQ QUESTIONS 1-9: 16

## 2019-12-19 NOTE — PROGRESS NOTES
Interventional Psychiatry Program  [address] [facility], [address]  [place] [address]  TMS Procedure Note   Mariaelena Jean Baptiste MRN# [id number]  Age: 49 year old year old YOB: 1970    Pre-Procedure:  History and Physical: Reviewed in medical record  Consent Signed by: Mariaelena Jean Baptiste for this course of treatment.  On: 11/22/2019    Clinical Narrative:  Patient is tolerating treatment. Patient reports feeling better. Pt states she has been very sick the last 2 days. Sweating profusely, no fever and sleeping a lot. BP is low today with high heart rate. She states she is drinking plenty of fluids. Otherwise mood is OK.      Indications for TMS:  MDD, recurrent, severe; 4+ medication trials (from 2+ classes) ineffective; Psychotherapy ineffective     Procedure Diagnosis:  F33.2    Treatment Hx:  Treatment number this series: 17  Total lifetime treatment number: 47    No Known Allergies   /58   Pulse 114     Pause for the Cause  Right patient: Yes  Right procedure/correct coil:  Yes; rTMS; cpt 96014; H1 coil.   Earplugs in place:  Yes    Procedure  Patient was seated in procedure chair. Identity and procedure was verified. Ear plugs were placed in ears and patient-specific cap was placed on head and tightened appropriately. Ruler locations were verified. Coil was placed at treatment location and stimulator was set to parameters described below. A test train was delivered and pt tolerated train. Given pt tolerance, 55 treatment trains were delivered. Pt tolerated procedure with some slight hand movement.    Motor Threshold Determination  Distance from nasion to inion: 36.0 cm  MT 1: 0 - 6.5 - 16 @ 47% on 11/22/2019    Stimulation Parameters  Frequency: 18  Train Duration: 2  Total pulses delivered: 1980  Inter-train interval: 20  Tx Loc: 0 - eyebrows - 15  Energy: 56% (120%MT)  Trains: 55    Date MADRS IDS-SR PHQ-9   11/22/19 25 43 18   11/29/19 -- 45 18   12/6/19  45 18   12/13/19  47 20                              PHQ-9 SCORE 12/13/2019 12/16/2019 12/17/2019   PHQ-9 Total Score 20 18 14   Some encounter information is confidential and restricted. Go to Review Flowsheets activity to see all data.       Praneeth King LPN  [facility]  Neuromodulation Clinic    Plan   - Cont TMS   - ReMT      I did not see the patient during/after treatment but remained available in the clinic during  treatment.    Moni Mcgee MD  [facility]  Mental [facility] Neuromodulation

## 2019-12-20 ENCOUNTER — OFFICE VISIT (OUTPATIENT)
Dept: PSYCHIATRY | Facility: CLINIC | Age: 49
End: 2019-12-20
Payer: MEDICARE

## 2019-12-20 VITALS — DIASTOLIC BLOOD PRESSURE: 77 MMHG | HEART RATE: 120 BPM | SYSTOLIC BLOOD PRESSURE: 129 MMHG

## 2019-12-20 DIAGNOSIS — F32.2 CURRENT SEVERE EPISODE OF MAJOR DEPRESSIVE DISORDER WITHOUT PSYCHOTIC FEATURES WITHOUT PRIOR EPISODE (H): Primary | ICD-10-CM

## 2019-12-20 ASSESSMENT — PATIENT HEALTH QUESTIONNAIRE - PHQ9: SUM OF ALL RESPONSES TO PHQ QUESTIONS 1-9: 13

## 2019-12-20 NOTE — PROGRESS NOTES
Interventional Psychiatry Program  [address] [facility], [address]  [place] [address]  TMS Procedure Note   Mariaelena Jean Baptiste MRN# [id number]  Age: 49 year old year old YOB: 1970    Pre-Procedure:  History and Physical: Reviewed in medical record  Consent Signed by: Mariaelena Jean Baptiste for this course of treatment.  On: 11/22/2019    Clinical Narrative:  Patient is tolerating treatment. Patient reports feeling good.       Indications for TMS:  MDD, recurrent, severe; 4+ medication trials (from 2+ classes) ineffective; Psychotherapy ineffective     Procedure Diagnosis:  MDD, recurrent, severe (F32.2)    Treatment Hx:  Treatment number this series: 18  Total lifetime treatment number: 48    No Known Allergies   /77 (BP Location: Left arm, Patient Position: Sitting, Cuff Size: Adult Regular)   Pulse 120     Pause for the Cause  Right patient: Yes  Right procedure/correct coil:  Yes; rTMS; cpt 90297; H1 coil.   Earplugs in place:  Yes    Procedure  Patient was seated in procedure chair. Identity and procedure was verified. Ear plugs were placed in ears and patient-specific cap was placed on head and tightened appropriately. Ruler locations were verified. Coil was placed at treatment location and stimulator was set to parameters described below. A test train was delivered and pt tolerated train. Given pt tolerance, 55 treatment trains were delivered. Pt tolerated procedure with some slight hand movement.    Motor Threshold Determination  Distance from nasion to inion: 36.0 cm  MT 1: 0 - 6.5 - 16 @ 47% on 11/22/2019    Stimulation Parameters  Frequency: 18  Train Duration: 2  Total pulses delivered: 1980  Inter-train interval: 20  Tx Loc: 0 - eyebrows - 15  Energy: 56% (120%MT)  Trains: 55    Date MADRS IDS-SR PHQ-9   11/22/19 25 43 18   11/29/19 -- 45 18   12/6/19  45 18   12/13/19  47 20   12/20/19  48 13                       PHQ-9 SCORE 12/16/2019 12/17/2019 12/19/2019   PHQ-9 Total Score 18 14  16   Some encounter information is confidential and restricted. Go to Review Flowsheets activity to see all data.       Shayy Montemayor  [facility]  Neuromodulation Clinic    Plan   - Cont TMS   - ReMT      I did not see the patient during/after treatment but remained available in the clinic during  treatment.    Carlton Graham MD, MD, PhD  [facility]  Mental [facility] Neuromodulation

## 2019-12-23 ENCOUNTER — OFFICE VISIT (OUTPATIENT)
Dept: PSYCHIATRY | Facility: CLINIC | Age: 49
End: 2019-12-23
Payer: MEDICARE

## 2019-12-23 VITALS — SYSTOLIC BLOOD PRESSURE: 156 MMHG | HEART RATE: 112 BPM | DIASTOLIC BLOOD PRESSURE: 87 MMHG

## 2019-12-23 DIAGNOSIS — F32.2 CURRENT SEVERE EPISODE OF MAJOR DEPRESSIVE DISORDER WITHOUT PSYCHOTIC FEATURES WITHOUT PRIOR EPISODE (H): Primary | ICD-10-CM

## 2019-12-23 ASSESSMENT — PATIENT HEALTH QUESTIONNAIRE - PHQ9: SUM OF ALL RESPONSES TO PHQ QUESTIONS 1-9: 11

## 2019-12-23 NOTE — PROGRESS NOTES
Interventional Psychiatry Program  [address] [facility], [address]  [place] [address]  TMS Procedure Note   Mariaelena Jean Baptiste MRN# [id number]  Age: 49 year old year old YOB: 1970    Pre-Procedure:  History and Physical: Reviewed in medical record  Consent Signed by: Mariaelena Jean Baptiste for this course of treatment.  On: 11/22/2019    Clinical Narrative:  Patient is tolerating treatment. Patient reports feeling good. Had a very busy weekend which she enjoyed.       Indications for TMS:  MDD, recurrent, severe; 4+ medication trials (from 2+ classes) ineffective; Psychotherapy ineffective     Procedure Diagnosis:  F33.2    Treatment Hx:  Treatment number this series: 19  Total lifetime treatment number: 49    No Known Allergies   BP (!) 156/87   Pulse 112     Pause for the Cause  Right patient: Yes  Right procedure/correct coil:  Yes; rTMS; cpt 29114; H1 coil.   Earplugs in place:  Yes    Procedure  Patient was seated in procedure chair. Identity and procedure was verified. Ear plugs were placed in ears and patient-specific cap was placed on head and tightened appropriately. Ruler locations were verified. Coil was placed at treatment location and stimulator was set to parameters described below. A test train was delivered and pt tolerated train. Given pt tolerance, 55 treatment trains were delivered. Pt tolerated procedure with some slight hand movement.    Motor Threshold Determination  Distance from nasion to inion: 36.0 cm  MT 1: 0 - 6.5 - 16 @ 47% on 11/22/2019    Stimulation Parameters  Frequency: 18  Train Duration: 2  Total pulses delivered: 1980  Inter-train interval: 20  Tx Loc: 0 - eyebrows - 15  Energy: 56% (120%MT)  Trains: 55    Date MADRS IDS-SR PHQ-9   11/22/19 25 43 18   11/29/19 -- 45 18   12/6/19  45 18   12/13/19  47 20   12/20/19  48 13                       PHQ-9 SCORE 12/17/2019 12/19/2019 12/20/2019   PHQ-9 Total Score 14 16 13   Some encounter information is confidential and  restricted. Go to Review Flowsheets activity to see all data.       Praneeth King LPN  [facility]  Neuromodulation Clinic    Plan   - Cont TMS   - ReMT      I did not see the patient during/after treatment but remained available in the clinic during  treatment.    Link Marie MD  [facility]  Mental [facility] Neuromodulation

## 2019-12-24 ENCOUNTER — OFFICE VISIT (OUTPATIENT)
Dept: PSYCHIATRY | Facility: CLINIC | Age: 49
End: 2019-12-24
Payer: MEDICARE

## 2019-12-24 VITALS — SYSTOLIC BLOOD PRESSURE: 132 MMHG | DIASTOLIC BLOOD PRESSURE: 82 MMHG | HEART RATE: 108 BPM

## 2019-12-24 DIAGNOSIS — F33.2 SEVERE RECURRENT MAJOR DEPRESSION WITHOUT PSYCHOTIC FEATURES (H): Primary | ICD-10-CM

## 2019-12-24 ASSESSMENT — PATIENT HEALTH QUESTIONNAIRE - PHQ9: SUM OF ALL RESPONSES TO PHQ QUESTIONS 1-9: 12

## 2019-12-24 NOTE — PROGRESS NOTES
Interventional Psychiatry Program  [address] [facility], [address]  [place] [address]  TMS Procedure Note   Mariaelena Jean Baptiste MRN# [id number]  Age: 49 year old year old YOB: 1970    Pre-Procedure:  History and Physical: Reviewed in medical record  Consent Signed by: Mariaelena Jean Baptiste for this course of treatment.  On: 11/22/2019    Clinical Narrative:  Patient is tolerating treatment. Patient reports no change in mood.       Indications for TMS:  MDD, recurrent, severe; 4+ medication trials (from 2+ classes) ineffective; Psychotherapy ineffective     Procedure Diagnosis:  F33.2    Treatment Hx:  Treatment number this series: 20  Total lifetime treatment number: 50    No Known Allergies   /82 (BP Location: Right arm, Patient Position: Sitting, Cuff Size: Adult Regular)   Pulse 108     Pause for the Cause  Right patient:  Yes  Right procedure/correct coil:  Yes; rTMS; cpt 93540; H1 coil.   Earplugs in place:  Yes    Procedure  Patient was seated in procedure chair. Identity and procedure was verified. Ear plugs were placed in ears and patient-specific cap was placed on head and tightened appropriately. Ruler locations were verified. Coil was placed at initial MT location and stimulator was set to initial MT. MT was retested and found as described below. Coil was placed at treatment location and stimulator was set to stimulation parameters detailed below. A test train was delivered and pt tolerated train fine. Given pt tolerance, 55 trains were delivered. Pt tolerated procedure with some slight hand movement.    Motor Threshold Determination  Distance from nasion to inion: 36.0 cm  MT 1: 0 - 6.5 - 16 @ 47% on 11/22/2019  MT 2: 0 - 6.5 - 16 @ 47% on 12/24/2019    Stimulation Parameters  Frequency: 18  Train Duration: 2 sec  Total pulses delivered: 1980  Inter-train interval: 20 sec  Tx Loc: 0 - eyebrows - 15  Energy: 56% (120%MT)  Trains: 55    Date MADRS IDS-SR PHQ-9   11/22/19 25 43 18    11/29/19 -- 45 18   12/6/19  45 18   12/13/19  47 20   12/20/19  48 13                       PHQ-9 SCORE 12/20/2019 12/23/2019 12/24/2019   PHQ-9 Total Score 13 11 12   Some encounter information is confidential and restricted. Go to Review Flowsheets activity to see all data.       Maida Day  [facility]  Neuromodulation Clinic    Plan   - Cont TMS         I completed repeat determination of the pt's motor threshold during the visit today. I was available in the clinic throughout the treatment.    Moni Mcgee MD  [facility]  Mental Health Neuromodulation

## 2019-12-26 ENCOUNTER — OFFICE VISIT (OUTPATIENT)
Dept: PSYCHIATRY | Facility: CLINIC | Age: 49
End: 2019-12-26
Payer: MEDICARE

## 2019-12-26 VITALS — SYSTOLIC BLOOD PRESSURE: 132 MMHG | HEART RATE: 101 BPM | DIASTOLIC BLOOD PRESSURE: 90 MMHG

## 2019-12-26 DIAGNOSIS — F33.2 SEVERE RECURRENT MAJOR DEPRESSION WITHOUT PSYCHOTIC FEATURES (H): Primary | ICD-10-CM

## 2019-12-26 ASSESSMENT — PATIENT HEALTH QUESTIONNAIRE - PHQ9: SUM OF ALL RESPONSES TO PHQ QUESTIONS 1-9: 10

## 2019-12-26 NOTE — PROGRESS NOTES
Interventional Psychiatry Program  [address] [facility] [address], [address]  [place] [address]  TMS Procedure Note   Mariaelena Jean Baptiste MRN# [id number]  Age: 49 year old year old YOB: 1970    Pre-Procedure:  History and Physical: Reviewed in medical record  Consent Signed by: Mariaelena Jean Baptiste for this course of treatment.  On: 11/22/2019    Clinical Narrative:  Patient is tolerating treatment. Patient reports no change in mood.     Indications for TMS:  MDD, recurrent, severe; 4+ medication trials (from 2+ classes) ineffective; Psychotherapy ineffective     Procedure Diagnosis:  F33.2    Treatment Hx:  Treatment number this series: 21  Total lifetime treatment number: 51    No Known Allergies   BP (!) 132/90 (BP Location: Right arm, Patient Position: Sitting, Cuff Size: Adult Regular)   Pulse 101     Pause for the Cause  Right patient:  Yes  Right procedure/correct coil:  Yes; rTMS; cpt 01605; H1 coil.   Earplugs in place:  Yes    Procedure  Patient was seated in procedure chair. Identity and procedure was verified. Ear plugs were placed in ears and patient-specific cap was placed on head and tightened appropriately. Ruler locations were verified. Coil was placed at treatment location and stimulator was set to parameters described below. A test train was delivered and pt tolerated train. Given pt tolerance, 55 treatment trains were delivered. Pt tolerated procedure with some slight hand movement.    Motor Threshold Determination  Distance from nasion to inion: 36.0 cm  MT 1: 0 - 6.5 - 16 @ 47% on 11/22/2019  MT 2: 0 - 6.5 - 16 @ 47% on 12/24/2019    Stimulation Parameters  Frequency: 18  Train Duration: 2 sec  Total pulses delivered: 1980  Inter-train interval: 20 sec  Tx Loc: 0 - eyebrows - 15  Energy: 56% (120%MT)  Trains: 55    Date MADRS IDS-SR PHQ-9   11/22/19 25 43 18   11/29/19 -- 45 18   12/6/19  45 18   12/13/19  47 20   12/20/19  48 13                       PHQ-9 SCORE 12/23/2019 12/24/2019  12/26/2019   PHQ-9 Total Score 11 12 10   Some encounter information is confidential and restricted. Go to Review Flowsheets activity to see all data.       Maida Day  [facility]  Neuromodulation Clinic    Plan   - Cont TMS     I did not see the patient during/after treatment but remained available in the clinic during  treatment.    Link Marie MD  [facility]  Mental [facility] Neuromodulation

## 2019-12-27 ENCOUNTER — OFFICE VISIT (OUTPATIENT)
Dept: PSYCHIATRY | Facility: CLINIC | Age: 49
End: 2019-12-27
Payer: MEDICARE

## 2019-12-27 VITALS — DIASTOLIC BLOOD PRESSURE: 83 MMHG | SYSTOLIC BLOOD PRESSURE: 127 MMHG | HEART RATE: 106 BPM

## 2019-12-27 DIAGNOSIS — F33.2 SEVERE RECURRENT MAJOR DEPRESSION WITHOUT PSYCHOTIC FEATURES (H): Primary | ICD-10-CM

## 2019-12-27 ASSESSMENT — PATIENT HEALTH QUESTIONNAIRE - PHQ9: SUM OF ALL RESPONSES TO PHQ QUESTIONS 1-9: 9

## 2019-12-27 NOTE — PROGRESS NOTES
Interventional Psychiatry Program  [address], [address]  [place] [address]  TMS Procedure Note   Mariaelena Jean Baptiste MRN# [id number]  Age: 49 year old year old YOB: 1970    Pre-Procedure:  History and Physical: Reviewed in medical record  Consent Signed by: Mariaelena Jean Baptiste for this course of treatment.  On: 11/22/2019    Clinical Narrative:  Patient is tolerating treatment. Patient reports no change in mood. Patient has no plans for the weekend beside working.     Indications for TMS:  MDD, recurrent, severe; 4+ medication trials (from 2+ classes) ineffective; Psychotherapy ineffective     Procedure Diagnosis:  MDD, recurrent, severe (F32.2)      Treatment Hx:  Treatment number this series: 22  Total lifetime treatment number: 52    No Known Allergies   /83 (BP Location: Right arm, Patient Position: Sitting, Cuff Size: Adult Regular)   Pulse 106     Pause for the Cause  Right patient:  Yes  Right procedure/correct coil:  Yes; rTMS; cpt 57109; H1 coil.   Earplugs in place:  Yes    Procedure  Patient was seated in procedure chair. Identity and procedure was verified. Ear plugs were placed in ears and patient-specific cap was placed on head and tightened appropriately. Ruler locations were verified. Coil was placed at treatment location and stimulator was set to parameters described below. A test train was delivered and pt tolerated train. Given pt tolerance, 55 treatment trains were delivered. Pt tolerated procedure with some slight hand movement.    Motor Threshold Determination  Distance from nasion to inion: 36.0 cm  MT 1: 0 - 6.5 - 16 @ 47% on 11/22/2019  MT 2: 0 - 6.5 - 16 @ 47% on 12/24/2019    Stimulation Parameters  Frequency: 18 hz  Train Duration: 2 sec  Total pulses delivered: 1980  Inter-train interval: 20 sec  Tx Loc: 0 - eyebrows - 15  Energy: 56% (120%MT)  Trains: 55    Date MADRS IDS-SR PHQ-9   11/22/19 25 43 18   11/29/19 -- 45 18   12/6/19  45 18   12/13/19  47 20    12/20/19  48 13   12/27/19  26 9                       PHQ-9 SCORE 12/24/2019 12/26/2019 12/27/2019   PHQ-9 Total Score 12 10 9   Some encounter information is confidential and restricted. Go to Review Flowsheets activity to see all data.       Maida Day  [facility]  Neuromodulation Clinic    Plan   - Cont TMS     I did not see the patient during/after treatment but remained available in the clinic during  treatment.    Carlton Graham MD, MD, PhD  [facility]  Mental [facility] Neuromodulation

## 2019-12-30 ENCOUNTER — OFFICE VISIT (OUTPATIENT)
Dept: PSYCHIATRY | Facility: CLINIC | Age: 49
End: 2019-12-30
Attending: PSYCHIATRY & NEUROLOGY
Payer: MEDICARE

## 2019-12-30 ENCOUNTER — OFFICE VISIT (OUTPATIENT)
Dept: PSYCHIATRY | Facility: CLINIC | Age: 49
End: 2019-12-30
Payer: MEDICARE

## 2019-12-30 VITALS — SYSTOLIC BLOOD PRESSURE: 133 MMHG | DIASTOLIC BLOOD PRESSURE: 86 MMHG | HEART RATE: 103 BPM

## 2019-12-30 VITALS
WEIGHT: 171 LBS | BODY MASS INDEX: 23.85 KG/M2 | HEART RATE: 118 BPM | SYSTOLIC BLOOD PRESSURE: 121 MMHG | DIASTOLIC BLOOD PRESSURE: 70 MMHG

## 2019-12-30 DIAGNOSIS — F33.2 SEVERE RECURRENT MAJOR DEPRESSION WITHOUT PSYCHOTIC FEATURES (H): Primary | ICD-10-CM

## 2019-12-30 DIAGNOSIS — F33.42 RECURRENT MAJOR DEPRESSIVE DISORDER, IN FULL REMISSION (H): ICD-10-CM

## 2019-12-30 PROCEDURE — G0463 HOSPITAL OUTPT CLINIC VISIT: HCPCS | Mod: ZF

## 2019-12-30 RX ORDER — DEXTROAMPHETAMINE SACCHARATE, AMPHETAMINE ASPARTATE, DEXTROAMPHETAMINE SULFATE AND AMPHETAMINE SULFATE 2.5; 2.5; 2.5; 2.5 MG/1; MG/1; MG/1; MG/1
10 TABLET ORAL 2 TIMES DAILY
Qty: 60 TABLET | Refills: 0 | Status: SHIPPED | OUTPATIENT
Start: 2019-12-30 | End: 2020-02-13

## 2019-12-30 ASSESSMENT — PATIENT HEALTH QUESTIONNAIRE - PHQ9: SUM OF ALL RESPONSES TO PHQ QUESTIONS 1-9: 8

## 2019-12-30 ASSESSMENT — PAIN SCALES - GENERAL: PAINLEVEL: MODERATE PAIN (5)

## 2019-12-30 NOTE — NURSING NOTE
Chief Complaint   Patient presents with     Recheck Medication     MDD     High pulse x2 reported to provider.Abigail Eli/JOSE G

## 2019-12-30 NOTE — PROGRESS NOTES
Interventional Psychiatry Program  [address], [address]  [place] [address]  TMS Procedure Note   Mariaelena Jean Baptiste MRN# [id number]  Age: 49 year old year old YOB: 1970    Pre-Procedure:  History and Physical: Reviewed in medical record  Consent Signed by: Mariaelena Jean Baptiste for this course of treatment.  On: 11/22/2019    Clinical Narrative:  Patient is tolerating treatment. Patient reports no change in mood. She had to work over the weekend.     Indications for TMS:  MDD, recurrent, severe; 4+ medication trials (from 2+ classes) ineffective; Psychotherapy ineffective     Procedure Diagnosis:  F33.2    Treatment Hx:  Treatment number this series: 23  Total lifetime treatment number: 53    No Known Allergies   /86 (BP Location: Right arm, Patient Position: Sitting, Cuff Size: Adult Regular)   Pulse 103     Pause for the Cause  Right patient:  Yes  Right procedure/correct coil:  Yes; rTMS; cpt 13303; H1 coil.   Earplugs in place:  Yes    Procedure  Patient was seated in procedure chair. Identity and procedure was verified. Ear plugs were placed in ears and patient-specific cap was placed on head and tightened appropriately. Ruler locations were verified. Coil was placed at treatment location and stimulator was set to parameters described below. A test train was delivered and pt tolerated train. Given pt tolerance, 55 treatment trains were delivered. Pt tolerated procedure with some slight hand movement.    Motor Threshold Determination  Distance from nasion to inion: 36.0 cm  MT 1: 0 - 6.5 - 16 @ 47% on 11/22/2019  MT 2: 0 - 6.5 - 16 @ 47% on 12/24/2019    Stimulation Parameters  Frequency: 18 hz  Train Duration: 2 sec  Total pulses delivered: 1980  Inter-train interval: 20 sec  Tx Loc: 0 - eyebrows - 15  Energy: 56% (120%MT)  Trains: 55    Date MADRS IDS-SR PHQ-9   11/22/19 25 43 18   11/29/19 -- 45 18   12/6/19  45 18   12/13/19  47 20   12/20/19  48 13   12/27/19  26 9                        PHQ-9 SCORE 12/26/2019 12/27/2019 12/30/2019   PHQ-9 Total Score 10 9 8   Some encounter information is confidential and restricted. Go to Review Flowsheets activity to see all data.       Maida Day  [facility]  Neuromodulation Clinic    Plan   - Cont TMS     I did not see patient but remained available at the clinic throughout the TMS session.    Jennifer Perez MD  [facility]  Mental [facility] Neuromodulation

## 2019-12-31 ENCOUNTER — OFFICE VISIT (OUTPATIENT)
Dept: PSYCHIATRY | Facility: CLINIC | Age: 49
End: 2019-12-31
Payer: MEDICARE

## 2019-12-31 VITALS — SYSTOLIC BLOOD PRESSURE: 132 MMHG | DIASTOLIC BLOOD PRESSURE: 82 MMHG | HEART RATE: 109 BPM

## 2019-12-31 DIAGNOSIS — F32.2 CURRENT SEVERE EPISODE OF MAJOR DEPRESSIVE DISORDER WITHOUT PSYCHOTIC FEATURES WITHOUT PRIOR EPISODE (H): Primary | ICD-10-CM

## 2019-12-31 ASSESSMENT — PATIENT HEALTH QUESTIONNAIRE - PHQ9: SUM OF ALL RESPONSES TO PHQ QUESTIONS 1-9: 10

## 2019-12-31 NOTE — PROGRESS NOTES
Interventional Psychiatry Program  [address], [address]  [place] [address]  TMS Procedure Note   Mariaelena Jean Baptiste MRN# [id number]  Age: 49 year old year old YOB: 1970    Pre-Procedure:  History and Physical: Reviewed in medical record  Consent Signed by: Mariaelena Jean Baptiste for this course of treatment.  On: 11/22/2019    Clinical Narrative:  Patient is tolerating treatment. Patient reports no change in mood.     Indications for TMS:  MDD, recurrent, severe; 4+ medication trials (from 2+ classes) ineffective; Psychotherapy ineffective     Procedure Diagnosis:  F33.2    Treatment Hx:  Treatment number this series: 24  Total lifetime treatment number: 54    No Known Allergies   /82 (BP Location: Left arm, Patient Position: Sitting, Cuff Size: Adult Regular)   Pulse 109     Pause for the Cause  Right patient:  Yes  Right procedure/correct coil:  Yes; rTMS; cpt 24290; H1 coil.   Earplugs in place:  Yes    Procedure  Patient was seated in procedure chair. Identity and procedure was verified. Ear plugs were placed in ears and patient-specific cap was placed on head and tightened appropriately. Ruler locations were verified. Coil was placed at treatment location and stimulator was set to parameters described below. A test train was delivered and pt tolerated train. Given pt tolerance, 55 treatment trains were delivered. Pt tolerated procedure with some slight hand movement.    Motor Threshold Determination  Distance from nasion to inion: 36.0 cm  MT 1: 0 - 6.5 - 16 @ 47% on 11/22/2019  MT 2: 0 - 6.5 - 16 @ 47% on 12/24/2019    Stimulation Parameters  Frequency: 18 hz  Train Duration: 2 sec  Total pulses delivered: 1980  Inter-train interval: 20 sec  Tx Loc: 0 - eyebrows - 15  Energy: 56% (120%MT)  Trains: 55    Date MADRS IDS-SR PHQ-9   11/22/19 25 43 18   11/29/19 -- 45 18   12/6/19  45 18   12/13/19  47 20   12/20/19  48 13   12/27/19  26 9                       PHQ-9 SCORE 12/26/2019  12/27/2019 12/30/2019   PHQ-9 Total Score 10 9 8   Some encounter information is confidential and restricted. Go to Review Flowsheets activity to see all data.       Shayy Montemayor  [facility]  Neuromodulation Clinic    Plan   - Cont TMS       I did not see the patient during/after treatment but remained available in the clinic during  treatment.    Moni Mcgee MD  [facility]  Mental Health Neuromodulation

## 2020-01-02 ENCOUNTER — OFFICE VISIT (OUTPATIENT)
Dept: PSYCHIATRY | Facility: CLINIC | Age: 50
End: 2020-01-02
Payer: MEDICARE

## 2020-01-02 VITALS — SYSTOLIC BLOOD PRESSURE: 135 MMHG | HEART RATE: 102 BPM | DIASTOLIC BLOOD PRESSURE: 89 MMHG

## 2020-01-02 DIAGNOSIS — F33.2 SEVERE RECURRENT MAJOR DEPRESSION WITHOUT PSYCHOTIC FEATURES (H): Primary | ICD-10-CM

## 2020-01-02 ASSESSMENT — PATIENT HEALTH QUESTIONNAIRE - PHQ9: SUM OF ALL RESPONSES TO PHQ QUESTIONS 1-9: 9

## 2020-01-02 NOTE — PROGRESS NOTES
Interventional Psychiatry Program  [address], [address]  [place] [address]  TMS Procedure Note   Mariaelena Jean Baptiste MRN# [id number]  Age: 49 year old year old YOB: 1970    Pre-Procedure:  History and Physical: Reviewed in medical record  Consent Signed by: Mariaelena Jean Baptiste for this course of treatment.  On: 11/22/2019    Clinical Narrative:  Patient is tolerating treatment. Patient reports no change in mood.     Indications for TMS:  MDD, recurrent, severe; 4+ medication trials (from 2+ classes) ineffective; Psychotherapy ineffective     Procedure Diagnosis:  MDD, recurrent, severe F33.2    Treatment Hx:  Treatment number this series: 25  Total lifetime treatment number: 55    No Known Allergies   /89 (BP Location: Right arm, Patient Position: Sitting, Cuff Size: Adult Regular)   Pulse 102     Pause for the Cause  Right patient:  Yes  Right procedure/correct coil:  Yes; rTMS; cpt 73623; H1 coil.   Earplugs in place:  Yes    Procedure  Patient was seated in procedure chair. Identity and procedure was verified. Ear plugs were placed in ears and patient-specific cap was placed on head and tightened appropriately. Ruler locations were verified. Coil was placed at treatment location and stimulator was set to parameters described below. A test train was delivered and pt tolerated train. Given pt tolerance, 55 treatment trains were delivered. Pt tolerated procedure with some slight hand movement.    Motor Threshold Determination  Distance from nasion to inion: 36.0 cm  MT 1: 0 - 6.5 - 16 @ 47% on 11/22/2019  MT 2: 0 - 6.5 - 16 @ 47% on 12/24/2019    Stimulation Parameters  Frequency: 18 hz  Train Duration: 2 sec  Total pulses delivered: 1980  Inter-train interval: 20 sec  Tx Loc: 0 - eyebrows - 15  Energy: 56% (120%MT)  Trains: 55    Date MADRS IDS-SR PHQ-9   11/22/19 25 43 18   11/29/19 -- 45 18   12/6/19  45 18   12/13/19  47 20   12/20/19  48 13   12/27/19  26 9                        PHQ-9 SCORE 12/27/2019 12/30/2019 12/31/2019   PHQ-9 Total Score 9 8 10   Some encounter information is confidential and restricted. Go to Review Flowsheets activity to see all data.       Doris Jiménez  [facility]  Neuromodulation Clinic    Plan   - Cont TMS       I did not see the patient during/after treatment but remained available in the clinic during  treatment.    Moni Mcgee MD  [facility]  Mental Health Neuromodulation

## 2020-01-06 ENCOUNTER — OFFICE VISIT (OUTPATIENT)
Dept: PSYCHIATRY | Facility: CLINIC | Age: 50
End: 2020-01-06
Payer: MEDICARE

## 2020-01-06 VITALS — SYSTOLIC BLOOD PRESSURE: 148 MMHG | HEART RATE: 97 BPM | DIASTOLIC BLOOD PRESSURE: 97 MMHG

## 2020-01-06 DIAGNOSIS — F33.2 SEVERE RECURRENT MAJOR DEPRESSION WITHOUT PSYCHOTIC FEATURES (H): Primary | ICD-10-CM

## 2020-01-06 ASSESSMENT — PATIENT HEALTH QUESTIONNAIRE - PHQ9: SUM OF ALL RESPONSES TO PHQ QUESTIONS 1-9: 11

## 2020-01-06 NOTE — PROGRESS NOTES
Interventional Psychiatry Program  [address], [address]  [place] [address]  TMS Procedure Note   Mariaelena Jean Baptiste MRN# [id number]  Age: 49 year old year old YOB: 1970    Pre-Procedure:  History and Physical: Reviewed in medical record  Consent Signed by: Mariaelena Jean Baptiste for this course of treatment.  On: 11/22/2019    Clinical Narrative:  Patient is tolerating treatment. Patient reports that TMS has made an impact.  States that she has noticed a spring in her step and has been able to have more conversations with people at work.      Indications for TMS:  MDD, recurrent, severe; 4+ medication trials (from 2+ classes) ineffective; Psychotherapy ineffective     Procedure Diagnosis:  F33.2    Treatment Hx:  Treatment number this series: 26  Total lifetime treatment number: 56    No Known Allergies   BP (!) 148/97   Pulse 97     Pause for the Cause  Right patient:  Yes  Right procedure/correct coil:  Yes; rTMS; cpt 22971; H1 coil.   Earplugs in place:  Yes    Procedure  Patient was seated in procedure chair. Identity and procedure was verified. Ear plugs were placed in ears and patient-specific cap was placed on head and tightened appropriately. Ruler locations were verified. Coil was placed at treatment location and stimulator was set to parameters described below. A test train was delivered and pt tolerated train. Given pt tolerance, 55 treatment trains were delivered. Pt tolerated procedure with some slight hand movement.    Motor Threshold Determination  Distance from nasion to inion: 36.0 cm  MT 1: 0 - 6.5 - 16 @ 47% on 11/22/2019  MT 2: 0 - 6.5 - 16 @ 47% on 12/24/2019    Stimulation Parameters  Frequency: 18 hz  Train Duration: 2 sec  Total pulses delivered: 1980  Inter-train interval: 20 sec  Tx Loc: 0 - eyebrows - 15  Energy: 56% (120%MT)  Trains: 55    Date MADRS IDS-SR PHQ-9   11/22/19 25 43 18   11/29/19 -- 45 18   12/6/19  45 18   12/13/19  47 20   12/20/19  48 13    12/27/19  26 9                       PHQ-9 SCORE 12/31/2019 1/2/2020 1/6/2020   PHQ-9 Total Score 10 9 11       Luz Garcia RN   [facility]  Neuromodulation Clinic    Plan   - Cont TMS       I did not see the patient during/after treatment but remained available in the clinic during  treatment.    Moni Mcgee MD  [facility]  Mental [facility] Neuromodulation

## 2020-01-07 ENCOUNTER — OFFICE VISIT (OUTPATIENT)
Dept: PSYCHIATRY | Facility: CLINIC | Age: 50
End: 2020-01-07
Payer: MEDICARE

## 2020-01-07 VITALS — HEART RATE: 97 BPM | DIASTOLIC BLOOD PRESSURE: 86 MMHG | SYSTOLIC BLOOD PRESSURE: 127 MMHG

## 2020-01-07 DIAGNOSIS — F33.2 SEVERE RECURRENT MAJOR DEPRESSION WITHOUT PSYCHOTIC FEATURES (H): Primary | ICD-10-CM

## 2020-01-07 ASSESSMENT — PATIENT HEALTH QUESTIONNAIRE - PHQ9: SUM OF ALL RESPONSES TO PHQ QUESTIONS 1-9: 9

## 2020-01-07 NOTE — PROGRESS NOTES
Interventional Psychiatry Program  [address], [address]  [place] [address]  TMS Procedure Note   Mariaelena Jean Baptiste MRN# [id number]  Age: 49 year old year old YOB: 1970    Pre-Procedure:  History and Physical: Reviewed in medical record  Consent Signed by: Mariaelena Jean Baptiste for this course of treatment.  On: 11/22/2019    Clinical Narrative:  Patient is tolerating treatment. Patient reports that she got her social security check today.  States she is feeling much better regarding it.        Indications for TMS:  MDD, recurrent, severe; 4+ medication trials (from 2+ classes) ineffective; Psychotherapy ineffective     Procedure Diagnosis:  F33.2    Treatment Hx:  Treatment number this series: 27  Total lifetime treatment number: 57    No Known Allergies   /86   Pulse 97     Pause for the Cause  Right patient:  Yes  Right procedure/correct coil:  Yes; rTMS; cpt 96867; H1 coil.   Earplugs in place:  Yes    Procedure  Patient was seated in procedure chair. Identity and procedure was verified. Ear plugs were placed in ears and patient-specific cap was placed on head and tightened appropriately. Ruler locations were verified. Coil was placed at treatment location and stimulator was set to parameters described below. A test train was delivered and pt tolerated train. Given pt tolerance, 55 treatment trains were delivered. Pt tolerated procedure with some slight hand movement.    Motor Threshold Determination  Distance from nasion to inion: 36.0 cm  MT 1: 0 - 6.5 - 16 @ 47% on 11/22/2019  MT 2: 0 - 6.5 - 16 @ 47% on 12/24/2019    Stimulation Parameters  Frequency: 18 hz  Train Duration: 2 sec  Total pulses delivered: 1980  Inter-train interval: 20 sec  Tx Loc: 0 - eyebrows - 15  Energy: 56% (120%MT)  Trains: 55    Date MADRS IDS-SR PHQ-9   11/22/19 25 43 18   11/29/19 -- 45 18   12/6/19  45 18   12/13/19  47 20   12/20/19  48 13   12/27/19  26 9                       PHQ-9 SCORE 1/2/2020  1/6/2020 1/7/2020   PHQ-9 Total Score 9 11 9       Luz Garcia RN   [facility]  Neuromodulation Clinic    Plan   - Cont TMS  - Increase trains to 83      I did not see the patient during/after treatment but remained available in the clinic during  treatment.    Moni Mcgee MD  [facility]  Mental [facility] Neuromodulation

## 2020-01-08 ENCOUNTER — OFFICE VISIT (OUTPATIENT)
Dept: PSYCHIATRY | Facility: CLINIC | Age: 50
End: 2020-01-08
Payer: MEDICARE

## 2020-01-08 VITALS — DIASTOLIC BLOOD PRESSURE: 91 MMHG | HEART RATE: 96 BPM | SYSTOLIC BLOOD PRESSURE: 140 MMHG

## 2020-01-08 DIAGNOSIS — F32.2 CURRENT SEVERE EPISODE OF MAJOR DEPRESSIVE DISORDER WITHOUT PSYCHOTIC FEATURES WITHOUT PRIOR EPISODE (H): Primary | ICD-10-CM

## 2020-01-08 ASSESSMENT — PATIENT HEALTH QUESTIONNAIRE - PHQ9: SUM OF ALL RESPONSES TO PHQ QUESTIONS 1-9: 9

## 2020-01-08 NOTE — PROGRESS NOTES
Interventional Psychiatry Program  [address], [address]  [place] [address]  TMS Procedure Note   Mariaelena Jean Baptiste MRN# [id number]  Age: 49 year old year old YOB: 1970    Pre-Procedure:  History and Physical: Reviewed in medical record  Consent Signed by: Mariaelena Jean Baptiste for this course of treatment.  On: 11/22/2019    Clinical Narrative:  Patient is tolerating treatment. Patient reports no changes from yesterday.     Indications for TMS:  MDD, recurrent, severe; 4+ medication trials (from 2+ classes) ineffective; Psychotherapy ineffective     Procedure Diagnosis:  F33.2    Treatment Hx:  Treatment number this series: 28  Total lifetime treatment number: 58    No Known Allergies   BP (!) 140/91 (BP Location: Left arm, Patient Position: Sitting, Cuff Size: Adult Regular)   Pulse 96     Pause for the Cause  Right patient:  Yes  Right procedure/correct coil:  Yes; rTMS; cpt 26217; H1 coil.   Earplugs in place:  Yes    Procedure  Patient was seated in procedure chair. Identity and procedure was verified. Ear plugs were placed in ears and patient-specific cap was placed on head and tightened appropriately. Ruler locations were verified. Coil was placed at treatment location and stimulator was set to parameters described below. A test train was delivered and pt tolerated train. Given pt tolerance, 83 treatment trains were delivered. Pt tolerated procedure with some slight hand movement.    Motor Threshold Determination  Distance from nasion to inion: 36.0 cm  MT 1: 0 - 6.5 - 16 @ 47% on 11/22/2019  MT 2: 0 - 6.5 - 16 @ 47% on 12/24/2019    Stimulation Parameters  Frequency: 18 hz  Train Duration: 2 sec  Total pulses delivered: 1980  Inter-train interval: 20 sec  Tx Loc: 0 - eyebrows - 15  Energy: 56% (120%MT)  Trains: 83    Date MADRS IDS-SR PHQ-9   11/22/19 25 43 18   11/29/19 -- 45 18   12/6/19  45 18   12/13/19  47 20   12/20/19  48 13   12/27/19  26 9                       PHQ-9 SCORE  1/2/2020 1/6/2020 1/7/2020   PHQ-9 Total Score 9 11 9   Some encounter information is confidential and restricted. Go to Review Flowsheets activity to see all data.       Shayy Montemayor  [facility]  Neuromodulation Clinic    Plan   - Cont TMS  - Increase trains to 83      I did not see the patient during/after treatment but remained available in the clinic during  treatment.    Link Marie MD  [facility]  Mental [facility] Neuromodulation

## 2020-01-09 ENCOUNTER — OFFICE VISIT (OUTPATIENT)
Dept: PSYCHIATRY | Facility: CLINIC | Age: 50
End: 2020-01-09
Payer: MEDICARE

## 2020-01-09 VITALS — DIASTOLIC BLOOD PRESSURE: 91 MMHG | SYSTOLIC BLOOD PRESSURE: 139 MMHG | HEART RATE: 102 BPM

## 2020-01-09 DIAGNOSIS — F32.2 CURRENT SEVERE EPISODE OF MAJOR DEPRESSIVE DISORDER WITHOUT PSYCHOTIC FEATURES WITHOUT PRIOR EPISODE (H): Primary | ICD-10-CM

## 2020-01-09 ASSESSMENT — PATIENT HEALTH QUESTIONNAIRE - PHQ9: SUM OF ALL RESPONSES TO PHQ QUESTIONS 1-9: 9

## 2020-01-09 NOTE — PROGRESS NOTES
Interventional Psychiatry Program  [address], [address]  [place] [address]  TMS Procedure Note   Mariaelena Jean Baptiste MRN# [id number]  Age: 49 year old year old YOB: 1970    Pre-Procedure:  History and Physical: Reviewed in medical record  Consent Signed by: Mariaelena Jean Baptiste for this course of treatment.  On: 11/22/2019    Clinical Narrative:  Patient is tolerating treatment. Patient reports that she feels TMS has been helpful this time around.  States that it not only helped with her mood, but also helped build some resiliency as well.      Indications for TMS:  MDD, recurrent, severe; 4+ medication trials (from 2+ classes) ineffective; Psychotherapy ineffective     Procedure Diagnosis:  F33.2    Treatment Hx:  Treatment number this series: 29  Total lifetime treatment number: 59    No Known Allergies   BP (!) 139/91   Pulse 102     Pause for the Cause  Right patient:  Yes  Right procedure/correct coil:  Yes; rTMS; cpt 11200; H1 coil.   Earplugs in place:  Yes    Procedure  Patient was seated in procedure chair. Identity and procedure was verified. Ear plugs were placed in ears and patient-specific cap was placed on head and tightened appropriately. Ruler locations were verified. Coil was placed at treatment location and stimulator was set to parameters described below. A test train was delivered and pt tolerated train. Given pt tolerance, 83 treatment trains were delivered. Pt tolerated procedure with some slight hand movement.    Motor Threshold Determination  Distance from nasion to inion: 36.0 cm  MT 1: 0 - 6.5 - 16 @ 47% on 11/22/2019  MT 2: 0 - 6.5 - 16 @ 47% on 12/24/2019    Stimulation Parameters  Frequency: 18 hz  Train Duration: 2 sec  Total pulses delivered: 1980  Inter-train interval: 20 sec  Tx Loc: 0 - eyebrows - 15  Energy: 56% (120%MT)  Trains: 83    Date MADRS IDS-SR PHQ-9   11/22/19 25 43 18   11/29/19 -- 45 18   12/6/19  45 18   12/13/19  47 20   12/20/19  48 13    12/27/19  26 9                       PHQ-9 SCORE 1/7/2020 1/8/2020 1/9/2020   PHQ-9 Total Score 9 9 9       Luz Garcia RN   [facility]  Neuromodulation Clinic    Plan   - Cont TMS      I did not see the patient during/after treatment but remained available in the clinic during  treatment.    Moni Mcgee MD  [facility]  Mental [facility] Neuromodulation

## 2020-01-10 ENCOUNTER — OFFICE VISIT (OUTPATIENT)
Dept: PSYCHIATRY | Facility: CLINIC | Age: 50
End: 2020-01-10
Payer: MEDICARE

## 2020-01-10 VITALS — DIASTOLIC BLOOD PRESSURE: 91 MMHG | SYSTOLIC BLOOD PRESSURE: 135 MMHG | HEART RATE: 97 BPM

## 2020-01-10 DIAGNOSIS — F32.2 CURRENT SEVERE EPISODE OF MAJOR DEPRESSIVE DISORDER WITHOUT PSYCHOTIC FEATURES WITHOUT PRIOR EPISODE (H): Primary | ICD-10-CM

## 2020-01-10 ASSESSMENT — PATIENT HEALTH QUESTIONNAIRE - PHQ9: SUM OF ALL RESPONSES TO PHQ QUESTIONS 1-9: 5

## 2020-01-10 NOTE — PROGRESS NOTES
Interventional Psychiatry Program  [address] [facility], [address]  [place] [address]  TMS Procedure Note   Mariaelena Jean Baptiste MRN# [id number]  Age: 49 year old year old YOB: 1970    Pre-Procedure:  History and Physical: Reviewed in medical record  Consent Signed by: Mariaelena Jean Baptiste for this course of treatment.  On: 11/22/2019    Clinical Narrative:  Patient is tolerating treatment. Patient reports that she feels TMS has been helpful this time around.  States that it not only helped with her mood, but also helped build some resiliency as well.      Indications for TMS:  MDD, recurrent, severe; 4+ medication trials (from 2+ classes) ineffective; Psychotherapy ineffective     Procedure Diagnosis:  F33.2    Treatment Hx:  Treatment number this series: 30  Total lifetime treatment number: 60    No Known Allergies   BP (!) 135/91 (BP Location: Left arm, Patient Position: Sitting, Cuff Size: Adult Regular)   Pulse 97     Pause for the Cause  Right patient:  Yes  Right procedure/correct coil:  Yes; rTMS; cpt 46020; H1 coil.   Earplugs in place:  Yes    Procedure  Patient was seated in procedure chair. Identity and procedure was verified. Ear plugs were placed in ears and patient-specific cap was placed on head and tightened appropriately. Ruler locations were verified. Coil was placed at treatment location and stimulator was set to parameters described below. A test train was delivered and pt tolerated train. Given pt tolerance, 83 treatment trains were delivered. Pt tolerated procedure with some slight hand movement.    Motor Threshold Determination  Distance from nasion to inion: 36.0 cm  MT 1: 0 - 6.5 - 16 @ 47% on 11/22/2019  MT 2: 0 - 6.5 - 16 @ 47% on 12/24/2019    Stimulation Parameters  Frequency: 18 hz  Train Duration: 2 sec  Total pulses delivered: 1980  Inter-train interval: 20 sec  Tx Loc: 0 - eyebrows - 15  Energy: 56% (120%MT)  Trains: 83    Date MADRS IDS-SR PHQ-9   11/22/19 25 43 18    11/29/19 -- 45 18   12/6/19  45 18   12/13/19  47 20   12/20/19  48 13   12/27/19  26 9   1/10/19  22 5                 PHQ-9 SCORE 1/7/2020 1/8/2020 1/9/2020   PHQ-9 Total Score 9 9 9   Some encounter information is confidential and restricted. Go to Review Flowsheets activity to see all data.       Shayy Montemayor  [facility]  Neuromodulation Clinic    Plan   - Cont TMS    I did not see patient but remained available at the clinic throughout the TMS session    Jennifer Perez MD  [facility]  Mental Health Neuromodulation

## 2020-01-13 ENCOUNTER — OFFICE VISIT (OUTPATIENT)
Dept: PSYCHIATRY | Facility: CLINIC | Age: 50
End: 2020-01-13
Payer: MEDICARE

## 2020-01-13 VITALS — DIASTOLIC BLOOD PRESSURE: 79 MMHG | HEART RATE: 102 BPM | SYSTOLIC BLOOD PRESSURE: 110 MMHG

## 2020-01-13 DIAGNOSIS — F33.2 SEVERE RECURRENT MAJOR DEPRESSION WITHOUT PSYCHOTIC FEATURES (H): Primary | ICD-10-CM

## 2020-01-13 ASSESSMENT — PATIENT HEALTH QUESTIONNAIRE - PHQ9: SUM OF ALL RESPONSES TO PHQ QUESTIONS 1-9: 9

## 2020-01-13 NOTE — PROGRESS NOTES
Interventional Psychiatry Program  [address] [facility] [facility], [address]  [place] [address]  TMS Procedure Note   Mariaelena Jean Baptiste MRN# [id number]  Age: 49 year old year old YOB: 1970    Pre-Procedure:  History and Physical: Reviewed in medical record  Consent Signed by: Mariaelena Jean Baptiste for this course of treatment.  On: 11/22/2019    Clinical Narrative:  Patient is tolerating treatment. Patient reports no change in mood. TMS overall last week has been good. Feeling slightly better.     Indications for TMS:  MDD, recurrent, severe; 4+ medication trials (from 2+ classes) ineffective; Psychotherapy ineffective     Procedure Diagnosis:  F33.2    Treatment Hx:  Treatment number this series: 31  Total lifetime treatment number: 61    No Known Allergies   /79 (BP Location: Right arm, Patient Position: Sitting, Cuff Size: Adult Regular)   Pulse 102     Pause for the Cause  Right patient:  Yes  Right procedure/correct coil:  Yes; rTMS; cpt 16466; H1 coil.   Earplugs in place:  Yes    Procedure  Patient was seated in procedure chair. Identity and procedure was verified. Ear plugs were placed in ears and patient-specific cap was placed on head and tightened appropriately. Ruler locations were verified. Coil was placed at treatment location and stimulator was set to parameters described below. A test train was delivered and pt tolerated train. Given pt tolerance, 83 treatment trains were delivered. Pt tolerated procedure with some slight hand movement.    Motor Threshold Determination  Distance from nasion to inion: 36.0 cm  MT 1: 0 - 6.5 - 16 @ 47% on 11/22/2019  MT 2: 0 - 6.5 - 16 @ 47% on 12/24/2019    Stimulation Parameters  Frequency: 18 hz  Train Duration: 2 sec  Total pulses delivered: 2988  Inter-train interval: 20 sec  Tx Loc: 0 - eyebrows - 15  Energy: 56% (120%MT)  Trains: 83    Date MADRS IDS-SR PHQ-9   11/22/19 25 43 18   11/29/19 -- 45 18   12/6/19  45 18   12/13/19  47 20   12/20/19  48 13    12/27/19  26 9   1/10/19  22 5                 PHQ-9 SCORE 1/9/2020 1/10/2020 1/13/2020   PHQ-9 Total Score 9 5 9   Some encounter information is confidential and restricted. Go to Review Flowsheets activity to see all data.       Maida Day  [facility]  Neuromodulation Clinic    Plan   - Cont TMS  I did not see patient but remained available at the clinic throughout the TMS session    Jennifer Perez MD  [facility]  Mental [facility] Neuromodulation

## 2020-01-14 ENCOUNTER — OFFICE VISIT (OUTPATIENT)
Dept: PSYCHIATRY | Facility: CLINIC | Age: 50
End: 2020-01-14
Payer: MEDICARE

## 2020-01-14 VITALS — DIASTOLIC BLOOD PRESSURE: 83 MMHG | HEART RATE: 99 BPM | SYSTOLIC BLOOD PRESSURE: 147 MMHG

## 2020-01-14 DIAGNOSIS — F32.2 CURRENT SEVERE EPISODE OF MAJOR DEPRESSIVE DISORDER WITHOUT PSYCHOTIC FEATURES WITHOUT PRIOR EPISODE (H): Primary | ICD-10-CM

## 2020-01-14 ASSESSMENT — PATIENT HEALTH QUESTIONNAIRE - PHQ9: SUM OF ALL RESPONSES TO PHQ QUESTIONS 1-9: 9

## 2020-01-14 NOTE — PROGRESS NOTES
Interventional Psychiatry Program  [address] [facility], [address]  [place] [address]  TMS Procedure Note   Mariaelena Jean Baptiste MRN# [id number]  Age: 49 year old year old YOB: 1970    Pre-Procedure:  History and Physical: Reviewed in medical record  Consent Signed by: Mariaelena Jean Baptiste for this course of treatment.  On: 11/22/2019    Clinical Narrative:  Patient is tolerating treatment. Patient reports no change in mood.     Indications for TMS:  MDD, recurrent, severe; 4+ medication trials (from 2+ classes) ineffective; Psychotherapy ineffective     Procedure Diagnosis:  F33.2    Treatment Hx:  Treatment number this series: 32  Total lifetime treatment number: 62    No Known Allergies   BP (!) 147/83 (BP Location: Left arm, Patient Position: Sitting, Cuff Size: Adult Regular)   Pulse 99     Pause for the Cause  Right patient:  Yes  Right procedure/correct coil:  Yes; rTMS; cpt 32964; H1 coil.   Earplugs in place:  Yes    Procedure  Patient was seated in procedure chair. Identity and procedure was verified. Ear plugs were placed in ears and patient-specific cap was placed on head and tightened appropriately. Ruler locations were verified. Coil was placed at treatment location and stimulator was set to parameters described below. A test train was delivered and pt tolerated train. Given pt tolerance, 83 treatment trains were delivered. Pt tolerated procedure with some slight hand movement.    Motor Threshold Determination  Distance from nasion to inion: 36.0 cm  MT 1: 0 - 6.5 - 16 @ 47% on 11/22/2019  MT 2: 0 - 6.5 - 16 @ 47% on 12/24/2019    Stimulation Parameters  Frequency: 18 hz  Train Duration: 2 sec  Total pulses delivered: 2988  Inter-train interval: 20 sec  Tx Loc: 0 - eyebrows - 15  Energy: 56% (120%MT)  Trains: 83    Date MADRS IDS-SR PHQ-9   11/22/19 25 43 18   11/29/19 -- 45 18   12/6/19  45 18   12/13/19  47 20   12/20/19  48 13   12/27/19  26 9   1/10/19  22 5                 PHQ-9  SCORE 1/9/2020 1/10/2020 1/13/2020   PHQ-9 Total Score 9 5 9   Some encounter information is confidential and restricted. Go to Review Flowsheets activity to see all data.       Shayy Montemayor   [facility]  Neuromodulation Clinic    Plan   - Cont TMS      I did not see the patient during/after treatment but remained available in the clinic during  treatment.    Moni Mcgee MD  [facility]  Mental [facility] Neuromodulation

## 2020-01-15 ENCOUNTER — OFFICE VISIT (OUTPATIENT)
Dept: PSYCHIATRY | Facility: CLINIC | Age: 50
End: 2020-01-15
Payer: MEDICARE

## 2020-01-15 VITALS — DIASTOLIC BLOOD PRESSURE: 81 MMHG | SYSTOLIC BLOOD PRESSURE: 132 MMHG | HEART RATE: 102 BPM

## 2020-01-15 DIAGNOSIS — F32.2 CURRENT SEVERE EPISODE OF MAJOR DEPRESSIVE DISORDER WITHOUT PSYCHOTIC FEATURES WITHOUT PRIOR EPISODE (H): Primary | ICD-10-CM

## 2020-01-15 ASSESSMENT — PATIENT HEALTH QUESTIONNAIRE - PHQ9: SUM OF ALL RESPONSES TO PHQ QUESTIONS 1-9: 7

## 2020-01-15 NOTE — PROGRESS NOTES
Interventional Psychiatry Program  [address], [address]  [place] [address]  TMS Procedure Note   Mariaelena Jean Baptiste MRN# [id number]  Age: 49 year old year old YOB: 1970    Pre-Procedure:  History and Physical: Reviewed in medical record  Consent Signed by: Mariaelena Jean Baptiste for this course of treatment.  On: 11/22/2019    Clinical Narrative:  Patient is tolerating treatment. Patient reports no change in mood.     Indications for TMS:  MDD, recurrent, severe; 4+ medication trials (from 2+ classes) ineffective; Psychotherapy ineffective     Procedure Diagnosis:  F33.2    Treatment Hx:  Treatment number this series: 33  Total lifetime treatment number: 63    No Known Allergies   /81 (BP Location: Left arm, Patient Position: Sitting, Cuff Size: Adult Regular)   Pulse 102     Pause for the Cause  Right patient:  Yes  Right procedure/correct coil:  Yes; rTMS; cpt 14849; H1 coil.   Earplugs in place:  Yes    Procedure  Patient was seated in procedure chair. Identity and procedure was verified. Ear plugs were placed in ears and patient-specific cap was placed on head and tightened appropriately. Ruler locations were verified. Coil was placed at treatment location and stimulator was set to parameters described below. A test train was delivered and pt tolerated train. Given pt tolerance, 83 treatment trains were delivered. Pt tolerated procedure with some slight hand movement.    Motor Threshold Determination  Distance from nasion to inion: 36.0 cm  MT 1: 0 - 6.5 - 16 @ 47% on 11/22/2019  MT 2: 0 - 6.5 - 16 @ 47% on 12/24/2019    Stimulation Parameters  Frequency: 18 hz  Train Duration: 2 sec  Total pulses delivered: 2988  Inter-train interval: 20 sec  Tx Loc: 0 - eyebrows - 15  Energy: 56% (120%MT)  Trains: 83    Date MADRS IDS-SR PHQ-9   11/22/19 25 43 18   11/29/19 -- 45 18   12/6/19  45 18   12/13/19  47 20   12/20/19  48 13   12/27/19  26 9   1/10/19  22 5                 PHQ-9 SCORE  1/10/2020 1/13/2020 1/14/2020   PHQ-9 Total Score 5 9 9   Some encounter information is confidential and restricted. Go to Review Flowsheets activity to see all data.       Shayy Montemayor   [facility]  Neuromodulation Clinic    Plan   - Cont TMS    I did not see patient but remained available throughout the TMS session.    Jeferson Viveros MD  [facility]  Mental [facility] Neuromodulation

## 2020-01-16 ENCOUNTER — OFFICE VISIT (OUTPATIENT)
Dept: PSYCHIATRY | Facility: CLINIC | Age: 50
End: 2020-01-16
Payer: MEDICARE

## 2020-01-16 VITALS — DIASTOLIC BLOOD PRESSURE: 94 MMHG | HEART RATE: 97 BPM | SYSTOLIC BLOOD PRESSURE: 143 MMHG

## 2020-01-16 DIAGNOSIS — F33.2 SEVERE RECURRENT MAJOR DEPRESSION WITHOUT PSYCHOTIC FEATURES (H): Primary | ICD-10-CM

## 2020-01-16 ASSESSMENT — PATIENT HEALTH QUESTIONNAIRE - PHQ9: SUM OF ALL RESPONSES TO PHQ QUESTIONS 1-9: 5

## 2020-01-16 NOTE — PROGRESS NOTES
Interventional Psychiatry Program  [address] [facility], [address]  [place] [address]  TMS Procedure Note   Mariaelena Jean Baptiste MRN# [id number]  Age: 49 year old year old YOB: 1970    Pre-Procedure:  History and Physical: Reviewed in medical record  Consent Signed by: Mariaelena Jean Baptiste for this course of treatment.  On: 11/22/2019    Clinical Narrative:  Patient is tolerating treatment. Patient reported noticing mood gradually improving.     Indications for TMS:  MDD, recurrent, severe; 4+ medication trials (from 2+ classes) ineffective; Psychotherapy ineffective     Procedure Diagnosis:  F33.2    Treatment Hx:  Treatment number this series: 34  Total lifetime treatment number: 64    No Known Allergies   BP (!) 143/94 (BP Location: Right arm, Patient Position: Sitting, Cuff Size: Adult Regular)   Pulse 97     Pause for the Cause  Right patient:  Yes  Right procedure/correct coil:  Yes; rTMS; cpt 99134; H1 coil.   Earplugs in place:  Yes    Procedure  Patient was seated in procedure chair. Identity and procedure was verified. Ear plugs were placed in ears and patient-specific cap was placed on head and tightened appropriately. Ruler locations were verified. Coil was placed at treatment location and stimulator was set to parameters described below. A test train was delivered and pt tolerated train. Given pt tolerance, 83 treatment trains were delivered. Pt tolerated procedure with some slight hand movement.    Motor Threshold Determination  Distance from nasion to inion: 36.0 cm  MT 1: 0 - 6.5 - 16 @ 47% on 11/22/2019  MT 2: 0 - 6.5 - 16 @ 47% on 12/24/2019    Stimulation Parameters  Frequency: 18 hz  Train Duration: 2 sec  Total pulses delivered: 2988  Inter-train interval: 20 sec  Tx Loc: 0 - eyebrows - 15  Energy: 56% (120%MT)  Trains: 83    Date MADRS IDS-SR PHQ-9   11/22/19 25 43 18   11/29/19 -- 45 18   12/6/19  45 18   12/13/19  47 20   12/20/19  48 13   12/27/19  26 9   1/10/19  22 5                  PHQ-9 SCORE 1/14/2020 1/15/2020 1/16/2020   PHQ-9 Total Score 9 7 5   Some encounter information is confidential and restricted. Go to Review Flowsheets activity to see all data.       Maida Day  [facility]  Neuromodulation Clinic    Plan   - Cont TMS    I did not see the patient during/after treatment but remained available in the clinic during  treatment.    Moni Mcgee MD  [facility]  Mental [facility] Neuromodulation

## 2020-01-17 ENCOUNTER — OFFICE VISIT (OUTPATIENT)
Dept: PSYCHIATRY | Facility: CLINIC | Age: 50
End: 2020-01-17
Payer: MEDICARE

## 2020-01-17 VITALS — DIASTOLIC BLOOD PRESSURE: 97 MMHG | HEART RATE: 105 BPM | SYSTOLIC BLOOD PRESSURE: 151 MMHG

## 2020-01-17 DIAGNOSIS — F33.2 SEVERE RECURRENT MAJOR DEPRESSION WITHOUT PSYCHOTIC FEATURES (H): Primary | ICD-10-CM

## 2020-01-17 ASSESSMENT — PATIENT HEALTH QUESTIONNAIRE - PHQ9: SUM OF ALL RESPONSES TO PHQ QUESTIONS 1-9: 5

## 2020-01-20 ENCOUNTER — OFFICE VISIT (OUTPATIENT)
Dept: PSYCHIATRY | Facility: CLINIC | Age: 50
End: 2020-01-20
Payer: MEDICARE

## 2020-01-20 VITALS — HEART RATE: 96 BPM | SYSTOLIC BLOOD PRESSURE: 147 MMHG | DIASTOLIC BLOOD PRESSURE: 87 MMHG

## 2020-01-20 DIAGNOSIS — F32.2 CURRENT SEVERE EPISODE OF MAJOR DEPRESSIVE DISORDER WITHOUT PSYCHOTIC FEATURES WITHOUT PRIOR EPISODE (H): Primary | ICD-10-CM

## 2020-01-20 ASSESSMENT — PATIENT HEALTH QUESTIONNAIRE - PHQ9: SUM OF ALL RESPONSES TO PHQ QUESTIONS 1-9: 7

## 2020-01-20 NOTE — PROGRESS NOTES
Interventional Psychiatry Program  [address] [facility] [address] [facility], [address]  [place] [address]  TMS Procedure Note   Mariaelena Jean Baptiste MRN# [id number]  Age: 49 year old year old YOB: 1970    Pre-Procedure:  History and Physical: Reviewed in medical record  Consent Signed by: Mariaelena Jean Baptiste for this course of treatment.  On: 11/22/2019    Clinical Narrative:  Patient is tolerating treatment.  Patient does not report changes in the last 24 hours.  Patient reported mood: depressed.  States she has been having some increase in stress.  Reports that she is in the process of appealing her disability.  States she has a hearing in front of a  next week.       Indications for TMS:  MDD, recurrent, severe; 4+ medication trials (from 2+ classes) ineffective; Psychotherapy ineffective     Procedure Diagnosis:  F33.2    Treatment Hx:  Treatment number this series:  12 and Total lifetime treatment number: 42    No Known Allergies   BP (!) 147/96   Pulse 92     Pause for the Cause  Right patient: Yes  Right procedure/correct coil:  Yes; rTMS; [id number]; H1 coil.   Earplugs in place:  Yes    Procedure  Patient was seated in procedure chair. Identity and procedure was verified. Ear plugs were placed in ears and patient-specific cap was placed on head and tightened appropriately. Ruler locations were verified. Coil was placed at treatment location and stimulator was set to parameters described below. A test train was delivered and pt tolerated train. Given pt tolerance, 55 treatment trains were delivered. Pt tolerated procedure with some slight hand movement.    Motor Threshold Determination  Distance from nasion to inion: 36.0 cm  MT 1: 0 - 6.5 - 16 @ 47% on 11/22/2019    Standard LDLPFC    Frequency: 18  Train Duration: 2  Total pulses delivered: 1980  Inter-train interval: 20  Tx Loc: 0 - eyebrows - 15  Energy: 56% (120%MT)  Trains: 55    Date MADRS IDS-SR PHQ-9   11/22/19 25 43 18   11/29/19 -- 45 18   12/6/19   45 18                                   PHQ-9 SCORE 1/16/2020 1/17/2020 1/20/2020   PHQ-9 Total Score 5 5 7   Some encounter information is confidential and restricted. Go to Review Flowsheets activity to see all data.       Technician: RADHA GASTELUM RN    Plan   - Cont TMS     I did not see the patient during/after treatment but remained available in the clinic during  treatment.    Reji Sandoval MD  [facility] Neuromodulation

## 2020-01-20 NOTE — PROGRESS NOTES
Interventional Psychiatry Program  [address] [facility], [address]  [place] [address]  TMS Procedure Note   Mariaelena Jean Baptiste MRN# [id number]  Age: 49 year old year old YOB: 1970    Pre-Procedure:  History and Physical: Reviewed in medical record  Consent Signed by: Mariaelena Jean Baptiste for this course of treatment.  On: 11/22/2019    Clinical Narrative:  Patient is tolerating treatment. Patient reported noticing mood gradually improving.     Indications for TMS:  MDD, recurrent, severe; 4+ medication trials (from 2+ classes) ineffective; Psychotherapy ineffective     Procedure Diagnosis:  F33.2    Treatment Hx:  Treatment number this series: 36  Total lifetime treatment number: 66    No Known Allergies   BP (!) 147/87 (BP Location: Left arm, Patient Position: Sitting, Cuff Size: Adult Regular)   Pulse 96     Pause for the Cause  Right patient:  Yes  Right procedure/correct coil:  Yes; rTMS; cpt 92629; H1 coil.   Earplugs in place:  Yes    Procedure  Patient was seated in procedure chair. Identity and procedure was verified. Ear plugs were placed in ears and patient-specific cap was placed on head and tightened appropriately. Ruler locations were verified. Coil was placed at treatment location and stimulator was set to parameters described below. A test train was delivered and pt tolerated train. Given pt tolerance, 83 treatment trains were delivered. Pt tolerated procedure with some slight hand movement.    Motor Threshold Determination  Distance from nasion to inion: 36.0 cm  MT 1: 0 - 6.5 - 16 @ 47% on 11/22/2019  MT 2: 0 - 6.5 - 16 @ 47% on 12/24/2019    Stimulation Parameters  Frequency: 18 hz  Train Duration: 2 sec  Total pulses delivered: 2988  Inter-train interval: 20 sec  Tx Loc: 0 - eyebrows - 15  Energy: 56% (120%MT)  Trains: 83    Date MADRS IDS-SR PHQ-9   11/22/19 25 43 18   11/29/19 -- 45 18   12/6/19  45 18   12/13/19  47 20   12/20/19  48 13   12/27/19  26 9   1/10/19  22 5   1/17/20   21 5           PHQ-9 SCORE 1/15/2020 1/16/2020 1/17/2020   PHQ-9 Total Score 7 5 5   Some encounter information is confidential and restricted. Go to Review Flowsheets activity to see all data.       Shayy Montemayor  [facility]  Neuromodulation Clinic    Plan   - Cont TMS    I did not see patient but remained available at the clinic throughout the TMS session    Jennifer Perez MD  [facility]  Mental [facility] Neuromodulation

## 2020-01-21 ENCOUNTER — OFFICE VISIT (OUTPATIENT)
Dept: PSYCHIATRY | Facility: CLINIC | Age: 50
End: 2020-01-21
Payer: MEDICARE

## 2020-01-21 VITALS — HEART RATE: 96 BPM | DIASTOLIC BLOOD PRESSURE: 82 MMHG | SYSTOLIC BLOOD PRESSURE: 144 MMHG

## 2020-01-21 DIAGNOSIS — F32.2 CURRENT SEVERE EPISODE OF MAJOR DEPRESSIVE DISORDER WITHOUT PSYCHOTIC FEATURES WITHOUT PRIOR EPISODE (H): Primary | ICD-10-CM

## 2020-01-21 ASSESSMENT — PATIENT HEALTH QUESTIONNAIRE - PHQ9: SUM OF ALL RESPONSES TO PHQ QUESTIONS 1-9: 5

## 2020-01-21 NOTE — PROGRESS NOTES
Interventional Psychiatry Program  [address] [facility], [address]  [place] [address]  TMS Procedure Note   Mariaelena Jean Baptiste MRN# [id number]  Age: 49 year old year old YOB: 1970    Pre-Procedure:  History and Physical: Reviewed in medical record  Consent Signed by: Mariaelena Jean Baptiste for this course of treatment.  On: 11/22/2019    Clinical Narrative:  Patient is tolerating treatment. Patient reported noticing mood gradually improving.     Indications for TMS:  MDD, recurrent, severe; 4+ medication trials (from 2+ classes) ineffective; Psychotherapy ineffective     Procedure Diagnosis:  F33.2    Treatment Hx:  Treatment number this series: 37  Total lifetime treatment number: 67    No Known Allergies   BP (!) 144/82 (BP Location: Left arm, Patient Position: Sitting, Cuff Size: Adult Regular)   Pulse 96     Pause for the Cause  Right patient:  Yes  Right procedure/correct coil:  Yes; rTMS; cpt 61669; H1 coil.   Earplugs in place:  Yes    Procedure  Patient was seated in procedure chair. Identity and procedure was verified. Ear plugs were placed in ears and patient-specific cap was placed on head and tightened appropriately. Ruler locations were verified. Coil was placed at treatment location and stimulator was set to parameters described below. A test train was delivered and pt tolerated train. Given pt tolerance, 83 treatment trains were delivered. Pt tolerated procedure with some slight hand movement.    Motor Threshold Determination  Distance from nasion to inion: 36.0 cm  MT 1: 0 - 6.5 - 16 @ 47% on 11/22/2019  MT 2: 0 - 6.5 - 16 @ 47% on 12/24/2019    Stimulation Parameters  Frequency: 18 hz  Train Duration: 2 sec  Total pulses delivered: 2988  Inter-train interval: 20 sec  Tx Loc: 0 - eyebrows - 15  Energy: 56% (120%MT)  Trains: 83    Date MADRS IDS-SR PHQ-9   11/22/19 25 43 18   11/29/19 -- 45 18   12/6/19  45 18   12/13/19  47 20   12/20/19  48 13   12/27/19  26 9   1/10/19  22 5   1/17/20   21 5   1/21/20 2 21 5           PHQ-9 SCORE 1/16/2020 1/17/2020 1/20/2020   PHQ-9 Total Score 5 5 7   Some encounter information is confidential and restricted. Go to Review Flowsheets activity to see all data.       Shayy Montemayor  [facility]  Neuromodulation Clinic    Plan   - Cont TMS      I did not see the patient during/after treatment but remained available in the clinic during  treatment.    Moni Mcgee MD  [facility]  Mental Health Neuromodulation

## 2020-02-07 ENCOUNTER — TELEPHONE (OUTPATIENT)
Dept: PSYCHIATRY | Facility: CLINIC | Age: 50
End: 2020-02-07

## 2020-02-07 NOTE — TELEPHONE ENCOUNTER
Social Work   Incoming/Outgoing Call  [facility]ry [facility]    Outgoing Call To: Mariaelena Jean Baptiste  Callback number: [phone]    Reason for Call:  Dr. Burnett requested that I check-in with this patient to ensure that everything is squared away with her insurance.    Response/Plan:  I called Mariaelena and confirmed that her MA application has been approved and that she will now receive legal representation for her disability case. I encouraged Mariaelena to call me if any other concerns come up.      Thalia Day,  [facility]AA[facility], SW  [phone]
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
## 2020-02-13 ENCOUNTER — OFFICE VISIT (OUTPATIENT)
Dept: PSYCHIATRY | Facility: CLINIC | Age: 50
End: 2020-02-13
Attending: PSYCHIATRY & NEUROLOGY
Payer: MEDICARE

## 2020-02-13 VITALS
WEIGHT: 169 LBS | BODY MASS INDEX: 23.57 KG/M2 | HEART RATE: 106 BPM | DIASTOLIC BLOOD PRESSURE: 86 MMHG | SYSTOLIC BLOOD PRESSURE: 127 MMHG

## 2020-02-13 DIAGNOSIS — F33.42 RECURRENT MAJOR DEPRESSIVE DISORDER, IN FULL REMISSION (H): ICD-10-CM

## 2020-02-13 PROCEDURE — G0463 HOSPITAL OUTPT CLINIC VISIT: HCPCS | Mod: ZF

## 2020-02-13 RX ORDER — LAMOTRIGINE 150 MG/1
150 TABLET ORAL DAILY
Qty: 30 TABLET | Refills: 2 | Status: SHIPPED | OUTPATIENT
Start: 2020-02-13 | End: 2020-04-30

## 2020-02-13 RX ORDER — DEXTROAMPHETAMINE SACCHARATE, AMPHETAMINE ASPARTATE, DEXTROAMPHETAMINE SULFATE AND AMPHETAMINE SULFATE 2.5; 2.5; 2.5; 2.5 MG/1; MG/1; MG/1; MG/1
10 TABLET ORAL 2 TIMES DAILY
Qty: 60 TABLET | Refills: 0 | Status: SHIPPED | OUTPATIENT
Start: 2020-02-13 | End: 2020-03-19

## 2020-02-13 ASSESSMENT — PAIN SCALES - GENERAL: PAINLEVEL: MILD PAIN (3)

## 2020-02-14 ENCOUNTER — TELEPHONE (OUTPATIENT)
Dept: PSYCHIATRY | Facility: CLINIC | Age: 50
End: 2020-02-14

## 2020-02-14 ENCOUNTER — OFFICE VISIT (OUTPATIENT)
Dept: URGENT CARE | Facility: URGENT CARE | Age: 50
End: 2020-02-14
Payer: MEDICARE

## 2020-02-14 VITALS
SYSTOLIC BLOOD PRESSURE: 112 MMHG | DIASTOLIC BLOOD PRESSURE: 70 MMHG | WEIGHT: 165 LBS | BODY MASS INDEX: 23.1 KG/M2 | HEIGHT: 71 IN | RESPIRATION RATE: 13 BRPM | OXYGEN SATURATION: 98 % | TEMPERATURE: 98.7 F | HEART RATE: 100 BPM

## 2020-02-14 DIAGNOSIS — G89.29 CHRONIC LEFT-SIDED THORACIC BACK PAIN: Primary | ICD-10-CM

## 2020-02-14 DIAGNOSIS — M54.6 CHRONIC LEFT-SIDED THORACIC BACK PAIN: Primary | ICD-10-CM

## 2020-02-14 PROCEDURE — 99213 OFFICE O/P EST LOW 20 MIN: CPT | Performed by: PHYSICIAN ASSISTANT

## 2020-02-14 RX ORDER — CYCLOBENZAPRINE HCL 5 MG
5 TABLET ORAL 3 TIMES DAILY PRN
Qty: 15 TABLET | Refills: 0 | Status: SHIPPED | OUTPATIENT
Start: 2020-02-14 | End: 2020-09-24

## 2020-02-14 ASSESSMENT — ENCOUNTER SYMPTOMS
SHORTNESS OF BREATH: 0
FEVER: 0
CHILLS: 0
HEADACHES: 0
UNEXPECTED WEIGHT CHANGE: 0
FATIGUE: 0
NECK PAIN: 1
DIFFICULTY URINATING: 0
BACK PAIN: 1

## 2020-02-14 ASSESSMENT — MIFFLIN-ST. JEOR: SCORE: 1469.57
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
## 2020-02-14 NOTE — PATIENT INSTRUCTIONS
Take ibuprofen (maximum 3200mg daily) alternating with tylenol (maximum 4000mg daily) as needed for pain.   Patient Education     Back Care Tips    Caring for your back  These are things you can do to prevent a recurrence of acute back pain and to reduce symptoms from chronic back pain:    Stay at a healthy weight. If you are overweight, losing weight will help most types of back pain.    Exercise is an important part of recovery from most types of back pain. The muscles behind and in front of the spine support the back. This means strengthening both the back muscles and the abdominal muscles will provide better support for your spine.     Swimming and brisk walking are good overall exercises to improve your fitness level.    Practice safe lifting methods (see below).    Practice good posture when sitting, standing, and walking. Don't sit for a long time. This puts more stress on the lower back than standing or walking.    Wear quality shoes with good arch support. Foot and ankle alignment can affect back symptoms. Don't wear high heels.    Therapeutic massage can help relax the back muscles without stretching them.    During the first 24 to 72 hours after an acute injury or flare-up of chronic back pain, put an ice pack on the painful area for 20 minutes and then remove it for 20 minutes. Do thisover a period of 60 to 90 minutes, or several times a day. As a safety precaution, don't use a heating pad at bedtime. Sleeping on a heating pad can lead to skin burns or tissue damage.    You can alternate using ice and heat.  Medicines  Talk with your healthcare provider before using medicines, especially if you have other health problems or are taking other medicines.    You may use over-the-counter medicines, such as acetaminophen, ibuprofen, or naprosyn to control pain, unless your healthcare provider prescribed other pain medicine. Talk with your healthcare provider before taking any medicines if you have a chronic  condition such ass diabetes, liver or kidney disease, stomach ulcers, or digestive bleeding, or are taking blood thinners.    Be careful if you are given prescription pain medicines, opioids, or medicine for muscle spasm. They can cause drowsiness, and affect your coordination, reflexes, and judgment. Don't drive or operate heavy machinery while taking these types of medicines. Take prescription pain medicine only as prescribed by your healthcare provider.  Lumbar stretch  This simple stretch will help relax muscle spasm and keep your back more limber. If exercise makes your back pain worse, don t do it.    Lie on your back with your knees bent and both feet on the ground.    Slowly raise your left knee to your chest as you flatten your lower back against the floor. Hold for 5 seconds.    Relax and repeat the exercise with your right knee.    Do 10 of these exercises for each leg.  Safe lifting method    Don t bend over at the waist to lift an object off the floor.  Instead, bend your knees and hips in a squat.     Keep your back and head upright    Hold the object close to your body, directly in front of you.    Straighten your legs to lift the object.     Lower the object to the floor in the reverse fashion.    If you must slide something across the floor, push it.    Posture tips  Sitting  Sit in chairs with straight backs or low-back support. Keep your knees lower than your hips, with your feet flat on the floor.  When driving, sit up straight. Adjust the seat forward so you are not leaning toward the steering wheel.  A small pillow or rolled towel behind your lower back may help if you are driving long distances.   Standing  When standing for long periods, shift most of your weight to one leg at a time. Switch legs every few minutes.   Sleeping  The best way to sleep is on your side with your knees bent. Put a low pillow under your head to support your neck in a neutral spine position. Don't use thick pillows  that bend your neck to one side. Put a pillow between your legs to further relax your lower back. If you sleep on your back, put pillows under your knees to support your legs in a slightly flexed position. Use a firm mattress. If your mattress sags, replace it, or use a 1/2-inch plywood board under the mattress to add support.  Follow-up care  Follow up with your healthcare provider, or as advised.  If X-rays, a CT scan or an MRI scan were taken, they may be reviewed by a radiologist. You will be told of any new findings that may affect your care.  Call 911  Call 911 if any of the following occur:    Trouble breathing    Confusion    Very drowsy    Fainting or loss of consciousness    Rapid or very slow heart rate    Loss of  bowel or bladder control  When to seek medical advice  Call your healthcare provider right away if any of the following occur:    Pain becomes worse or spreads to your arms or legs    Weakness or numbness in one or both arms or legs    Numbness in the groin area  Date Last Reviewed: 6/1/2016 2000-2019 [employer]. [address] [place] [id number]. All rights reserved. This information is not intended as a substitute for professional medical care. Always follow your healthcare professional's instructions.

## 2020-02-14 NOTE — PROGRESS NOTES
"SUBJECTIVE:   Mariaelena Jean Baptiste is a 49 year old adult presenting with a chief complaint of   Chief Complaint   Patient presents with     Urgent Care     Back Pain     upper left back pain x several months. No known injury       Mariaelena Jean Baptiste is an established patient of [facility].    Back Pain  Longstanding, varies in severity. On occasion can be more severe and seems to be related primarily to stress or prolonged shifts at work. 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 She states she's known to \"hold tension in one spot, my upper back.\"   She usually is able to get by with lidocaine patches, but these have been decreasing effectiveness. She also finds Tylenol and ibuprofen to be helpful. In the past, she has also found cyclopenzaprine to be helpful; she last took this likely 1 year ago.   Denies numbness, tingling or weakness in arms or shoulders.   Physical therapy in the past, though hasn't been doing exercises recently.   Onset of symptoms has been steadily worsening over the last couple of months.   Location: left >Rt upper back  Radiation: up into left shoulder and into neck  Context:       No known injury. Denies twisting, falling, or recent heavy lifting.   Course of symptoms is worsening.    Severity moderately severe  Denies: fecal incontinence, urinary incontinence, lower extremity numbness, lower extremity weakness and paresthesia  Aggravating Factors: stress, cook so bent over cutting board   Past history: no prior back problems    Review of Systems   Constitutional: Negative for chills, fatigue, fever and unexpected weight change.   Respiratory: Negative for shortness of breath.    Cardiovascular: Negative for chest pain.   Genitourinary: Negative for difficulty urinating.   Musculoskeletal: Positive for back pain and neck pain (Left > right). Negative for gait problem.   Neurological: Negative for headaches.       Past Medical History:   Diagnosis Date     " Anxiety 1988     Chronic osteoarthritis      Depressive disorder      Dissociative identity disorder (H)      Gastro-oesophageal reflux disease      Migraines      PTSD (post-traumatic stress disorder)      Social phobia      Family History   Problem Relation Age of Onset     Depression Father      Hypertension Father      Substance Abuse Father      Cancer Father         non hodgkins lymphoma     Depression Sister      Cancer Maternal Grandmother         breast cancer (mastectomy in 40's), melanoma, leukemia     Melanoma Maternal Grandmother      Cancer Maternal Grandfather         leukemia     Substance Abuse Maternal Grandfather      Cancer Paternal Grandmother         lung cancer, nonsmoker     Substance Abuse Brother      Substance Abuse Sister      Skin Cancer No family hx of      Current Outpatient Medications   Medication Sig Dispense Refill     amphetamine-dextroamphetamine (ADDERALL) 10 MG tablet Take 1 tablet (10 mg) by mouth 2 times daily 60 tablet 0     cyclobenzaprine (FLEXERIL) 5 MG tablet Take 1 tablet (5 mg) by mouth 3 times daily as needed for muscle spasms 15 tablet 0     lamoTRIgine (LAMICTAL) 150 MG tablet Take 1 tablet (150 mg) by mouth daily 30 tablet 2     levomilnacipran (FETZIMA ER) 120 MG 24 hr capsule Take 1 capsule (120 mg) by mouth daily 30 capsule 0     lisinopril (PRINIVIL/ZESTRIL) 20 MG tablet Take 1 tablet (20 mg) by mouth daily 90 tablet 0     MAGNESIUM OXIDE PO        multivitamin, therapeutic (THERA-VIT) TABS tablet Take 1 tablet by mouth daily       QUEtiapine (SEROQUEL) 25 MG tablet Take 2 tablets (50 mg) by mouth At Bedtime 60 tablet 0     vilazodone (VIIBRYD) 40 MG TABS tablet Take 1 tablet (40 mg) by mouth daily 30 tablet 0     VITAMIN D, CHOLECALCIFEROL, PO Take 1,000 Units by mouth 2 times daily        diclofenac (VOLTAREN) 1 % GEL topical gel Apply 4 grams to knees or 2 grams to hands four times daily using enclosed dosing card. (Patient not taking: Reported on 2/14/2020)  "100 g 1     ibuprofen (ADVIL,MOTRIN) 800 MG tablet Take 800 mg by mouth       Social History     Tobacco Use     Smoking status: Former Smoker     Packs/day: 1.00     Years: 20.00     Pack years: 20.00     Types: Cigarettes, Other     Start date: 1995     Last attempt to quit: 10/23/2017     Years since quittin.3     Smokeless tobacco: Former User     Tobacco comment: E-cig    Substance Use Topics     Alcohol use: No       OBJECTIVE  /70   Pulse 100   Temp 98.7  F (37.1  C) (Oral)   Resp 13   Ht 1.803 m (5' 11\")   Wt 74.8 kg (165 lb)   LMP 2020   SpO2 98%   Breastfeeding No   BMI 23.01 kg/m      Physical Exam  Vitals signs and nursing note reviewed.   Constitutional:       General: Mariaelena Jean Baptiste is not in acute distress.     Appearance: Normal appearance. Mariaelena Jean Baptiste is normal weight.   HENT:      Head: Normocephalic.      Mouth/Throat:      Comments: Poor dentition  Eyes:      Conjunctiva/sclera: Conjunctivae normal.   Cardiovascular:      Rate and Rhythm: Normal rate and regular rhythm.   Pulmonary:      Effort: Pulmonary effort is normal.      Breath sounds: No wheezing.   Musculoskeletal: Normal range of motion.      Right shoulder: Mariaelena Jean Baptiste exhibits normal range of motion, no bony tenderness, no swelling and no deformity.        Arms:       Comments: Left ear to shoulder neck inhibited secondary to pain. Shoulder ROM intact.  strength intact bilaterally.    Skin:     General: Skin is warm.   Neurological:      General: No focal deficit present.      Mental Status: Mariaelena Jean Baptiste is alert and oriented to person, place, and time.   Psychiatric:         Mood and Affect: Mood normal.         Behavior: Behavior normal.          Labs:  No results found for this or any previous visit (from the past 24 hour(s)).    X-Ray was not done.    ASSESSMENT:      ICD-10-CM    1. Chronic left-sided thoracic back pain M54.6 cyclobenzaprine (FLEXERIL) 5 MG tablet    G89.29         Medical " Decision Making:    Differential Diagnosis:  Back Pain: myofascial low back strain, lumbosacral strain, degenerative disc disease, possible herniated disc , acute sciatica and chronic low back pain    Serious Comorbid Conditions:  Adult:  None    PLAN:    Back Pain: ice, heat, rest, stretching, Physical Therapy, Tylenol, Ibuprofen and Rx: flexeril    Followup:    If not improving or if condition worsens, follow up with your Primary Care Provider    Patient Instructions   Take ibuprofen (maximum 3200mg daily) alternating with tylenol (maximum 4000mg daily) as needed for pain.   Patient Education     Back Care Tips    Caring for your back  These are things you can do to prevent a recurrence of acute back pain and to reduce symptoms from chronic back pain:    Stay at a healthy weight. If you are overweight, losing weight will help most types of back pain.    Exercise is an important part of recovery from most types of back pain. The muscles behind and in front of the spine support the back. This means strengthening both the back muscles and the abdominal muscles will provide better support for your spine.     Swimming and brisk walking are good overall exercises to improve your fitness level.    Practice safe lifting methods (see below).    Practice good posture when sitting, standing, and walking. Don't sit for a long time. This puts more stress on the lower back than standing or walking.    Wear quality shoes with good arch support. Foot and ankle alignment can affect back symptoms. Don't wear high heels.    Therapeutic massage can help relax the back muscles without stretching them.    During the first 24 to 72 hours after an acute injury or flare-up of chronic back pain, put an ice pack on the painful area for 20 minutes and then remove it for 20 minutes. Do thisover a period of 60 to 90 minutes, or several times a day. As a safety precaution, don't use a heating pad at bedtime. Sleeping on a heating pad can lead  to skin burns or tissue damage.    You can alternate using ice and heat.  Medicines  Talk with your healthcare provider before using medicines, especially if you have other health problems or are taking other medicines.    You may use over-the-counter medicines, such as acetaminophen, ibuprofen, or naprosyn to control pain, unless your healthcare provider prescribed other pain medicine. Talk with your healthcare provider before taking any medicines if you have a chronic condition such ass diabetes, liver or kidney disease, stomach ulcers, or digestive bleeding, or are taking blood thinners.    Be careful if you are given prescription pain medicines, opioids, or medicine for muscle spasm. They can cause drowsiness, and affect your coordination, reflexes, and judgment. Don't drive or operate heavy machinery while taking these types of medicines. Take prescription pain medicine only as prescribed by your healthcare provider.  Lumbar stretch  This simple stretch will help relax muscle spasm and keep your back more limber. If exercise makes your back pain worse, don t do it.    Lie on your back with your knees bent and both feet on the ground.    Slowly raise your left knee to your chest as you flatten your lower back against the floor. Hold for 5 seconds.    Relax and repeat the exercise with your right knee.    Do 10 of these exercises for each leg.  Safe lifting method    Don t bend over at the waist to lift an object off the floor.  Instead, bend your knees and hips in a squat.     Keep your back and head upright    Hold the object close to your body, directly in front of you.    Straighten your legs to lift the object.     Lower the object to the floor in the reverse fashion.    If you must slide something across the floor, push it.    Posture tips  Sitting  Sit in chairs with straight backs or low-back support. Keep your knees lower than your hips, with your feet flat on the floor.  When driving, sit up straight.  Adjust the seat forward so you are not leaning toward the steering wheel.  A small pillow or rolled towel behind your lower back may help if you are driving long distances.   Standing  When standing for long periods, shift most of your weight to one leg at a time. Switch legs every few minutes.   Sleeping  The best way to sleep is on your side with your knees bent. Put a low pillow under your head to support your neck in a neutral spine position. Don't use thick pillows that bend your neck to one side. Put a pillow between your legs to further relax your lower back. If you sleep on your back, put pillows under your knees to support your legs in a slightly flexed position. Use a firm mattress. If your mattress sags, replace it, or use a 1/2-inch plywood board under the mattress to add support.  Follow-up care  Follow up with your healthcare provider, or as advised.  If X-rays, a CT scan or an MRI scan were taken, they may be reviewed by a radiologist. You will be told of any new findings that may affect your care.  Call 911  Call 911 if any of the following occur:    Trouble breathing    Confusion    Very drowsy    Fainting or loss of consciousness    Rapid or very slow heart rate    Loss of  bowel or bladder control  When to seek medical advice  Call your healthcare provider right away if any of the following occur:    Pain becomes worse or spreads to your arms or legs    Weakness or numbness in one or both arms or legs    Numbness in the groin area  Date Last Reviewed: 6/1/2016 2000-2019 [employer]. [address] [place] [id number]. All rights reserved. This information is not intended as a substitute for professional medical care. Always follow your healthcare professional's instructions.

## 2020-02-16 RX ORDER — LAMOTRIGINE 150 MG/1
TABLET ORAL
Qty: 90 TABLET | OUTPATIENT
Start: 2020-02-16

## 2020-02-17 NOTE — TELEPHONE ENCOUNTER
Received fax of approval of pt's Viibryd 2/1/20-1/25/21.  Writer called pharmacy and identified approval. Pharmacist reported successful billing with $3 co-pay.  Routed message to pt via [employer].

## 2020-03-05 ENCOUNTER — MYC MEDICAL ADVICE (OUTPATIENT)
Dept: INTERNAL MEDICINE | Facility: CLINIC | Age: 50
End: 2020-03-05

## 2020-03-05 DIAGNOSIS — I10 BENIGN ESSENTIAL HYPERTENSION: ICD-10-CM

## 2020-03-06 RX ORDER — LISINOPRIL 20 MG/1
20 TABLET ORAL DAILY
Qty: 30 TABLET | Refills: 0 | Status: SHIPPED | OUTPATIENT
Start: 2020-03-06 | End: 2020-04-01

## 2020-03-09 DIAGNOSIS — F33.42 RECURRENT MAJOR DEPRESSIVE DISORDER, IN FULL REMISSION (H): ICD-10-CM

## 2020-03-13 DIAGNOSIS — F33.42 RECURRENT MAJOR DEPRESSIVE DISORDER, IN FULL REMISSION (H): ICD-10-CM

## 2020-03-13 RX ORDER — QUETIAPINE FUMARATE 25 MG/1
TABLET, FILM COATED ORAL
Qty: 60 TABLET | Refills: 0 | OUTPATIENT
Start: 2020-03-13

## 2020-03-13 RX ORDER — LEVOMILNACIPRAN HYDROCHLORIDE 120 MG/1
CAPSULE, EXTENDED RELEASE ORAL
Qty: 30 CAPSULE | Refills: 0 | OUTPATIENT
Start: 2020-03-13

## 2020-03-13 RX ORDER — VILAZODONE HYDROCHLORIDE 40 MG/1
TABLET ORAL
Qty: 30 TABLET | Refills: 0 | OUTPATIENT
Start: 2020-03-13

## 2020-03-15 ENCOUNTER — HEALTH MAINTENANCE LETTER (OUTPATIENT)
Age: 50
End: 2020-03-15

## 2020-03-19 ENCOUNTER — ALLIED HEALTH/NURSE VISIT (OUTPATIENT)
Dept: PSYCHIATRY | Facility: CLINIC | Age: 50
End: 2020-03-19
Attending: PSYCHIATRY & NEUROLOGY
Payer: MEDICAID

## 2020-03-19 ENCOUNTER — VIRTUAL VISIT (OUTPATIENT)
Dept: FAMILY MEDICINE | Facility: OTHER | Age: 50
End: 2020-03-19

## 2020-03-19 DIAGNOSIS — F33.42 RECURRENT MAJOR DEPRESSIVE DISORDER, IN FULL REMISSION (H): ICD-10-CM

## 2020-03-19 RX ORDER — DEXTROAMPHETAMINE SACCHARATE, AMPHETAMINE ASPARTATE, DEXTROAMPHETAMINE SULFATE AND AMPHETAMINE SULFATE 2.5; 2.5; 2.5; 2.5 MG/1; MG/1; MG/1; MG/1
10 TABLET ORAL 2 TIMES DAILY
Qty: 60 TABLET | Refills: 0 | Status: SHIPPED | OUTPATIENT
Start: 2020-03-19 | End: 2020-04-30

## 2020-03-19 NOTE — PROGRESS NOTES
"Date: 2020 10:42:35  Clinician: Nereida Magana  Clinician NPI: [id number]  Patient: Mariaelena Jean Baptiste  Patient : 1970  Patient Address: [address], [place] [address]  Patient Phone: [phone]  Visit Protocol: URI  Patient Summary:  Mariaelena is a 49 year old ( : 1970 ) female who initiated a Visit for COVID-19 (Coronavirus) evaluation and screening. When asked the question \"Please sign me up to receive news, health information and promotions. \", Mariaelena responded \"No\".    Mariaelena states her symptoms started 1-2 days ago.   Her symptoms consist of a sore throat, a headache, rhinitis, and enlarged lymph nodes.   Symptom details     Nasal secretions: The color of her mucus is clear.    Sore throat: Mariaelena reports having mild throat pain (1-3 on a 10 point pain scale), does not have exudate on her tonsils, and can swallow liquids. The lymph nodes in her neck are enlarged. A rash has not appeared on the skin since the sore throat started.     Headache: She states the headache is mild (1-3 on a 10 point pain scale).      Mariaelena denies having cough, nasal congestion, malaise, fever, ear pain, facial pain or pressure, myalgias, chills, teeth pain, and wheezing. She also denies having recent facial or sinus surgery in the past 60 days and taking antibiotic medication for the symptoms. She is not experiencing dyspnea.   Precipitating events  Within the past week, Mariaelena has not been exposed to someone with strep throat.   Pertinent COVID-19 (Coronavirus) information  Mariaelena has not traveled internationally or to the areas where COVID-19 (Coronavirus) is widespread, including cruise ship travel in the last 14 days before the start of her symptoms.   Mariaelena has not had a close contact with a laboratory-confirmed COVID-19 patient within 14 days of symptom onset. She also has not had a close contact with a suspected COVID-19 patient within 14 days of symptom onset.   Mariaelena is not a healthcare worker and " does not work in a healthcare facility.   Pertinent medical history  Mariaelena does not get yeast infections when she takes antibiotics.   Mariaelena does not need a return to work/school note.   Weight: 165 lbs   Mariaelena does not smoke or use smokeless tobacco.   She denies pregnancy and denies breastfeeding. Her last period was over a month ago.   Weight: 165 lbs    MEDICATIONS: Viibryd oral, Fetzima oral, Adderall oral, lisinopril oral, quetiapine oral, ALLERGIES: NKDA  Clinician Response:  Dear Mariaelena,  Based on the information provided, you have a viral upper respiratory infection, otherwise known as a cold. Symptoms vary from person to person, but can include sneezing, coughing, a runny nose, sore throat, and headache and range from mild to severe.  Unfortunately, there are no medications that can cure a cold, so treatment is focused on controlling symptoms as much as possible. Most people gradually feel better until symptoms are gone in 1-2 weeks.  Medication information  Because you have a viral infection, antibiotics will not help you get better. Treating a viral infection with antibiotics could actually make you feel worse.  I am prescribing:     Fluticasone 50 mcg/actuation nasal spray. Inhale 2 sprays in each nostril 1 time per day; after 1 week, may adjust to 1 - 2 sprays in each nostril 1 time per day. This medication takes several days to start working, so keep taking it even if it doesn't help right away. There are no refills with this prescription.   Self care  The following tips will keep you as comfortable as possible while you recover:     Rest    Drink plenty of water and other liquids    Take a hot shower to loosen congestion    Use throat lozenges    Gargle with warm salt water (1/4 teaspoon of salt per 8 ounce glass of water)    Suck on frozen items such as popsicles or ice cubes    Drink hot tea with lemon and honey     When to seek care  Please be seen in a clinic or urgent care if new symptoms  develop, or symptoms become worse.  Call 911 or go to the emergency room if you feel that your throat is closing off, you suddenly develop a rash, you are unable to swallow fluids, you are drooling, or you are having difficulty breathing.  COVID-19 (Coronavirus) General Information  With the increase in the number of COVID-19 (Coronavirus) cases, we understand you may have some questions. Below is some helpful information on COVID-19 (Coronavirus).  How can I protect myself and others from the COVID-19 (Coronavirus)?  Because there is currently no vaccine to prevent infection, the best way to protect yourself is to avoid being exposed to this virus. Put distance between yourself and other people if COVID-19 (Coronavirus) is spreading in your community. The virus is thought to spread mainly from person-to-person.     Between people who are in close contact with one another (within about 6 about) for a prolonged period (10 minutes or longer).    Through respiratory droplets produced when an infected person coughs or sneezes.     The CDC recommends the following additional steps to protect yourself and others:     Wash your hands often with soap and water for at least 20 seconds, especially after blowing your nose, coughing, or sneezing; going to the bathroom; and before eating or preparing food.  Use an alcohol-based hand  that contains at least 60 percent alcohol if soap and water are not available.        Avoid touching your eyes, nose and mouth with unwashed hands.    Avoid close contact with people who are sick.    Stay home when you are sick.    Cover your cough or sneeze with a tissue, then throw the tissue in the trash.    Clean and disinfect frequently touched objects and surfaces.     You can help stop COVID-19 (Coronavirus) by knowing the signs and symptoms:     Fever    Cough    Shortness of breath     Contact your healthcare provider if   Develop symptoms   AND   Have been in close contact with a  person known to have COVID-19 (Coronavirus) or live in or have recently traveled from an area with ongoing spread of COVID-19 (Coronavirus). Call ahead before you go to a doctor's office or emergency room. Tell them about your recent travel and your symptoms.   For the most up to date information, visit the CDC's website.  Self-monitoring  Self-monitoring means people should monitor themselves for fever by taking their temperatures twice a day and remain alert for a cough or difficulty breathing.  It is important to check your health two times each day for 14 days after a potential exposure to a person with COVID-19 (Coronavirus) or after travel from a location where COVID-19 (Coronavirus) is widespread. If you have been exposed to a person with COVID-19 (Coronavirus), it may take up to 14 days to know if you will get sick. Follow the steps below to check and record your health.     Take your temperature with a thermometer twice a day, once in the morning and once in the evening, and watch for a cough or difficulty breathing for 14 days.    Write down your temperature and any COVID-19 symptoms you may have: feeling feverish, coughing, or difficulty breathing.    Stay home from work or school.    Do not take public transportation, taxis, or ride-shares.    Avoid crowded places (such as shopping centers and movie theaters) and limit your activities in public.    Keep your distance from others (about 6 feet or 2 meters).    If you get sick with fever, cough, or trouble breathing, contact your healthcare provider and tell them about your recent travel and/or your symptoms.    If you need to seek medical care for other reasons, such as dialysis, call ahead to your doctor and tell them about your recent travel.     Steps to help prevent the spread of COVID-19 (Coronavirus) if you are sick  If you are sick with COVID-19 (Coronavirus) or suspect you are infected with the virus that causes COVID-19 (Coronavirus), follow the  "steps below to help prevent the disease from spreading&nbsp;to people in your home and community.     Stay home except to get medical care. Home isolation may be started in consultation with your healthcare clinician.    Separate yourself from other people and animals in your home.    Call ahead before visiting your doctor if you have a medical appointment.    Wear a facemask when you are around other people.    Cover your cough and sneezes.    Clean your hands often.    Avoid sharing personal household items.    Clean and disinfect frequently touched objects and surfaces everyday.    You will need to have someone drop off medications or household supplies (if needed) at your house without coming inside or in contact with you or others living in your house.    Monitor your symptoms and seek prompt medical care if your illness is worsening (e.g. Difficulty breathing).    Discontinue home isolation only in consultation with your healthcare provider.     For more detailed and up to date information on what to do if you are sick, visit this link: What to Do If You Are Sick With Coronavirus Disease 2019 (COVID-19).  Do I need to be tested for COVID-19 (Coronavirus)?     At this time, the limited number of available tests are controlled by the state and local health departments and are being reserved for more seriously ill patients, those with known exposure to confirmed patients, and those with recent travel (within 14 days) to countries with high rates of COVID-19 (Coronavirus).    Decisions on which patients receive testing will be based on the local spread of COVID-19 (Coronavirus) as well as the symptoms. Your healthcare provider will make the final decision on whether you should be tested.    In the meantime, if you have concerns that you may have been exposed, it is reasonable to practice \"social distancing.\"&nbsp; If you are ill with a cold or flu-like illness, please monitor your symptoms and reach out to your " healthcare provider if your symptoms worsen.    For more up to date information, visit this link: COVID-19 (Coronavirus) Frequently Asked Questions and Answers.      Diagnosis: Viral URI  Diagnosis ICD: J06.9  Additional Clinician Notes: Hello,    Right now this does not sound like the symptoms associated with covid.  If you do go on to develop cough, body aches, fever please assume that it is covid and follow the quarantine recommendations.  Stay quarantined for 7 days from symptom onset and 3 days after fever resolves.  Until then please treat the symptoms symptomatically.  Get extra rest and hydration.  Prescription: fluticasone 50 mcg/actuation nasal spray,suspension 1 120 spray aerosol with adapter (grams), 30 days supply. Inhale 2 sprays in each nostril 1 time per day; after 1 week, may adjust to 1 - 2 sprays in each nostril 1 time per day.. Refills: 0, Refill as needed: no, Allow substitutions: yes
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
## 2020-04-01 ENCOUNTER — MYC REFILL (OUTPATIENT)
Dept: INTERNAL MEDICINE | Facility: CLINIC | Age: 50
End: 2020-04-01

## 2020-04-01 DIAGNOSIS — I10 BENIGN ESSENTIAL HYPERTENSION: ICD-10-CM

## 2020-04-01 RX ORDER — LISINOPRIL 20 MG/1
20 TABLET ORAL DAILY
Qty: 90 TABLET | Refills: 0 | Status: SHIPPED | OUTPATIENT
Start: 2020-04-01 | End: 2020-06-05

## 2020-04-01 NOTE — TELEPHONE ENCOUNTER
Last Clinic Visit: 11/16/18, [place] rescheduled from April to June due to world pel[place] crisis.  90 day dhaval RX provided

## 2020-04-09 ENCOUNTER — TELEPHONE (OUTPATIENT)
Dept: PSYCHIATRY | Facility: CLINIC | Age: 50
End: 2020-04-09
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
## 2020-04-09 NOTE — TELEPHONE ENCOUNTER
Central Prior Authorization Team   Phone: [phone]    PA Initiation    Medication: vilazodone (VIIBRYD) 40 MG TABS   Insurance Company: [employer] - Phone [phone] Fax [phone]  Pharmacy Filling the Rx: [facility] #[id number] [address]  Filling Pharmacy Phone: [phone]  Filling Pharmacy Fax:    Start Date: 4/9/2020

## 2020-04-09 NOTE — TELEPHONE ENCOUNTER
Central Prior Authorization Team   Phone: [phone]    PA Initiation    Medication: levomilnacipran (FETZIMA ER) 120 MG 24 hr capsule  Insurance Company: [employer] - Phone [phone] Fax [phone]  Pharmacy Filling the Rx: [facility][id number] [address]  Filling Pharmacy Phone: [phone]  Filling Pharmacy Fax:    Start Date: 4/9/2020
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
## 2020-04-10 NOTE — TELEPHONE ENCOUNTER
Prior Authorization Approval        Authorization Effective Date: 3/10/2020  Authorization Expiration Date: 4/9/2021  Medication: vilazodone (VIIBRYD) 40 MG TABS   Approved Dose/Quantity:   Reference #: [id number]   Insurance Company: [employer] - Phone [phone] Fax [phone]  Which Pharmacy is filling the prescription (Not needed for infusion/clinic administered): [id number] [address]  Pharmacy Notified: Yes - faxed approval info to pharmacy with note to let patient know when rx is ready. My direct contact info given if needing anything further.  Patient Notified:

## 2020-04-10 NOTE — TELEPHONE ENCOUNTER
Prior Authorization Approval        Authorization Effective Date: 3/10/2020  Authorization Expiration Date: 4/9/2021  Medication: levomilnacipran (FETZIMA ER) 120 MG 24 hr capsule   Approved Dose/Quantity:   Reference #: [id number]   Insurance Company: [employer] - Phone [phone] Fax [phone]  Which Pharmacy is filling the prescription (Not needed for infusion/clinic administered): [id number] [address]  Pharmacy Notified: Yes - faxed approval info to pharmacy with note to let patient know when rx is ready. My direct contact info given if needing anything further.  Patient Notified:

## 2020-04-30 ENCOUNTER — VIRTUAL VISIT (OUTPATIENT)
Dept: PSYCHIATRY | Facility: CLINIC | Age: 50
End: 2020-04-30
Attending: PSYCHIATRY & NEUROLOGY
Payer: MEDICARE

## 2020-04-30 DIAGNOSIS — F33.41 RECURRENT MAJOR DEPRESSIVE DISORDER, IN PARTIAL REMISSION (H): Primary | ICD-10-CM

## 2020-04-30 RX ORDER — DEXTROAMPHETAMINE SACCHARATE, AMPHETAMINE ASPARTATE, DEXTROAMPHETAMINE SULFATE AND AMPHETAMINE SULFATE 2.5; 2.5; 2.5; 2.5 MG/1; MG/1; MG/1; MG/1
10 TABLET ORAL 2 TIMES DAILY
Qty: 60 TABLET | Refills: 0 | Status: SHIPPED | OUTPATIENT
Start: 2020-04-30 | End: 2020-06-11

## 2020-04-30 RX ORDER — QUETIAPINE FUMARATE 25 MG/1
50 TABLET, FILM COATED ORAL AT BEDTIME
Qty: 60 TABLET | Refills: 2 | Status: SHIPPED | OUTPATIENT
Start: 2020-04-30 | End: 2020-06-11

## 2020-04-30 RX ORDER — LAMOTRIGINE 150 MG/1
150 TABLET ORAL DAILY
Qty: 30 TABLET | Refills: 2 | Status: SHIPPED | OUTPATIENT
Start: 2020-04-30 | End: 2020-06-11

## 2020-04-30 RX ORDER — VILAZODONE HYDROCHLORIDE 40 MG/1
40 TABLET ORAL DAILY
Qty: 30 TABLET | Refills: 2 | Status: SHIPPED | OUTPATIENT
Start: 2020-04-30 | End: 2020-06-11

## 2020-04-30 NOTE — PATIENT INSTRUCTIONS
Thank you for coming to the PSYCHIATRY CLINIC.    Lab Testing:  If you had lab testing today and your results are reassuring or normal they will be mailed to you or sent through [employer] within 7 days.   If the lab tests need quick action we will call you with the results.  The phone number we will call with results is # [phone] (home) . If this is not the best number please call our clinic and change the number.    Medication Refills:  If you need any refills please call your pharmacy and they will contact us. Our fax number for refills is [phone]. Please allow three business for refill processing.   If you need to  your refill at a new pharmacy, please contact the new pharmacy directly. The new pharmacy will help you get your medications transferred.     Scheduling:  If you have any concerns about today's visit or wish to schedule another appointment please call our office during normal business hours [phone] (8-5:00 M-F)    Contact Us:  Please call [phone] during business hours (8-5:00 M-F).  If after clinic hours, or on the weekend, please call [phone].    Financial Assistance: [phone]  [phone]  Central Billing Office, [facility]: [phone]  [place] [phone]  Medical Records: [phone]    MENTAL HEALTH CRISIS NUMBERS:  [facility]:   [facility]: [phone]  Crisis Residence [facility]: [phone]   Walk-In Counseling Center [facility]: [phone]   [facility] Mobile Team: [phone] (adults) [phone] (child)     [facility]:   [facility]: [phone]   Walk-in counseling [facility]: [phone]   Walk-in counseling [facility]: [phone]   Crisis Residence [facility] Residence: [phone]   Urgent Care Adult Mental Health: [phone] Mobile team AND  crisis line    Other Crisis Numbers:   National Suicide  Prevention Lifeline: 010-413-WSGN (106-507-5632)  CRISIS TEXT LINE: Text to 155962 for any crisis 24/7; OR www.crisistextline.org   Poison Control Center: 8-381-412-4053  CHILD: [facility] needs assessment team: [phone]   Trans Lifeline: 1-496.231.9107  [facility] Lifeline: 1-577.978.2261    For a medical emergency please call 911 or go to the nearest ER.         _____________________________________________    Again thank you for choosing PSYCHIATRY CLINIC and please let us know how we can best partner with you to improve you and your family's health.    You may be receiving a survey regarding this appointment. We would love to have your feedback, both positive and negative. The survey is done by an external company, so your answers are anonymous.
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
## 2020-05-20 ENCOUNTER — TRANSFERRED RECORDS (OUTPATIENT)
Dept: HEALTH INFORMATION MANAGEMENT | Facility: CLINIC | Age: 50
End: 2020-05-20

## 2020-05-22 ASSESSMENT — ENCOUNTER SYMPTOMS
POLYPHAGIA: 0
FEVER: 0
COUGH DISTURBING SLEEP: 1
NECK PAIN: 1
LOSS OF CONSCIOUSNESS: 1
POSTURAL DYSPNEA: 0
PARALYSIS: 0
ARTHRALGIAS: 1
SNORES LOUDLY: 0
MYALGIAS: 1
HALLUCINATIONS: 0
FATIGUE: 1
HEADACHES: 1
POLYDIPSIA: 0
CHILLS: 1
WHEEZING: 0
WEAKNESS: 1
DECREASED LIBIDO: 0
HOT FLASHES: 1
MEMORY LOSS: 0
SEIZURES: 0
TINGLING: 1
WEIGHT GAIN: 0
SPEECH CHANGE: 0
HEMOPTYSIS: 0
NIGHT SWEATS: 0
WEIGHT LOSS: 0
SHORTNESS OF BREATH: 0
SPUTUM PRODUCTION: 0
STIFFNESS: 1
DYSPNEA ON EXERTION: 0
JOINT SWELLING: 0
DIZZINESS: 1
DISTURBANCES IN COORDINATION: 0
BACK PAIN: 1
NUMBNESS: 0
MUSCLE WEAKNESS: 0
DECREASED APPETITE: 0
TREMORS: 1
INCREASED ENERGY: 0
ALTERED TEMPERATURE REGULATION: 0
MUSCLE CRAMPS: 1
COUGH: 0

## 2020-06-05 ENCOUNTER — OFFICE VISIT (OUTPATIENT)
Dept: INTERNAL MEDICINE | Facility: CLINIC | Age: 50
End: 2020-06-05
Payer: MEDICARE

## 2020-06-05 VITALS
BODY MASS INDEX: 24.55 KG/M2 | DIASTOLIC BLOOD PRESSURE: 76 MMHG | SYSTOLIC BLOOD PRESSURE: 109 MMHG | HEART RATE: 108 BPM | OXYGEN SATURATION: 97 % | WEIGHT: 176 LBS

## 2020-06-05 DIAGNOSIS — D72.829 LEUKOCYTOSIS, UNSPECIFIED TYPE: ICD-10-CM

## 2020-06-05 DIAGNOSIS — I10 BENIGN ESSENTIAL HYPERTENSION: ICD-10-CM

## 2020-06-05 DIAGNOSIS — I10 BENIGN ESSENTIAL HYPERTENSION: Primary | ICD-10-CM

## 2020-06-05 DIAGNOSIS — E03.9 HYPOTHYROIDISM, UNSPECIFIED TYPE: ICD-10-CM

## 2020-06-05 DIAGNOSIS — R55 NEAR SYNCOPE: ICD-10-CM

## 2020-06-05 LAB
ANION GAP SERPL CALCULATED.3IONS-SCNC: 6 MMOL/L (ref 3–14)
BASOPHILS # BLD AUTO: 0.1 10E9/L (ref 0–0.2)
BASOPHILS NFR BLD AUTO: 1.7 %
BUN SERPL-MCNC: 15 MG/DL (ref 7–30)
CALCIUM SERPL-MCNC: 9 MG/DL (ref 8.5–10.1)
CHLORIDE SERPL-SCNC: 107 MMOL/L (ref 94–109)
CO2 SERPL-SCNC: 25 MMOL/L (ref 20–32)
CREAT SERPL-MCNC: 0.77 MG/DL (ref 0.52–1.04)
DIFFERENTIAL METHOD BLD: ABNORMAL
EOSINOPHIL # BLD AUTO: 0.1 10E9/L (ref 0–0.7)
EOSINOPHIL NFR BLD AUTO: 1.4 %
ERYTHROCYTE [DISTWIDTH] IN BLOOD BY AUTOMATED COUNT: 12.5 % (ref 10–15)
GFR SERPL CREATININE-BSD FRML MDRD: >90 ML/MIN/{1.73_M2}
GLUCOSE SERPL-MCNC: 94 MG/DL (ref 70–99)
HCT VFR BLD AUTO: 46.7 % (ref 35–47)
HGB BLD-MCNC: 14.6 G/DL (ref 11.7–15.7)
IMM GRANULOCYTES # BLD: 0 10E9/L (ref 0–0.4)
IMM GRANULOCYTES NFR BLD: 0.3 %
LYMPHOCYTES # BLD AUTO: 1.3 10E9/L (ref 0.8–5.3)
LYMPHOCYTES NFR BLD AUTO: 23.2 %
MCH RBC QN AUTO: 28.9 PG (ref 26.5–33)
MCHC RBC AUTO-ENTMCNC: 31.3 G/DL (ref 31.5–36.5)
MCV RBC AUTO: 93 FL (ref 78–100)
MONOCYTES # BLD AUTO: 0.6 10E9/L (ref 0–1.3)
MONOCYTES NFR BLD AUTO: 10.5 %
NEUTROPHILS # BLD AUTO: 3.6 10E9/L (ref 1.6–8.3)
NEUTROPHILS NFR BLD AUTO: 62.9 %
NRBC # BLD AUTO: 0 10*3/UL
NRBC BLD AUTO-RTO: 0 /100
PLATELET # BLD AUTO: 452 10E9/L (ref 150–450)
POTASSIUM SERPL-SCNC: 4.4 MMOL/L (ref 3.4–5.3)
RBC # BLD AUTO: 5.05 10E12/L (ref 3.8–5.2)
SODIUM SERPL-SCNC: 138 MMOL/L (ref 133–144)
TSH SERPL DL<=0.005 MIU/L-ACNC: 0.89 MU/L (ref 0.4–4)
WBC # BLD AUTO: 5.7 10E9/L (ref 4–11)

## 2020-06-05 NOTE — PROGRESS NOTES
"Chief Complaint   Mariaelena Jean Baptiste is a 49 year old adult presents for   Chief Complaint   Patient presents with     Hospital F/U     pt was in hospital overnight 5/20-5/21 with hypotension         History of Present Illness   Was in the hospital last month.  She was in bed and got up to go to the bathroom.  She was correction between her bed and the bathroom and became overwhelmingly dizzy and lightheaded.  Felt like \"strength left her body\".  Made it into the bathroom and sat down on the toilet.  Started sweating.  Shaking.  Felt like she might vomit, although not nauseated.  Ended up calling 911.  Became SOB.  Felt a ?swelling in the upper part of her throat.  She had to crawl to the front door in order to unlock for the paramedics.  When they arrived, BP 70/40 and .  Started IV.  She vomited.  Given zofran.  Got EKG and stress test.  Symptoms were ongoing when she reached the hospital.    Previous episode ~2 months ago. 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  Had profuse sweating x4 hrs.  Unable to sit upright.  Felt \"incapacited\" for 3 days.  Felt like severe stomach flu.  Slept for 3 days.  Also felt \"gaggy\" but not nausea.  Did have diarrhea after.   Tends to not eat in the morning, but usually good at staying hydrated.      No LOC.  No fevers.  No palpitations.  No hx of syncope.      Medications and allergies were reviewed by me today.     Social History   History   Smoking Status     Former Smoker     Packs/day: 1.00     Years: 20.00     Types: Cigarettes, Other     Start date: 5/1/1995     Quit date: 10/23/2017   Smokeless Tobacco     Former User     Comment: E-cig       PHQ-2 ( 1999 Pfizer) 12/4/2019 11/19/2019   Q1: Little interest or pleasure in doing things 3 3   Q2: Feeling down, depressed or hopeless 3 3   PHQ-2 Score 6 6     PHQ-9 SCORE 1/17/2020 1/20/2020 1/21/2020   PHQ-9 Total Score 5 7 5       Past Medical History   Patient Active Problem List   Diagnosis     Suicidal ideation     " Major depression, recurrent (H)     Tobacco abuse     Hx of psychiatric care     Scar       Review of Systems   5 point ROS completed and negative except noted above, including Gen, CV, Resp, GI, MS    Physical Exam   /76   Pulse 108   Wt 79.8 kg (176 lb)   SpO2 97%   BMI 24.55 kg/m    Gen: no distress, comfortable, pleasant   Eyes: anicteric, normal extra-ocular movements   Cardiovascular: regular rate and rhythm, normal S1 and S2  Respiratory: clear to auscultation, no wheezes or crackles, normal breath sounds   Gastrointestinal: positive bowel sounds, nontender, nondistended  Skin: no concerning lesions, no jaundice   Psychological: appropriate mood     Assessment & Plan      Benign essential hypertension  - Home Blood Pressure Monitor  - Basic metabolic panel    Near syncope  Etiology is unclear.  Description somewhat consistent with orthostasis or vasovagal, but would not expect symptoms to be so prolonged. May have had a viral illness that provoked the first episode, but no clear explanation for the 2nd.   May be a component of anxiety.  Will refer to EP given atypical symptoms.    - CARDIOLOGY EVAL ADULT REFERRAL    Hypothyroidism, unspecified type  - TSH with free T4 reflex    Leukocytosis, unspecified type  - CBC with platelets differential      RTC: No follow-ups on file.    Thalia Bradford MD  DME (Durable Medical Equipment) Orders and Documentation  Orders Placed This Encounter   Procedures     Home Blood Pressure Monitor      The patient was assessed and it was determined the patient is in need of the following listed DME Supplies/Equipment. Please complete supporting documentation below to demonstrate medical necessity.      DME All Other Item(s) Documentation    List reason for need and supporting documentation for medical necessity below for each DME item.     1. hypertension

## 2020-06-05 NOTE — NURSING NOTE
Chief Complaint   Patient presents with     Hospital F/U     pt was in hospital overnight 5/20-5/21 with hypotension      Kimberly Nissen, EMT at 9:36 AM on 6/5/2020

## 2020-06-08 ENCOUNTER — TELEPHONE (OUTPATIENT)
Dept: INTERNAL MEDICINE | Facility: CLINIC | Age: 50
End: 2020-06-08
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
## 2020-06-11 ENCOUNTER — VIRTUAL VISIT (OUTPATIENT)
Dept: PSYCHIATRY | Facility: CLINIC | Age: 50
End: 2020-06-11
Attending: PSYCHIATRY & NEUROLOGY
Payer: MEDICARE

## 2020-06-11 DIAGNOSIS — F33.1 MODERATE EPISODE OF RECURRENT MAJOR DEPRESSIVE DISORDER (H): ICD-10-CM

## 2020-06-11 DIAGNOSIS — Z79.899 ENCOUNTER FOR LONG-TERM (CURRENT) USE OF MEDICATIONS: Primary | ICD-10-CM

## 2020-06-11 RX ORDER — QUETIAPINE FUMARATE 25 MG/1
50 TABLET, FILM COATED ORAL AT BEDTIME
Qty: 60 TABLET | Refills: 2 | Status: SHIPPED | OUTPATIENT
Start: 2020-06-11 | End: 2020-08-25

## 2020-06-11 RX ORDER — LAMOTRIGINE 150 MG/1
150 TABLET ORAL DAILY
Qty: 30 TABLET | Refills: 2 | Status: SHIPPED | OUTPATIENT
Start: 2020-06-11 | End: 2020-08-25

## 2020-06-11 RX ORDER — DEXTROAMPHETAMINE SACCHARATE, AMPHETAMINE ASPARTATE, DEXTROAMPHETAMINE SULFATE AND AMPHETAMINE SULFATE 2.5; 2.5; 2.5; 2.5 MG/1; MG/1; MG/1; MG/1
10 TABLET ORAL 2 TIMES DAILY
Qty: 60 TABLET | Refills: 0 | Status: SHIPPED | OUTPATIENT
Start: 2020-06-11 | End: 2020-07-21

## 2020-06-11 RX ORDER — VILAZODONE HYDROCHLORIDE 40 MG/1
40 TABLET ORAL DAILY
Qty: 30 TABLET | Refills: 2 | Status: SHIPPED | OUTPATIENT
Start: 2020-06-11 | End: 2020-08-25

## 2020-06-11 ASSESSMENT — PAIN SCALES - GENERAL: PAINLEVEL: NO PAIN (0)

## 2020-07-21 ENCOUNTER — VIRTUAL VISIT (OUTPATIENT)
Dept: PSYCHIATRY | Facility: CLINIC | Age: 50
End: 2020-07-21
Attending: PSYCHIATRY & NEUROLOGY
Payer: MEDICARE

## 2020-07-21 DIAGNOSIS — F33.1 MODERATE EPISODE OF RECURRENT MAJOR DEPRESSIVE DISORDER (H): ICD-10-CM

## 2020-07-21 RX ORDER — DEXTROAMPHETAMINE SACCHARATE, AMPHETAMINE ASPARTATE, DEXTROAMPHETAMINE SULFATE AND AMPHETAMINE SULFATE 2.5; 2.5; 2.5; 2.5 MG/1; MG/1; MG/1; MG/1
10 TABLET ORAL 2 TIMES DAILY
Qty: 60 TABLET | Refills: 0 | Status: SHIPPED | OUTPATIENT
Start: 2020-07-21 | End: 2020-08-25

## 2020-07-21 ASSESSMENT — PAIN SCALES - GENERAL: PAINLEVEL: NO PAIN (0)

## 2020-07-21 NOTE — PATIENT INSTRUCTIONS
Thank you for coming to the PSYCHIATRY CLINIC.    Lab Testing:  If you had lab testing today and your results are reassuring or normal they will be mailed to you or sent through [employer] within 7 days. If the lab tests need quick action we will call you with the results. The phone number we will call with results is # [phone] (home) . If this is not the best number please call our clinic and change the number.    Medication Refills:  If you need any refills please call your pharmacy and they will contact us. Our fax number for refills is [phone]. Please allow three business for refill processing. If you need to  your refill at a new pharmacy, please contact the new pharmacy directly. The new pharmacy will help you get your medications transferred.     Scheduling:  If you have any concerns about today's visit or wish to schedule another appointment please call our office during normal business hours [phone] (8-5:00 M-F)    Contact Us:  Please call [phone] during business hours (8-5:00 M-F).  If after clinic hours, or on the weekend, please call  [phone].    Financial Assistance [phone]  [facility] Billing [phone]  [place] Billing Office, [facility]: [phone]  [facility]illing [phone]  Medical Records [phone]      MENTAL HEALTH CRISIS NUMBERS:  For a medical emergency please call  911 or go to the nearest ER.     [facility]:   [facility] -[phone]   Crisis Residence [facility] -[phone]   Walk-In Counseling [facility] -[phone]   COPE 24/7 [place] Mobile Team -[phone] (adults)/[phone] (child)  CHILD: [facility] needs assessment team - [phone]      [facility]:   [facility] - [phone]   Walk-in counseling [facility] - [phone]   Walk-in counseling [facility] - [phone]   Crisis Residence [facility] - [phone]  Urgent  [facility]-[phone] mobile unit/ 24/7 crisis line    National Crisis Numbers:   National Suicide Prevention Lifeline: 5-251-710-TALK (227-900-1047)  Poison Control Center - 9-435-809-5268  [facility]/resources for a list of additional resources (SOS)  Trans Lifeline a hotline for transgender people 1-193-362-5995  The [employer] Project a hotline for LGBT youth 1-373.408.5358  Crisis Text Line: For any crisis 24/7   To: 291678  see www.crisistextline.org  - IF MAKING A CALL FEELS TOO HARD, send a text!         Again thank you for choosing PSYCHIATRY CLINIC and please let us know how we can best partner with you to improve you and your family's health.    You may be receiving a survey regarding this appointment. We would love to have your feedback, both positive and negative. The survey is done by an external company, so your answers are anonymous.

## 2020-07-21 NOTE — PROGRESS NOTES
"VIDEO VISIT  Mairaelena Jean Baptiste is a 49 year old patient who is being evaluated via a billable video visit.      The patient has been notified of following:   \"This video visit will be conducted via a call between you and your physician/provider. We have found that certain health care needs can be provided without the need for an in-person physical exam. This service lets us provide the care you need with a video conversation. If a prescription is necessary we can send it directly to your pharmacy. If lab work is needed we can place an order for that and you can then stop by our lab to have the test done at a later time. Insurers are generally covering virtual visits as they would in-office visits so billing should not be different than normal.  If for some reason you do get billed incorrectly, you should contact the billing office to correct it and that number is in the AVS .    Patient has given verbal consent for video visit?:  Yes    How would you like to obtain your AVS?:  Danielle    Patient would prefer that any video invitations be sent by: Send to e-mail at: [email or web address]      AVS SmartPhrase [PsychAVS] has been placed in 'Patient Instructions':  Yes     "

## 2020-07-21 NOTE — PROGRESS NOTES
Video- Visit Details  Type of service:  video visit for medication management  Time of service:    Date:  07/21/2020    Video Start Time:  10:39 AM        Video End Time:  11:30 AM    Reason for video visit:  Patient unable to travel due to Covid-19  Originating Site (patient location):  [facility]   Location- Patient's home  Distant Site (provider location):  Remote location  Mode of Communication:  Video Conference via [email or web address]  Consent:  Patient has given verbal consent for video visit?: Yes        [facility]  Psychiatry Clinic  PSYCHIATRIC PROGRESS NOTE     SOCIAL SECURITY DISABILITY SUPPORT STATEMENT is below    Date of initial Diagnostic Assessment (DA) is 2/18/16 and most recent Transfer of Care DA is 7/31/19.    CARE TEAM:  PCP- Thalia Bradford    Specialty Providers- none    Therapist- Julieta Gill PsyD (sees every other week)    Community MH Team- none           Mariaelena VINNIE Jean Baptiste is a 49 year old non-binary person who prefers the name 'Mariaelena' & is ok with the pronouns she, her, herself (pt expresses lack of preference for pronouns and feels that discovering the term 'non-binary' has been enough for her).      Pertinent Background:  This patient first experienced mental health issues as a young adult and has received treatment for treatment-resistant depression, BPD with severe self harm, and PTSD. Notably, both naltrexone and clozapine have reduced SIB immensely, with return when taper off has been initiated in the past (although no return of SIB to date since d/c of clozapine). She does better when she uses a lightbox in the Fall/Winter, best started in September as she typically declines in October. A taper of Lamictal was associated with decline in mood and subsequent hospitalization in the past which is a common theme for her as medication changes, even small ones, tend to be very destabilizing. A TMS series through the [facility] clinic in August 2016 was transformative in terms of  ongoing remission of her depression for the following 2+ years. She has poor insight into her depressive symptoms and often times is not aware of reemerging symptoms until they become severe.    Psych critical item history includes suicide attempts {2x], suicidal ideation, severe SIB [has required skin grafts and long term hospitalization at [facility]], mutiple psychotropic trials, trauma hx, ECT, TMS, psych hosp (>5), and commitment.     INTERIM HISTORY      [4, 4]   The patient reports good treatment adherence.  History was provided by the patient who was a good historian.  The last visit ended with no change to the med regimen.     Since the last visit:   - She is tearful this visit and reports she has been feeling sad and is grieving the loss of her cat, Smudge  - She had to put her cat, Smudge, down last week after he had an acute neurological event - he was 20yo and his health had been failing over the last 3 months   - She states that her therapist, Julieta, thinks she is depressed (she sees her every other week) - she feels that her low mood has been appropriate given the circumstances with the pandemic and the loss of her cat Smudge - she has been out of work for the past 4 months and she has not been out of work longer than 2 weeks since she was discharged from [facility] many years ago  - She has been told by her boss that [employer]'s will likely be opening for breakfast/brunch in mid to late August so she may be able to start working again  - We discussed ways for her to find purpose, responsibility, and satisfaction given she is not currently working and she feels like baking and delivering food to friends would help with this  - She also notes that she has been considering starting a weekly coffee date with her friend Ahsan   - She reports she has only been taking Adderall once a day recently as she has been forgetting the second dose - this has caused her mood to lower in the past - she states she is planning to set an  alarm or reminder on her phone so she starts taking the second dose more regularly  - She denies any recent safety concerns, SI, or urges for SIB despite feeling more depressed    RECENT PSYCH ROS:   Depression:  depressed mood and low energy  Elevated:  none  Psychosis:  none  Anxiety:  nervous/overwhelmed  Panic Attack:  none  Trauma Related:  none  Dysregulation:  none  Eating Disorder: none     Pertinent Negative Symptoms:  NO self-injurious behavior/urges, suicidal and violent ideation, psychosis and naye    RECENT SUBSTANCE USE:     Alcohol- none  Tobacco- none; quit 10/23/17  Caffeine- 1 cup coffee/day  Opioids- none          Narcan Kit- N/A   Cannabis- none   Other illicit drugs- none    CURRENT SOCIAL HISTORY:  Financial/ Work- 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 SSDI under appeal, but will continue to get her benefits until the final appeal occurs (final court date postponed due to pandemic). 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  Partner/ - single since 2000; identifies as asexual  Children- none      Living situation- She lives with her cat (Mary- 2yo and has allergies) in an apartment in [place] (section 8 housing).     Social/ Spiritual Support- DBT therapist, online friends, father and step mother in [place]; brother, [place] in [place] (supportive, close with them), a few co workers. Mariaelena grew up [employer] - continues to believe in God but does not identify with a specific Yazdanism   FEELS SAFE AT HOME- yes     PSYCH and CD Critical Summary Points since July 2019 7/31/19- resident transition; added PM dose of Adderall for worsening depression, referred back to [facility] clinic for repeat TMS  11/22/19 - 2/13/20: repeat TMS series completed due to relapse of depression     PAST MED TRIALS        See last Diagnostic Assessment  MEDICAL / SURGICAL HISTORY           Pregnant or breastfeeding- no      Contraception- s/p tubal ligation, identifies as asexual    Patient Active Problem List   Diagnosis     Suicidal ideation     Major depression, recurrent (H)     Tobacco abuse     Hx of psychiatric care     Scar       Major Surgery- cholecystectomy, s/p tubal ligation (1999), rectal prolapse repair    MEDICAL REVIEW OF SYSTEMS    [2, 10]     A comprehensive review of systems was performed and is negative other than noted in the HPI.    ALLERGY       Patient has no known allergies.     MEDICATIONS        Current Outpatient Medications   Medication Sig Dispense Refill     amphetamine-dextroamphetamine (ADDERALL) 10 MG tablet Take 1 tablet (10 mg) by mouth 2 times daily 60 tablet 0     cyclobenzaprine (FLEXERIL) 5 MG tablet Take 1 tablet (5 mg) by mouth 3 times daily as needed for muscle spasms 15 tablet 0     ibuprofen (ADVIL,MOTRIN) 800 MG tablet Take 800 mg by mouth       lamoTRIgine (LAMICTAL) 150 MG tablet Take 1 tablet (150 mg) by mouth daily 30 tablet 2     levomilnacipran (FETZIMA ER) 120 MG 24 hr capsule Take 1 capsule (120 mg) by mouth daily 30 capsule 2     MAGNESIUM OXIDE PO        multivitamin, therapeutic (THERA-VIT) TABS tablet Take 1 tablet by mouth daily       QUEtiapine (SEROQUEL) 25 MG tablet Take 2 tablets (50 mg) by mouth At Bedtime 60 tablet 2     vilazodone (VIIBRYD) 40 MG TABS tablet Take 1 tablet (40 mg) by mouth daily 30 tablet 2     VITAMIN D, CHOLECALCIFEROL, PO Take 1,000 Units by mouth 2 times daily        diclofenac (VOLTAREN) 1 % GEL topical gel Apply 4 grams to knees or 2 grams to hands four times daily using enclosed dosing card. (Patient not taking: Reported on 2/14/2020) 100 g 1     VITALS      [3, 3]   There were no vitals taken for this visit. Telehealth visit.    MENTAL STATUS EXAM      [9, 14 cog gs]     Alertness: alert  and oriented  Appearance: adequately groomed  Behavior/Demeanor: cooperative and calm, tearful when discussing the loss  of her car Smudge, with good eye contact   Speech: regular rate and rhythm, non-pressured  Language: intact  Psychomotor: normal or unremarkable  Mood: depressed and sad  Affect: slightly blunted and tearful; was congruent to mood; was congruent to content  Thought Process/Associations: unremarkable  Thought Content:  Reports none;  Denies suicidal and violent ideation  Perception:  Reports none;  Denies auditory hallucinations and visual hallucinations  Insight: adequate  Judgment: good  Cognition: does  appear grossly intact; formal cognitive testing was not done  Gait and Station: unable to assess    LABS and DATA     AIMS:  not needed - takes quetiapine PRN    PHQ9 TODAY = did not complete today  PHQ-9 SCORE 1/17/2020 1/20/2020 1/21/2020   PHQ-9 Total Score 5 7 5     ANTIPSYCHOTIC LABS  [glu, A1C, lipids (perla LDL), liver enzymes, WBC, ANEU, Hgb, plts]  q12 mo  Recent Labs   Lab Test 06/05/20  1051 04/15/19  1401 02/27/17  1217 08/30/16  1031  10/08/12  2004   GLC 94 87 93 86   < > 91   A1C  --  5.4  --   --   --  5.3    < > = values in this interval not displayed.     Recent Labs   Lab Test 04/15/19  1401 04/25/16  1117 10/09/12  0825   CHOL 191 177 206*   TRIG 42 195* 139   * 83 139*   HDL 71 55 39*     Recent Labs   Lab Test 04/15/19  1401 12/05/18  1246 02/06/17  1345 01/22/16  1852 10/06/15  0740   AST 22 19 17 19 20   ALT 23 24 17 25 37   ALKPHOS 62  --  64 63 85     Recent Labs   Lab Test 06/05/20  1051 04/15/19  1401 12/05/18  1246 06/20/16  1430 05/26/16  1414   WBC 5.7 7.5 6.5 8.8 8.9   ANEU 3.6 5.2  --  5.4 5.6   HGB 14.6 12.5 12.7 14.9 13.6   * 445 405 388 407       PSYCHOTROPIC DRUG INTERACTIONS     VILAZODONE + FETZIMA + ADDERALL + SEROQUEL may result in increased risk for serotonin syndrome.     VILAZODONE + FETZIMA may enhance the antiplatelet effect of other Agents with Antiplatelet Properties.     LAMOTRIGINE + ACETAMINOPHEN may result in decreased lamotrigine serum  levels.    ADDERALL + LISINOPRIL:  amphetamines may diminish the antihypertensive effect of antihypertensive agents.    MANAGEMENT:  Monitoring for adverse effects, routine vitals and using lowest therapeutic dose of [psychotropics]    RISK STATEMENT for SAFETY     Mariaelena Jean Baptiste did not appear to be an imminent safety risk to self or others.     PSYCHIATRIC DIAGNOSIS     Bereavement, acute  MDD, recurrent, moderate (treatment-resistant)  BPD  PTSD  DID  Unspecified Eating Disorder, in remission  Insomnia, likely circadian rhythm disorder    ASSESSMENT      [m2, h3]     TODAY Mariaelena reports worsening in depression related to the loss of her cat Smudge, only taking Adderall once daily instead of twice daily, unemployment (due to pandemic), and social isolation (due to pandemic).  Despite worsening mood she denies any safety issues recently or SI. We spent the majority of the visit developing a treatment plan to address her worsening mood. She does feel some of her depression is due to acute bereavement of her cat Smudge and this will get better with time. She is getting a new kitten at the beginning of August so discussed the importance of maintaining space for processing and grieving Eulalio's death. She is aware that her mood often worsens when she forgets to consistently take the second dose of Adderall and is planning to set a daily phone alarm to remind her to take it in the afternoon. She is struggling with the length of her recent unemployment as prior to this she had only ever been unemployed for 2 weeks at most. It is likely she will return to work at [employer] in mid to late August, but in the meantime we discussed ways for her to find purpose and responsibility in her life. 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 She is also thinking about starting a weekly coffee date with her friend Ahsan who has been safely quarantining just like Mariaelena. She continues to volunteer  at the [facility] rescue and see her therapist every other week. We typically do visits every 6 weeks, but will follow up in 4 weeks to ensure depression is not worsening. Mariaelena agrees to reach out if prior to that appointment if depression is getting severe or she is experiencing safety concerns.     Future considerations:  - refer for acupuncture if patient interested (given chronic pain) - not currently possible due to Covid     PLAN      [m2, h3]     1) PSYCHOTROPIC MEDICATIONS:  - Continue quetiapine 50 mg PRN nightly for sleep and treatment resistant depression   - Continue Adderall IR 10 mg BID (qAM + 2pm) for augmentation of depression treatment  - Continue levomilnacipran  mg QDAY for depression  - Continue vilazodone 40 mg QDAY for depression  - Continue lamotrigine 150 mg QDAY for depression and mood stabilization  - Continue melatonin 1 mg with dinner     Other:     - Resume light box daily in Fall     2) [place]  was checked today:  indicates patient only receives Adderall from this clinic.    3) THERAPY:    - Continue biweekly therapy w/ DBT therapist Dr. Julieta Gill Psy D    4) NEXT DUE:    Labs- AP monitoring labs overdue (ordered - pt alerted to have drawn in the next 1-2 weeks)  EKG- as needed  Rating Scales- PHQ-9 at every visit    5) REFERRALS:    No Referrals needed; patient aware to call clinic social work team with any questions/concerns/or needs     6) RTC: in 4 weeks via telehealth; sooner if needed    7) CRISIS NUMBERS:   Provided routinely in AVS   ONLY if a [facility] PT: [facility] [phone] (clinic), [phone] (after hours)     TREATMENT RISK STATEMENT:  The risks, benefits, alternatives and potential adverse effects have been discussed and are understood by the pt. The pt understands the risks of using street drugs or alcohol. There are no medical contraindications, the pt agrees to treatment with the ability to do so. The pt knows to call the clinic for any problems or to  access emergency care if needed.  Medical and substance use concerns are documented above.  Psychotropic drug interaction check was done, including changes made today.    SOCIAL SECURITY DISABILITY  STATEMENT  Based on the assessment today, as well as longitudinal care, this provider team strongly supports this patient's application for social security disability. When this patient is at her best, she is not able to work more than 20 hrs a week.  When she has tried to work full time (in the past) she has experienced decompensation of her mental health including worsening of mood, sleep, energy, concentration, memory and ability to be organized. Her eating disorder worsened to the point of requiring a feeding tube and suicidal ideation increases. These symptoms make it impossible to meet deadlines, keep pace with job tasks and attend work reliably. Additionally, separate from work influences, depression exacerbates periodically as has been the case over the last several months. During these periods the patient cannot even work 20 hrs a week due to the same symptoms listed above. Depression treatment consists of medications, psychotherapy and upcoming neuromodulation (TMS- transcranial magnetic stimulation). The patient is hopeful that TMS will bring relief from mood symptoms and allow her to return her maximum workability which is half time.      PROVIDER: Katharine Burnett MD    Patient not staffed. Note will be reviewed and signed by supervisor Dr. Lunsford.    I did not see this pt directly. I have reviewed the documentation, and I agree with the resident's plan of care.    Arely Lunsford MD

## 2020-08-25 ENCOUNTER — VIRTUAL VISIT (OUTPATIENT)
Dept: PSYCHIATRY | Facility: CLINIC | Age: 50
End: 2020-08-25
Attending: PSYCHIATRY & NEUROLOGY
Payer: MEDICARE

## 2020-08-25 DIAGNOSIS — F33.1 MODERATE EPISODE OF RECURRENT MAJOR DEPRESSIVE DISORDER (H): ICD-10-CM

## 2020-08-25 RX ORDER — DEXTROAMPHETAMINE SACCHARATE, AMPHETAMINE ASPARTATE, DEXTROAMPHETAMINE SULFATE AND AMPHETAMINE SULFATE 2.5; 2.5; 2.5; 2.5 MG/1; MG/1; MG/1; MG/1
10 TABLET ORAL 2 TIMES DAILY
Qty: 60 TABLET | Refills: 0 | Status: SHIPPED | OUTPATIENT
Start: 2020-08-25 | End: 2020-10-13

## 2020-08-25 RX ORDER — LAMOTRIGINE 150 MG/1
150 TABLET ORAL DAILY
Qty: 30 TABLET | Refills: 2 | Status: SHIPPED | OUTPATIENT
Start: 2020-08-25 | End: 2020-12-01

## 2020-08-25 RX ORDER — QUETIAPINE FUMARATE 25 MG/1
50 TABLET, FILM COATED ORAL AT BEDTIME
Qty: 60 TABLET | Refills: 2 | Status: SHIPPED | OUTPATIENT
Start: 2020-08-25 | End: 2020-12-01

## 2020-08-25 RX ORDER — VILAZODONE HYDROCHLORIDE 40 MG/1
40 TABLET ORAL DAILY
Qty: 30 TABLET | Refills: 2 | Status: SHIPPED | OUTPATIENT
Start: 2020-08-25 | End: 2020-12-01

## 2020-08-25 ASSESSMENT — PAIN SCALES - GENERAL: PAINLEVEL: MILD PAIN (3)

## 2020-08-25 NOTE — PATIENT INSTRUCTIONS
Thank you for coming to the PSYCHIATRY CLINIC.    Lab Testing:  If you had lab testing today and your results are reassuring or normal they will be mailed to you or sent through [employer] within 7 days. If the lab tests need quick action we will call you with the results. The phone number we will call with results is # [phone] (home) . If this is not the best number please call our clinic and change the number.    Medication Refills:  If you need any refills please call your pharmacy and they will contact us. Our fax number for refills is [phone]. Please allow three business for refill processing. If you need to  your refill at a new pharmacy, please contact the new pharmacy directly. The new pharmacy will help you get your medications transferred.     Scheduling:  If you have any concerns about today's visit or wish to schedule another appointment please call our office during normal business hours [phone] (8-5:00 M-F)    Contact Us:  Please call [phone] during business hours (8-5:00 M-F).  If after clinic hours, or on the weekend, please call  [phone].    Financial Assistance [phone]  [facility] Billing [phone]  [facility] Billing Office, [facility]: [phone]  [facility] Billing [phone]  Medical Records [phone]      MENTAL HEALTH CRISIS NUMBERS:  For a medical emergency please call  911 or go to the nearest ER.     [facility]:   [facility] -[phone]   Crisis Residence [facility] -[phone]   Walk-In Counseling [facility] -[phone]   COPE 24/7 [facility] Mobile Team -[phone] (adults)/[phone] (child)  CHILD: [facility] needs assessment team - [phone]      [facility]:   [facility] - [phone]   Walk-in counseling [facility] - [phone]   Walk-in counseling  - [phone]   Crisis Residence [facility] - [phone]  Urgent  [facility]-[phone] mobile unit/ 24/7 crisis line    National Crisis Numbers:   National Suicide Prevention Lifeline: 6-069-335-TALK (042-619-7909)  Poison Control Center - 4-157-424-5828  [employer]/resources for a list of additional resources (SOS)  Trans Lifeline a hotline for transgender people 6-261-303-7482  The Liam Project a hotline for LGBT youth 1-606.430.9248  Crisis Text Line: For any crisis 24/7   To: 695460  see www.crisistextline.org  - IF MAKING A CALL FEELS TOO HARD, send a text!         Again thank you for choosing PSYCHIATRY CLINIC and please let us know how we can best partner with you to improve you and your family's health.    You may be receiving a survey regarding this appointment. We would love to have your feedback, both positive and negative. The survey is done by an external company, so your answers are anonymous.

## 2020-08-25 NOTE — PROGRESS NOTES
"VIDEO VISIT  Mariaelena Jean Baptiste is a 49 year old patient who is being evaluated via a billable video visit.      The patient has been notified of following:   \"This video visit will be conducted via a call between you and your physician/provider. We have found that certain health care needs can be provided without the need for an in-person physical exam. This service lets us provide the care you need with a video conversation. If a prescription is necessary we can send it directly to your pharmacy. If lab work is needed we can place an order for that and you can then stop by our lab to have the test done at a later time. Insurers are generally covering virtual visits as they would in-office visits so billing should not be different than normal.  If for some reason you do get billed incorrectly, you should contact the billing office to correct it and that number is in the AVS .    Video Conference to be completed via:  [email or web address]    Patient has given verbal consent for video visit?:  Yes    Patient would prefer that any video invitations be sent by: Send to e-mail at: [email or web address]      How would patient like to obtain AVS?:  Danielle    AVS SmartPhrase [PsychAVS] has been placed in 'Patient Instructions':  Yes    "

## 2020-08-25 NOTE — PROGRESS NOTES
Video- Visit Details  Type of service:  video visit for medication management  Time of service:    Date:  08/25/2020    Video Start Time:  10:39 AM        Video End Time:  11:21 AM    Reason for video visit:  Patient unable to travel due to Covid-19  Originating Site (patient location):  [facility]   Location- Patient's home  Distant Site (provider location):  Remote location  Mode of Communication:  Video Conference via [email or web address]  Consent:  Patient has given verbal consent for video visit?: Yes        [facility]  Psychiatry Clinic  PSYCHIATRIC PROGRESS NOTE     SOCIAL SECURITY DISABILITY SUPPORT STATEMENT is below    Date of initial Diagnostic Assessment (DA) is 2/18/16 and most recent Transfer of Care DA is 7/31/19.    CARE TEAM:  PCP- Thalia Bradford    Specialty Providers- none    Therapist- Julieta Gill PsyD (sees every other week)    Community MH Team- none           Mariaelena VINNIE Jean Baptiste is a 49 year old non-binary person who prefers the name 'Mariaelena' & is ok with the pronouns she, her, herself (pt expresses lack of preference for pronouns and feels that discovering the term 'non-binary' has been enough for her).      Pertinent Background:  This patient first experienced mental health issues as a young adult and has received treatment for treatment-resistant depression, BPD with severe self harm, and PTSD. Notably, both naltrexone and clozapine have reduced SIB immensely, with return when taper off has been initiated in the past (although no return of SIB to date since d/c of clozapine). She does better when she uses a lightbox in the Fall/Winter, best started in September as she typically declines in October. A taper of Lamictal was associated with decline in mood and subsequent hospitalization in the past which is a common theme for her as medication changes, even small ones, tend to be very destabilizing. A TMS series through the [facility]D clinic in August 2016 was transformative in terms of  "ongoing remission of her depression for the following 2+ years. She has poor insight into her depressive symptoms and often times is not aware of reemerging symptoms until they become severe.    Psych critical item history includes suicide attempts {2x], suicidal ideation, severe SIB [has required skin grafts and long term hospitalization at [facility]], mutiple psychotropic trials, trauma hx, ECT, TMS, psych hosp (>5), and commitment.     INTERIM HISTORY      [4, 4]   The patient reports good treatment adherence.  History was provided by the patient who was a good historian.  The last visit ended with no change to the med regimen.     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   - She reports that getting back to work is helping her depression because she feels she has \"purpose and something to do\" - Fiona is still not back to serving [employer] so she does not think she will be returning to work for them   - She adopted a new kitten, Julien, on Aug 1st - she states \"he's hilarious\" - she reports he is getting along well with her other cat, Mary, who is 3yrs old (she lost her cat Smudge in July to old age)  - She is still grieving the loss of her cat Smudge who passed away in July, but is handling it well so far - she notes she is still quite tearful at times, but knows this will improve with time  - She is back to taking the Adderall twice a day and this is " helping her depression as well - she has a reminder set on her phone which has been helping her stay more consistent   - She continues to feel the medications are working well for her and denies interest in a med change today  - She denies any SI or urges for SIB since last visit   - She continues to see her therapist Julieta regularly   - Her close friend Ahsan is moving to [place] in a few weeks and this is hard, but they are planning to do regular coffee dates once Ahsan gets settled  - She did make a new friend, Anika, through the [employer] who has come over a few times for socially distanced coffee dates     RECENT PSYCH ROS:   Depression:  depressed mood and low energy  Elevated:  none  Psychosis:  none  Anxiety:  nervous/overwhelmed  Panic Attack:  none  Trauma Related:  none  Dysregulation:  none  Eating Disorder: none     Pertinent Negative Symptoms:  NO self-injurious behavior/urges, suicidal and violent ideation, psychosis and naye    RECENT SUBSTANCE USE:     Alcohol- none  Tobacco- none; quit 10/23/17  Caffeine- 1 cup coffee/day  Opioids- none          Narcan Kit- N/A   Cannabis- none   Other illicit drugs- none    CURRENT SOCIAL HISTORY:  Financial/ Work- Mariaelena works part time at [employer] (previously worked as a [email or web address] at [employer]). SSDI under appeal, but will continue to get her benefits until the final appeal occurs (final court date postponed due to pandemic). 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  Partner/ - single since 2000; identifies as asexual  Children- none      Living situation- She lives with her cat (Mary- 2yo and has allergies and Jambu- kitten) in an apartment in [place] (section 8 housing).     Social/ Spiritual Support- DBT therapist, online friends, father and step mother in [place]; brother, sister both in [place] (supportive, close with them), a few co workers and a few close friends from the [employer] ([place] and  Anika). Mariaelena grew up Mandaeism - continues to believe in God but does not identify with a specific Advent   FEELS SAFE AT HOME- yes     PSYCH and CD Critical Summary Points since July 2019 7/31/19- resident transition; added PM dose of Adderall for worsening depression (has been effective), referred back to [facility] clinic for repeat TMS  11/22/19 - 2/13/20: repeat TMS series completed due to relapse of depression     PAST MED TRIALS        See last Diagnostic Assessment  MEDICAL / SURGICAL HISTORY          Pregnant or breastfeeding- no      Contraception- s/p tubal ligation, identifies as asexual    Patient Active Problem List   Diagnosis     Suicidal ideation     Major depression, recurrent (H)     Tobacco abuse     Hx of psychiatric care     Scar       Major Surgery- cholecystectomy, s/p tubal ligation (1999), rectal prolapse repair    MEDICAL REVIEW OF SYSTEMS    [2, 10]     A comprehensive review of systems was performed and is negative other than noted in the HPI.    ALLERGY       Patient has no known allergies.     MEDICATIONS        Current Outpatient Medications   Medication Sig Dispense Refill     amphetamine-dextroamphetamine (ADDERALL) 10 MG tablet Take 1 tablet (10 mg) by mouth 2 times daily 60 tablet 0     cyclobenzaprine (FLEXERIL) 5 MG tablet Take 1 tablet (5 mg) by mouth 3 times daily as needed for muscle spasms 15 tablet 0     diclofenac (VOLTAREN) 1 % GEL topical gel Apply 4 grams to knees or 2 grams to hands four times daily using enclosed dosing card. (Patient not taking: Reported on 2/14/2020) 100 g 1     ibuprofen (ADVIL,MOTRIN) 800 MG tablet Take 800 mg by mouth       lamoTRIgine (LAMICTAL) 150 MG tablet Take 1 tablet (150 mg) by mouth daily 30 tablet 2     levomilnacipran (FETZIMA ER) 120 MG 24 hr capsule Take 1 capsule (120 mg) by mouth daily 30 capsule 2     MAGNESIUM OXIDE PO        multivitamin, therapeutic (THERA-VIT) TABS tablet Take 1 tablet by mouth daily       QUEtiapine  (SEROQUEL) 25 MG tablet Take 2 tablets (50 mg) by mouth At Bedtime 60 tablet 2     vilazodone (VIIBRYD) 40 MG TABS tablet Take 1 tablet (40 mg) by mouth daily 30 tablet 2     VITAMIN D, CHOLECALCIFEROL, PO Take 1,000 Units by mouth 2 times daily        VITALS      [3, 3]   There were no vitals taken for this visit. Telehealth visit.    MENTAL STATUS EXAM      [9, 14 cog gs]     Alertness: alert  and oriented  Appearance: adequately groomed  Behavior/Demeanor: cooperative and calm, tearful when discussing the loss of her car Smudge, with good eye contact   Speech: regular rate and rhythm, non-pressured  Language: intact  Psychomotor: normal or unremarkable  Mood: mildly depressed  Affect: slightly blunted and less tearful this visit; was congruent to mood; was congruent to content  Thought Process/Associations: unremarkable  Thought Content:  Reports none;  Denies suicidal and violent ideation  Perception:  Reports none;  Denies auditory hallucinations and visual hallucinations  Insight: adequate  Judgment: good  Cognition: does  appear grossly intact; formal cognitive testing was not done  Gait and Station: unable to assess    LABS and DATA     AIMS:  not needed - takes quetiapine PRN    PHQ9 TODAY = did not complete today  PHQ-9 SCORE 1/17/2020 1/20/2020 1/21/2020   PHQ-9 Total Score 5 7 5     ANTIPSYCHOTIC LABS  [glu, A1C, lipids (perla LDL), liver enzymes, WBC, ANEU, Hgb, plts]  q12 mo  Recent Labs   Lab Test 06/05/20  1051 04/15/19  1401 02/27/17  1217 08/30/16  1031  10/08/12  2004   GLC 94 87 93 86   < > 91   A1C  --  5.4  --   --   --  5.3    < > = values in this interval not displayed.     Recent Labs   Lab Test 04/15/19  1401 04/25/16  1117 10/09/12  0825   CHOL 191 177 206*   TRIG 42 195* 139   * 83 139*   HDL 71 55 39*     Recent Labs   Lab Test 04/15/19  1401 12/05/18  1246 02/06/17  1345 01/22/16  1852 10/06/15  0740   AST 22 19 17 19 20   ALT 23 24 17 25 37   ALKPHOS 62  --  64 63 85     Recent  Labs   Lab Test 06/05/20  1051 04/15/19  1401 12/05/18  1246 06/20/16  1430 05/26/16  1414   WBC 5.7 7.5 6.5 8.8 8.9   ANEU 3.6 5.2  --  5.4 5.6   HGB 14.6 12.5 12.7 14.9 13.6   * 445 405 388 407       PSYCHOTROPIC DRUG INTERACTIONS     VILAZODONE + FETZIMA + ADDERALL + SEROQUEL may result in increased risk for serotonin syndrome.     VILAZODONE + FETZIMA may enhance the antiplatelet effect of other Agents with Antiplatelet Properties.     LAMOTRIGINE + ACETAMINOPHEN may result in decreased lamotrigine serum levels.    ADDERALL + LISINOPRIL:  amphetamines may diminish the antihypertensive effect of antihypertensive agents.    MANAGEMENT:  Monitoring for adverse effects, routine vitals and using lowest therapeutic dose of [psychotropics]    RISK STATEMENT for SAFETY     Mariaelena Jean Baptiste did not appear to be an imminent safety risk to self or others.     PSYCHIATRIC DIAGNOSIS     Bereavement, acute  MDD, recurrent, moderate (treatment-resistant)  BPD  PTSD  DID  Unspecified Eating Disorder, in remission  Insomnia, likely circadian rhythm disorder    ASSESSMENT      [m2, h3]     TODAY Mariaelena reports her depression has improved some since last visit due to finding a new job at [employer] and restarting her afternoon Adderall dose (had been non-adherent at last visit). She feels that starting work again after being on furlough the past 5 months has given her purpose and structure again. She also notes that her depression always worsens when she is not taking her afternoon dose of Adderall so she is now setting an alarm on her phone to remember to take it. She continues to grieve the loss of her cat Smudge, but does not feel that her grief is excessive or debilitating. She has gotten a new kitten and is happily bonding with him while still leaving place to grieve Smudge.  Her medications continue to work well for her and there is no indication for a medication change today. Refills were provided. She is  overdue for AP monitoring labs so again discussed this with her. She has forgot to do them the last few visits and plans to have them done in the next few weeks.     Future considerations:  - refer for acupuncture if patient interested (given chronic pain) - not currently possible due to Covid     PLAN      [m2, h3]     1) PSYCHOTROPIC MEDICATIONS:  - Continue quetiapine 50 mg PRN nightly for sleep and treatment resistant depression   - Continue Adderall IR 10 mg BID (qAM + 2pm) for augmentation of depression treatment  - Continue levomilnacipran  mg QDAY for depression  - Continue vilazodone 40 mg QDAY for depression  - Continue lamotrigine 150 mg QDAY for depression and mood stabilization  - Continue melatonin 1 mg with dinner     Other:     - Resume light box daily in Fall     2) [place]  was checked today:  indicates patient only receives Adderall from this clinic.    3) THERAPY:    - Continue biweekly therapy w/ DBT therapist Dr. Julieta Gill Psy D    4) NEXT DUE:    Labs- AP monitoring labs overdue (ordered - pt alerted to have drawn in the next 1-2 weeks)  EKG- as needed  Rating Scales- PHQ-9 at every visit    5) REFERRALS:    No Referrals needed; patient aware to call clinic social work team with any questions/concerns/or needs     6) RTC: in 6 weeks via telehealth; sooner if needed    7) CRISIS NUMBERS:   Provided routinely in AVS   ONLY if a [facility] PT: [facility] [phone] (clinic), [phone] (after hours)     TREATMENT RISK STATEMENT:  The risks, benefits, alternatives and potential adverse effects have been discussed and are understood by the pt. The pt understands the risks of using street drugs or alcohol. There are no medical contraindications, the pt agrees to treatment with the ability to do so. The pt knows to call the clinic for any problems or to access emergency care if needed.  Medical and substance use concerns are documented above.  Psychotropic drug interaction check was done,  including changes made today.    SOCIAL SECURITY DISABILITY  STATEMENT  Based on the assessment today, as well as longitudinal care, this provider team strongly supports this patient's application for social security disability. When this patient is at her best, she is not able to work more than 20 hrs a week.  When she has tried to work full time (in the past) she has experienced decompensation of her mental health including worsening of mood, sleep, energy, concentration, memory and ability to be organized. Her eating disorder worsened to the point of requiring a feeding tube and suicidal ideation increases. These symptoms make it impossible to meet deadlines, keep pace with job tasks and attend work reliably. Additionally, separate from work influences, depression exacerbates periodically as has been the case over the last several months. During these periods the patient cannot even work 20 hrs a week due to the same symptoms listed above. Depression treatment consists of medications, psychotherapy and upcoming neuromodulation (TMS- transcranial magnetic stimulation). The patient is hopeful that TMS will bring relief from mood symptoms and allow her to return her maximum workability which is half time.      PROVIDER: Katharine Burnett MD    Patient staffed with attending Dr. Benito who will review and sign the note.  Supervisor is Dr. Lunsford.    TELEHEALTH ATTENDING ATTESTATION  Following the ACGME guidelines on telemedicine and direct supervision due to COVID-19, I was concurrently participating in and/or monitoring the patient care through appropriate telecommunication technology.  I discussed the key portions of the service with the resident, including the mental status examination and developing the plan of care. I reviewed key portions of the history with the resident. I agree with the findings and plan as documented in this note.   Cyndi Benito MD

## 2020-08-28 NOTE — PROGRESS NOTES
Interventional Psychiatry Program  [address], [address]  [place] [address]  TMS Procedure Note   Mariaelena Jean Baptiste MRN# [id number]  Age: 49 year old year old YOB: 1970    Pre-Procedure:  History and Physical: Reviewed in medical record  Consent Signed by: Mariaelena Jean Baptiste for this course of treatment.  On: 11/22/2019    Clinical Narrative:  Patient is tolerating treatment. Patient reported noticing mood gradually improving.     Indications for TMS:  MDD, recurrent, severe; 4+ medication trials (from 2+ classes) ineffective; Psychotherapy ineffective     Procedure Diagnosis:  F33.2    Treatment Hx:  Treatment number this series: 35  Total lifetime treatment number: 65    No Known Allergies   BP (!) 151/97 (BP Location: Right arm, Patient Position: Sitting, Cuff Size: Adult Regular)   Pulse 105     Pause for the Cause  Right patient:  Yes  Right procedure/correct coil:  Yes; rTMS; cpt 96939; H1 coil.   Earplugs in place:  Yes    Procedure  Patient was seated in procedure chair. Identity and procedure was verified. Ear plugs were placed in ears and patient-specific cap was placed on head and tightened appropriately. Ruler locations were verified. Coil was placed at treatment location and stimulator was set to parameters described below. A test train was delivered and pt tolerated train. Given pt tolerance, 83 treatment trains were delivered. Pt tolerated procedure with some slight hand movement.    Motor Threshold Determination  Distance from nasion to inion: 36.0 cm  MT 1: 0 - 6.5 - 16 @ 47% on 11/22/2019  MT 2: 0 - 6.5 - 16 @ 47% on 12/24/2019    Stimulation Parameters  Frequency: 18 hz  Train Duration: 2 sec  Total pulses delivered: 2988  Inter-train interval: 20 sec  Tx Loc: 0 - eyebrows - 15  Energy: 56% (120%MT)  Trains: 83    Date MADRS IDS-SR PHQ-9   11/22/19 25 43 18   11/29/19 -- 45 18   12/6/19  45 18   12/13/19  47 20   12/20/19  48 13   12/27/19  26 9   1/10/19  22 5  · Moderate to severe MR on echo 2019   · No evidence of regurgitation on repeat echocardiogram   1/17/20  21 5           PHQ-9 SCORE 1/14/2020 1/15/2020 1/16/2020   PHQ-9 Total Score 9 7 5   Some encounter information is confidential and restricted. Go to Review Flowsheets activity to see all data.       Doris Jiménez  [facility]  Neuromodulation Clinic    Plan   - Cont TMS    I did not see patient but remained available in the clinic throughout the TMS session    Jennifer Perez MD  [facility]  Mental Health Neuromodulation

## 2020-09-16 NOTE — TELEPHONE ENCOUNTER
RECORDS RECEIVED FROM: Internal/Care Everywhere   DATE RECEIVED: 9-24   NOTES STATUS DETAILS   OFFICE NOTE from referring provider    Internal Dr. Bradford 6-5-20   OFFICE NOTE from other cardiologist    N/A    DISCHARGE SUMMARY from hospital    N/A    DISCHARGE REPORT from the ER   Care Everywhere 5-20-20   OPERATIVE REPORT    N/A    MEDICATION LIST   Internal    LABS     BMP   Internal 6-5-20   CBC   Internal 6-5-20   CMP   N/A 5-20-20   Lipids   Internal 4-15-19   TSH   Internal 6-5-20   DIAGNOSTIC PROCEDURES     EKG   In process 5-20-20   Monitor Reports   N/A    IMAGING (DISC & REPORT)      Echo   N/A    Stress Tests   In process 5-20-20   Cath   N/A    MRI/MRA   N/A    CT/CTA   N/A      Action    Action Taken 9-16: Requested from HE:    NM Stress test     EKG Strips   5-20-20 5-20-20 9-18: sent EKG and stress test to scanning

## 2020-09-24 ENCOUNTER — PRE VISIT (OUTPATIENT)
Dept: CARDIOLOGY | Facility: CLINIC | Age: 50
End: 2020-09-24

## 2020-09-24 ENCOUNTER — VIRTUAL VISIT (OUTPATIENT)
Dept: CARDIOLOGY | Facility: CLINIC | Age: 50
End: 2020-09-24
Attending: INTERNAL MEDICINE
Payer: MEDICARE

## 2020-09-24 DIAGNOSIS — R55 SYNCOPE AND COLLAPSE: Primary | ICD-10-CM

## 2020-09-24 PROCEDURE — 99203 OFFICE O/P NEW LOW 30 MIN: CPT | Mod: 95 | Performed by: INTERNAL MEDICINE

## 2020-09-24 NOTE — PATIENT INSTRUCTIONS
You were seen in the Electrophysiology Clinic today by: Dr. Campo    Plan:     Medication Changes: None.    Labs/Tests Needed: None.    Follow up visit: 6 months with Dr. Campo.    Further Instructions:    Take your blood pressures once a week for the next week months. Please notify us if you have consistent blood pressures over 130/90.    Increase your salt, electrolyte, and fluid intake as follows:   Avoid salt tablets, instead, explore salt supplementation: Nuun tablets, Vitassium tablets, or add extra salt on meals. You can have a maximum of 6g of salt (not sodium) per day. You may need less, so experiment.  Try to drink at least 60 ounces fluids per day (minimum). 1/2 of this intake should be an electrolyte solution, and the other 1/2 should be water.  -You need to double this intake (120oz minmum) in warmer/hotter weather, or during the summer months.  -Examples of electrolyte fluids: Propel, full strength Pedialyte/powder, Gatorade, Powerade.   We recommend sugar-free versions, especially if diabetic.    Your Care Team:   Cardiology   Telephone Number     Janene Vergara RN [phone]     For scheduling appts or procedures:    Magdalena Mccann   [phone]   For the Device Clinic (Pacemakers, ICDs, Loop Recorders)    During business hours: [phone]  After business hours:   [phone]- select option 4 and ask for job code [phone].       Cardiovascular Clinic:   [address] [place] [address]. [place] [address]      As always, Thank you for trusting us with your health care needs!

## 2020-09-24 NOTE — LETTER
"9/24/2020      RE: Mariaelena Jean Baptiste  [address]  [place] [address]       Dear Colleague,    Thank you for the opportunity to participate in the care of your patient, Mariaelena Jean Baptiste, at the [facility]. Please see a copy of my visit note below.    Mariaelena Jean Baptiste is a 49 year old adult who is being evaluated via a billable video visit.      The patient has been notified of following:     \"This video visit will be conducted via a call between you and your physician/provider. We have found that certain health care needs can be provided without the need for an in-person physical exam.  This service lets us provide the care you need with a video conversation.  If a prescription is necessary we can send it directly to your pharmacy.  If lab work is needed we can place an order for that and you can then stop by our lab to have the test done at a later time.    Video visits are billed at different rates depending on your insurance coverage.  Please reach out to your insurance provider with any questions.    If during the course of the call the physician/provider feels a video visit is not appropriate, you will not be charged for this service.\"    Patient has given verbal consent for Video visit? Yes  How would you like to obtain your AVS? MyChart  If you are dropped from the video visit, the video invite should be resent to: Text to cell phone: [phone]  Will anyone else be joining your video visit? No      HPI:   Mariaelena is a pleasant 49-year-old woman who was kindly referred by Dr. Bradford for evaluation of  2 collapse events during this past year.  The first episode occurred in April while Mariaelena was volunteering at a [employer] [facility] center.  She apparently began to feel hot and very sweaty and nauseated.  She went outside to try to cool off but found that that she was quite faint.  Was somewhat later she laid down and rested for about 4 " hours before driving home.  The event was marked by substantial fatigue that lasted about 4 days.  She felt as though she had experienced a bad bout of the flu but in fact had no predisposing or preceding illness that she is aware of.  There was no evident trigger.  The history strongly supports the diagnosis of vasovagal syncope.    The second episode occurred in July of this year during nighttime hours.  Mariaelena apparently got up from bed to go to the bathroom and had very similar sensations of nausea and feeling hot and sweaty.  On this occasion she did call emergency and was treated with fluids.  When EMT arrived her blood pressure was slowing she did experience vomiting has also summarized in Dr. Bradford's note below. Mariaelena believes that the episode was less severe because she received medical intervention.  Once again the history tends to support a vasovagal component although in this case perhaps triggered by an abrupt orthostatic change.     Mariaelena has no prior episodes of syncope or collapse.  There is no family history of such symptoms.  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    All his past medical history includes depression and PTSD.  She is also been treated for essential hypertension with lisinopril but this medicine was stopped by Dr. Bradford after the July collapse event.    Review of current medications does not reveal any drugs that might be expected to cause these 2 events.    Based on the patient's history I discussed with her the role of electrolyte fluids and increased salt intake.  I note that Dr. Bradford had already recommended increase hydration but may have been somewhat tentative regarding electrolytes and salt given the patient's hypertension history.  Nonetheless I believe that we can increase her salt intake somewhat but I have  "asked her to use her home blood pressure cuff to monitor her blood pressure over the next few months.  I gave her limits of 130 and 90 to be aware of and to let us know if her blood pressure start to climb again into those ranges.    Inasmuch as Mariaelena is only had 2 episodes I did not recommend any autonomic function testing at this time.  I suggested to her that if simple measures such as recommended here did not prove effective and she has recurrence of her vasovagal faints them waiting void arrange such studies.      From Dr Bradford's note re July and April  events  When they arrived, BP 70/40 and .  Started IV.  She vomited.  Given zofran.  Got EKG and stress test.  Symptoms were ongoing when she reached the hospital.     Previous episode ~2 months ago. 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  Had profuse sweating x4 hrs.  Unable to sit upright.  Felt \"incapacited\" for 3 days.  Felt like severe stomach flu.  Slept for 3 days.  Also felt \"gaggy\" but not nausea.  Did have diarrhea after.   Tends to not eat in the morning, but usually good at staying hydrated        PAST MEDICAL HISTORY:  Past Medical History:   Diagnosis Date     Anxiety 1988     Chronic osteoarthritis      Depressive disorder      Dissociative identity disorder (H)      Gastro-oesophageal reflux disease      Migraines      PTSD (post-traumatic stress disorder)      Social phobia        CURRENT MEDICATIONS:  Current Outpatient Medications   Medication Sig Dispense Refill     amphetamine-dextroamphetamine (ADDERALL) 10 MG tablet Take 1 tablet (10 mg) by mouth 2 times daily 60 tablet 0     ibuprofen (ADVIL,MOTRIN) 800 MG tablet Take 800 mg by mouth       lamoTRIgine (LAMICTAL) 150 MG tablet Take 1 tablet (150 mg) by mouth daily 30 tablet 2     levomilnacipran (FETZIMA ER) 120 MG 24 hr capsule Take 1 capsule (120 mg) by mouth daily 30 capsule 2     multivitamin, therapeutic (THERA-VIT) TABS tablet Take 1 tablet by mouth daily       " QUEtiapine (SEROQUEL) 25 MG tablet Take 2 tablets (50 mg) by mouth At Bedtime 60 tablet 2     vilazodone (VIIBRYD) 40 MG TABS tablet Take 1 tablet (40 mg) by mouth daily 30 tablet 2     VITAMIN D, CHOLECALCIFEROL, PO Take 1,000 Units by mouth 2 times daily        diclofenac (VOLTAREN) 1 % GEL topical gel Apply 4 grams to knees or 2 grams to hands four times daily using enclosed dosing card. (Patient not taking: Reported on 2020) 100 g 1     MAGNESIUM OXIDE PO          PAST SURGICAL HISTORY:  Past Surgical History:   Procedure Laterality Date     CHOLECYSTECTOMY       LAPAROSCOPIC TUBAL LIGATION       rectal prolapse repair         ALLERGIES:   No Known Allergies    FAMILY HISTORY:  Family History   Problem Relation Age of Onset     Depression Father      Hypertension Father      Substance Abuse Father      Cancer Father         non hodgkins lymphoma     Depression Sister      Cancer Maternal Grandmother         breast cancer (mastectomy in 40's), melanoma, leukemia     Melanoma Maternal Grandmother      Cancer Maternal Grandfather         leukemia     Substance Abuse Maternal Grandfather      Cancer Paternal Grandmother         lung cancer, nonsmoker     Substance Abuse Brother      Substance Abuse Sister      Skin Cancer No family hx of        SOCIAL HISTORY:  Social History     Tobacco Use     Smoking status: Former Smoker     Packs/day: 1.00     Years: 20.00     Pack years: 20.00     Types: Cigarettes, Other     Start date: 1995     Last attempt to quit: 10/23/2017     Years since quittin.9     Smokeless tobacco: Former User     Tobacco comment: E-cig    Substance Use Topics     Alcohol use: No     Drug use: No       ROS:   Constitutional: No fever, chills, or sweats. Weight stable.   ENT: No visual disturbance, ear ache, epistaxis, sore throat.   Cardiovascular: As per HPI.   Respiratory: No cough, hemoptysis.    GI: No nausea, vomiting, hematemesis.  Additional history as above in HPI  : No  hematuria.    Integument: Negative.   Psychiatric: Negative.   Hematologic:  Easy bruising, no easy bleeding.  Neuro: Negative.   Endocrinology: No significant heat or cold intolerance   Musculoskeletal: No myalgia.    Exam:  GENERAL APPEARANCE: healthy, alert and no distress    RESPIRATORY:  no rales, rhonchi or wheezes, no use of accessory muscles, no retractions, respirations are unlabored, normal respiratory rate  CARDIOVASCULAR: regular rhythm, normal S1 with physiologic split S2, no S3 or S4 and no murmur, click or rub, precordium quiet with normal PMI.  ABDOMEN: soft, non tender, without hepatosplenomegaly, no masses palpable, bowel sounds normal, aorta not enlarged by palpation, no abdominal bruits  EXTREMITIES:  no edema, no bruits  NEURO: alert and oriented to person/place/time, normal speech, gait and affect  Labs:  CBC RESULTS:   Lab Results   Component Value Date    WBC 5.7 06/05/2020    RBC 5.05 06/05/2020    HGB 14.6 06/05/2020    HCT 46.7 06/05/2020    MCV 93 06/05/2020    MCH 28.9 06/05/2020    MCHC 31.3 (L) 06/05/2020    RDW 12.5 06/05/2020     (H) 06/05/2020       BMP RESULTS:  Lab Results   Component Value Date     06/05/2020    POTASSIUM 4.4 06/05/2020    CHLORIDE 107 06/05/2020    CO2 25 06/05/2020    ANIONGAP 6 06/05/2020    GLC 94 06/05/2020    BUN 15 06/05/2020    CR 0.77 06/05/2020    GFRESTIMATED >90 06/05/2020    GFRESTBLACK >90 06/05/2020    ARUN 9.0 06/05/2020        INR RESULTS:  No results found for: INR    Procedures:  PULMONARY FUNCTION TESTS:   No flowsheet data found.      ECHOCARDIOGRAM:   No results found for this or any previous visit (from the past 8760 hour(s)).      Assessment and Plan:   1.  2 syncopal events almost certainly vasovagal in nature without prior history of faints or collapse  2.  History of hypertension    Plan  1.  Today I spent approximately 15 minutes discussing the nature of the collapse events and generally innocent prognosis but importance of  avoiding falls and injuring.  We discussed the roles and limitations of increasing electrolyte and salt in the diet.  We will defer any laboratory testing until we determine whether basic measurements described above are adequate  2.  Patient to monitor her blood pressure at home with the limits noted in HPI  3.  Follow-up assessment in 6 months time or earlier if patient contacts us by my chart with blood pressure issues or recurrent collapse events.  Please provide a contact my chart address    Video on 11: 00am; video of 11: 25; elapsed 25 minutes  Platform DOXIMITY  Patient at home; clinic [facility] heart    I very much appreciated the opportunity to see and assess Mariaelena Jean Baptiste in the clinic today. Please do not hesitate to contact my office if you have any questions or concerns.      Ihsan Campo MD  Cardiac Arrhythmia Service  [facility]  [phone]        Vitals - Patient Reported  Pain Score: No Pain (0)      Please do not hesitate to contact me if you have any questions/concerns.     Sincerely,     Ihsan Campo MD

## 2020-09-24 NOTE — PROGRESS NOTES
"Mariaelena Jean Baptiste is a 49 year old adult who is being evaluated via a billable video visit.      The patient has been notified of following:     \"This video visit will be conducted via a call between you and your physician/provider. We have found that certain health care needs can be provided without the need for an in-person physical exam.  This service lets us provide the care you need with a video conversation.  If a prescription is necessary we can send it directly to your pharmacy.  If lab work is needed we can place an order for that and you can then stop by our lab to have the test done at a later time.    Video visits are billed at different rates depending on your insurance coverage.  Please reach out to your insurance provider with any questions.    If during the course of the call the physician/provider feels a video visit is not appropriate, you will not be charged for this service.\"    Patient has given verbal consent for Video visit? Yes  How would you like to obtain your AVS? MyChart  If you are dropped from the video visit, the video invite should be resent to: Text to cell phone: [phone]  Will anyone else be joining your video visit? No      HPI:   Mariaelena is a pleasant 49-year-old woman who was kindly referred by Dr. Bradford for evaluation of  2 collapse events during this past year.  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  She apparently began to feel hot and very sweaty and nauseated.  She went outside to try to cool off but found that that she was quite faint.  Was somewhat later she laid down and rested for about 4 hours before driving home.  The event was marked by substantial fatigue that lasted about 4 days.  She felt as though she had experienced a bad bout of the flu but in fact had no predisposing or preceding illness that she is aware of.  There was no evident trigger.  The history strongly supports the diagnosis of vasovagal " syncope.    The second episode occurred in July of this year during nighttime hours.  Mariaelena apparently got up from bed to go to the bathroom and had very similar sensations of nausea and feeling hot and sweaty.  On this occasion she did call emergency and was treated with fluids.  When EMT arrived her blood pressure was slowing she did experience vomiting has also summarized in Dr. Bradford's note below. Mariaelena believes that the episode was less severe because she received medical intervention.  Once again the history tends to support a vasovagal component although in this case perhaps triggered by an abrupt orthostatic change.     Mariaelena has no prior episodes of syncope or collapse.  There is no family history of such symptoms.  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    All his past medical history includes depression and PTSD.  She is also been treated for essential hypertension with lisinopril but this medicine was stopped by Dr. Bradofrd after the July collapse event.    Review of current medications does not reveal any drugs that might be expected to cause these 2 events.    Based on the patient's history I discussed with her the role of electrolyte fluids and increased salt intake.  I note that Dr. Bradford had already recommended increase hydration but may have been somewhat tentative regarding electrolytes and salt given the patient's hypertension history.  Nonetheless I believe that we can increase her salt intake somewhat but I have asked her to use her home blood pressure cuff to monitor her blood pressure over the next few months.  I gave her limits of 130 and 90 to be aware of and to let us know if her blood pressure start to climb again into those ranges.    Inasmuch as Mariaelena is only had 2 episodes I did not recommend any autonomic function testing  "at this time.  I suggested to her that if simple measures such as recommended here did not prove effective and she has recurrence of her vasovagal faints them waiting void arrange such studies.      From Dr Bradford's note re July and April  events  When they arrived, BP 70/40 and .  Started IV.  She vomited.  Given zofran.  Got EKG and stress test.  Symptoms were ongoing when she reached the hospital.     Previous episode ~2 months ago. 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  Had profuse sweating x4 hrs.  Unable to sit upright.  Felt \"incapacited\" for 3 days.  Felt like severe stomach flu.  Slept for 3 days.  Also felt \"gaggy\" but not nausea.  Did have diarrhea after.   Tends to not eat in the morning, but usually good at staying hydrated        PAST MEDICAL HISTORY:  Past Medical History:   Diagnosis Date     Anxiety 1988     Chronic osteoarthritis      Depressive disorder      Dissociative identity disorder (H)      Gastro-oesophageal reflux disease      Migraines      PTSD (post-traumatic stress disorder)      Social phobia        CURRENT MEDICATIONS:  Current Outpatient Medications   Medication Sig Dispense Refill     amphetamine-dextroamphetamine (ADDERALL) 10 MG tablet Take 1 tablet (10 mg) by mouth 2 times daily 60 tablet 0     ibuprofen (ADVIL,MOTRIN) 800 MG tablet Take 800 mg by mouth       lamoTRIgine (LAMICTAL) 150 MG tablet Take 1 tablet (150 mg) by mouth daily 30 tablet 2     levomilnacipran (FETZIMA ER) 120 MG 24 hr capsule Take 1 capsule (120 mg) by mouth daily 30 capsule 2     multivitamin, therapeutic (THERA-VIT) TABS tablet Take 1 tablet by mouth daily       QUEtiapine (SEROQUEL) 25 MG tablet Take 2 tablets (50 mg) by mouth At Bedtime 60 tablet 2     vilazodone (VIIBRYD) 40 MG TABS tablet Take 1 tablet (40 mg) by mouth daily 30 tablet 2     VITAMIN D, CHOLECALCIFEROL, PO Take 1,000 Units by mouth 2 times daily        diclofenac (VOLTAREN) 1 % GEL topical gel Apply 4 grams to knees " or 2 grams to hands four times daily using enclosed dosing card. (Patient not taking: Reported on 2020) 100 g 1     MAGNESIUM OXIDE PO          PAST SURGICAL HISTORY:  Past Surgical History:   Procedure Laterality Date     CHOLECYSTECTOMY       LAPAROSCOPIC TUBAL LIGATION       rectal prolapse repair         ALLERGIES:   No Known Allergies    FAMILY HISTORY:  Family History   Problem Relation Age of Onset     Depression Father      Hypertension Father      Substance Abuse Father      Cancer Father         non hodgkins lymphoma     Depression Sister      Cancer Maternal Grandmother         breast cancer (mastectomy in 40's), melanoma, leukemia     Melanoma Maternal Grandmother      Cancer Maternal Grandfather         leukemia     Substance Abuse Maternal Grandfather      Cancer Paternal Grandmother         lung cancer, nonsmoker     Substance Abuse Brother      Substance Abuse Sister      Skin Cancer No family hx of        SOCIAL HISTORY:  Social History     Tobacco Use     Smoking status: Former Smoker     Packs/day: 1.00     Years: 20.00     Pack years: 20.00     Types: Cigarettes, Other     Start date: 1995     Last attempt to quit: 10/23/2017     Years since quittin.9     Smokeless tobacco: Former User     Tobacco comment: E-cig    Substance Use Topics     Alcohol use: No     Drug use: No       ROS:   Constitutional: No fever, chills, or sweats. Weight stable.   ENT: No visual disturbance, ear ache, epistaxis, sore throat.   Cardiovascular: As per HPI.   Respiratory: No cough, hemoptysis.    GI: No nausea, vomiting, hematemesis.  Additional history as above in HPI  : No hematuria.    Integument: Negative.   Psychiatric: Negative.   Hematologic:  Easy bruising, no easy bleeding.  Neuro: Negative.   Endocrinology: No significant heat or cold intolerance   Musculoskeletal: No myalgia.    Exam:  GENERAL APPEARANCE: healthy, alert and no distress    RESPIRATORY:  no rales, rhonchi or wheezes, no use  of accessory muscles, no retractions, respirations are unlabored, normal respiratory rate  CARDIOVASCULAR: regular rhythm, normal S1 with physiologic split S2, no S3 or S4 and no murmur, click or rub, precordium quiet with normal PMI.  ABDOMEN: soft, non tender, without hepatosplenomegaly, no masses palpable, bowel sounds normal, aorta not enlarged by palpation, no abdominal bruits  EXTREMITIES:  no edema, no bruits  NEURO: alert and oriented to person/place/time, normal speech, gait and affect  Labs:  CBC RESULTS:   Lab Results   Component Value Date    WBC 5.7 06/05/2020    RBC 5.05 06/05/2020    HGB 14.6 06/05/2020    HCT 46.7 06/05/2020    MCV 93 06/05/2020    MCH 28.9 06/05/2020    MCHC 31.3 (L) 06/05/2020    RDW 12.5 06/05/2020     (H) 06/05/2020       BMP RESULTS:  Lab Results   Component Value Date     06/05/2020    POTASSIUM 4.4 06/05/2020    CHLORIDE 107 06/05/2020    CO2 25 06/05/2020    ANIONGAP 6 06/05/2020    GLC 94 06/05/2020    BUN 15 06/05/2020    CR 0.77 06/05/2020    GFRESTIMATED >90 06/05/2020    GFRESTBLACK >90 06/05/2020    ARUN 9.0 06/05/2020        INR RESULTS:  No results found for: INR    Procedures:  PULMONARY FUNCTION TESTS:   No flowsheet data found.      ECHOCARDIOGRAM:   No results found for this or any previous visit (from the past 8760 hour(s)).      Assessment and Plan:   1.  2 syncopal events almost certainly vasovagal in nature without prior history of faints or collapse  2.  History of hypertension    Plan  1.  Today I spent approximately 15 minutes discussing the nature of the collapse events and generally innocent prognosis but importance of avoiding falls and injuring.  We discussed the roles and limitations of increasing electrolyte and salt in the diet.  We will defer any laboratory testing until we determine whether basic measurements described above are adequate  2.  Patient to monitor her blood pressure at home with the limits noted in HPI  3.  Follow-up  assessment in 6 months time or earlier if patient contacts us by my chart with blood pressure issues or recurrent collapse events.  Please provide a contact my chart address    Video on 11: 00am; video of 11: 25; elapsed 25 minutes  Platform DOXIMITY  Patient at home; clinic [facility] heart    I very much appreciated the opportunity to see and assess Mariaelena Jean Baptiste in the clinic today. Please do not hesitate to contact my office if you have any questions or concerns.      Ihsan Campo MD  Cardiac Arrhythmia Service  [facility]  [phone]      CC  SELF, REFERRED    Vitals - Patient Reported  Pain Score: No Pain (0)
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
## 2020-10-13 ENCOUNTER — VIRTUAL VISIT (OUTPATIENT)
Dept: PSYCHIATRY | Facility: CLINIC | Age: 50
End: 2020-10-13
Attending: PSYCHIATRY & NEUROLOGY
Payer: MEDICARE

## 2020-10-13 DIAGNOSIS — F33.1 MODERATE EPISODE OF RECURRENT MAJOR DEPRESSIVE DISORDER (H): ICD-10-CM

## 2020-10-13 PROCEDURE — 99214 OFFICE O/P EST MOD 30 MIN: CPT | Mod: GC | Performed by: STUDENT IN AN ORGANIZED HEALTH CARE EDUCATION/TRAINING PROGRAM

## 2020-10-13 RX ORDER — DEXTROAMPHETAMINE SACCHARATE, AMPHETAMINE ASPARTATE, DEXTROAMPHETAMINE SULFATE AND AMPHETAMINE SULFATE 2.5; 2.5; 2.5; 2.5 MG/1; MG/1; MG/1; MG/1
10 TABLET ORAL 2 TIMES DAILY
Qty: 60 TABLET | Refills: 0 | Status: SHIPPED | OUTPATIENT
Start: 2020-10-13 | End: 2020-12-01

## 2020-10-13 NOTE — PROGRESS NOTES
"VIDEO VISIT  Mariaelena Jean Baptiste is a 49 year old patient who is being evaluated via a billable video visit.      The patient has been notified of following:   \"This video visit will be conducted via a call between you and your physician/provider. We have found that certain health care needs can be provided without the need for an in-person physical exam. This service lets us provide the care you need with a video conversation. If a prescription is necessary we can send it directly to your pharmacy. If lab work is needed we can place an order for that and you can then stop by our lab to have the test done at a later time. Insurers are generally covering virtual visits as they would in-office visits so billing should not be different than normal.  If for some reason you do get billed incorrectly, you should contact the billing office to correct it and that number is in the AVS .    Video Conference to be completed via:  [email or web address]    Patient has given verbal consent for video visit?:  Yes    Patient would prefer that any video invitations be sent by: Text to cell phone: [phone]      How would patient like to obtain AVS?:  TripOvation    AVS SmartPhrase [PsychAVS] has been placed in 'Patient Instructions':  Yes    "

## 2020-11-17 ENCOUNTER — TELEPHONE (OUTPATIENT)
Dept: PSYCHIATRY | Facility: CLINIC | Age: 50
End: 2020-11-17

## 2020-11-17 NOTE — TELEPHONE ENCOUNTER
On 11/17/2020, 3 pages was received via email ([email or web address]) regarding forms for SSDI - [employer]. The forms are being worked on in Nurse Triage.Martha Arriaga LPN

## 2020-11-24 ENCOUNTER — MYC MEDICAL ADVICE (OUTPATIENT)
Dept: INTERNAL MEDICINE | Facility: CLINIC | Age: 50
End: 2020-11-24

## 2020-11-24 DIAGNOSIS — Z20.822 EXPOSURE TO COVID-19 VIRUS: Primary | ICD-10-CM

## 2020-11-25 DIAGNOSIS — Z20.822 EXPOSURE TO COVID-19 VIRUS: ICD-10-CM

## 2020-11-25 PROCEDURE — U0003 INFECTIOUS AGENT DETECTION BY NUCLEIC ACID (DNA OR RNA); SEVERE ACUTE RESPIRATORY SYNDROME CORONAVIRUS 2 (SARS-COV-2) (CORONAVIRUS DISEASE [COVID-19]), AMPLIFIED PROBE TECHNIQUE, MAKING USE OF HIGH THROUGHPUT TECHNOLOGIES AS DESCRIBED BY CMS-2020-01-R: HCPCS | Performed by: INTERNAL MEDICINE

## 2020-11-25 NOTE — TELEPHONE ENCOUNTER
Signed forms were returned to this writer and faxed to [employer] at [phone]. The original copies were sent to scanning and copies are held in Psych until scanning is complete.Martha Arriaga LPN

## 2020-11-26 LAB
SARS-COV-2 RNA SPEC QL NAA+PROBE: NOT DETECTED
SPECIMEN SOURCE: NORMAL

## 2020-11-30 NOTE — PROGRESS NOTES
"VIDEO VISIT  Mariaelena Jean Baptiste is a 49 year old patient who is being evaluated via a billable video visit.      The patient has been notified of following:   \"We have found that certain health care needs can be provided without the need for an in-person physical exam. This service lets us provide the care you need with a video conversation. If a prescription is necessary we can send it directly to your pharmacy. If lab work is needed we can place an order for that and you can then stop by our lab to have the test done at a later time. Insurers are generally covering virtual visits as they would in-office visits so billing should not be different than normal.  If for some reason you do get billed incorrectly, you should contact the billing office to correct it and that number is in the AVS .    Patient has given verbal consent for video visit?: Yes   How would you like to obtain your AVS?: [employer]  AVS SmartPhrase [PsychAVS] has been placed in 'Patient Instructions': Yes      Video- Visit Details  Type of service:  video visit for medication management  Time of service:    Date:  12/1/2020    Video Start Time:  9:33 AM        Video End Time:  10:11 AM    Reason for video visit:  Patient unable to travel due to Covid-19  Originating Site (patient location):  [facility] [place]   Location- Patient's home  Distant Site (provider location):  Remote location  Mode of Communication:  Video Conference via D[employer]  Consent:  Patient has given verbal consent for video visit?: Yes        [facility]  Psychiatry Clinic  PSYCHIATRIC PROGRESS NOTE     SOCIAL SECURITY DISABILITY SUPPORT STATEMENT is below    Date of initial Diagnostic Assessment (DA) is 2/18/16 and most recent Transfer of Care DA is 7/31/19.    CARE TEAM:  PCP- Thalia Bradford    Specialty Providers- none    Therapist- Julieta Gill PsyD (sees every other week)    Community  Team- none           Mariaelena Jean Baptiste is a 49 year old non-binary person " "who prefers the name 'Mariaelena' & is ok with the pronouns she, her, herself (pt expresses lack of preference for pronouns and feels that discovering the term 'non-binary' has been enough for her).      Pertinent Background:  Mariaelena first experienced mental health issues as a young adult and has received treatment for treatment-resistant depression, BPD with severe self harm, and PTSD. Notably, both naltrexone and clozapine have reduced SIB immensely, with return when taper off has been initiated in the past (although no return of SIB to date since d/c of clozapine). She does better when she uses a light box in the Fall/Winter, best started in September as she typically experiences return of depressive symptoms in October. A taper of Lamictal was associated with decline in mood and subsequent hospitalization in the past which is a common theme for her as medication changes, even small ones, tend to be very destabilizing. A TMS series through the [facility] clinic in August 2016 was transformative in terms of ongoing remission of her depression for the following 2+ years. She has poor insight into her depressive symptoms and often times is not aware of reemerging symptoms until they become severe.    Psych critical item history includes suicide attempts {2x], suicidal ideation, severe SIB [has required skin grafts and long term hospitalization at [facility]], mutiple psychotropic trials, trauma hx, ECT, TMS, psych hosp (>5), and commitment.     INTERIM HISTORY      [4, 4]   The patient reports good treatment adherence.  History was provided by the patient who was a good historian.  The last visit ended with no change to the med regimen.     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   - Her 50th birthday is on Saturday and she is planning to spend some time at the rescue and is talking with her brother and his family about a potential socially distanced get together    RECENT PSYCH ROS:   Depression:  depressed mood and low energy  Elevated:  none  Psychosis:  none  Anxiety:  nervous/overwhelmed  Panic Attack:  none  Trauma Related:  none  Dysregulation:  none  Eating Disorder: none     Pertinent Negative Symptoms:  NO self-injurious behavior/urges, suicidal and violent ideation, psychosis and naye    RECENT SUBSTANCE USE:     Alcohol- none  Tobacco- none; quit 10/23/17  Caffeine- 1 cup coffee/day  Opioids- none          Narcan Kit- N/A   Cannabis- none   Other illicit drugs- none    CURRENT SOCIAL HISTORY:  Financial/ Work- 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 SSDI under appeal, but will continue to get her benefits until the final appeal occurs (final court date postponed due to pandemic). 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  Partner/ - Single since 2000; identifies as asexual  Children- none      Living situation- She lives with her cat (Mary- 4yo and has allergies and Jambu- kitten) in an apartment in [place] (section 8 housing).     Social/ Spiritual Support- DBT therapist, online friends, father and step mother in [place]; [place], [place] both in [place] (supportive, close with them), a few co workers and a few close friends from the [place] (Ahsan and Anika). Mariaelena grew up [place] - continues to believe in God but does  not identify with a specific Latter day   FEELS SAFE AT HOME- yes     PSYCH and CD Critical Summary Points since July 2019 7/31/19- resident transition; added PM dose of Adderall for worsening depression (has been effective), referred back to [facility] clinic for repeat TMS  11/22/19 - 2/13/20: repeat TMS series completed due to relapse of depression     * Disability forms have been completed x2 (Winter 2020 and Fall 2020) - see chart for copies    PAST MED TRIALS        See last Diagnostic Assessment  MEDICAL / SURGICAL HISTORY          Pregnant or breastfeeding- no      Contraception- s/p tubal ligation, identifies as asexual    Patient Active Problem List   Diagnosis     Suicidal ideation     Major depression, recurrent (H)     Tobacco abuse     Hx of psychiatric care     Scar       Major Surgery- cholecystectomy, s/p tubal ligation (1999), rectal prolapse repair    MEDICAL REVIEW OF SYSTEMS    [2, 10]     A comprehensive review of systems was performed and is negative other than noted in the HPI.    ALLERGY       Patient has no known allergies.     MEDICATIONS        Current Outpatient Medications   Medication Sig Dispense Refill     amphetamine-dextroamphetamine (ADDERALL) 10 MG tablet Take 1 tablet (10 mg) by mouth 2 times daily 60 tablet 0     diclofenac (VOLTAREN) 1 % GEL topical gel Apply 4 grams to knees or 2 grams to hands four times daily using enclosed dosing card. (Patient not taking: Reported on 9/24/2020) 100 g 1     ibuprofen (ADVIL,MOTRIN) 800 MG tablet Take 800 mg by mouth       lamoTRIgine (LAMICTAL) 150 MG tablet Take 1 tablet (150 mg) by mouth daily 30 tablet 2     levomilnacipran (FETZIMA ER) 120 MG 24 hr capsule Take 1 capsule (120 mg) by mouth daily 30 capsule 2     MAGNESIUM OXIDE PO        multivitamin, therapeutic (THERA-VIT) TABS tablet Take 1 tablet by mouth daily       QUEtiapine (SEROQUEL) 25 MG tablet Take 2 tablets (50 mg) by mouth At Bedtime 60 tablet 2     vilazodone (VIIBRYD) 40  MG TABS tablet Take 1 tablet (40 mg) by mouth daily 30 tablet 2     VITAMIN D, CHOLECALCIFEROL, PO Take 1,000 Units by mouth 2 times daily        VITALS      [3, 3]   There were no vitals taken for this visit. Telehealth visit.    MENTAL STATUS EXAM      [9, 14 cog gs]     Alertness: alert  and oriented  Appearance: adequately groomed  Behavior/Demeanor: cooperative and calm, tearful when discussing the loss of her car Smudge, with good eye contact   Speech: regular rate and rhythm, non-pressured  Language: intact  Psychomotor: normal or unremarkable  Mood: mildly depressed  Affect: slightly blunted and less tearful this visit; was congruent to mood; was congruent to content  Thought Process/Associations: unremarkable  Thought Content:  Reports none;  Denies suicidal and violent ideation  Perception:  Reports none;  Denies auditory hallucinations and visual hallucinations  Insight: adequate  Judgment: good  Cognition: does  appear grossly intact; formal cognitive testing was not done  Gait and Station: unable to assess    LABS and DATA     AIMS:  not needed     PHQ9 TODAY = did not complete today  PHQ-9 SCORE 1/17/2020 1/20/2020 1/21/2020   PHQ-9 Total Score 5 7 5     ANTIPSYCHOTIC LABS  [glu, A1C, lipids (perla LDL), liver enzymes, WBC, ANEU, Hgb, plts]  q12 mo  Recent Labs   Lab Test 06/05/20  1051 04/15/19  1401 02/27/17  1217 08/30/16  1031 10/08/12  2004 10/08/12  2004   GLC 94 87 93 86   < > 91   A1C  --  5.4  --   --   --  5.3    < > = values in this interval not displayed.     Recent Labs   Lab Test 04/15/19  1401 04/25/16  1117 10/09/12  0825   CHOL 191 177 206*   TRIG 42 195* 139   * 83 139*   HDL 71 55 39*     Recent Labs   Lab Test 04/15/19  1401 12/05/18  1246 02/06/17  1345 01/22/16  1852 10/06/15  0740   AST 22 19 17 19 20   ALT 23 24 17 25 37   ALKPHOS 62  --  64 63 85     Recent Labs   Lab Test 06/05/20  1051 04/15/19  1401 12/05/18  1246 06/20/16  1430 05/26/16  1414   WBC 5.7 7.5 6.5 8.8 8.9    ANEU 3.6 5.2  --  5.4 5.6   HGB 14.6 12.5 12.7 14.9 13.6   * 445 405 388 407       PSYCHOTROPIC DRUG INTERACTIONS     VILAZODONE + FETZIMA + ADDERALL + SEROQUEL may result in increased risk for serotonin syndrome.     VILAZODONE + FETZIMA may enhance the antiplatelet effect of other Agents with Antiplatelet Properties.     LAMOTRIGINE + ACETAMINOPHEN may result in decreased lamotrigine serum levels.    ADDERALL + LISINOPRIL:  amphetamines may diminish the antihypertensive effect of antihypertensive agents.    MANAGEMENT:  Monitoring for adverse effects, routine vitals and using lowest therapeutic dose of [psychotropics]    RISK STATEMENT for SAFETY     Mariaelena Jean Baptiste did not appear to be an imminent safety risk to self or others.     PSYCHIATRIC DIAGNOSIS     MDD, recurrent, moderate (treatment-resistant; h/o severe episodes)  BPD  PTSD  DID  Unspecified Eating Disorder, in remission  Insomnia, likely circadian rhythm disorder    ASSESSMENT      [m2, h3]     TODAY Mariaelena reports her depression is at baseline and she has not experienced any SI or urges for SIB since last visit. She was recently laid off from her job at [employer] due to the pandemic and has an upcoming disability hearing on 1/21 which she has been coping with reasonably well. This writer completed forms for the upcoming hearing which can be found in the chart. Her medications continue to work well for her in addition to the repeat series of TMS last Winter. There is no indication for a medication change today. Refills were provided. She remains overdue for AP monitoring labs so discussed this with her again.     Future considerations:  - refer for acupuncture if patient interested (given chronic pain) - not currently possible due to Covid     PLAN      [m2, h3]     1) PSYCHOTROPIC MEDICATIONS:  - Continue quetiapine 50 mg PRN nightly for sleep and treatment resistant depression   - Continue Adderall IR 10 mg BID (qAM + 2pm) for  augmentation of depression treatment  - Continue levomilnacipran  mg QDAY for depression  - Continue vilazodone 40 mg QDAY for depression  - Continue lamotrigine 150 mg QDAY for depression and mood stabilization  - Continue melatonin 1 mg with dinner     Other:     - Resume light box daily in Fall     2) [place] checked [place]:  indicates patient only receives Adderall from this clinic.    3) THERAPY:    - Continue biweekly therapy w/ DBT therapist Dr. Julieta Gill Psy D    4) NEXT DUE:    Labs- AP monitoring labs overdue (ordered - pt alerted to have drawn in the next 1-2 weeks)  EKG- as needed  Rating Scales- PHQ-9 at every visit    5) REFERRALS:    No Referrals needed; patient aware to call clinic social work team with any questions/concerns/or needs     6) RTC: in 6 weeks following disability hearing; sooner if needed    7) CRISIS NUMBERS:   Provided routinely in AVS   ONLY if a [facility] PT: [facility] [place] [facility] [phone] (clinic), [phone] (after hours)     TREATMENT RISK STATEMENT:  The risks, benefits, alternatives and potential adverse effects have been discussed and are understood by the pt. The pt understands the risks of using street drugs or alcohol. There are no medical contraindications, the pt agrees to treatment with the ability to do so. The pt knows to call the clinic for any problems or to access emergency care if needed.  Medical and substance use concerns are documented above.  Psychotropic drug interaction check was done, including changes made today.    SOCIAL SECURITY DISABILITY  STATEMENT  Based on the assessment today, as well as longitudinal care, this provider team strongly supports this patient's application for social security disability. When this patient is at her best, she is not able to work more than 20 hrs a week.  When she has tried to work full time (in the past) she has experienced decompensation of her mental health including worsening of mood, sleep,  energy, concentration, memory and ability to be organized. Her eating disorder worsened to the point of requiring a feeding tube and suicidal ideation increases. These symptoms make it impossible to meet deadlines, keep pace with job tasks and attend work reliably. Additionally, separate from work influences, her depression exacerbates periodically as has been the case over the last year and a half. During these periods the patient cannot even work 20 hrs a week due to the same symptoms listed above. Depression treatment consists of medications, psychotherapy and upcoming neuromodulation (TMS- transcranial magnetic stimulation). The patient is hopeful that TMS will bring relief from mood symptoms and allow her to return her maximum workability which is half time.      PROVIDER: Katharine Burnett MD    Patient staffed with attending Dr. Benito who will review and sign the note.  Supervisor is Dr. Lunsford.    TELEHEALTH ATTENDING ATTESTATION  Following the ACGME guidelines on telemedicine and direct supervision due to COVID-19, I was concurrently participating in and/or monitoring the patient care through appropriate telecommunication technology.  I discussed the key portions of the service with the resident, including the mental status examination and developing the plan of care. I reviewed key portions of the history with the resident. I agree with the findings and plan as documented in this note.   Cyndi Benito MD

## 2020-12-01 ENCOUNTER — TELEPHONE (OUTPATIENT)
Dept: PSYCHIATRY | Facility: CLINIC | Age: 50
End: 2020-12-01

## 2020-12-01 ENCOUNTER — VIRTUAL VISIT (OUTPATIENT)
Dept: PSYCHIATRY | Facility: CLINIC | Age: 50
End: 2020-12-01
Attending: PSYCHIATRY & NEUROLOGY
Payer: MEDICARE

## 2020-12-01 DIAGNOSIS — F33.1 MODERATE EPISODE OF RECURRENT MAJOR DEPRESSIVE DISORDER (H): ICD-10-CM

## 2020-12-01 PROCEDURE — 99214 OFFICE O/P EST MOD 30 MIN: CPT | Mod: GC | Performed by: STUDENT IN AN ORGANIZED HEALTH CARE EDUCATION/TRAINING PROGRAM

## 2020-12-01 RX ORDER — DEXTROAMPHETAMINE SACCHARATE, AMPHETAMINE ASPARTATE, DEXTROAMPHETAMINE SULFATE AND AMPHETAMINE SULFATE 2.5; 2.5; 2.5; 2.5 MG/1; MG/1; MG/1; MG/1
10 TABLET ORAL 2 TIMES DAILY
Qty: 60 TABLET | Refills: 0 | Status: SHIPPED | OUTPATIENT
Start: 2020-12-01 | End: 2021-04-06

## 2020-12-01 RX ORDER — VILAZODONE HYDROCHLORIDE 40 MG/1
40 TABLET ORAL DAILY
Qty: 30 TABLET | Refills: 2 | Status: SHIPPED | OUTPATIENT
Start: 2020-12-01 | End: 2021-03-08

## 2020-12-01 RX ORDER — QUETIAPINE FUMARATE 25 MG/1
50 TABLET, FILM COATED ORAL AT BEDTIME
Qty: 60 TABLET | Refills: 2 | Status: SHIPPED | OUTPATIENT
Start: 2020-12-01 | End: 2021-03-08

## 2020-12-01 RX ORDER — LAMOTRIGINE 150 MG/1
150 TABLET ORAL DAILY
Qty: 30 TABLET | Refills: 2 | Status: SHIPPED | OUTPATIENT
Start: 2020-12-01 | End: 2021-03-08

## 2020-12-01 NOTE — TELEPHONE ENCOUNTER
Writer received incoming call from Miranda from [employer] asking writer to send forms to a different fax - [phone]. Writer agreed and re-faxed forms to [phone].Martha Arriaga LPN

## 2020-12-01 NOTE — TELEPHONE ENCOUNTER
On December 1, 2020, at 9:03 AM, writer called patient at [phone] to confirm Virtual Visit. Writer unable to make contact with patient. Writer left detailed voice message for call back. [phone] left as call back number. Ashley Hernandez, [facility]

## 2020-12-02 NOTE — PATIENT INSTRUCTIONS
Thank you for coming to the [facility] [place] [facility].    Lab Testing:  If you had lab testing today and your results are reassuring or normal they will be mailed to you or sent through [employer] within 7 days. If the lab tests need quick action we will call you with the results. The phone number we will call with results is # [phone] (home) . If this is not the best number please call our clinic and change the number.    Medication Refills:  If you need any refills please call your pharmacy and they will contact us. Our fax number for refills is [phone]. Please allow three business for refill processing. If you need to  your refill at a new pharmacy, please contact the new pharmacy directly. The new pharmacy will help you get your medications transferred.     Scheduling:  If you have any concerns about today's visit or wish to schedule another appointment please call our office during normal business hours [phone] (8-5:00 M-F)    Contact Us:  Please call [phone] during business hours (8-5:00 M-F).  If after clinic hours, or on the weekend, please call  [phone].    Financial Assistance [phone]  [facility] Billing [phone]  Central Billing Office, [facility]: [phone]  [facility] Billing [phone]  Medical Records [phone]  [facility] Patient Bill of Rights [email or web address]       MENTAL HEALTH CRISIS NUMBERS:  For a medical emergency please call  911 or go to the nearest ER.     [facility]:   [facility] -[phone]   Crisis Residence [facility]idence -[phone]   Walk-In Counseling Center [facility] -[phone]   COPE 24/7 [place] Mobile Team -[phone] (adults)/[phone] (child)  CHILD: [facility] Care needs assessment team - [phone]      [facility]:   [facility] - [phone]   Walk-in counseling [facility]  [facility] - [phone]   Walk-in counseling [facility] - [phone]   Crisis Residence [facility] Residence - [phone]  Urgent Care Adult Mental [facility]-[phone] mobile unit/ 24/7 crisis line    National Crisis Numbers:   National Suicide Prevention Lifeline: 3-202-939-TALK (166-001-4857)  Poison Control Center - 1-438.929.7966  [employer]oMed/resources for a list of additional resources (SOS)  Trans Lifeline a hotline for transgender people 6-715-372-0107  The [employer] Project a hotline for LGBT youth 1-866.755.3689  Crisis Text Line: For any crisis 24/7   To: 403954  see www.crisistextline.org  - IF MAKING A CALL FEELS TOO HARD, send a text!         Again thank you for choosing [facility] MENTAL HEALTH & ADDICTION [facility] and please let us know how we can best partner with you to improve you and your family's health.    You may be receiving a survey regarding this appointment. We would love to have your feedback, both positive and negative. The survey is done by an external company, so your answers are anonymous.

## 2020-12-14 ENCOUNTER — OFFICE VISIT (OUTPATIENT)
Dept: URGENT CARE | Facility: URGENT CARE | Age: 50
End: 2020-12-14
Payer: MEDICARE

## 2020-12-14 VITALS
DIASTOLIC BLOOD PRESSURE: 96 MMHG | SYSTOLIC BLOOD PRESSURE: 151 MMHG | HEART RATE: 90 BPM | TEMPERATURE: 98.5 F | BODY MASS INDEX: 25.66 KG/M2 | OXYGEN SATURATION: 100 % | WEIGHT: 184 LBS

## 2020-12-14 DIAGNOSIS — M54.2 NECK PAIN: Primary | ICD-10-CM

## 2020-12-14 PROCEDURE — 99213 OFFICE O/P EST LOW 20 MIN: CPT | Performed by: PHYSICIAN ASSISTANT

## 2020-12-14 RX ORDER — CYCLOBENZAPRINE HCL 5 MG
5 TABLET ORAL 3 TIMES DAILY PRN
Qty: 30 TABLET | Refills: 0 | Status: SHIPPED | OUTPATIENT
Start: 2020-12-14 | End: 2021-01-15

## 2020-12-14 ASSESSMENT — PAIN SCALES - GENERAL: PAINLEVEL: EXTREME PAIN (8)

## 2020-12-14 NOTE — PROGRESS NOTES
SUBJECTIVE:  Chief Complaint   Patient presents with     Urgent Care     Back Pain     Upper back spasms ongoing for months and getting worse for last several days.      Mariaelena Jean Baptiste is a 50 year old adult who presents with a chief complaint of bilateral upper back and neck pain (L>R)  Pain has been present for several months, a chronic issue, but greatly exacerbated in the past several days.     Context:  Injury:No.  Pain exacerbated by Neck rotation Relieved by rest.  Mariaelena Jean Baptiste treated it initially with Tylenol and Ibuprofen. This is not the first time this type of injury has occurred to this patient. She denies having numbness / tingling into her extremities.   She denies having fever, chills or body aches.   Past Medical History:   Diagnosis Date     Anxiety 1988     Chronic osteoarthritis      Depressive disorder      Dissociative identity disorder (H)      Gastro-oesophageal reflux disease      Migraines      PTSD (post-traumatic stress disorder)      Social phobia      Current Outpatient Medications   Medication Sig Dispense Refill     amphetamine-dextroamphetamine (ADDERALL) 10 MG tablet Take 1 tablet (10 mg) by mouth 2 times daily 60 tablet 0     cyclobenzaprine (FLEXERIL) 5 MG tablet Take 1 tablet (5 mg) by mouth 3 times daily as needed for muscle spasms 30 tablet 0     ibuprofen (ADVIL,MOTRIN) 800 MG tablet Take 800 mg by mouth       lamoTRIgine (LAMICTAL) 150 MG tablet Take 1 tablet (150 mg) by mouth daily 30 tablet 2     levomilnacipran (FETZIMA ER) 120 MG 24 hr capsule Take 1 capsule (120 mg) by mouth daily 30 capsule 2     MAGNESIUM OXIDE PO        multivitamin, therapeutic (THERA-VIT) TABS tablet Take 1 tablet by mouth daily       QUEtiapine (SEROQUEL) 25 MG tablet Take 2 tablets (50 mg) by mouth At Bedtime 60 tablet 2     vilazodone (VIIBRYD) 40 MG TABS tablet Take 1 tablet (40 mg) by mouth daily 30 tablet 2     VITAMIN D, CHOLECALCIFEROL, PO Take 1,000 Units by mouth 2 times daily         Social History     Tobacco Use     Smoking status: Former Smoker     Packs/day: 1.00     Years: 20.00     Pack years: 20.00     Types: Cigarettes, Other     Start date: 5/1/1995     Quit date: 10/23/2017     Years since quitting: 3.1     Smokeless tobacco: Former User     Tobacco comment: E-cig    Substance Use Topics     Alcohol use: No       ROS:  Review of systems negative except as stated above.    EXAM:   BP (!) 151/96   Pulse 113   Temp 98.5  F (36.9  C) (Tympanic)   Wt 83.5 kg (184 lb)   SpO2 100%   BMI 25.66 kg/m    M/S Exam: .Trapezius muscle TTP B/L (L>R) Decreased ROM with head rotation. No midline tendernessGENERAL APPEARANCE: healthy, alert and no distress  CHEST: clear to auscultation  CV: regular rate and rhythm  EXTREMITIES: peripheral pulses normal  SKIN: no suspicious lesions or rashes  NEURO: Normal strength and tone, sensory exam grossly normal, mentation intact and speech normal      ASSESSMENT / PLAN::  1. Neck pain  Patient with chronic neck pain, worsening over the past couple days.  She is TTP over trapezius muscle.    Has done well with muscle relaxer in past. OK to use with Tylenol / IBU. Use ice as well.   Encouraged stretching and PT referral given  - cyclobenzaprine (FLEXERIL) 5 MG tablet; Take 1 tablet (5 mg) by mouth 3 times daily as needed for muscle spasms  Dispense: 30 tablet; Refill: 0  - PHYSICAL THERAPY REFERRAL; Future    Diagnosis and treatment plan was reviewed with patient and/or family.   We went over any labs or imaging. Discussed worsening symptoms or little to no relief despite treatment plan to follow-up with PCP or return to clinic.  Patient verbalizes understanding. All questions were addressed and answered.   Angela Yang PA-C

## 2020-12-14 NOTE — PATIENT INSTRUCTIONS
Patient Education     Neck Exercises: Passive Neck Rotation    To start, lie on your back, knees bent and feet flat on the floor. Keep your ears, shoulders, and hips aligned, but don t press your lower back to the floor. Rest your hands on your pelvis. Breathe deeply and relax.  Here are the steps for passive neck rotation:     With your neck relaxed, place the palm of one hand on your forehead. Use your hand to turn your head to one side until you feel a stretch in the neck muscles. Do not push through pain.    Hold for 5 seconds. Then turn to the other side.    Repeat 5 times on each side.   Note: Keep your shoulders on the floor. Don t lift your chin as you turn your head.  Ghost last reviewed this educational content on 11/1/2017 2000-2020 [employer]. [address] [place] [address], [place] [address]. All rights reserved. This information is not intended as a substitute for professional medical care. Always follow your healthcare professional's instructions.

## 2020-12-30 ENCOUNTER — THERAPY VISIT (OUTPATIENT)
Dept: PHYSICAL THERAPY | Facility: CLINIC | Age: 50
End: 2020-12-30
Attending: PHYSICIAN ASSISTANT
Payer: MEDICARE

## 2020-12-30 DIAGNOSIS — M54.2 NECK PAIN: ICD-10-CM

## 2020-12-30 PROCEDURE — 97112 NEUROMUSCULAR REEDUCATION: CPT | Mod: GP | Performed by: PHYSICAL THERAPIST

## 2020-12-30 PROCEDURE — 97110 THERAPEUTIC EXERCISES: CPT | Mod: GP | Performed by: PHYSICAL THERAPIST

## 2020-12-30 PROCEDURE — 97161 PT EVAL LOW COMPLEX 20 MIN: CPT | Mod: GP | Performed by: PHYSICAL THERAPIST

## 2020-12-30 NOTE — PROGRESS NOTES
[facility] Initial Evaluation  Subjective:  The history is provided by the patient. No  was used.   Patient Health History  Mariaelena Jean Baptiste being seen for Neck/upper back pain, L>R.     Date of Onset: ongoing for many years. Date of MD appt: 12/14/2020.   Problem occurred: Pt describes that she has had ongoing pain for many years with her neck and upper back.  Pt has been through PT in the past and wanted clarification on previous exercises and also to see if anything needs to be changed because she is still struggling with pain.  Date of MD appt 12/14/2020.   Pain is reported as 8/10 on pain scale.  General health as reported by patient is excellent.  Pertinent medical history includes: depression, migraines/headaches and osteoarthritis.                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                     Therapist Generated HPI Evaluation         Type of problem:  Cervical spine and thoracic spine.            Pain is described as aching and burning (can be sharp) and is constant.  Pain is the same all the time.  Since onset symptoms are gradually worsening.  Associated symptoms:  Headache and loss of motion/stiffness. Symptoms are exacerbated by certain positions (certain movements, but not sure)  Relieved by: massage.      Restrictions due to condition include:  Working in normal job without restrictions.  Barriers include:  None as reported by patient.                        Objective:  Standing Alignment:      Shoulder/upper extremity deviations alignment: rounded shoulders, struggles to retract scapula.                  Flexibility/Screens:     Upper Extremity:    Decreased left upper extremity flexibility at:  Pectoralis Major and Pectoralis Minor              Physical Exam    [place] Cervical Evaluation    Posture:  Sitting: fair  Standing: fair  Protruding Head: yes  Wry Neck: no  Correction  of Posture: no effect    Movement Loss:  Protrusion (PRO): nil  Flexion (Flex): nil  Retraction (RET): min  Extension (EXT): min  Lateral Flexion Right (LF R): nil  Lateral Flexion Left (LF L): mod and pain  Rotation Right (ROT R): nil  Rotation Left (ROT L): mod and pain  Test Movements:      RET:   Repeat RET: During: decreases  After: no better  Mechanical Response: IncROM                          Conclusion: other  Principle of Treatment:          Other: testing retraction in supine as it seemed to increase ROM especially rot L                                         ROS    Assessment/Plan:    Patient is a 50 year old adult with cervical complaints.    Patient has the following significant findings with corresponding treatment plan.                Diagnosis 1:  Cervical/upper back pain L>R      Pain -  hot/cold therapy, manual therapy, self management, education, directional preference exercise and home program  Decreased ROM/flexibility - manual therapy and therapeutic exercise  Decreased strength - therapeutic exercise and therapeutic activities  Impaired muscle performance - neuro re-education  Decreased function - therapeutic activities  Impaired posture - neuro re-education    Therapy Evaluation Codes:   1) History comprised of:   Personal factors that impact the plan of care:      Past/current experiences and Time since onset of symptoms.    Comorbidity factors that impact the plan of care are:      None.     Medications impacting care: None.  2) Examination of Body Systems comprised of:   Body structures and functions that impact the plan of care:      Cervical spine and Thoracic Spine.   Activity limitations that impact the plan of care are:      Lifting and Working.  3) Clinical presentation characteristics are:   Evolving/Changing.  4) Decision-Making    Moderate complexity using standardized patient assessment instrument and/or measureable assessment of functional outcome.  Cumulative Therapy  Evaluation is: Moderate complexity.    Previous and current functional limitations:  (See Goal Flow Sheet for this information)    Short term and Long term goals: (See Goal Flow Sheet for this information)     Communication ability:  Patient appears to be able to clearly communicate and understand verbal and written communication and follow directions correctly.  Treatment Explanation - The following has been discussed with the patient:   RX ordered/plan of care  Anticipated outcomes  Possible risks and side effects  This patient would benefit from PT intervention to resume normal activities.   Rehab potential is good.    Frequency:  1 X week, once daily  Duration:  for 6 weeks  Discharge Plan:  Achieve all LTG.  Independent in home treatment program.  Reach maximal therapeutic benefit.    Please refer to the daily flowsheet for treatment today, total treatment time and time spent performing 1:1 timed codes.

## 2020-12-30 NOTE — LETTER
DEPARTMENT OF HEALTH AND HUMAN SERVICES  CENTERS FOR MEDICARE & MEDICAID SERVICES    PLAN/UPDATED PLAN OF PROGRESS FOR OUTPATIENT REHABILITATION          PATIENTS NAME:  Mariaelena Jean Baptiste   : 1970  PROVIDER NUMBER:    [id number]  HICN:  [id number]   PROVIDER NAME: [facility]  MEDICAL RECORD NUMBER: [id number]   START OF CARE DATE:  SOC Date: 20   TYPE:  PT  PRIMARY/TREATMENT DIAGNOSIS: (Pertinent Medical Diagnosis)  Neck pain  VISITS FROM START OF CARE: 1 Rxs Used: 1     [facility] Initial Evaluation    Subjective:  The history is provided by the patient. No  was used.   Patient Health History  Mariaelena Jean Baptiste being seen for Neck/upper back pain, L>R.   Date of Onset: ongoing for many years.   Problem occurred: Pt describes that she has had ongoing pain for many years with her neck and upper back.  Pt has been through PT in the past and wanted clarification on previous exercises and also to see if anything needs to be changed because she is still struggling with pain.     Pain is reported as 8/10 on pain scale.  General health as reported by patient is excellent.  Pertinent medical history includes: depression, migraines/headaches and osteoarthritis.   Current occupation is [employer] - [employer], [employer] pt is currently packaging to-go orders.   Therapist Generated HPI Evaluation     Type of problem:  Cervical spine and thoracic spine.  Pain is described as aching and burning (can be sharp) and is constant.  Pain is the same all the time.  Since onset symptoms are gradually worsening.  Associated symptoms:  Headache and loss of motion/stiffness. Symptoms are exacerbated by certain positions (certain movements, but not sure)  Relieved by: massage.  Restrictions due to condition include:  Working in normal job without restrictions.  Barriers include:  None as reported by patient.  Objective:  Standing Alignment:     Shoulder/upper extremity deviations alignment: rounded shoulders, struggles to retract scapula.  Flexibility/Screens:   Upper Extremity:    Decreased left upper extremity flexibility at:  Pectoralis Major and Pectoralis Minor  Physical Exam  [place] Cervical Evaluation  Posture:  PATIENTS NAME:  Mariaelena Jean Baptiste   : 1970    Sitting: fair  Standing: fair  Protruding Head: yes  Wry Neck: no  Correction of Posture: no effect  Movement Loss:  Protrusion (PRO): nil  Flexion (Flex): nil  Retraction (RET): min  Extension (EXT): min  Lateral Flexion Right (LF R): nil  Lateral Flexion Left (LF L): mod and pain  Rotation Right (ROT R): nil  Rotation Left (ROT L): mod and pain  Test Movements:  RET:   Repeat RET: During: decreases  After: no better  Mechanical Response: IncROM  Conclusion: other  Principle of Treatment:  Other: testing retraction in supine as it seemed to increase ROM especially rot L    Assessment/Plan:    Patient is a 50 year old adult with cervical complaints.    Patient has the following significant findings with corresponding treatment plan.                Diagnosis 1:  Cervical/upper back pain L>R  Pain -  hot/cold therapy, manual therapy, self management, education, directional preference exercise and home program  Decreased ROM/flexibility - manual therapy and therapeutic exercise  Decreased strength - therapeutic exercise and therapeutic activities  Impaired muscle performance - neuro re-education  Decreased function - therapeutic activities  Impaired posture - neuro re-education  Therapy Evaluation Codes:   1) History comprised of:   Personal factors that impact the plan of care:      Past/current experiences and Time since onset of symptoms.    Comorbidity factors that impact the plan of care are:      None.     Medications impacting care: None.  2) Examination of Body Systems comprised of:   Body structures and functions that impact the plan of care:      Cervical spine and Thoracic  "Spine.   Activity limitations that impact the plan of care are:      Lifting and Working.  3) Clinical presentation characteristics are:   Evolving/Changing.  4) Decision-Making    Moderate complexity using standardized patient assessment instrument and/or measureable assessment of functional outcome.  Cumulative Therapy Evaluation is: Moderate complexity.  Previous and current functional limitations:  (See Goal Flow Sheet for this information)    Short term and Long term goals: (See Goal Flow Sheet for this information)   PATIENTS NAME:  Mariaelena Jean Baptiste   : 1970    Communication ability:  Patient appears to be able to clearly communicate and understand verbal and written communication and follow directions correctly.  Treatment Explanation - The following has been discussed with the patient:   RX ordered/plan of care  Anticipated outcomes  Possible risks and side effects  This patient would benefit from PT intervention to resume normal activities.   Rehab potential is good.  Frequency:  1 X week, once daily  Duration:  for 6 weeks  Discharge Plan:  Achieve all LTG.  Independent in home treatment program.  Reach maximal therapeutic benefit.          Caregiver Signature/Credentials _____________________________ Date ________          Matyt Albarran DPT     I have reviewed and certified the need for these services and plan of treatment while under my care.        PHYSICIAN'S SIGNATURE:   _____________________________________  Date___________                          CHAO Marcial    Certification period:  Beginning of Cert date period: 20 to  End of Cert period date: 21   Functional Level Progress Report: Please see attached \"Goal Flow sheet for Functional level.\"  ____X____ Continue Services or       ________ DC Services              Service dates: From  SOC Date: 20 date to present                         "

## 2021-01-06 ENCOUNTER — THERAPY VISIT (OUTPATIENT)
Dept: PHYSICAL THERAPY | Facility: CLINIC | Age: 51
End: 2021-01-06
Payer: MEDICARE

## 2021-01-06 DIAGNOSIS — M54.2 NECK PAIN: ICD-10-CM

## 2021-01-06 PROCEDURE — 97110 THERAPEUTIC EXERCISES: CPT | Mod: GP | Performed by: PHYSICAL THERAPIST

## 2021-01-06 PROCEDURE — 97112 NEUROMUSCULAR REEDUCATION: CPT | Mod: GP | Performed by: PHYSICAL THERAPIST

## 2021-01-06 PROCEDURE — 97140 MANUAL THERAPY 1/> REGIONS: CPT | Mod: GP | Performed by: PHYSICAL THERAPIST

## 2021-01-13 ENCOUNTER — THERAPY VISIT (OUTPATIENT)
Dept: PHYSICAL THERAPY | Facility: CLINIC | Age: 51
End: 2021-01-13
Payer: MEDICARE

## 2021-01-13 DIAGNOSIS — Z79.899 ENCOUNTER FOR LONG-TERM (CURRENT) USE OF MEDICATIONS: ICD-10-CM

## 2021-01-13 DIAGNOSIS — M54.2 NECK PAIN: ICD-10-CM

## 2021-01-13 LAB — HBA1C MFR BLD: 5.1 % (ref 0–5.6)

## 2021-01-13 PROCEDURE — 97140 MANUAL THERAPY 1/> REGIONS: CPT | Mod: GP | Performed by: PHYSICAL THERAPIST

## 2021-01-13 PROCEDURE — 97110 THERAPEUTIC EXERCISES: CPT | Mod: GP | Performed by: PHYSICAL THERAPIST

## 2021-01-13 PROCEDURE — 80061 LIPID PANEL: CPT | Performed by: STUDENT IN AN ORGANIZED HEALTH CARE EDUCATION/TRAINING PROGRAM

## 2021-01-13 PROCEDURE — 83036 HEMOGLOBIN GLYCOSYLATED A1C: CPT | Performed by: STUDENT IN AN ORGANIZED HEALTH CARE EDUCATION/TRAINING PROGRAM

## 2021-01-13 PROCEDURE — 36415 COLL VENOUS BLD VENIPUNCTURE: CPT | Performed by: STUDENT IN AN ORGANIZED HEALTH CARE EDUCATION/TRAINING PROGRAM

## 2021-01-14 ENCOUNTER — DOCUMENTATION ONLY (OUTPATIENT)
Dept: CARE COORDINATION | Facility: CLINIC | Age: 51
End: 2021-01-14

## 2021-01-14 LAB
CHOLEST SERPL-MCNC: 206 MG/DL
HDLC SERPL-MCNC: 80 MG/DL
LDLC SERPL CALC-MCNC: 118 MG/DL
NONHDLC SERPL-MCNC: 126 MG/DL
TRIGL SERPL-MCNC: 42 MG/DL

## 2021-01-15 ENCOUNTER — OFFICE VISIT (OUTPATIENT)
Dept: ORTHOPEDICS | Facility: CLINIC | Age: 51
End: 2021-01-15
Payer: MEDICARE

## 2021-01-15 ENCOUNTER — HEALTH MAINTENANCE LETTER (OUTPATIENT)
Age: 51
End: 2021-01-15

## 2021-01-15 ENCOUNTER — TELEPHONE (OUTPATIENT)
Dept: GASTROENTEROLOGY | Facility: CLINIC | Age: 51
End: 2021-01-15

## 2021-01-15 ENCOUNTER — OFFICE VISIT (OUTPATIENT)
Dept: INTERNAL MEDICINE | Facility: CLINIC | Age: 51
End: 2021-01-15
Payer: MEDICARE

## 2021-01-15 ENCOUNTER — HOSPITAL ENCOUNTER (OUTPATIENT)
Facility: AMBULATORY SURGERY CENTER | Age: 51
End: 2021-01-15
Attending: INTERNAL MEDICINE
Payer: MEDICARE

## 2021-01-15 ENCOUNTER — ANCILLARY PROCEDURE (OUTPATIENT)
Dept: GENERAL RADIOLOGY | Facility: CLINIC | Age: 51
End: 2021-01-15
Attending: FAMILY MEDICINE
Payer: MEDICARE

## 2021-01-15 VITALS
WEIGHT: 188 LBS | DIASTOLIC BLOOD PRESSURE: 89 MMHG | OXYGEN SATURATION: 100 % | HEART RATE: 122 BPM | BODY MASS INDEX: 26.22 KG/M2 | SYSTOLIC BLOOD PRESSURE: 133 MMHG

## 2021-01-15 VITALS — WEIGHT: 188 LBS | HEIGHT: 71 IN | BODY MASS INDEX: 26.32 KG/M2

## 2021-01-15 DIAGNOSIS — M54.2 CERVICALGIA: ICD-10-CM

## 2021-01-15 DIAGNOSIS — Z12.11 SCREEN FOR COLON CANCER: Primary | ICD-10-CM

## 2021-01-15 DIAGNOSIS — S46.812S STRAIN OF LEFT TRAPEZIUS MUSCLE, SEQUELA: Primary | ICD-10-CM

## 2021-01-15 DIAGNOSIS — M54.2 CERVICALGIA: Primary | ICD-10-CM

## 2021-01-15 DIAGNOSIS — Z12.11 SPECIAL SCREENING FOR MALIGNANT NEOPLASMS, COLON: ICD-10-CM

## 2021-01-15 DIAGNOSIS — S46.812S STRAIN OF LEFT TRAPEZIUS MUSCLE, SEQUELA: ICD-10-CM

## 2021-01-15 DIAGNOSIS — Z12.31 ENCOUNTER FOR SCREENING MAMMOGRAM FOR BREAST CANCER: ICD-10-CM

## 2021-01-15 DIAGNOSIS — Z23 NEED FOR VACCINATION: ICD-10-CM

## 2021-01-15 DIAGNOSIS — M54.2 NECK PAIN: ICD-10-CM

## 2021-01-15 PROCEDURE — 99214 OFFICE O/P EST MOD 30 MIN: CPT | Mod: 25 | Performed by: INTERNAL MEDICINE

## 2021-01-15 PROCEDURE — 99204 OFFICE O/P NEW MOD 45 MIN: CPT | Performed by: FAMILY MEDICINE

## 2021-01-15 PROCEDURE — G0008 ADMIN INFLUENZA VIRUS VAC: HCPCS | Performed by: INTERNAL MEDICINE

## 2021-01-15 PROCEDURE — 72040 X-RAY EXAM NECK SPINE 2-3 VW: CPT | Mod: GC | Performed by: RADIOLOGY

## 2021-01-15 PROCEDURE — 90686 IIV4 VACC NO PRSV 0.5 ML IM: CPT | Performed by: INTERNAL MEDICINE

## 2021-01-15 RX ORDER — CYCLOBENZAPRINE HCL 5 MG
5 TABLET ORAL 3 TIMES DAILY PRN
Qty: 30 TABLET | Refills: 0 | Status: SHIPPED | OUTPATIENT
Start: 2021-01-15 | End: 2021-07-20

## 2021-01-15 RX ORDER — PREDNISONE 20 MG/1
40 TABLET ORAL DAILY
Qty: 10 TABLET | Refills: 0 | Status: SHIPPED | OUTPATIENT
Start: 2021-01-15 | End: 2021-10-29

## 2021-01-15 ASSESSMENT — ANXIETY QUESTIONNAIRES
2. NOT BEING ABLE TO STOP OR CONTROL WORRYING: SEVERAL DAYS
IF YOU CHECKED OFF ANY PROBLEMS ON THIS QUESTIONNAIRE, HOW DIFFICULT HAVE THESE PROBLEMS MADE IT FOR YOU TO DO YOUR WORK, TAKE CARE OF THINGS AT HOME, OR GET ALONG WITH OTHER PEOPLE: SOMEWHAT DIFFICULT
5. BEING SO RESTLESS THAT IT IS HARD TO SIT STILL: NOT AT ALL
7. FEELING AFRAID AS IF SOMETHING AWFUL MIGHT HAPPEN: SEVERAL DAYS
GAD7 TOTAL SCORE: 8
1. FEELING NERVOUS, ANXIOUS, OR ON EDGE: MORE THAN HALF THE DAYS
6. BECOMING EASILY ANNOYED OR IRRITABLE: NOT AT ALL
3. WORRYING TOO MUCH ABOUT DIFFERENT THINGS: SEVERAL DAYS

## 2021-01-15 ASSESSMENT — PAIN SCALES - GENERAL: PAINLEVEL: EXTREME PAIN (8)

## 2021-01-15 ASSESSMENT — MIFFLIN-ST. JEOR: SCORE: 1568.89

## 2021-01-15 ASSESSMENT — PATIENT HEALTH QUESTIONNAIRE - PHQ9
5. POOR APPETITE OR OVEREATING: NEARLY EVERY DAY
SUM OF ALL RESPONSES TO PHQ QUESTIONS 1-9: 5

## 2021-01-15 NOTE — TELEPHONE ENCOUNTER
Patient is scheduled for COLONOSCOPY with Dr. LEVENTHAL    Spoke with: MOLLY    Date of Procedure: 02/17/2021    Location: [facility]    Sedation Type CS    Pre-op for Unit J MAC NOT NEEDED    (if yes advise patient they will need a pre-op prior to procedure)      Is patient on blood thinners? -NO (If yes- inform patient to follow up with PCP or provider for follow up instructions)     Informed patient they will need an adult  YES    Informed Patient of COVID Test Requirement YES    Preferred Pharmacy for Pre Prescription NA    Confirmed Nurse will call to complete assessment YES    Additional comments: NA

## 2021-01-15 NOTE — PROGRESS NOTES
CHIEF COMPLAINT:  Pain of the Left Shoulder       HISTORY OF PRESENT ILLNESS  Mariaelena Jean Baptiste is a pleasant 50 year old year old adult who presents to clinic today with pain of left shoulder and neck.      Chronic neck pain history worsening since early December.  Has had some degree of intermittent neck pain past 30 years. This has been consistent which is unlike typical pain.  Pain with neck ROM.  Pain greatest in left shoulder at trapezius into rhomboid, posteriorly, as well.  Denies any radicular pain down arm. Denies numbness /tingling.  Aching pain.    Treatment to date:  Tylenol  Ibuprofen  Cyclobenzaprine  Ice    Additional history: as documented    MEDICAL HISTORY  Patient Active Problem List   Diagnosis     Suicidal ideation     Major depression, recurrent (H)     Tobacco abuse     Hx of psychiatric care     Scar     Neck pain       Current Outpatient Medications   Medication Sig Dispense Refill     amphetamine-dextroamphetamine (ADDERALL) 10 MG tablet Take 1 tablet (10 mg) by mouth 2 times daily 60 tablet 0     cyclobenzaprine (FLEXERIL) 5 MG tablet Take 1 tablet (5 mg) by mouth 3 times daily as needed for muscle spasms 30 tablet 0     ibuprofen (ADVIL,MOTRIN) 800 MG tablet Take 800 mg by mouth       lamoTRIgine (LAMICTAL) 150 MG tablet Take 1 tablet (150 mg) by mouth daily 30 tablet 2     levomilnacipran (FETZIMA ER) 120 MG 24 hr capsule Take 1 capsule (120 mg) by mouth daily 30 capsule 2     MAGNESIUM OXIDE PO        multivitamin, therapeutic (THERA-VIT) TABS tablet Take 1 tablet by mouth daily       QUEtiapine (SEROQUEL) 25 MG tablet Take 2 tablets (50 mg) by mouth At Bedtime 60 tablet 2     vilazodone (VIIBRYD) 40 MG TABS tablet Take 1 tablet (40 mg) by mouth daily 30 tablet 2     VITAMIN D, CHOLECALCIFEROL, PO Take 1,000 Units by mouth 2 times daily          No Known Allergies    Family History   Problem Relation Age of Onset     Depression Father      Hypertension Father      Substance Abuse Father   "    Cancer Father         non hodgkins lymphoma     Depression Sister      Cancer Maternal Grandmother         breast cancer (mastectomy in 40's), melanoma, leukemia     Melanoma Maternal Grandmother      Cancer Maternal Grandfather         leukemia     Substance Abuse Maternal Grandfather      Cancer Paternal Grandmother         lung cancer, nonsmoker     Substance Abuse Brother      Substance Abuse Sister      Skin Cancer No family hx of        Additional medical/Social/Surgical histories reviewed in [facility] and updated as appropriate.     REVIEW OF SYSTEMS (1/15/2021)  A 10-point review of systems was obtained and is negative except for as noted in the HPI.      PHYSICAL EXAM  Ht 1.803 m (5' 11\")   Wt 85.3 kg (188 lb)   LMP 12/31/2020 (Approximate)   BMI 26.22 kg/m      General  - normal appearance, in no obvious distress  HEENT  - conjunctivae not injected, moist mucous membranes  CV  - normal peripheral perfusion  Pulm  - normal respiratory pattern, non-labored  Musculoskeletal - cervical spine  - inspection: normal bone and joint alignment, no obvious kyphosis  - palpation: no paravertebral or bony tenderness  - ROM: pain with rotation and sidebending  - strength: upper extremities 5/5 in all planes  - special tests:  (-) Spurling bilaterally  (-) Jenniffer's reflex  Neuro  - C5-7 DTRs 2+ bilaterally, no sensory or motor deficit, grossly normal coordination, normal muscle tone  Skin  - no ecchymosis, erythema, warmth, or induration, no obvious rash  Psych  - interactive, appropriate, normal mood and affect    IMAGING :XR Cervical . Final results and radiologist's interpretation, available in the [facility] health record. Images were reviewed with the patient/family members in the office today. My personal interpretation of the performed imaging is C5-C6 degenerative disc.      ASSESSMENT & PLAN  Mariaelena Jean Baptiste is a 50 year old year old adult who presents to clinic today with chronic left >right shoulder and neck pain.  " Physical therapy to date has not provided great relief.     Reviewed urgent care visit 12/14/20  Reviewed Dr. Bradford notes  Reviewed PT notes with Sourav Albarran    Diagnosis:   Chronic cervicalgia with radiculitis.     -XR revealing cervical DDD  -Start medrol pack   -Refill muscle relaxant  -Consideration for MRI in 2 weeks if no improvement  -Follow up 2-4 weeks    It was a pleasure seeing Mariaelena today.    Ihsan Honeycutt DO, CAQSM  Primary Care Sports Medicine

## 2021-01-15 NOTE — PROGRESS NOTES
"Assessment & Plan   Strain of left trapezius muscle, sequela  Exam is most consistent with muscular pain.  Unclear why there has been no improvement with PT so far.  Given that it is interfering with her work, will refer to sports med for additional evaluation  - Orthopedic & Spine  Referral    Syncope/Presyncope symptoms:  Current managed with laying down/elevating legs at first signs of symptoms    Need for vaccination  - FLU VAC PRESRV FREE QUAD SPLIT VIR 3+YRS IM    Encounter for screening mammogram for breast cancer  - Mammogram, routine screening    Special screening for malignant neoplasms, colon  - GASTROENTEROLOGY ADULT REF PROCEDURE ONLY      HM:  As above    30 minutes spent on the date of the encounter doing chart review, history and exam, documentation and further activities as noted above    RTC: Return in about 6 months (around 7/15/2021) for pap, in person.    Thalia Bradford MD    Chief Complaint   Mariaelena Jean Baptiste is a 50 year old adult presents for   Chief Complaint   Patient presents with     Recheck Medication     follow up and discuss muscle spasms in upper back        History of Present Illness   Neck pain that started in December.  She was seen by UC who referred to PT.  She has been going for the past month.  She is getting bad muscle spasms in her L trapezius.  It has been that specific muscle that has been an issue on and off for the past 30 years.  In the past cyclobenzaprine TID is enough to \"hit the reset button\".  PT and massage have also helped in the apst.  None of that seems to be effective this time.    PT thinks it is muscular.  She sleeps on her left side.  She has been sleeping on her back because of pain which is very unusual.    She is now working front of house instead of cooking.  Has not noticed a significant difference.  R hand dominant.  Last Saturday sent home from work because of exacerbation.    No numbness, tingling weakness.  She had a few sessions of " accupuncture prior to pandemic.    She is unsure if she has had any more of the presyncopal/syncopal.  She has had some prodromal symptoms that mirror a panic attack, but she has been able to abort the episodes by laying down and elevating her legs.      Medications and allergies were reviewed by me today.     Social History   History   Smoking Status     Former Smoker     Packs/day: 1.00     Years: 20.00     Types: Cigarettes, Other     Start date: 5/1/1995     Quit date: 10/23/2017   Smokeless Tobacco     Former User     Comment: E-cig       PHQ-2 ( 1999 Pfizer) 12/4/2019 11/19/2019   Q1: Little interest or pleasure in doing things 3 3   Q2: Feeling down, depressed or hopeless 3 3   PHQ-2 Score 6 6     PHQ-9 SCORE 1/17/2020 1/20/2020 1/21/2020   PHQ-9 Total Score 5 7 5     Past Medical History   Patient Active Problem List   Diagnosis     Suicidal ideation     Major depression, recurrent (H)     Tobacco abuse     Hx of psychiatric care     Scar     Neck pain       Review of Systems   5 point ROS completed and negative except noted above, including Gen, CV, Resp, GI, MS    Physical Exam   /89   Pulse 122   Wt 85.3 kg (188 lb)   LMP 12/31/2020 (Approximate)   SpO2 100%   Breastfeeding No   BMI 26.22 kg/m    Gen: no distress, comfortable, pleasant   Eyes: anicteric, normal extra-ocular movements   Respiratory: clear to auscultation, no wheezes or crackles, normal breath sounds   MSK: pain with palpation over the L trapezius, near the spine.  The pain is also worsened with forward flexion of the neck  Skin: no concerning lesions, no jaundice   Psychological: appropriate mood     Thalia Bradford MD

## 2021-01-15 NOTE — PROGRESS NOTES
SPORTS & ORTHOPEDIC WALK-IN VISIT 1/15/2021    Primary Care Physician: Thalia Celestin    Pain is posterior and near the spine and radiates to the neck and trap area. Has had this issue for 30 years. Is not responding to the normal treatments like it has before. Was given cyclobenzaprine at the ER and if taken 3 times a day for 3 days it will reset it but now isnt helping.      Reason for visit:     What part of your body is injured / painful?  left shoulder     What caused the injury /pain? No inciting event     How long ago did your injury occur or pain begin? 6 months     What are your most bothersome symptoms? Pain    How would you characterize your symptom?  aching, dull, stabbing and sharp    What makes your symptoms better? Ibuprofen and Tylenol and and massage     What makes your symptoms worse? Movement and Overhead motion    Have you been previously seen for this problem? Yes,     Medical History:    Any recent changes to your medical history? No    Any new medication prescribed since last visit? No    Have you had surgery on this body part before? No    Social History:    Occupation: about to go back.     Handedness: Right    Exercise:     Review of Systems:    Do you have fever, chills, weight loss? No    Do you have any vision problems? No    Do you have any chest pain or edema? No    Do you have any shortness of breath or wheezing?  No    Do you have stomach problems? No    Do you have any numbness or focal weakness? No    Do you have diabetes? No    Do you have problems with bleeding or clotting? No    Do you have an rashes or other skin lesions? No

## 2021-01-15 NOTE — NURSING NOTE
Chief Complaint   Patient presents with     Recheck Medication     follow up and discuss muscle spasms in upper back     Kimberly Nissen, EMT at 7:52 AM on 1/15/2021

## 2021-01-15 NOTE — LETTER
1/15/2021         RE: Mariaelena Jean Baptiste  [address]  [place] [address]        Dear Colleague,    Thank you for referring your patient, Mariaelena Jean Baptiste, to the [facility] [place]. Please see a copy of my visit note below.          SPORTS & ORTHOPEDIC WALK-IN VISIT 1/15/2021    Primary Care Physician: Dr. Bradford, Thalia Bob    Pain is posterior and near the spine and radiates to the neck and trap area. Has had this issue for 30 years. Is not responding to the normal treatments like it has before. Was given cyclobenzaprine at the ER and if taken 3 times a day for 3 days it will reset it but now isnt helping.      Reason for visit:     What part of your body is injured / painful?  left shoulder     What caused the injury /pain? No inciting event     How long ago did your injury occur or pain begin? 6 months     What are your most bothersome symptoms? Pain    How would you characterize your symptom?  aching, dull, stabbing and sharp    What makes your symptoms better? Ibuprofen and Tylenol and and massage     What makes your symptoms worse? Movement and Overhead motion    Have you been previously seen for this problem? Yes,     Medical History:    Any recent changes to your medical history? No    Any new medication prescribed since last visit? No    Have you had surgery on this body part before? No    Social History:    Occupation: about to go back.     Handedness: Right    Exercise:     Review of Systems:    Do you have fever, chills, weight loss? No    Do you have any vision problems? No    Do you have any chest pain or edema? No    Do you have any shortness of breath or wheezing?  No    Do you have stomach problems? No    Do you have any numbness or focal weakness? No    Do you have diabetes? No    Do you have problems with bleeding or clotting? No    Do you have an rashes or other skin lesions? No           CHIEF COMPLAINT:  Pain of the Left  Shoulder       HISTORY OF PRESENT ILLNESS  Mariaelena Jean Baptiste is a pleasant 50 year old year old adult who presents to clinic today with pain of left shoulder and neck.      Chronic neck pain history worsening since early December.  Has had some degree of intermittent neck pain past 30 years. This has been consistent which is unlike typical pain.  Pain with neck ROM.  Pain greatest in left shoulder at trapezius into rhomboid, posteriorly, as well.  Denies any radicular pain down arm. Denies numbness /tingling.  Aching pain.    Treatment to date:  Tylenol  Ibuprofen  Cyclobenzaprine  Ice    Additional history: as documented    MEDICAL HISTORY  Patient Active Problem List   Diagnosis     Suicidal ideation     Major depression, recurrent (H)     Tobacco abuse     Hx of psychiatric care     Scar     Neck pain       Current Outpatient Medications   Medication Sig Dispense Refill     amphetamine-dextroamphetamine (ADDERALL) 10 MG tablet Take 1 tablet (10 mg) by mouth 2 times daily 60 tablet 0     cyclobenzaprine (FLEXERIL) 5 MG tablet Take 1 tablet (5 mg) by mouth 3 times daily as needed for muscle spasms 30 tablet 0     ibuprofen (ADVIL,MOTRIN) 800 MG tablet Take 800 mg by mouth       lamoTRIgine (LAMICTAL) 150 MG tablet Take 1 tablet (150 mg) by mouth daily 30 tablet 2     levomilnacipran (FETZIMA ER) 120 MG 24 hr capsule Take 1 capsule (120 mg) by mouth daily 30 capsule 2     MAGNESIUM OXIDE PO        multivitamin, therapeutic (THERA-VIT) TABS tablet Take 1 tablet by mouth daily       QUEtiapine (SEROQUEL) 25 MG tablet Take 2 tablets (50 mg) by mouth At Bedtime 60 tablet 2     vilazodone (VIIBRYD) 40 MG TABS tablet Take 1 tablet (40 mg) by mouth daily 30 tablet 2     VITAMIN D, CHOLECALCIFEROL, PO Take 1,000 Units by mouth 2 times daily          No Known Allergies    Family History   Problem Relation Age of Onset     Depression Father      Hypertension Father      Substance Abuse Father      Cancer Father         non  "hodgkins lymphoma     Depression Sister      Cancer Maternal Grandmother         breast cancer (mastectomy in 40's), melanoma, leukemia     Melanoma Maternal Grandmother      Cancer Maternal Grandfather         leukemia     Substance Abuse Maternal Grandfather      Cancer Paternal Grandmother         lung cancer, nonsmoker     Substance Abuse Brother      Substance Abuse Sister      Skin Cancer No family hx of        Additional medical/Social/Surgical histories reviewed in [facility] and updated as appropriate.     REVIEW OF SYSTEMS (1/15/2021)  A 10-point review of systems was obtained and is negative except for as noted in the HPI.      PHYSICAL EXAM  Ht 1.803 m (5' 11\")   Wt 85.3 kg (188 lb)   LMP 12/31/2020 (Approximate)   BMI 26.22 kg/m      General  - normal appearance, in no obvious distress  HEENT  - conjunctivae not injected, moist mucous membranes  CV  - normal peripheral perfusion  Pulm  - normal respiratory pattern, non-labored  Musculoskeletal - cervical spine  - inspection: normal bone and joint alignment, no obvious kyphosis  - palpation: no paravertebral or bony tenderness  - ROM: pain with rotation and sidebending  - strength: upper extremities 5/5 in all planes  - special tests:  (-) Spurling bilaterally  (-) Jenniffer's reflex  Neuro  - C5-7 DTRs 2+ bilaterally, no sensory or motor deficit, grossly normal coordination, normal muscle tone  Skin  - no ecchymosis, erythema, warmth, or induration, no obvious rash  Psych  - interactive, appropriate, normal mood and affect    IMAGING :XR Cervical . Final results and radiologist's interpretation, available in the [facility] health record. Images were reviewed with the patient/family members in the office today. My personal interpretation of the performed imaging is C5-C6 degenerative disc.      ASSESSMENT & PLAN  Mariaelena Jean Baptiste is a 50 year old year old adult who presents to clinic today with chronic left >right shoulder and neck pain.  Physical therapy to date has " not provided great relief.     Reviewed urgent care visit 12/14/20  Reviewed Dr. Bradford notes  Reviewed PT notes with Sourav Albarran    Diagnosis:   Chronic cervicalgia with radiculitis.     -XR revealing cervical DDD  -Start medrol pack   -Refill muscle relaxant  -Consideration for MRI in 2 weeks if no improvement  -Follow up 2-4 weeks    It was a pleasure seeing Mariaelena today.    Ihsan Honeycutt DO, CAQSM  Primary Care Sports Medicine

## 2021-01-16 ASSESSMENT — ANXIETY QUESTIONNAIRES: GAD7 TOTAL SCORE: 8

## 2021-01-20 ENCOUNTER — THERAPY VISIT (OUTPATIENT)
Dept: PHYSICAL THERAPY | Facility: CLINIC | Age: 51
End: 2021-01-20
Payer: MEDICARE

## 2021-01-20 DIAGNOSIS — M54.2 NECK PAIN: ICD-10-CM

## 2021-01-20 PROCEDURE — 97110 THERAPEUTIC EXERCISES: CPT | Mod: GP | Performed by: PHYSICAL THERAPIST

## 2021-01-20 PROCEDURE — 97112 NEUROMUSCULAR REEDUCATION: CPT | Mod: GP | Performed by: PHYSICAL THERAPIST

## 2021-01-22 ENCOUNTER — VIRTUAL VISIT (OUTPATIENT)
Dept: PSYCHIATRY | Facility: CLINIC | Age: 51
End: 2021-01-22
Attending: PSYCHIATRY & NEUROLOGY
Payer: MEDICARE

## 2021-01-22 DIAGNOSIS — F33.1 MODERATE EPISODE OF RECURRENT MAJOR DEPRESSIVE DISORDER (H): ICD-10-CM

## 2021-01-22 PROCEDURE — 99214 OFFICE O/P EST MOD 30 MIN: CPT | Mod: 95 | Performed by: STUDENT IN AN ORGANIZED HEALTH CARE EDUCATION/TRAINING PROGRAM

## 2021-01-22 RX ORDER — DEXTROAMPHETAMINE SACCHARATE, AMPHETAMINE ASPARTATE, DEXTROAMPHETAMINE SULFATE AND AMPHETAMINE SULFATE 2.5; 2.5; 2.5; 2.5 MG/1; MG/1; MG/1; MG/1
10 TABLET ORAL 2 TIMES DAILY
Qty: 60 TABLET | Refills: 0 | Status: SHIPPED | OUTPATIENT
Start: 2021-01-22 | End: 2021-05-10

## 2021-01-22 ASSESSMENT — PAIN SCALES - GENERAL: PAINLEVEL: MILD PAIN (3)

## 2021-01-22 NOTE — PROGRESS NOTES
"VIDEO VISIT  Mariaelena Jean Baptiste is a 49 year old patient who is being evaluated via a billable video visit.      The patient has been notified of following:   \"We have found that certain health care needs can be provided without the need for an in-person physical exam. This service lets us provide the care you need with a video conversation. If a prescription is necessary we can send it directly to your pharmacy. If lab work is needed we can place an order for that and you can then stop by our lab to have the test done at a later time. Insurers are generally covering virtual visits as they would in-office visits so billing should not be different than normal.  If for some reason you do get billed incorrectly, you should contact the billing office to correct it and that number is in the AVS .    Patient has given verbal consent for video visit?: Yes   How would you like to obtain your AVS?: [facility]  AVS SmartPhrase [PsychAVS] has been placed in 'Patient Instructions': Yes      Video- Visit Details  Type of service:  video visit for medication management  Time of service:    Date:  12/1/2020    Video Start Time:  9:33 AM        Video End Time:  10:11 AM    Reason for video visit:  Patient unable to travel due to Covid-19  Originating Site (patient location):  [facility] [address] [facility]- [place]   Location- Patient's home  Distant Site (provider location):  Remote location  Mode of Communication:  Video Conference via Doxy[employer]me  Consent:  Patient has given verbal consent for video visit?: Yes        [facility]  Psychiatry Clinic  PSYCHIATRIC PROGRESS NOTE     SOCIAL SECURITY DISABILITY SUPPORT STATEMENT is below    Date of initial Diagnostic Assessment (DA) is 2/18/16 and most recent Transfer of Care DA is 7/31/19.    CARE TEAM:  PCP- Thalia Bradford    Specialty Providers- none    Therapist- Julieta Gill PsyD (sees every other week)    Community  Team- none           Mariaelena Jean Baptiste is a 50 year old non-binary person " who prefers the name 'Mariaelena' & is ok with the pronouns she, her, herself (pt expresses lack of preference for pronouns and feels that discovering the term 'non-binary' has been enough for her).      Pertinent Background:  Mariaelena first experienced mental health issues as a young adult and has received treatment for treatment-resistant depression, BPD with severe self harm, and PTSD. Notably, both naltrexone and clozapine have reduced SIB immensely, with return when taper off has been initiated in the past (although no return of SIB to date since d/c of clozapine). She does better when she uses a light box in the Fall/Winter, best started in September as she typically experiences return of depressive symptoms in October. A taper of Lamictal was associated with decline in mood and subsequent hospitalization in the past which is a common theme for her as medication changes, even small ones, tend to be very destabilizing. A TMS series through the [facility] in August 2016 was transformative in terms of ongoing remission of her depression for the following 2+ years. She often is not aware of reemerging depressive symptoms until they become severe.    Psych critical item history includes suicide attempts {2x], suicidal ideation, severe SIB [has required skin grafts and long term hospitalization at [place]], mutiple psychotropic trials, trauma hx, ECT, TMS, psych hosp (>5), and commitment.     INTERIM HISTORY      [4, 4]   The patient reports good treatment adherence.  History was provided by the patient who was a good historian.  The last visit ended with no change to the med regimen.     Since the last visit:   -She has been feeling relieved since her disability hearing went well yesterday--it was almost anticlimactic, it took so long and the trial was very short, they found in her favor. She is very relieved.   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  -Things are going well with her therapist Julieta  "Long  -Overall, mood has been probably relatively good. She has been struggling with neck pain and did a brief prednisone burst that helped with the pain associated with a pinched nerve in her neck.  -SI is \"mostly when the depression gets bad. There were a couple of times when she was dealing with the social security stuff that those were coming into play,\" about 6-8 months ago, but none since.  -No severe depression since before her first course of TMS. Feeling euthymic recently.     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 SSDI under appeal, but will continue to get her benefits until the final appeal occurs (final court date postponed due to pandemic). 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  Partner/ - Single since 2000; identifies as asexual  Children- none      Living situation- She lives with her cat (Mary- 4yo and has allergies and Jambu- kitten) in an apartment in [place] (section 8 housing).     Social/ Spiritual Support- DBT therapist, online friends, father and step mother in [place]; brother, [place] both in [place] (supportive, close with them), a few co workers and a few close friends from the feline rescue (Ahsan and Anika). Mariaelena grew up Taoist - continues to believe in God but does not identify with a specific " Synagogue   FEELS SAFE AT HOME- yes     PSYCH and CD Critical Summary Points since July 2019 7/31/19- resident transition; added PM dose of Adderall for worsening depression (has been effective), referred back to [facility] for repeat TMS  11/22/19 - 2/13/20: repeat TMS series completed due to relapse of depression     * Disability forms have been completed x2 (Winter 2020 and Fall 2020) - see chart for copies    PAST MED TRIALS        See last Diagnostic Assessment  MEDICAL / SURGICAL HISTORY          Pregnant or breastfeeding- no      Contraception- s/p tubal ligation, identifies as asexual    Patient Active Problem List   Diagnosis     Suicidal ideation     Major depression, recurrent (H)     Tobacco abuse     Hx of psychiatric care     Scar     Neck pain       Major Surgery- cholecystectomy, s/p tubal ligation (1999), rectal prolapse repair    MEDICAL REVIEW OF SYSTEMS    [2, 10]     A comprehensive review of systems was performed and is negative other than noted in the HPI.    ALLERGY       Patient has no known allergies.     MEDICATIONS        Current Outpatient Medications   Medication Sig Dispense Refill     amphetamine-dextroamphetamine (ADDERALL) 10 MG tablet Take 1 tablet (10 mg) by mouth 2 times daily 60 tablet 0     amphetamine-dextroamphetamine (ADDERALL) 10 MG tablet Take 1 tablet (10 mg) by mouth 2 times daily 60 tablet 0     cyclobenzaprine (FLEXERIL) 5 MG tablet Take 1 tablet (5 mg) by mouth 3 times daily as needed for muscle spasms 30 tablet 0     ibuprofen (ADVIL,MOTRIN) 800 MG tablet Take 800 mg by mouth       MAGNESIUM OXIDE PO        multivitamin, therapeutic (THERA-VIT) TABS tablet Take 1 tablet by mouth daily       predniSONE (DELTASONE) 20 MG tablet Take 2 tablets (40 mg) by mouth daily 10 tablet 0     VITAMIN D, CHOLECALCIFEROL, PO Take 1,000 Units by mouth 2 times daily        lamoTRIgine (LAMICTAL) 150 MG tablet Take 1 tablet (150 mg) by mouth daily 30 tablet 2      levomilnacipran (FETZIMA ER) 120 MG 24 hr capsule Take 1 capsule (120 mg) by mouth daily 30 capsule 2     QUEtiapine (SEROQUEL) 25 MG tablet Take 2 tablets (50 mg) by mouth At Bedtime 60 tablet 2     vilazodone (VIIBRYD) 40 MG TABS tablet Take 1 tablet (40 mg) by mouth daily 30 tablet 2     VITALS      [3, 3]   LMP 12/31/2020 (Approximate)  Telehealth visit.    MENTAL STATUS EXAM      [9, 14 cog gs]     Alertness: alert  and oriented  Appearance: adequately groomed  Behavior/Demeanor: cooperative and calm, with good eye contact   Speech: regular rate and rhythm, non-pressured  Language: intact  Psychomotor: normal or unremarkable  Mood: mildly depressed  Affect: slightly blunted; was congruent to mood; was congruent to content  Thought Process/Associations: unremarkable  Thought Content:  Reports none;  Denies suicidal and violent ideation  Perception:  Reports none;  Denies auditory hallucinations and visual hallucinations  Insight: adequate  Judgment: good  Cognition: does  appear grossly intact; formal cognitive testing was not done  Gait and Station: unable to assess    LABS and DATA     AIMS:  not needed     PHQ9 TODAY =5  PHQ-9 SCORE 1/20/2020 1/21/2020 1/15/2021   PHQ-9 Total Score 7 5 5     ANTIPSYCHOTIC LABS  [glu, A1C, lipids (perla LDL), liver enzymes, WBC, ANEU, Hgb, plts]  q12 mo  Recent Labs   Lab Test 01/13/21  1149 06/05/20  1051 04/15/19  1401 02/27/17  1217 08/30/16  1031   GLC  --  94 87 93 86   A1C 5.1  --  5.4  --   --      Recent Labs   Lab Test 01/13/21  1149 04/15/19  1401 04/25/16  1117   CHOL 206* 191 177   TRIG 42 42 195*   * 112* 83   HDL 80 71 55     Recent Labs   Lab Test 04/15/19  1401 12/05/18  1246 02/06/17  1345 01/22/16  1852 10/06/15  0740   AST 22 19 17 19 20   ALT 23 24 17 25 37   ALKPHOS 62  --  64 63 85     Recent Labs   Lab Test 06/05/20  1051 04/15/19  1401 12/05/18  1246 06/20/16  1430 05/26/16  1414   WBC 5.7 7.5 6.5 8.8 8.9   ANEU 3.6 5.2  --  5.4 5.6   HGB 14.6 12.5  12.7 14.9 13.6   * 445 405 388 407       PSYCHOTROPIC DRUG INTERACTIONS     VILAZODONE + FETZIMA + ADDERALL + SEROQUEL may result in increased risk for serotonin syndrome.     VILAZODONE + FETZIMA may enhance the antiplatelet effect of other Agents with Antiplatelet Properties.     LAMOTRIGINE + ACETAMINOPHEN may result in decreased lamotrigine serum levels.    ADDERALL + LISINOPRIL:  amphetamines may diminish the antihypertensive effect of antihypertensive agents.    MANAGEMENT:  Monitoring for adverse effects, routine vitals and using lowest therapeutic dose of [psychotropics]    RISK STATEMENT for SAFETY     Mariaelena Jean Baptiste did not appear to be an imminent safety risk to self or others.     PSYCHIATRIC DIAGNOSIS     MDD, recurrent, moderate (treatment-resistant; h/o severe episodes), currently in remission  BPD  PTSD  DID  Unspecified Eating Disorder, in remission  Insomnia, likely circadian rhythm disorder    ASSESSMENT      [m2, h3]     TODAY Mariaelena reports her depression is at baseline and she has not experienced any SI or urges for SIB since last visit. She just had her disability hearing yesterday on 1/21 and her disability was approved, so she is relieved. Her medications continue to work well for her in addition to the repeat series of TMS last Winter. There is no indication for a medication change today. Refills were provided. Reviewed the antipsychotic lab results in the chart ordered by Dr. Burnett.     Future considerations:  - refer for acupuncture if patient interested (given chronic pain) - not currently possible due to Covid     PLAN      [m2, h3]     1) PSYCHOTROPIC MEDICATIONS:  - Continue quetiapine 50 mg PRN nightly for sleep and treatment resistant depression   - Continue Adderall IR 10 mg BID (qAM + 2pm) for augmentation of depression treatment  - Continue levomilnacipran  mg QDAY for depression  - Continue vilazodone 40 mg QDAY for depression  - Continue lamotrigine 150 mg QDAY for  depression and mood stabilization  - Continue melatonin 1 mg with dinner     Other:     - Resume light box daily in Fall     2) [place]  indicates patient only receives Adderall from this clinic.    3) THERAPY:    - Continue biweekly therapy w/ DBT therapist Dr. Julieta Gill Psy D    4) NEXT DUE:    Labs- AP monitoring labs overdue (ordered - pt alerted to have drawn in the next 1-2 weeks)  EKG- as needed  Rating Scales- PHQ-9 at every visit    5) REFERRALS:    No Referrals needed; patient aware to call clinic social work team with any questions/concerns/or needs     6) RTC: in 6 weeks following disability hearing; sooner if needed    7) CRISIS NUMBERS:   Provided routinely in AVS   ONLY if a [facility] PT: [facility] [place] [facility] [phone] (clinic), [phone] (after hours)     TREATMENT RISK STATEMENT:  The risks, benefits, alternatives and potential adverse effects have been discussed and are understood by the pt. The pt understands the risks of using street drugs or alcohol. There are no medical contraindications, the pt agrees to treatment with the ability to do so. The pt knows to call the clinic for any problems or to access emergency care if needed.  Medical and substance use concerns are documented above.  Psychotropic drug interaction check was done, including changes made today.    SOCIAL SECURITY DISABILITY  STATEMENT  Based on the assessment today, as well as longitudinal care, this provider team strongly supports this patient's application for social security disability. When this patient is at her best, she is not able to work more than 20 hrs a week.  When she has tried to work full time (in the past) she has experienced decompensation of her mental health including worsening of mood, sleep, energy, concentration, memory and ability to be organized. Her eating disorder worsened to the point of requiring a feeding tube and suicidal ideation increases. These symptoms make  it impossible to meet deadlines, keep pace with job tasks and attend work reliably. Additionally, separate from work influences, her depression exacerbates periodically as has been the case over the last year and a half. During these periods the patient cannot even work 20 hrs a week due to the same symptoms listed above. Depression treatment consists of medications, psychotherapy and upcoming neuromodulation (TMS- transcranial magnetic stimulation). The patient is hopeful that TMS will bring relief from mood symptoms and allow her to return her maximum workability which is half time.      PROVIDER: Sue Mcnulty MD    Patient staffed with attending Dr. Means who will review and sign the note.  Supervisor is Dr. Lunsford.       TELEHEALTH ATTENDING ATTESTATION    Following the ACGME guidelines on telemedicine and direct supervision due to COVID-19, I was concurrently participating in and/or monitoring the patient care through appropriate telecommunication technology.  I discussed the key portions of the service with the resident, including the mental status examination and developing the plan of care. I reviewed key portions of the history with the resident. I agree with the findings and plan as documented in this note.     Jeff Means MD

## 2021-01-22 NOTE — PATIENT INSTRUCTIONS
**For crisis resources, please see the information at the end of this document**     Patient Education      Thank you for coming to the [facility] MENTAL HEALTH & ADDICTION [place] [facility]IN[facility].    Lab Testing:  If you had lab testing today and your results are reassuring or normal they will be mailed to you or sent through Edvivo within 7 days. If the lab tests need quick action we will call you with the results. The phone number we will call with results is # [phone] (home) . If this is not the best number please call our clinic and change the number.    Medication Refills:  If you need any refills please call your pharmacy and they will contact us. Our fax number for refills is [phone]. Please allow three business for refill processing. If you need to  your refill at a new pharmacy, please contact the new pharmacy directly. The new pharmacy will help you get your medications transferred.     Scheduling:  If you have any concerns about today's visit or wish to schedule another appointment please call our office during normal business hours [phone] (8-5:00 M-F)    Contact Us:  Please call [phone] during business hours (8-5:00 M-F).  If after clinic hours, or on the weekend, please call  [phone].    Financial Assistance [phone]  [facility] Billing [phone]  Central Billing Office, [facility]: [phone]  [place] Billing [phone]  Medical Records [phone]  [place] Patient Bill of Rights [email or web address]       MENTAL HEALTH CRISIS NUMBERS:  For a medical emergency please call  911 or go to the nearest ER.     [facility]:   [facility] -[phone]   Crisis Residence [facility] Residence -[phone]   Walk-In Counseling Center [facility] -[phone]   COPE 24/7 [facility] Mobile Team -[phone] (adults)/[phone] (child)  CHILD: [facility] needs assessment team -  [phone]      [facility]:   [facility] - [phone]   Walk-in counseling [facility] - [phone]   Walk-in counseling [facility] - [phone]   Crisis Residence [facility] Residence - [phone]  Urgent Care Adult Mental [facility]-[phone] mobile unit/ 24/7 crisis line    National Crisis Numbers:   National Suicide Prevention Lifeline: 5-329-124-TALK (737-006-1519)  Poison Control Center - 4-240-638-1364  BoomBang/resources for a list of additional resources (SOS)  Trans Lifeline a hotline for transgender people 2-037-445-6200  The Liam Project a hotline for LGBT youth 2-524-581-7666  Crisis Text Line: For any crisis 24/7   To: 558046  see www.crisistextline.org  - IF MAKING A CALL FEELS TOO HARD, send a text!         Again thank you for choosing [facility] MENTAL HEALTH & ADDICTION [facility] and please let us know how we can best partner with you to improve you and your family's health.    You may be receiving a survey regarding this appointment. We would love to have your feedback, both positive and negative. The survey is done by an external company, so your answers are anonymous.

## 2021-01-22 NOTE — PROGRESS NOTES
"VIDEO VISIT  Mariaelena Jean Baptiste is a 50 year old patient who is being evaluated via a billable video visit.      The patient has been notified of following:   \"This video visit will be conducted via a call between you and your physician/provider. We have found that certain health care needs can be provided without the need for an in-person physical exam. This service lets us provide the care you need with a video conversation. If a prescription is necessary we can send it directly to your pharmacy. If lab work is needed we can place an order for that and you can then stop by our lab to have the test done at a later time. Insurers are generally covering virtual visits as they would in-office visits so billing should not be different than normal.  If for some reason you do get billed incorrectly, you should contact the billing office to correct it and that number is in the AVS .    Video Conference to be completed via:  [email or web address]    Patient has given verbal consent for video visit?:  Yes    Patient would prefer that any video invitations be sent by: Send to e-mail at: [email or web address]      How would patient like to obtain AVS?:  Danielle    AVS SmartPhrase [[email or web address]] has been placed in 'Patient Instructions':  Yes  "

## 2021-01-26 ENCOUNTER — MYC MEDICAL ADVICE (OUTPATIENT)
Dept: ORTHOPEDICS | Facility: CLINIC | Age: 51
End: 2021-01-26

## 2021-01-26 DIAGNOSIS — M54.2 CERVICALGIA: Primary | ICD-10-CM

## 2021-01-26 NOTE — TELEPHONE ENCOUNTER
Spoke with Mariaelena about getting an MRI. She was happy to proceed.  I provided her with scheduling number and I will MyChart her once Dr. Honeycutt places the order.      Samir ESCOBEDO ATC

## 2021-01-26 NOTE — TELEPHONE ENCOUNTER
Tell her MRI would be helpful if this does return.  Discussion of injections may be a next option if PT, medications are still not providing long term benefit.  Thanks Mylene

## 2021-02-02 DIAGNOSIS — Z11.59 ENCOUNTER FOR SCREENING FOR OTHER VIRAL DISEASES: ICD-10-CM

## 2021-02-05 ENCOUNTER — ANCILLARY PROCEDURE (OUTPATIENT)
Dept: MRI IMAGING | Facility: CLINIC | Age: 51
End: 2021-02-05
Attending: FAMILY MEDICINE
Payer: MEDICARE

## 2021-02-05 DIAGNOSIS — M54.2 CERVICALGIA: ICD-10-CM

## 2021-02-05 PROCEDURE — 72141 MRI NECK SPINE W/O DYE: CPT | Mod: ME | Performed by: RADIOLOGY

## 2021-02-05 PROCEDURE — G1004 CDSM NDSC: HCPCS | Performed by: RADIOLOGY

## 2021-02-09 ENCOUNTER — VIRTUAL VISIT (OUTPATIENT)
Dept: ORTHOPEDICS | Facility: CLINIC | Age: 51
End: 2021-02-09
Payer: MEDICARE

## 2021-02-09 DIAGNOSIS — M50.30 DEGENERATION OF CERVICAL INTERVERTEBRAL DISC: Primary | ICD-10-CM

## 2021-02-09 PROCEDURE — 99442 PR PHYSICIAN TELEPHONE EVALUATION 11-20 MIN: CPT | Mod: 95 | Performed by: FAMILY MEDICINE

## 2021-02-09 NOTE — LETTER
2/9/2021         RE: Mariaelena Jean Baptiste  [address]  [place] [address]        Dear Colleague,    Thank you for referring your patient, Mariealena Jean Baptiste, to the [facility]OP[facility]IC [facility] [place]. Please see a copy of my visit note below.          SPORTS & ORTHOPEDIC WALK-IN FOLLOW-UP VISIT 2/9/2021    Interval History:     Follow up reason: Cervical MRI    Date of injury/onset: 8/2020    Date last seen: 1/26/21         Mariaelena is a 50 year old who is being evaluated via a billable video visit.      How would you like to obtain your AVS? MyChart  If the video visit is dropped, the invitation should be resent by: Send to e-mail at: [email or web address]Security [employer]card  Will anyone else be joining your video visit? No      ESTABLISHED PATIENT FOLLOW-UP VIRTUAL:  Follow Up (MRI f/u)       This encounter was conducted via telephone in lieu of face-to-face encounter due to precautions implemented during COVID-19 pandemic.     HISTORY OF PRESENT ILLNESS    Ita is a pleasant 50 year old year old adult who presents virtually today for follow-up of cervical pain with bilateral shoulder involvement.    Date of onset: Chronic, past 6 months particularly  Date last seen: 1/15/21  Following Therapeutic Plan: Yes  Pain Unchanged  Function: Unchanged  Interval History: No improvement with PT, Medrol Pack, Muscle relaxants.  Completed MRI.  Pain particularly greatest cervical region and involves both shoulders, shooting pain from neck.     Additional medical/Social/Surgical histories reviewed in [facility] and updated as appropriate.    REVIEW OF SYSTEMS (2/9/2021)  CONSTITUTIONAL: Denies fever and weight loss  GASTROINTESTINAL: Denies abdominal pain, nausea, vomiting  MUSCULOSKELETAL: See HPI  SKIN: Denies any recent rash or lesion  NEUROLOGICAL: Denies numbness or focal weakness    PHYSICAL EXAMINATION  General Appearance: Well appearing, alert, in no acute distress, well-hydrated, and well nourished  ENT:  Pupils equal, round, no conjunctival injection.  No lid lag  Respiratory: no respiratory distress, no audible wheezing, no labored breathing, symmetric thoracic excursion  Psychiatric: mood and affect are appropriate, patient is oriented to time, place and person  Musculoskeletal: Deferred  Skin: No rashes, lesions, or ecchymosis present      IMAGING :  Findings:  The cervical vertebrae are normally aligned.  There is mild disc  height narrowing at C5-6.  There is normal signal within and normal  contour of the cervical spinal cord.  The findings on a level by level  basis are as follows:     C2-3:  No spinal canal or neural foraminal stenosis.     C3-4:  No spinal canal or neural foraminal stenosis.     C4-5:  No spinal canal or neural foraminal stenosis.     C5-6:  Circumferential disc bulge with mild narrowing of the spinal  canal. The right neural foramen is moderately narrowed and the left  neural foramen is mildly narrowed.     C6-7:  No spinal canal or neural foraminal  stenosis.     C7-T1:  No spinal canal or neural foraminal stenosis.     No abnormality of the paraspinous soft tissues.                                                                      Impression:   Cervical spondylosis at C5-6 with mild spinal canal stenosis and  moderate right foraminal stenosis as detailed above.     AZAM BOONE MD     ASSESSMENT & PLAN  Mariaelena Jean Baptiste is a 50 year old year old adult who presents virtually today with Follow Up (MRI f/u)    MRI with mild spinal canal stenosis C5-C6.  Discussed treatment options and lacking improvement with medrol, PT, muscle relaxants and will proceed with DAWSON.    -C7-T1 interlaminar epidural injection  -Resume HEP/PT after CSI to continue maintenance strengthening  -Follow up 2 weeks after DAWSON    It was a pleasure seeing Mariaelena.    Ihsan Honeycutt DO, [facility]  Primary Care Sports Medicine  [facility]    Video Start Time: 1039  Video-Visit Details    Type of service:  Video  Visit    Video End Time:1054    Originating Location (pt. Location): Home    Distant Location (provider location):  [facility] ORTHOPEDIC WALKIN [facility]     Platform used for Video Visit: Shona

## 2021-02-09 NOTE — PROGRESS NOTES
SPORTS & ORTHOPEDIC WALK-IN FOLLOW-UP VISIT 2/9/2021    Interval History:     Follow up reason: Cervical MRI    Date of injury/onset: 8/2020    Date last seen: 1/26/21         Mariaelena is a 50 year old who is being evaluated via a billable video visit.      How would you like to obtain your AVS? MyChart  If the video visit is dropped, the invitation should be resent by: Send to e-mail at: [email or web address]  Will anyone else be joining your video visit? No      ESTABLISHED PATIENT FOLLOW-UP VIRTUAL:  Follow Up (MRI f/u)       This encounter was conducted via telephone in lieu of face-to-face encounter due to precautions implemented during COVID-19 pandemic.     HISTORY OF PRESENT ILLNESS    Ita is a pleasant 50 year old year old adult who presents virtually today for follow-up of cervical pain with bilateral shoulder involvement.    Date of onset: Chronic, past 6 months particularly  Date last seen: 1/15/21  Following Therapeutic Plan: Yes  Pain Unchanged  Function: Unchanged  Interval History: No improvement with PT, Medrol Pack, Muscle relaxants.  Completed MRI.  Pain particularly greatest cervical region and involves both shoulders, shooting pain from neck.     Additional medical/Social/Surgical histories reviewed in [facility] and updated as appropriate.    REVIEW OF SYSTEMS (2/9/2021)  CONSTITUTIONAL: Denies fever and weight loss  GASTROINTESTINAL: Denies abdominal pain, nausea, vomiting  MUSCULOSKELETAL: See HPI  SKIN: Denies any recent rash or lesion  NEUROLOGICAL: Denies numbness or focal weakness    PHYSICAL EXAMINATION  General Appearance: Well appearing, alert, in no acute distress, well-hydrated, and well nourished  ENT: Pupils equal, round, no conjunctival injection.  No lid lag  Respiratory: no respiratory distress, no audible wheezing, no labored breathing, symmetric thoracic excursion  Psychiatric: mood and affect are appropriate, patient is oriented to time, place and person  Musculoskeletal:  Deferred  Skin: No rashes, lesions, or ecchymosis present      IMAGING :  Findings:  The cervical vertebrae are normally aligned.  There is mild disc  height narrowing at C5-6.  There is normal signal within and normal  contour of the cervical spinal cord.  The findings on a level by level  basis are as follows:     C2-3:  No spinal canal or neural foraminal stenosis.     C3-4:  No spinal canal or neural foraminal stenosis.     C4-5:  No spinal canal or neural foraminal stenosis.     C5-6:  Circumferential disc bulge with mild narrowing of the spinal  canal. The right neural foramen is moderately narrowed and the left  neural foramen is mildly narrowed.     C6-7:  No spinal canal or neural foraminal  stenosis.     C7-T1:  No spinal canal or neural foraminal stenosis.     No abnormality of the paraspinous soft tissues.                                                                      Impression:   Cervical spondylosis at C5-6 with mild spinal canal stenosis and  moderate right foraminal stenosis as detailed above.     AZAM BOONE MD     ASSESSMENT & PLAN  Mariaelena Jean Baptiste is a 50 year old year old adult who presents virtually today with Follow Up (MRI f/u)    MRI with mild spinal canal stenosis C5-C6.  Discussed treatment options and lacking improvement with medrol, PT, muscle relaxants and will proceed with DAWSON.    -C7-T1 interlaminar epidural injection  -Resume HEP/PT after CSI to continue maintenance strengthening  -Follow up 2 weeks after DAWSON    It was a pleasure seeing Mariaelena.    Ihsan Honeycutt DO, [facility]  Primary Care Sports Medicine  [facility]    Video Start Time: 1039  Video-Visit Details    Type of service:  Video Visit    Video End Time:1054    Originating Location (pt. Location): Home    Distant Location (provider location):  [facility] ORTHOPEDIC [facility]     Platform used for Video Visit: [employer]

## 2021-02-15 ENCOUNTER — TELEPHONE (OUTPATIENT)
Dept: GASTROENTEROLOGY | Facility: OUTPATIENT CENTER | Age: 51
End: 2021-02-15

## 2021-02-15 NOTE — TELEPHONE ENCOUNTER
Caller: PATIENT    Reason for cancel? CX 2/17- DUE TO CAR DYING NOT GOING TO COVID TEST APPTMT    Rescheduled? Y 3/10-[place]

## 2021-03-01 ENCOUNTER — ANCILLARY PROCEDURE (OUTPATIENT)
Dept: MAMMOGRAPHY | Facility: CLINIC | Age: 51
End: 2021-03-01
Attending: INTERNAL MEDICINE
Payer: MEDICARE

## 2021-03-01 DIAGNOSIS — Z12.31 ENCOUNTER FOR SCREENING MAMMOGRAM FOR BREAST CANCER: ICD-10-CM

## 2021-03-01 PROCEDURE — 77067 SCR MAMMO BI INCL CAD: CPT | Mod: TC | Performed by: RADIOLOGY

## 2021-03-02 ENCOUNTER — HOSPITAL ENCOUNTER (OUTPATIENT)
Dept: MAMMOGRAPHY | Facility: CLINIC | Age: 51
Discharge: HOME | End: 2021-03-02
Attending: INTERNAL MEDICINE | Admitting: INTERNAL MEDICINE
Payer: MEDICARE

## 2021-03-02 DIAGNOSIS — R92.8 ABNORMAL MAMMOGRAM: ICD-10-CM

## 2021-03-02 PROCEDURE — 77065 DX MAMMO INCL CAD UNI: CPT | Mod: LT

## 2021-03-04 DIAGNOSIS — Z11.59 ENCOUNTER FOR SCREENING FOR OTHER VIRAL DISEASES: ICD-10-CM

## 2021-03-04 NOTE — TELEPHONE ENCOUNTER
DIAGNOSIS: R) Foot / No Imaging / Self.Refer / [facility]   APPOINTMENT DATE:v 3/5   NOTES STATUS DETAILS   OFFICE NOTE from referring provider N/A    OFFICE NOTE from other specialist N/A    DISCHARGE SUMMARY from hospital     DISCHARGE REPORT from the ER     OPERATIVE REPORT     EMG report     MEDICATION LIST     MRI     DEXA (osteoporosis/bone health)     CT SCAN     XRAYS (IMAGES & REPORTS)

## 2021-03-05 ENCOUNTER — ANCILLARY PROCEDURE (OUTPATIENT)
Dept: GENERAL RADIOLOGY | Facility: CLINIC | Age: 51
End: 2021-03-05
Attending: FAMILY MEDICINE
Payer: MEDICARE

## 2021-03-05 ENCOUNTER — OFFICE VISIT (OUTPATIENT)
Dept: ORTHOPEDICS | Facility: CLINIC | Age: 51
End: 2021-03-05
Payer: MEDICARE

## 2021-03-05 ENCOUNTER — PRE VISIT (OUTPATIENT)
Dept: ORTHOPEDICS | Facility: CLINIC | Age: 51
End: 2021-03-05

## 2021-03-05 VITALS — BODY MASS INDEX: 26.32 KG/M2 | HEIGHT: 71 IN | WEIGHT: 188 LBS

## 2021-03-05 DIAGNOSIS — M79.671 RIGHT FOOT PAIN: Primary | ICD-10-CM

## 2021-03-05 DIAGNOSIS — M79.671 RIGHT FOOT PAIN: ICD-10-CM

## 2021-03-05 PROCEDURE — 73630 X-RAY EXAM OF FOOT: CPT | Mod: RT | Performed by: RADIOLOGY

## 2021-03-05 PROCEDURE — 99213 OFFICE O/P EST LOW 20 MIN: CPT | Performed by: FAMILY MEDICINE

## 2021-03-05 ASSESSMENT — MIFFLIN-ST. JEOR: SCORE: 1568.89

## 2021-03-05 NOTE — LETTER
3/5/2021         RE: Mariaelena Jean Baptiste  [address]  [place] [address]        Dear Colleague,    Thank you for referring your patient, Mariaelena Jean Baptiste, to the [facility] ORTHOPEDIC WALKIN [facility]. Please see a copy of my visit note below.      [facility] CLINICS AND SURGERY CENTER  SPORTS & ORTHOPEDIC CLINIC VISIT     Mar 5, 2021        ASSESSMENT & PLAN    50-year-old with right midfoot metatarsal pain that is likely secondary to soft tissue and bony contusion.  No fracture evident on radiographs today.    Reviewed imaging and assessment with patient in detail  Have recommended continue activity as tolerated.  Continue icing as necessary to reduce swelling and ecchymosis.  Discussed that stiffer soled shoes with adequate cushion and support will likely be more comfortable for her in the near term.  Follow as needed.    Mauricio Crespo MD  [facility]    -----  Chief Complaint   Patient presents with     Right Foot - Pain       SUBJECTIVE  Mariaelena Jean Baptiste is a/an 50 year old adult who is seen as a self referral for evaluation of  Right foot pain.     The patient is seen by themselves.  The patient is Right handed    Onset: 7 day(s) ago. Patient describes injury as dropping a heavy headboard onto her foot.   Location of Pain: right midfoot.  Worsened by: Standing, walking and movement  Better with: rest and ice.  Feels better in her work boots, worse when barefoot.  Treatments tried: ice, Tylenol and ibuprofen  Associated symptoms: swelling bruising and tingling    Orthopedic/Surgical history: NO  Social History/Occupation: Restaurant       REVIEW OF SYSTEMS:    Do you have fever, chills, weight loss? No    Do you have any vision problems? No    Do you have any chest pain or edema? No    Do you have any shortness of breath or wheezing?  No    Do you have stomach problems? No    Do you have any numbness or focal weakness? No    Do you  "have diabetes? No    Do you have problems with bleeding or clotting? No    Do you have an rashes or other skin lesions? No    OBJECTIVE:  Ht 1.803 m (5' 11\")   Wt 85.3 kg (188 lb)   BMI 26.22 kg/m       General: Alert, pleasant, no distress  Right foot: warm, well perfused, SILT throughout     Inspection: Resolving ecchymosis over the dorsal midfoot.  Mild edema is present.  Healing abrasion over the mid metatarsal area.     ROM: Symmetric without pain     Strength: Intact     Palpation: Diffusely tender over the midportion of the second third and fourth metatarsals.  No tenderness over the MTP joints or midfoot.  No tenderness over the plantar surface of the foot.     Special Tests: None      RADIOLOGY:    3 view xrays of right foot performed and reviewed independently demonstrating no acute fracture or dislocation.  No significant degenerative disease. See EMR for formal radiology report.          "

## 2021-03-05 NOTE — PROGRESS NOTES
"  [facility] CLINICS AND SURGERY CENTER  SPORTS & ORTHOPEDIC CLINIC VISIT     Mar 5, 2021        ASSESSMENT & PLAN    50-year-old with right midfoot metatarsal pain that is likely secondary to soft tissue and bony contusion.  No fracture evident on radiographs today.    Reviewed imaging and assessment with patient in detail  Have recommended continue activity as tolerated.  Continue icing as necessary to reduce swelling and ecchymosis.  Discussed that stiffer soled shoes with adequate cushion and support will likely be more comfortable for her in the near term.  Follow as needed.    Mauricio Crespo MD  [facility] ORTHOPEDIC WALKIN CLINIC [place]    -----  Chief Complaint   Patient presents with     Right Foot - Pain       SUBJECTIVE  Mariaelena Jean Baptiste is a/an 50 year old adult who is seen as a self referral for evaluation of  Right foot pain.     The patient is seen by themselves.  The patient is Right handed    Onset: 7 day(s) ago. Patient describes injury as dropping a heavy headboard onto her foot.   Location of Pain: right midfoot.  Worsened by: Standing, walking and movement  Better with: rest and ice.  Feels better in her work boots, worse when barefoot.  Treatments tried: ice, Tylenol and ibuprofen  Associated symptoms: swelling bruising and tingling    Orthopedic/Surgical history: NO  Social History/Occupation: Restaurant       REVIEW OF SYSTEMS:    Do you have fever, chills, weight loss? No    Do you have any vision problems? No    Do you have any chest pain or edema? No    Do you have any shortness of breath or wheezing?  No    Do you have stomach problems? No    Do you have any numbness or focal weakness? No    Do you have diabetes? No    Do you have problems with bleeding or clotting? No    Do you have an rashes or other skin lesions? No    OBJECTIVE:  Ht 1.803 m (5' 11\")   Wt 85.3 kg (188 lb)   BMI 26.22 kg/m       General: Alert, pleasant, no distress  Right foot: warm, well perfused, SILT " throughout     Inspection: Resolving ecchymosis over the dorsal midfoot.  Mild edema is present.  Healing abrasion over the mid metatarsal area.     ROM: Symmetric without pain     Strength: Intact     Palpation: Diffusely tender over the midportion of the second third and fourth metatarsals.  No tenderness over the MTP joints or midfoot.  No tenderness over the plantar surface of the foot.     Special Tests: None      RADIOLOGY:    3 view xrays of right foot performed and reviewed independently demonstrating no acute fracture or dislocation.  No significant degenerative disease. See EMR for formal radiology report.

## 2021-03-08 ENCOUNTER — VIRTUAL VISIT (OUTPATIENT)
Dept: PSYCHIATRY | Facility: CLINIC | Age: 51
End: 2021-03-08
Attending: PSYCHIATRY & NEUROLOGY
Payer: MEDICARE

## 2021-03-08 DIAGNOSIS — F33.1 MODERATE EPISODE OF RECURRENT MAJOR DEPRESSIVE DISORDER (H): ICD-10-CM

## 2021-03-08 PROCEDURE — 99214 OFFICE O/P EST MOD 30 MIN: CPT | Mod: GC | Performed by: STUDENT IN AN ORGANIZED HEALTH CARE EDUCATION/TRAINING PROGRAM

## 2021-03-08 RX ORDER — QUETIAPINE FUMARATE 25 MG/1
50 TABLET, FILM COATED ORAL AT BEDTIME
Qty: 60 TABLET | Refills: 2 | Status: SHIPPED | OUTPATIENT
Start: 2021-03-08 | End: 2021-05-10

## 2021-03-08 RX ORDER — LAMOTRIGINE 150 MG/1
150 TABLET ORAL DAILY
Qty: 30 TABLET | Refills: 2 | Status: SHIPPED | OUTPATIENT
Start: 2021-03-08 | End: 2021-05-10

## 2021-03-08 RX ORDER — VILAZODONE HYDROCHLORIDE 40 MG/1
40 TABLET ORAL DAILY
Qty: 30 TABLET | Refills: 2 | Status: SHIPPED | OUTPATIENT
Start: 2021-03-08 | End: 2021-05-10

## 2021-03-08 ASSESSMENT — PAIN SCALES - GENERAL: PAINLEVEL: MODERATE PAIN (5)

## 2021-03-08 NOTE — PROGRESS NOTES
"VIDEO VISIT  Mariaelena Jean Baptiste is a 50 year old patient who is being evaluated via a billable video visit.      The patient has been notified of following:   \"This video visit will be conducted via a call between you and your physician/provider. We have found that certain health care needs can be provided without the need for an in-person physical exam. This service lets us provide the care you need with a video conversation. If a prescription is necessary we can send it directly to your pharmacy. If lab work is needed we can place an order for that and you can then stop by our lab to have the test done at a later time. Insurers are generally covering virtual visits as they would in-office visits so billing should not be different than normal.  If for some reason you do get billed incorrectly, you should contact the billing office to correct it and that number is in the AVS .    Video Conference to be completed via:  [email or web address]    Patient has given verbal consent for video visit?:  Yes    Patient would prefer that any video invitations be sent by: Send to e-mail at: [email or web address]      How would patient like to obtain AVS?:  Danielle    AVS SmartPhrase [PsychAVS] has been placed in 'Patient Instructions':  Yes    "
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
## 2021-03-08 NOTE — PATIENT INSTRUCTIONS
**For crisis resources, please see the information at the end of this document**     Patient Education      Thank you for coming to the [facility] MENTAL HEALTH & ADDICTION [place] CLINIC.    Lab Testing:  If you had lab testing today and your results are reassuring or normal they will be mailed to you or sent through [employer] within 7 days. If the lab tests need quick action we will call you with the results. The phone number we will call with results is # [phone] (home) . If this is not the best number please call our clinic and change the number.    Medication Refills:  If you need any refills please call your pharmacy and they will contact us. Our fax number for refills is [phone]. Please allow three business for refill processing. If you need to  your refill at a new pharmacy, please contact the new pharmacy directly. The new pharmacy will help you get your medications transferred.     Scheduling:  If you have any concerns about today's visit or wish to schedule another appointment please call our office during normal business hours [phone] (8-5:00 M-F)    Contact Us:  Please call [phone] during business hours (8-5:00 M-F).  If after clinic hours, or on the weekend, please call  [phone].    Financial Assistance [phone]  [facility] Billing [phone]  Central Billing Office, [facility]: [phone]  [facility] Billing [phone]  Medical Records [phone]  [facility] Patient Bill of Rights [email or web address]       MENTAL HEALTH CRISIS NUMBERS:  For a medical emergency please call  911 or go to the nearest ER.     [facility]:   [facility] -[phone]   Crisis Residence [facility] Residence -[phone]   Walk-In Counseling Center [facility] -[phone]   COPE 24/7 [place] Mobile Team -[phone] (adults)/[phone] (child)  CHILD: [facility] needs assessment  team - [phone]      [facility]:   [facility] - [phone]   Walk-in counseling [facility] - [phone]   Walk-in counseling [facility] - [phone]   Crisis Residence [facility] Residence - [phone]  Urgent Care Adult Mental [facility]-[phone] mobile unit/ 24/7 crisis line    National Crisis Numbers:   National Suicide Prevention Lifeline: 1-097-946-TALK (061-249-2631)  Poison Control Center - 0-260-482-4975  [employer].com/resources for a list of additional resources (SOS)  Trans Lifeline a hotline for transgender people 1-115.193.8598  The Liam Project a hotline for LGBT youth 1-869-235-0530  Crisis Text Line: For any crisis 24/7   To: 245246  see www.crisistextline.org  - IF MAKING A CALL FEELS TOO HARD, send a text!         Again thank you for choosing [facility] MENTAL HEALTH & ADDICTION [facility] and please let us know how we can best partner with you to improve you and your family's health.    You may be receiving a survey regarding this appointment. We would love to have your feedback, both positive and negative. The survey is done by an external company, so your answers are anonymous.

## 2021-03-09 RX ORDER — LIDOCAINE 40 MG/G
CREAM TOPICAL
Status: CANCELLED | OUTPATIENT
Start: 2021-03-09

## 2021-03-09 RX ORDER — ONDANSETRON 2 MG/ML
4 INJECTION INTRAMUSCULAR; INTRAVENOUS
Status: CANCELLED | OUTPATIENT
Start: 2021-03-09

## 2021-03-10 ENCOUNTER — TELEPHONE (OUTPATIENT)
Dept: GASTROENTEROLOGY | Facility: CLINIC | Age: 51
End: 2021-03-10

## 2021-03-10 NOTE — TELEPHONE ENCOUNTER
Caller Mariaelena Jean Baptiste    Reason for cancel? Pt did not have a ride to the procedure    Rescheduled? Yes to 4/14/2021    Will patient call back to reschedule?

## 2021-03-19 DIAGNOSIS — Z11.59 ENCOUNTER FOR SCREENING FOR OTHER VIRAL DISEASES: ICD-10-CM

## 2021-03-19 LAB
LABORATORY COMMENT REPORT: NORMAL
SARS-COV-2 RNA RESP QL NAA+PROBE: NEGATIVE
SARS-COV-2 RNA RESP QL NAA+PROBE: NORMAL
SPECIMEN SOURCE: NORMAL
SPECIMEN SOURCE: NORMAL

## 2021-03-19 PROCEDURE — U0005 INFEC AGEN DETEC AMPLI PROBE: HCPCS | Performed by: FAMILY MEDICINE

## 2021-03-19 PROCEDURE — U0003 INFECTIOUS AGENT DETECTION BY NUCLEIC ACID (DNA OR RNA); SEVERE ACUTE RESPIRATORY SYNDROME CORONAVIRUS 2 (SARS-COV-2) (CORONAVIRUS DISEASE [COVID-19]), AMPLIFIED PROBE TECHNIQUE, MAKING USE OF HIGH THROUGHPUT TECHNOLOGIES AS DESCRIBED BY CMS-2020-01-R: HCPCS | Performed by: FAMILY MEDICINE

## 2021-03-20 ENCOUNTER — MYC MEDICAL ADVICE (OUTPATIENT)
Dept: INTERNAL MEDICINE | Facility: CLINIC | Age: 51
End: 2021-03-20

## 2021-03-26 ENCOUNTER — TELEPHONE (OUTPATIENT)
Dept: PSYCHIATRY | Facility: CLINIC | Age: 51
End: 2021-03-26

## 2021-03-26 NOTE — TELEPHONE ENCOUNTER
Prior Authorization Retail Medication Request    Medication/Dose: vilazodone (VIIBRYD) 40 MG TABS tablet  ICD code (if different than what is on RX):  Moderate episode of recurrent major depressive disorder (H) [F33.1]   Previously Tried and Failed:    Rationale:      Insurance Name:  [employer]  Insurance ID:  [id number]    Pharmacy Information (if different than what is on RX)  [facility]:  [id number] [address]  Phone:  [phone]
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
## 2021-03-26 NOTE — TELEPHONE ENCOUNTER
Prior Authorization Retail Medication Request    Medication/Dose: levomilnacipran (FETZIMA ER) 120 MG 24 hr capsule  ICD code (if different than what is on RX):  Moderate episode of recurrent major depressive disorder (H) [F33.1]  Previously Tried and Failed:    Rationale:      Insurance Name: [employer]awn ([employer]IP[employer])  Insurance ID:  [id number]    Pharmacy Information (if different than what is on RX)  [facility]:  [id number] [address]  Phone:  [phone]

## 2021-03-28 DIAGNOSIS — Z11.59 ENCOUNTER FOR SCREENING FOR OTHER VIRAL DISEASES: Primary | ICD-10-CM

## 2021-03-29 NOTE — TELEPHONE ENCOUNTER
Central Prior Authorization Team   Phone: [phone]      PA Initiation    Medication: vilazodone (VIIBRYD) 40 MG TABS tablet  Insurance Company: [employer]s - Phone [phone] Fax [phone]  Pharmacy Filling the Rx: [facility] #[id number]  Filling Pharmacy Phone: [phone]  Filling Pharmacy Fax:    Start Date: 3/29/2021

## 2021-03-30 NOTE — TELEPHONE ENCOUNTER
Writer called [employer] - Phone [phone] - to explore status of [facility] PA.    Toi was unable to find an open case and identified that this needed to be handled by a different team at [phone].    Writer called provided number. Roxy was able to see the 3/26 submission, which was cancelled by [employer] because there was already a valid PA, although it will  on . Roxy was able to find that the pt's insurance plan extended the PA until 21, so a PA is not needed at this time.

## 2021-03-30 NOTE — TELEPHONE ENCOUNTER
Prior Authorization Approval - for 3 mos        Authorization Effective Date: 2/27/2021  Authorization Expiration Date: 6/27/2021  Medication: vilazodone (VIIBRYD) 40 MG TABS tablet  Approved Dose/Quantity: 30  Reference #: [id number]   Insurance Company: [employer] - Phone [phone] Fax [phone]  Expected CoPay:       Which Pharmacy is filling the prescription (Not needed for infusion/clinic administered): [id number] [address]  Pharmacy Notified: Yes  Patient Notified: No

## 2021-03-31 ENCOUNTER — MYC MEDICAL ADVICE (OUTPATIENT)
Dept: INTERNAL MEDICINE | Facility: CLINIC | Age: 51
End: 2021-03-31

## 2021-03-31 NOTE — PROGRESS NOTES
"VIDEO VISIT  Mariaelena Jean Baptiste is a 49 year old patient who is being evaluated via a billable video visit.      The patient has been notified of following:   \"We have found that certain health care needs can be provided without the need for an in-person physical exam. This service lets us provide the care you need with a video conversation. If a prescription is necessary we can send it directly to your pharmacy. If lab work is needed we can place an order for that and you can then stop by our lab to have the test done at a later time. Insurers are generally covering virtual visits as they would in-office visits so billing should not be different than normal.  If for some reason you do get billed incorrectly, you should contact the billing office to correct it and that number is in the AVS .    Patient has given verbal consent for video visit?: Yes   How would you like to obtain your AVS?: [place]  AVS SmartPhrase [PsychAVS] has been placed in 'Patient Instructions': Yes      Video- Visit Details  Type of service:  video visit for medication management  Time of service:    Date:  04/05/21    Video Start Time:  11:07 AM   Video End Time:  11:41 AM    Reason for video visit:  Patient unable to travel due to Covid-19  Originating Site (patient location):  [facility] [place]   Location- Patient's home  Distant Site (provider location):  Remote location  Mode of Communication:  Video Conference via [email or web address]  Consent:  Patient has given verbal consent for video visit?: Yes        [facility]  Psychiatry Clinic  PSYCHIATRIC PROGRESS NOTE     SOCIAL SECURITY DISABILITY SUPPORT STATEMENT is below    Date of initial Diagnostic Assessment (DA) is 2/18/16 and most recent Transfer of Care DA is 7/31/19.    CARE TEAM:  PCP- Thalia Bradford    Specialty Providers- none    Therapist- Julieta Gill PsyD (sees every other week)    Community  Team- none           Mariaelena Jean Baptiste is a 50 year old non-binary person who " "prefers the name 'Mariaelena' & is ok with the pronouns she, her, herself (pt expresses lack of preference for pronouns and feels that discovering the term 'non-binary' has been enough for her).      Pertinent Background:  Mariaelena first experienced mental health issues as a young adult and has received treatment for treatment-resistant depression, BPD with severe self harm, and PTSD. Notably, both naltrexone and clozapine have reduced SIB immensely, with return when taper off has been initiated in the past (although no return of SIB to date since d/c of clozapine). She does better when she uses a light box in the Fall/Winter, best started in September as she typically experiences return of depressive symptoms in October. A taper of Lamictal was associated with decline in mood and subsequent hospitalization in the past which is a common theme for her as medication changes, even small ones, tend to be very destabilizing. A TMS series through the TRD clinic in August 2016 was transformative in terms of ongoing remission of her depression for the following 2+ years. She often is not aware of reemerging depressive symptoms until they become severe.    Psych critical item history includes suicide attempts {2x], suicidal ideation, severe SIB [has required skin grafts and long term hospitalization at [facility]], mutiple psychotropic trials, trauma hx, ECT, TMS, psych hosp (>5), and commitment.     INTERIM HISTORY      [4, 4]   The patient reports good treatment adherence.  History was provided by the patient who was a good historian.  The last visit ended with no change to the med regimen.   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   - She continues therapy with Julieta Long  - She received the second dose of the Covid vaccine last Wednesday (Pfizer) - feels please to be fully vaccinated    RECENT PSYCH ROS:   Depression:  depressed mood and low energy  Elevated:  none  Psychosis:  none  Anxiety:  nervous/overwhelmed  Panic Attack:  none  Trauma Related:  none  Dysregulation:  none  Eating Disorder: none     Pertinent Negative Symptoms:  NO self-injurious behavior/urges, suicidal and violent ideation, psychosis and naye    RECENT SUBSTANCE USE:     Alcohol- none  Tobacco- none; quit 10/23/17  Caffeine- 1 cup coffee/day  Opioids- none          Narcan Kit- N/A   Cannabis- none   Other illicit drugs- none    CURRENT SOCIAL HISTORY:  Financial/ Work- SSDI + 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  Partner/ - Single since 2000; identifies as asexual  Children- none      Living situation- She lives with her 2 cats (Mary- 2yo and Carlosbu- kitten) in an apartment in [place] (section 8 housing).     Social/ Spiritual Support- DBT therapist, online friends, father and step mother in [place]; brother [place] in [place] (supportive, close with them), a few co workers and a few close friends from the feline rescue (Ahsan and Anika). Mariaelena grew up Religion - continues to believe in God but does not identify with a specific Mu-ism.  FEELS SAFE AT HOME- yes     PSYCH and CD Critical Summary Points since July 2019 7/31/19- resident transition; added PM dose of Adderall for worsening depression (has been effective), referred back to [facility] clinic  for repeat TMS  11/22/19 - 2/13/20: repeat TMS series completed due to relapse of depression     * Disability forms have been completed x2 (Winter 2020 and Fall 2020) - see chart for copies    PAST MED TRIALS        See last Diagnostic Assessment  MEDICAL / SURGICAL HISTORY          Pregnant or breastfeeding- no      Contraception- s/p tubal ligation, identifies as asexual    Patient Active Problem List   Diagnosis     Suicidal ideation     Major depression, recurrent (H)     Tobacco abuse     Hx of psychiatric care     Scar     Neck pain       Major Surgery- cholecystectomy, s/p tubal ligation (1999), rectal prolapse repair    MEDICAL REVIEW OF SYSTEMS    [2, 10]     A comprehensive review of systems was performed and is negative other than noted in the HPI.    ALLERGY       Patient has no known allergies.     MEDICATIONS        Current Outpatient Medications   Medication Sig Dispense Refill     amphetamine-dextroamphetamine (ADDERALL) 10 MG tablet Take 1 tablet (10 mg) by mouth 2 times daily 60 tablet 0     amphetamine-dextroamphetamine (ADDERALL) 10 MG tablet Take 1 tablet (10 mg) by mouth 2 times daily 60 tablet 0     cyclobenzaprine (FLEXERIL) 5 MG tablet Take 1 tablet (5 mg) by mouth 3 times daily as needed for muscle spasms 30 tablet 0     ibuprofen (ADVIL,MOTRIN) 800 MG tablet Take 800 mg by mouth       lamoTRIgine (LAMICTAL) 150 MG tablet Take 1 tablet (150 mg) by mouth daily 30 tablet 2     levomilnacipran (FETZIMA ER) 120 MG 24 hr capsule Take 1 capsule (120 mg) by mouth daily 30 capsule 2     MAGNESIUM OXIDE PO        multivitamin, therapeutic (THERA-VIT) TABS tablet Take 1 tablet by mouth daily       predniSONE (DELTASONE) 20 MG tablet Take 2 tablets (40 mg) by mouth daily 10 tablet 0     QUEtiapine (SEROQUEL) 25 MG tablet Take 2 tablets (50 mg) by mouth At Bedtime 60 tablet 2     vilazodone (VIIBRYD) 40 MG TABS tablet Take 1 tablet (40 mg) by mouth daily 30 tablet 2     VITAMIN D, CHOLECALCIFEROL, PO  Take 1,000 Units by mouth 2 times daily        VITALS      [3, 3]   There were no vitals taken for this visit. Telehealth visit.    MENTAL STATUS EXAM      [9, 14 cog gs]     Alertness: alert  and oriented  Appearance: adequately groomed  Behavior/Demeanor: cooperative and calm, with good eye contact   Speech: regular rate and rhythm, non-pressured  Language: intact  Psychomotor: normal or unremarkable  Mood: mildly depressed  Affect: slightly blunted; was congruent to mood; was congruent to content  Thought Process/Associations: unremarkable  Thought Content:  Reports none;  Denies suicidal and violent ideation  Perception:  Reports none;  Denies auditory hallucinations and visual hallucinations  Insight: adequate  Judgment: good  Cognition: does  appear grossly intact; formal cognitive testing was not done  Gait and Station: unable to assess    LABS and DATA     AIMS:  not needed     PHQ9 TODAY = did not complete today  PHQ-9 SCORE 1/20/2020 1/21/2020 1/15/2021   PHQ-9 Total Score 7 5 5     ANTIPSYCHOTIC LABS  [glu, A1C, lipids (perla LDL), liver enzymes, WBC, ANEU, Hgb, plts]  q12 mo  Recent Labs   Lab Test 01/13/21  1149 06/05/20  1051 04/15/19  1401 02/27/17  1217 08/30/16  1031   GLC  --  94 87 93 86   A1C 5.1  --  5.4  --   --      Recent Labs   Lab Test 01/13/21  1149 04/15/19  1401 04/25/16  1117   CHOL 206* 191 177   TRIG 42 42 195*   * 112* 83   HDL 80 71 55     Recent Labs   Lab Test 04/15/19  1401 12/05/18  1246 02/06/17  1345 01/22/16  1852 10/06/15  0740   AST 22 19 17 19 20   ALT 23 24 17 25 37   ALKPHOS 62  --  64 63 85     Recent Labs   Lab Test 06/05/20  1051 04/15/19  1401 12/05/18  1246 06/20/16  1430 05/26/16  1414   WBC 5.7 7.5 6.5 8.8 8.9   ANEU 3.6 5.2  --  5.4 5.6   HGB 14.6 12.5 12.7 14.9 13.6   * 445 405 388 407       PSYCHOTROPIC DRUG INTERACTIONS     VILAZODONE + FETZIMA + ADDERALL + SEROQUEL may result in increased risk for serotonin syndrome.     VILAZODONE + FETZIMA may  enhance the antiplatelet effect of other Agents with Antiplatelet Properties.     LAMOTRIGINE + ACETAMINOPHEN may result in decreased lamotrigine serum levels.    ADDERALL + LISINOPRIL:  amphetamines may diminish the antihypertensive effect of antihypertensive agents.    MANAGEMENT:  Monitoring for adverse effects, routine vitals and using lowest therapeutic dose of [psychotropics]    RISK STATEMENT for SAFETY     Mariaelena Jean Baptiste did not appear to be an imminent safety risk to self or others.     PSYCHIATRIC DIAGNOSIS     MDD, recurrent, moderate (treatment-resistant; h/o severe episodes), currently in remission  BPD  PTSD  DID  Unspecified Eating Disorder, in remission  Insomnia, likely circadian rhythm disorder     ASSESSMENT      [m2, h3]     TODAY Mariaelena reports her depression is at baseline and she has not experienced any SI or urges for SIB since last visit. Her medications continue to work well for her in addition to psychotherapy and the repeat series of TMS last winter. There is no indication for a medication change today.     Future considerations:  - refer for acupuncture if patient interested (given chronic pain) - not currently possible due to Covid     PLAN      [m2, h3]     1) PSYCHOTROPIC MEDICATIONS:  - Continue quetiapine 50 mg PRN nightly for sleep and treatment resistant depression   - Continue Adderall IR 10 mg BID (qAM + 2pm) for augmentation of depression treatment  - Continue levomilnacipran  mg QDAY for depression  - Continue vilazodone 40 mg QDAY for depression  - Continue lamotrigine 150 mg QDAY for depression and mood stabilization  - Continue melatonin 1 mg with dinner     Other:     - Resume light box daily in Fall     2) [place] checked today:  indicates patient only receives Adderall from this clinic.    3) THERAPY:    - Continue biweekly therapy w/ DBT therapist Dr. Julieta Gill Psy D    4) NEXT DUE:    Labs- AP monitoring labs due Jan 2022  EKG- as needed  Rating Scales- PHQ-9 at  every visit    5) REFERRALS:    No Referrals needed; patient aware to call clinic social work team with any questions/concerns/or needs     6) RTC: in 4-6 weeks     7) CRISIS NUMBERS:   Provided routinely in AVS   ONLY if a [facility] PT: [facility] [place] [facility] [phone] (clinic), [phone] (after hours)     TREATMENT RISK STATEMENT:  The risks, benefits, alternatives and potential adverse effects have been discussed and are understood by the pt. The pt understands the risks of using street drugs or alcohol. There are no medical contraindications, the pt agrees to treatment with the ability to do so. The pt knows to call the clinic for any problems or to access emergency care if needed.  Medical and substance use concerns are documented above.  Psychotropic drug interaction check was done, including changes made today.    SOCIAL SECURITY DISABILITY  STATEMENT  Based on the assessment today, as well as longitudinal care, this provider team strongly supports this patient's application for social security disability. When this patient is at her best, she is not able to work more than 20 hrs a week.  When she has tried to work full time (in the past) she has experienced decompensation of her mental health including worsening of mood, sleep, energy, concentration, memory and ability to be organized. Her eating disorder worsened to the point of requiring a feeding tube and suicidal ideation increases. These symptoms make it impossible to meet deadlines, keep pace with job tasks and attend work reliably. Additionally, separate from work influences, her depression exacerbates periodically as has been the case over the last year and a half. During these periods the patient cannot even work 20 hrs a week due to the same symptoms listed above. Depression treatment consists of medications, psychotherapy and upcoming neuromodulation (TMS- transcranial magnetic stimulation). The patient is hopeful that TMS will  bring relief from mood symptoms and allow her to return her maximum workability which is half time.      PROVIDER: Katharine Tavares MD    Patient staffed with attending Dr. Harrington who will review and sign the note.  Supervisor is Dr. Lunsford.     TELEHEALTH ATTENDING ATTESTATION  Following the ACGME guidelines on telemedicine and direct supervision due to COVID-19, I was concurrently participating in and/or monitoring the patient care through appropriate telecommunication technology.  I discussed the key portions of the service with the resident, including the mental status examination and developing the plan of care. I reviewed key portions of the history with the resident. I agree with the findings and plan as documented in this note.   Ihsan Harrington MD

## 2021-04-05 ENCOUNTER — VIRTUAL VISIT (OUTPATIENT)
Dept: PSYCHIATRY | Facility: CLINIC | Age: 51
End: 2021-04-05
Attending: PSYCHIATRY & NEUROLOGY
Payer: MEDICARE

## 2021-04-05 DIAGNOSIS — F33.1 MODERATE EPISODE OF RECURRENT MAJOR DEPRESSIVE DISORDER (H): Primary | ICD-10-CM

## 2021-04-05 PROCEDURE — 99214 OFFICE O/P EST MOD 30 MIN: CPT | Mod: GC | Performed by: STUDENT IN AN ORGANIZED HEALTH CARE EDUCATION/TRAINING PROGRAM

## 2021-04-05 ASSESSMENT — PAIN SCALES - GENERAL: PAINLEVEL: NO PAIN (0)

## 2021-04-05 NOTE — PATIENT INSTRUCTIONS
**For crisis resources, please see the information at the end of this document**     Patient Education      Thank you for coming to the [facility].    Lab Testing:  If you had lab testing today and your results are reassuring or normal they will be mailed to you or sent through [facility] within 7 days. If the lab tests need quick action we will call you with the results. The phone number we will call with results is # [phone] (home) . If this is not the best number please call our clinic and change the number.    Medication Refills:  If you need any refills please call your pharmacy and they will contact us. Our fax number for refills is [phone]. Please allow three business for refill processing. If you need to  your refill at a new pharmacy, please contact the new pharmacy directly. The new pharmacy will help you get your medications transferred.     Scheduling:  If you have any concerns about today's visit or wish to schedule another appointment please call our office during normal business hours [phone] (8-5:00 M-F)    Contact Us:  Please call [phone] during business hours (8-5:00 M-F).  If after clinic hours, or on the weekend, please call  [phone].    Financial Assistance [phone]  [facility] Billing [phone]  Central Billing Office, [facility]: [phone]  [facility] Billing [phone]  Medical Records [phone]  [facility] Patient Bill of Rights [email or web address]       MENTAL HEALTH CRISIS NUMBERS:  For a medical emergency please call  911 or go to the nearest ER.     [facility]:   [facility] -[phone]   Crisis Residence [facility] -[phone]   Walk-In Counseling Center [facility] -[phone]   COPE 24/7 [place] Mobile Team -[phone] (adults)/[phone] (child)  CHILD: [facility] needs assessment  team - [phone]      [facility]:   [facility] - [phone]   Walk-in counseling [facility] - [phone]   Walk-in counseling [facility] - [phone]   Crisis Residence [facility] - [phone]  Urgent Care Adult Mental Fghzvq-[phone] mobile unit/ 24/7 crisis line    National Crisis Numbers:   National Suicide Prevention Lifeline: 7-426-858-TALK (006-512-7928)  Poison Control Center - 9-616-723-3910  Immunetics/resources for a list of additional resources (SOS)  Trans Lifeline a hotline for transgender people 1-501.632.4671  The Liam Project a hotline for LGBT youth 1-334-585-2398  Crisis Text Line: For any crisis 24/7   To: 907445  see www.crisistextline.org  - IF MAKING A CALL FEELS TOO HARD, send a text!         Again thank you for choosing [facility] M[facility] HE[facility] & [facility]DI[facility] and please let us know how we can best partner with you to improve you and your family's health.    You may be receiving a survey regarding this appointment. We would love to have your feedback, both positive and negative. The survey is done by an external company, so your answers are anonymous.

## 2021-04-05 NOTE — PROGRESS NOTES
"VIDEO VISIT  Mariaelena Jean Baptiste is a 50 year old patient who is being evaluated via a billable video visit.      The patient has been notified of following:   \"This video visit will be conducted via a call between you and your physician/provider. We have found that certain health care needs can be provided without the need for an in-person physical exam. This service lets us provide the care you need with a video conversation. If a prescription is necessary we can send it directly to your pharmacy. If lab work is needed we can place an order for that and you can then stop by our lab to have the test done at a later time. Insurers are generally covering virtual visits as they would in-office visits so billing should not be different than normal.  If for some reason you do get billed incorrectly, you should contact the billing office to correct it and that number is in the AVS .    Video Conference to be completed via:  [email or web address]    Patient has given verbal consent for video visit?:  Yes    Patient would prefer that any video invitations be sent by: Send to e-mail at: [email or web address]      How would patient like to obtain AVS?:  Danielle    AVS SmartPhrase [PsychAVS] has been placed in 'Patient Instructions':  Yes    "

## 2021-04-06 ENCOUNTER — TELEPHONE (OUTPATIENT)
Dept: GASTROENTEROLOGY | Facility: CLINIC | Age: 51
End: 2021-04-06

## 2021-04-07 DIAGNOSIS — Z11.59 ENCOUNTER FOR SCREENING FOR OTHER VIRAL DISEASES: ICD-10-CM

## 2021-04-08 ENCOUNTER — VIRTUAL VISIT (OUTPATIENT)
Dept: CARDIOLOGY | Facility: CLINIC | Age: 51
End: 2021-04-08
Attending: INTERNAL MEDICINE
Payer: MEDICARE

## 2021-04-08 DIAGNOSIS — R55 SYNCOPE AND COLLAPSE: Primary | ICD-10-CM

## 2021-04-08 PROCEDURE — 99213 OFFICE O/P EST LOW 20 MIN: CPT | Mod: 95 | Performed by: INTERNAL MEDICINE

## 2021-04-08 NOTE — PATIENT INSTRUCTIONS
You were seen in the Electrophysiology Clinic today by: Ihsan Campo MD    Plan:     Follow up visit: Follow-up with Dr. Bradford.        Your Care Team:  EP Cardiology   Telephone Number     Janene Vergara RN [phone]     For scheduling appts or procedures:    Magdalena Mccann   [phone]   For the Device Clinic (Pacemakers, ICDs, Loop Recorders)    During business hours: [phone]  After business hours:   [phone]- select option 4 and ask for job code [id number].     On-call cardiologist for after hours or on weekends: [phone], option #4, and ask to speak to the on-call cardiologist.     Cardiovascular Clinic:   [address] [facility] [place] [address]      As always, Thank you for trusting us with your health care needs!

## 2021-04-08 NOTE — LETTER
"4/8/2021      RE: Mariaelena Jean Baptiste  [address]  [place] [address]       Dear Colleague,    Thank you for the opportunity to participate in the care of your patient, Mariaelena Jean Baptiste, at the [facility] [place] [facility]. Please see a copy of my visit note below.    Mariaelena is a 50 year old who is being evaluated via a billable video visit.      How would you like to obtain your AVS? MyChart  If the video visit is dropped, the invitation should be resent by: Text to cell phone: [phone]  Will anyone else be joining your video visit? No    Vitals - Patient Reported  Weight (Patient Reported): 81.6 kg (180 lb)  Height (Patient Reported): 180.3 cm (5' 11\")  BMI (Based on Pt Reported Ht/Wt): 25.1  Pain Score: No Pain (0)  Pain Loc: Chest    HPI:     Mariaelena is a pleasant 50-year-old woman who was kindly referred by Dr. Bradford for evaluation of  2 collapse events during the past year.  By history both were almost certainly vasovagal in nature.  She did not have a prior history of syncope or collapse.    Since her last visit Mariaelena has had some near faints in which she feels something coming on and then gets down on the floor sits down.  She has not had any maria teresa syncope.  She is aware of the need for salt and electrolyte fluid intake and has been following this up.    Already has had no new cardiovascular complaints and no intercurrent illnesses.    We agreed that unless her symptoms become worse she would continue to follow with Dr. Bradford in the clinic.    PAST MEDICAL HISTORY:  Past Medical History:   Diagnosis Date     Anxiety 1988     Chronic osteoarthritis      Depressive disorder      Dissociative identity disorder (H)      Gastro-oesophageal reflux disease      Migraines      PTSD (post-traumatic stress disorder)      Social phobia        CURRENT MEDICATIONS:  Current Outpatient Medications   Medication Sig Dispense " Refill     amphetamine-dextroamphetamine (ADDERALL) 10 MG tablet Take 1 tablet (10 mg) by mouth 2 times daily 60 tablet 0     cyclobenzaprine (FLEXERIL) 5 MG tablet Take 1 tablet (5 mg) by mouth 3 times daily as needed for muscle spasms 30 tablet 0     ibuprofen (ADVIL,MOTRIN) 800 MG tablet Take 400 mg by mouth        lamoTRIgine (LAMICTAL) 150 MG tablet Take 1 tablet (150 mg) by mouth daily 30 tablet 2     levomilnacipran (FETZIMA ER) 120 MG 24 hr capsule Take 1 capsule (120 mg) by mouth daily 30 capsule 2     MAGNESIUM OXIDE PO        multivitamin, therapeutic (THERA-VIT) TABS tablet Take 1 tablet by mouth daily       QUEtiapine (SEROQUEL) 25 MG tablet Take 2 tablets (50 mg) by mouth At Bedtime 60 tablet 2     vilazodone (VIIBRYD) 40 MG TABS tablet Take 1 tablet (40 mg) by mouth daily 30 tablet 2     VITAMIN D, CHOLECALCIFEROL, PO Take 1,000 Units by mouth 2 times daily        predniSONE (DELTASONE) 20 MG tablet Take 2 tablets (40 mg) by mouth daily (Patient not taking: Reported on 4/8/2021) 10 tablet 0       PAST SURGICAL HISTORY:  Past Surgical History:   Procedure Laterality Date     CHOLECYSTECTOMY       LAPAROSCOPIC TUBAL LIGATION  1999     rectal prolapse repair         ALLERGIES:   No Known Allergies    FAMILY HISTORY:  Family History   Problem Relation Age of Onset     Depression Father      Hypertension Father      Substance Abuse Father      Cancer Father         non hodgkins lymphoma     Depression Sister      Cancer Maternal Grandmother         breast cancer (mastectomy in 40's), melanoma, leukemia     Melanoma Maternal Grandmother      Cancer Maternal Grandfather         leukemia     Substance Abuse Maternal Grandfather      Cancer Paternal Grandmother         lung cancer, nonsmoker     Substance Abuse Brother      Substance Abuse Sister      Skin Cancer No family hx of          SOCIAL HISTORY:  Social History     Tobacco Use     Smoking status: Former Smoker     Packs/day: 1.00     Years: 20.00      Pack years: 20.00     Types: Cigarettes, Other     Start date: 5/1/1995     Quit date: 10/23/2017     Years since quitting: 3.4     Smokeless tobacco: Former User     Tobacco comment: E-cig    Substance Use Topics     Alcohol use: No     Drug use: No       ROS:   Constitutional: No fever, chills, or sweats. Weight stable.   ENT: No visual disturbance, ear ache, epistaxis, sore throat.   Cardiovascular: As per HPI.   Respiratory: No cough, hemoptysis.    GI: No nausea, vomiting,   : No hematuria.   Integument: Negative.   Psychiatric: Negative.   Hematologic:   no easy bleeding.  Neuro: Negative.   Endocrinology: No significant heat or cold intolerance   Musculoskeletal: No myalgia.    Exam:  There were no vitals taken for this visit.  GENERAL APPEARANCE: healthy, alert and no distress    RESPIRATORY:- no rales, rhonchi or wheezes, no use of accessory muscles, no retractions, respirations are unlabored, normal respiratory rate  NEURO: alert and oriented to person/place/time, normal speech,and affect    SKIN: no ecchymoses, no rashes    Labs:  CBC RESULTS:   Lab Results   Component Value Date    WBC 5.7 06/05/2020    RBC 5.05 06/05/2020    HGB 14.6 06/05/2020    HCT 46.7 06/05/2020    MCV 93 06/05/2020    MCH 28.9 06/05/2020    MCHC 31.3 (L) 06/05/2020    RDW 12.5 06/05/2020     (H) 06/05/2020       BMP RESULTS:  Lab Results   Component Value Date     06/05/2020    POTASSIUM 4.4 06/05/2020    CHLORIDE 107 06/05/2020    CO2 25 06/05/2020    ANIONGAP 6 06/05/2020    GLC 94 06/05/2020    BUN 15 06/05/2020    CR 0.77 06/05/2020    GFRESTIMATED >90 06/05/2020    GFRESTBLACK >90 06/05/2020    ARUN 9.0 06/05/2020        INR RESULTS:  No results found for: INR    Procedures:  PULMONARY FUNCTION TESTS:   No flowsheet data found.      ECHOCARDIOGRAM:   No results found for this or any previous visit (from the past 8760 hour(s)).      Assessment and Plan:   1.  Vasovagal syncope by history-patient had 2 documented  episodes last year and has had some near syncopal spells recently but understands the basic treatment strategy.    Plan  1.  Follow-up with Dr. Bradford  2.  I advised patient that she could contact us at any time should her symptoms become worse    Video on 6: 10; video off 6: 25  Elapsed time with documentation 20 minutes  Platform [employer]  Patient at home; clinic [facility] heart    I very much appreciated the opportunity to see and assess Mariaelena Jean Baptiste in the clinic today. Please do not hesitate to contact my office if you have any questions or concerns.      Ihsan Campo MD  Cardiac Arrhythmia Service  [facility]  [phone]      CC  IHSAN CAMPO

## 2021-04-08 NOTE — PROGRESS NOTES
"Mariaelena is a 50 year old who is being evaluated via a billable video visit.      How would you like to obtain your AVS? MyChart  If the video visit is dropped, the invitation should be resent by: Text to cell phone: [phone]  Will anyone else be joining your video visit? No    Vitals - Patient Reported  Weight (Patient Reported): 81.6 kg (180 lb)  Height (Patient Reported): 180.3 cm (5' 11\")  BMI (Based on Pt Reported Ht/Wt): 25.1  Pain Score: No Pain (0)  Pain Loc: Chest    HPI:     Mariaelena is a pleasant 50-year-old woman who was kindly referred by Dr. Bradford for evaluation of  2 collapse events during the past year.  By history both were almost certainly vasovagal in nature.  She did not have a prior history of syncope or collapse.    Since her last visit Mariaelena has had some near faints in which she feels something coming on and then gets down on the floor sits down.  She has not had any maria teresa syncope.  She is aware of the need for salt and electrolyte fluid intake and has been following this up.    Already has had no new cardiovascular complaints and no intercurrent illnesses.    We agreed that unless her symptoms become worse she would continue to follow with Dr. Bradford in the clinic.    PAST MEDICAL HISTORY:  Past Medical History:   Diagnosis Date     Anxiety 1988     Chronic osteoarthritis      Depressive disorder      Dissociative identity disorder (H)      Gastro-oesophageal reflux disease      Migraines      PTSD (post-traumatic stress disorder)      Social phobia        CURRENT MEDICATIONS:  Current Outpatient Medications   Medication Sig Dispense Refill     amphetamine-dextroamphetamine (ADDERALL) 10 MG tablet Take 1 tablet (10 mg) by mouth 2 times daily 60 tablet 0     cyclobenzaprine (FLEXERIL) 5 MG tablet Take 1 tablet (5 mg) by mouth 3 times daily as needed for muscle spasms 30 tablet 0     ibuprofen (ADVIL,MOTRIN) 800 MG tablet Take 400 mg by mouth        lamoTRIgine (LAMICTAL) 150 MG tablet " Take 1 tablet (150 mg) by mouth daily 30 tablet 2     levomilnacipran (FETZIMA ER) 120 MG 24 hr capsule Take 1 capsule (120 mg) by mouth daily 30 capsule 2     MAGNESIUM OXIDE PO        multivitamin, therapeutic (THERA-VIT) TABS tablet Take 1 tablet by mouth daily       QUEtiapine (SEROQUEL) 25 MG tablet Take 2 tablets (50 mg) by mouth At Bedtime 60 tablet 2     vilazodone (VIIBRYD) 40 MG TABS tablet Take 1 tablet (40 mg) by mouth daily 30 tablet 2     VITAMIN D, CHOLECALCIFEROL, PO Take 1,000 Units by mouth 2 times daily        predniSONE (DELTASONE) 20 MG tablet Take 2 tablets (40 mg) by mouth daily (Patient not taking: Reported on 4/8/2021) 10 tablet 0       PAST SURGICAL HISTORY:  Past Surgical History:   Procedure Laterality Date     CHOLECYSTECTOMY       LAPAROSCOPIC TUBAL LIGATION  1999     rectal prolapse repair         ALLERGIES:   No Known Allergies    FAMILY HISTORY:  Family History   Problem Relation Age of Onset     Depression Father      Hypertension Father      Substance Abuse Father      Cancer Father         non hodgkins lymphoma     Depression Sister      Cancer Maternal Grandmother         breast cancer (mastectomy in 40's), melanoma, leukemia     Melanoma Maternal Grandmother      Cancer Maternal Grandfather         leukemia     Substance Abuse Maternal Grandfather      Cancer Paternal Grandmother         lung cancer, nonsmoker     Substance Abuse Brother      Substance Abuse Sister      Skin Cancer No family hx of          SOCIAL HISTORY:  Social History     Tobacco Use     Smoking status: Former Smoker     Packs/day: 1.00     Years: 20.00     Pack years: 20.00     Types: Cigarettes, Other     Start date: 5/1/1995     Quit date: 10/23/2017     Years since quitting: 3.4     Smokeless tobacco: Former User     Tobacco comment: E-cig    Substance Use Topics     Alcohol use: No     Drug use: No       ROS:   Constitutional: No fever, chills, or sweats. Weight stable.   ENT: No visual disturbance, ear  ache, epistaxis, sore throat.   Cardiovascular: As per HPI.   Respiratory: No cough, hemoptysis.    GI: No nausea, vomiting,   : No hematuria.   Integument: Negative.   Psychiatric: Negative.   Hematologic:   no easy bleeding.  Neuro: Negative.   Endocrinology: No significant heat or cold intolerance   Musculoskeletal: No myalgia.    Exam:  There were no vitals taken for this visit.  GENERAL APPEARANCE: healthy, alert and no distress    RESPIRATORY:- no rales, rhonchi or wheezes, no use of accessory muscles, no retractions, respirations are unlabored, normal respiratory rate  NEURO: alert and oriented to person/place/time, normal speech,and affect    SKIN: no ecchymoses, no rashes    Labs:  CBC RESULTS:   Lab Results   Component Value Date    WBC 5.7 06/05/2020    RBC 5.05 06/05/2020    HGB 14.6 06/05/2020    HCT 46.7 06/05/2020    MCV 93 06/05/2020    MCH 28.9 06/05/2020    MCHC 31.3 (L) 06/05/2020    RDW 12.5 06/05/2020     (H) 06/05/2020       BMP RESULTS:  Lab Results   Component Value Date     06/05/2020    POTASSIUM 4.4 06/05/2020    CHLORIDE 107 06/05/2020    CO2 25 06/05/2020    ANIONGAP 6 06/05/2020    GLC 94 06/05/2020    BUN 15 06/05/2020    CR 0.77 06/05/2020    GFRESTIMATED >90 06/05/2020    GFRESTBLACK >90 06/05/2020    ARUN 9.0 06/05/2020        INR RESULTS:  No results found for: INR    Procedures:  PULMONARY FUNCTION TESTS:   No flowsheet data found.      ECHOCARDIOGRAM:   No results found for this or any previous visit (from the past 8760 hour(s)).      Assessment and Plan:   1.  Vasovagal syncope by history-patient had 2 documented episodes last year and has had some near syncopal spells recently but understands the basic treatment strategy.    Plan  1.  Follow-up with Dr. Bradford  2.  I advised patient that she could contact us at any time should her symptoms become worse    Video on 6: 10; video off 6: 25  Elapsed time with documentation 20 minutes  Platform [facility]  Patient at  home; clinic [facility] heart    I very much appreciated the opportunity to see and assess Mariaelena Jean Baptiste in the clinic today. Please do not hesitate to contact my office if you have any questions or concerns.      Ihsan Campo MD  Cardiac Arrhythmia Service  [facility]  [phone]      CC  IHSAN CAMPO

## 2021-04-08 NOTE — TELEPHONE ENCOUNTER
Writer reviewed pre-assessment questions with patient prior to upcoming colonoscopy on 4.14.2021.  COVID test scheduled for 4.12.2021.    Pt is not taking the miralax daily on her med list.  Denies constipation.  Will continue with miralax/mag citrate prep.     Reviewed miralax/magnesium citrate prep instructions with patient.  Noting no nuts, seeds, or popcorn 7 days prior to procedure.     Patient verbalized understanding.  No further questions or concerns.    Ju Sun RN

## 2021-04-12 DIAGNOSIS — Z11.59 ENCOUNTER FOR SCREENING FOR OTHER VIRAL DISEASES: ICD-10-CM

## 2021-04-12 LAB
SARS-COV-2 RNA RESP QL NAA+PROBE: NORMAL
SPECIMEN SOURCE: NORMAL

## 2021-04-12 PROCEDURE — U0003 INFECTIOUS AGENT DETECTION BY NUCLEIC ACID (DNA OR RNA); SEVERE ACUTE RESPIRATORY SYNDROME CORONAVIRUS 2 (SARS-COV-2) (CORONAVIRUS DISEASE [COVID-19]), AMPLIFIED PROBE TECHNIQUE, MAKING USE OF HIGH THROUGHPUT TECHNOLOGIES AS DESCRIBED BY CMS-2020-01-R: HCPCS | Performed by: INTERNAL MEDICINE

## 2021-04-12 PROCEDURE — U0005 INFEC AGEN DETEC AMPLI PROBE: HCPCS | Performed by: INTERNAL MEDICINE

## 2021-04-13 LAB
LABORATORY COMMENT REPORT: NORMAL
SARS-COV-2 RNA RESP QL NAA+PROBE: NEGATIVE
SPECIMEN SOURCE: NORMAL

## 2021-04-14 ENCOUNTER — HOSPITAL ENCOUNTER (OUTPATIENT)
Facility: AMBULATORY SURGERY CENTER | Age: 51
Discharge: HOME | End: 2021-04-14
Attending: INTERNAL MEDICINE | Admitting: INTERNAL MEDICINE
Payer: MEDICARE

## 2021-04-14 VITALS
HEART RATE: 107 BPM | SYSTOLIC BLOOD PRESSURE: 129 MMHG | DIASTOLIC BLOOD PRESSURE: 95 MMHG | OXYGEN SATURATION: 99 % | RESPIRATION RATE: 16 BRPM | TEMPERATURE: 97.3 F

## 2021-04-14 LAB — COLONOSCOPY: NORMAL

## 2021-04-14 PROCEDURE — 45380 COLONOSCOPY AND BIOPSY: CPT | Mod: 33

## 2021-04-14 PROCEDURE — 88305 TISSUE EXAM BY PATHOLOGIST: CPT | Mod: GC | Performed by: PATHOLOGY

## 2021-04-14 RX ORDER — FENTANYL CITRATE 50 UG/ML
INJECTION, SOLUTION INTRAMUSCULAR; INTRAVENOUS PRN
Status: DISCONTINUED | OUTPATIENT
Start: 2021-04-14 | End: 2021-04-14 | Stop reason: HOSPADM

## 2021-04-14 RX ORDER — LIDOCAINE 40 MG/G
CREAM TOPICAL
Status: DISCONTINUED | OUTPATIENT
Start: 2021-04-14 | End: 2021-04-15 | Stop reason: HOSPADM

## 2021-04-14 RX ORDER — ONDANSETRON 2 MG/ML
4 INJECTION INTRAMUSCULAR; INTRAVENOUS
Status: DISCONTINUED | OUTPATIENT
Start: 2021-04-14 | End: 2021-04-15 | Stop reason: HOSPADM

## 2021-04-14 NOTE — H&P
Mariaelena Jean Baptiste  [id number]  adult  50 year old      Reason for procedure/surgery: Screening    Patient Active Problem List   Diagnosis     Suicidal ideation     Major depression, recurrent (H)     Tobacco abuse     Hx of psychiatric care     Scar     Neck pain       Past Surgical History:    Past Surgical History:   Procedure Laterality Date     CHOLECYSTECTOMY       LAPAROSCOPIC TUBAL LIGATION  1999     rectal prolapse repair         Past Medical History:   Past Medical History:   Diagnosis Date     Anxiety 1988     Chronic osteoarthritis      Depressive disorder      Dissociative identity disorder (H)      Gastro-oesophageal reflux disease      Hypertension      Migraines      PTSD (post-traumatic stress disorder)      Social phobia        Social History:   Social History     Tobacco Use     Smoking status: Former Smoker     Packs/day: 1.00     Years: 20.00     Pack years: 20.00     Types: Cigarettes, Other     Start date: 5/1/1995     Quit date: 10/23/2017     Years since quitting: 3.4     Smokeless tobacco: Former User     Tobacco comment: E-cig    Substance Use Topics     Alcohol use: No       Family History:   Family History   Problem Relation Age of Onset     Depression Father      Hypertension Father      Substance Abuse Father      Cancer Father         non hodgkins lymphoma     Depression Sister      Cancer Maternal Grandmother         breast cancer (mastectomy in 40's), melanoma, leukemia     Melanoma Maternal Grandmother      Cancer Maternal Grandfather         leukemia     Substance Abuse Maternal Grandfather      Cancer Paternal Grandmother         lung cancer, nonsmoker     Substance Abuse Brother      Substance Abuse Sister      Skin Cancer No family hx of        Allergies: No Known Allergies    Active Medications:   Current Outpatient Medications   Medication Sig Dispense Refill     amphetamine-dextroamphetamine (ADDERALL) 10 MG tablet Take 1 tablet (10 mg) by mouth 2 times daily 60 tablet 0      cyclobenzaprine (FLEXERIL) 5 MG tablet Take 1 tablet (5 mg) by mouth 3 times daily as needed for muscle spasms 30 tablet 0     ibuprofen (ADVIL,MOTRIN) 800 MG tablet Take 400 mg by mouth        levomilnacipran (FETZIMA ER) 120 MG 24 hr capsule Take 1 capsule (120 mg) by mouth daily 30 capsule 2     MAGNESIUM OXIDE PO        multivitamin, therapeutic (THERA-VIT) TABS tablet Take 1 tablet by mouth daily       QUEtiapine (SEROQUEL) 25 MG tablet Take 2 tablets (50 mg) by mouth At Bedtime 60 tablet 2     vilazodone (VIIBRYD) 40 MG TABS tablet Take 1 tablet (40 mg) by mouth daily 30 tablet 2     VITAMIN D, CHOLECALCIFEROL, PO Take 1,000 Units by mouth 2 times daily        lamoTRIgine (LAMICTAL) 150 MG tablet Take 1 tablet (150 mg) by mouth daily 30 tablet 2     predniSONE (DELTASONE) 20 MG tablet Take 2 tablets (40 mg) by mouth daily (Patient not taking: Reported on 4/8/2021) 10 tablet 0       Systemic Review:   CONSTITUTIONAL: NEGATIVE for fever, chills, change in weight  ENT/MOUTH: NEGATIVE for ear, mouth and throat problems  RESP: NEGATIVE for significant cough or SOB  CV: NEGATIVE for chest pain, palpitations or peripheral edema    Physical Examination:   Vital Signs: BP (!) 131/95   Pulse 125   Temp 97.3  F (36.3  C) (Temporal)   Resp 16   SpO2 97%   GENERAL: healthy, alert and no distress  NECK: no adenopathy, no asymmetry, masses, or scars  RESP: lungs clear to auscultation - no rales, rhonchi or wheezes  CV: regular rate and rhythm, normal S1 S2, no S3 or S4, no murmur, click or rub, no peripheral edema and peripheral pulses strong  ABDOMEN: soft, nontender, no hepatosplenomegaly, no masses and bowel sounds normal  MS: no gross musculoskeletal defects noted, no edema    ASA Classification: (II)  Mild systemic disease  Airway Exam: Mallampati Score: Class II (Complete visualization of uvula)    Plan: Appropriate to proceed as scheduled.      Rickie Mccarthy MD  4/14/2021    PCP:  Thalia Bradford

## 2021-04-14 NOTE — DISCHARGE INSTRUCTIONS
Discharge Instructions after Colonoscopy    Activity and Diet  You were given medicine for pain. You may be dizzy or sleepy.  For 24 hours:    Do not drive or use heavy equipment.    Do not make important decisions.    Do not drink any alcohol.  You may return to your normal diet and medicines.    Discomfort    Air was placed in your colon during the exam in order to see it. Walking helps to pass the air.    You may take Tylenol (acetaminophen) for pain unless your doctor has told you not to.  Do not take aspirin or ibuprofen (Advil, Motrin, or other anti-inflammatory  drugs) for _____ days.    Follow-up  ____ We took small tissue samples or polyps to study. Your doctor will call you with the results  within two weeks.    When to call:    Call right away if you have:    Unusual pain in belly or chest pain not relieved with passing air.    More than 1 to 2 Tablespoons of bleeding from your rectum.    Fever above 100.6  F (37.5  C).    If you have severe pain, bleeding, or shortness of breath, go to an emergency room.    If you have questions, call:  Monday to Friday, 8 a.m. to 4:30 p.m.  Endoscopy: [phone] (We may have to call you back)    After hours  Hospital: [phone] (Ask for the GI fellow on call)

## 2021-04-18 DIAGNOSIS — Z11.59 ENCOUNTER FOR SCREENING FOR OTHER VIRAL DISEASES: Primary | ICD-10-CM

## 2021-04-18 DIAGNOSIS — Z11.59 ENCOUNTER FOR SCREENING FOR OTHER VIRAL DISEASES: ICD-10-CM

## 2021-04-18 LAB
COPATH REPORT: NORMAL
SARS-COV-2 RNA RESP QL NAA+PROBE: NORMAL
SPECIMEN SOURCE: NORMAL

## 2021-04-18 PROCEDURE — U0003 INFECTIOUS AGENT DETECTION BY NUCLEIC ACID (DNA OR RNA); SEVERE ACUTE RESPIRATORY SYNDROME CORONAVIRUS 2 (SARS-COV-2) (CORONAVIRUS DISEASE [COVID-19]), AMPLIFIED PROBE TECHNIQUE, MAKING USE OF HIGH THROUGHPUT TECHNOLOGIES AS DESCRIBED BY CMS-2020-01-R: HCPCS | Performed by: SPECIALIST

## 2021-04-18 PROCEDURE — U0005 INFEC AGEN DETEC AMPLI PROBE: HCPCS | Performed by: SPECIALIST

## 2021-04-21 ENCOUNTER — ANCILLARY PROCEDURE (OUTPATIENT)
Dept: GENERAL RADIOLOGY | Facility: CLINIC | Age: 51
End: 2021-04-21
Attending: FAMILY MEDICINE
Payer: MEDICARE

## 2021-04-21 VITALS
DIASTOLIC BLOOD PRESSURE: 85 MMHG | HEART RATE: 112 BPM | TEMPERATURE: 98.2 F | RESPIRATION RATE: 20 BRPM | SYSTOLIC BLOOD PRESSURE: 165 MMHG | OXYGEN SATURATION: 99 %

## 2021-04-21 PROCEDURE — 62321 NJX INTERLAMINAR CRV/THRC: CPT | Performed by: RADIOLOGY

## 2021-04-21 RX ORDER — LIDOCAINE HYDROCHLORIDE 10 MG/ML
30 INJECTION, SOLUTION EPIDURAL; INFILTRATION; INTRACAUDAL; PERINEURAL ONCE
Status: COMPLETED | OUTPATIENT
Start: 2021-04-21 | End: 2021-04-21

## 2021-04-21 RX ORDER — BETAMETHASONE SODIUM PHOSPHATE AND BETAMETHASONE ACETATE 3; 3 MG/ML; MG/ML
6 INJECTION, SUSPENSION INTRA-ARTICULAR; INTRALESIONAL; INTRAMUSCULAR; SOFT TISSUE ONCE
Status: COMPLETED | OUTPATIENT
Start: 2021-04-21 | End: 2021-04-21

## 2021-04-21 RX ORDER — IOPAMIDOL 408 MG/ML
10 INJECTION, SOLUTION INTRATHECAL ONCE
Status: COMPLETED | OUTPATIENT
Start: 2021-04-21 | End: 2021-04-21

## 2021-04-21 RX ADMIN — LIDOCAINE HYDROCHLORIDE 5 ML: 10 INJECTION, SOLUTION EPIDURAL; INFILTRATION; INTRACAUDAL; PERINEURAL at 09:25

## 2021-04-21 RX ADMIN — IOPAMIDOL 2 ML: 408 INJECTION, SOLUTION INTRATHECAL at 09:25

## 2021-04-21 RX ADMIN — BETAMETHASONE SODIUM PHOSPHATE AND BETAMETHASONE ACETATE 6 MG: 3; 3 INJECTION, SUSPENSION INTRA-ARTICULAR; INTRALESIONAL; INTRAMUSCULAR; SOFT TISSUE at 09:25

## 2021-04-21 NOTE — DISCHARGE INSTRUCTIONS
Assessment/Plan:    Hemorrhoids  Inflamed left external hemorrhoids  Noted stigmata of recent bleed  Mild tender palpation on exam   No palpable anorectal masses  No significant active bleeding noted  For now would recommend continued Sitz baths twice a day and after bowel movements  Symptom control with preparation H or wipes is appropriate  I suspect her her hemorrhoids will settle down the next couple weeks  We discussed dietary changes increasing fiber  Encourage her to take Metamucil daily  She may add MiraLax and Colace if needed  Avoid straining  Diagnoses and all orders for this visit:    Hemorrhoids, unspecified hemorrhoid type          Subjective:      Patient ID: Brayan Rush is a 52 y o  female  78-year-old female presents to the office today for evaluation of bleeding hemorrhoids  She was recently seen in the ER was found to have bleeding external hemorrhoids  She states that her hemorrhoids started approximately 1 week ago but when the bleeding got worse the pain got worse she sought medical attention  She has had hemorrhoids in the past   She states that currently her hemorrhoids are still inflamed and there is a small amount of bleeding however the have shrunk substantially in size since being seen in the ER few days ago  She is using preparation H in tucks wipes  She is not doing any Sitz baths  She has never had any surgery for her hernias in the past   She admits to history of constipation and straining  She has never had a colonoscopy  The following portions of the patient's history were reviewed and updated as appropriate:   She  has a past medical history of Acid reflux, Bacterial vaginosis, Cleft palate, Frequency of urination, HPV (human papilloma virus) anogenital infection, Hyperhidrosis, Perforated ear drum, Raynaud's disease, and Vocal cords swelling    She   Patient Active Problem List    Diagnosis Date Noted    Hemorrhoids 10/02/2019    Microhematuria Discharge Instructions for Cervical Steroid Injection    Care of injection site:    If you have new numbness down your arm after the injection, this may last up to 4-6 hours, but should go away.     Over the next 24 to 48 hours, pain at the injection site may increase before it gets better.    For the next 48 hours, use ice packs for 15 minutes, 3-4 times a day for pain relief.    If you have a bandage, you may remove it the next morning         Do not submerge injection site in water for 24 hours, (no baths. pools, hot tubs). Showers are OK.              Activity:    You should relax today, and then you may return to your regular activity.    You may eat a normal diet.    Medicines:    Restart all your medicines tomorrow at your regular dose, including Coumadin (Warfarin).    If you are restarting Coumadin, talk to your doctor about having your INR checked.      If you received steroids: The steroid should help reduce swelling and pain. This may take from 3-14 days. Side effects from the shot will be mild and go away in 2-3 days.     Common side effects may include:    Insomnia    Heartburn    Flushed face    Water retention    Increased appetite    Increased blood sugar    If you have diabetes, watch your blood sugar closely, If needed, call your doctor to help control your blood sugar.    DO NOT GET THE COVID VACCINE 2 WEEKS BEFORE OR AFTER YOUR INJECTION    Call your doctor or go to the Emergency Room if you have new severe pain, fever, or loss of control of your arms.     04/11/2019    Urinary frequency 04/11/2019     She  has a past surgical history that includes Cleft palate repair; Tympanostomy tube placement; and Tympanoplasty (Left)  Her family history is not on file  She  reports that she has been smoking cigarettes  She has been smoking about 1 00 pack per day  She has never used smokeless tobacco  She reports that she drank alcohol  She reports that she does not use drugs  Current Outpatient Medications   Medication Sig Dispense Refill    atorvastatin (LIPITOR) 10 mg tablet   5    FAMOTIDINE PO       cholecalciferol (VITAMIN D3) 1,000 units tablet Take 1,000 Units by mouth daily      hydrocortisone (ANUSOL-HC) 2 5 % rectal cream Apply rectally 2 times daily (Patient not taking: Reported on 10/1/2019) 28 35 g 0    metroNIDAZOLE (FLAGYL PO)       oxybutynin (DITROPAN) 5 mg tablet Take 1 tablet (5 mg total) by mouth 3 (three) times a day (Patient not taking: Reported on 5/1/2019) 60 tablet 3     No current facility-administered medications for this visit  She has No Known Allergies       Review of Systems   Constitutional: Negative for activity change, appetite change, chills, diaphoresis, fatigue, fever and unexpected weight change  Gastrointestinal: Positive for anal bleeding, blood in stool and constipation  Negative for abdominal pain (Anorectal pain), diarrhea, nausea, rectal pain and vomiting  Objective:      BP (!) 173/88   Pulse (!) 108   Temp 99 2 °F (37 3 °C)   Ht 5' 4" (1 626 m)   Wt 96 2 kg (212 lb)   BMI 36 39 kg/m²          Physical Exam   Constitutional: She is oriented to person, place, and time  She appears well-developed and well-nourished  No distress  HENT:   Head: Normocephalic and atraumatic  Eyes: No scleral icterus  Neck: Normal range of motion  Pulmonary/Chest: Effort normal  No respiratory distress  Genitourinary: Rectal exam shows external hemorrhoid and tenderness   Rectal exam shows no internal hemorrhoid, no fissure, no mass and anal tone normal          Musculoskeletal: Normal range of motion  She exhibits no edema, tenderness or deformity  Neurological: She is alert and oriented to person, place, and time  No cranial nerve deficit  Skin: Skin is warm  No rash noted  She is not diaphoretic  No erythema  No pallor  Psychiatric: She has a normal mood and affect  Her behavior is normal    Vitals reviewed

## 2021-04-21 NOTE — PROGRESS NOTES
Pt did well with the procedure.  No complications. VSS. AVS given and pt escorted to the waiting room.

## 2021-05-05 NOTE — PROGRESS NOTES
"VIDEO VISIT  Mariaelena Jean Baptiste is a 49 year old patient who is being evaluated via a billable video visit.      The patient has been notified of following:   \"We have found that certain health care needs can be provided without the need for an in-person physical exam. This service lets us provide the care you need with a video conversation. If a prescription is necessary we can send it directly to your pharmacy. If lab work is needed we can place an order for that and you can then stop by our lab to have the test done at a later time. Insurers are generally covering virtual visits as they would in-office visits so billing should not be different than normal.  If for some reason you do get billed incorrectly, you should contact the billing office to correct it and that number is in the AVS .    Patient has given verbal consent for video visit?: Yes   How would you like to obtain your AVS?: Friendsignia  AVS SmartPhrase [PsychAVS] has been placed in 'Patient Instructions': Yes      Video- Visit Details  Type of service:  video visit for medication management  Time of service:    Date:  05/10/21    Video Start Time:  10:02 AM   Video End Time:  10:45 AM    Reason for video visit:  Patient unable to travel due to Covid-19  Originating Site (patient location):  [facility]- [place]   Location- Patient's home  Distant Site (provider location):  Remote location  Mode of Communication:  Video Conference via Doxy.me  Consent:  Patient has given verbal consent for video visit?: Yes        [facility]  Psychiatry Clinic  PSYCHIATRIC PROGRESS NOTE     SOCIAL SECURITY DISABILITY SUPPORT STATEMENT is below    Date of initial Diagnostic Assessment (DA) is 2/18/16 and most recent Transfer of Care DA is 7/31/19.    CARE TEAM:  PCP- Thalia Bradford    Specialty Providers- none    Therapist- Julieat Gill PsyD (sees every other week)    Community  Team- none           Mariaelena Jean Baptiste is a 50 year old non-binary person who " "prefers the name 'Mariaelena' & is ok with the pronouns she, her, herself (pt expresses lack of preference for pronouns and feels that discovering the term 'non-binary' has been enough for her).      Pertinent Background:  Mariaelena first experienced mental health issues as a young adult and has received treatment for treatment-resistant depression, BPD with severe self harm, and PTSD. Notably, both naltrexone and clozapine have reduced SIB immensely, with return when taper off has been initiated in the past (although no return of SIB to date since d/c of clozapine). She does better when she uses a light box in the Fall/Winter, best started in September as she typically experiences return of depressive symptoms in October. A taper of Lamictal was associated with decline in mood and subsequent hospitalization in the past which is a common theme for her as medication changes, even small ones, tend to be very destabilizing. A TMS series through the [facility] in August 2016 was transformative in terms of ongoing remission of her depression for the following 2+ years. She often is not aware of reemerging depressive symptoms until they become severe.    Psych critical item history includes suicide attempts {2x], suicidal ideation, severe SIB [has required skin grafts and long term hospitalization at [place]], mutiple psychotropic trials, trauma hx, ECT, TMS, psych hosp (>5), and commitment.     INTERIM HISTORY      [4, 4]   The patient reports good treatment adherence.  History was provided by the patient who was a good historian.  The last visit ended with no change to the med regimen.   Since the last visit:   - She states she is \"very tired\" today - she worked at [employer] yesterday for Mother's Day and they got slammed  - She states her mood has been \"pretty good\" recently - feels the depression is at baseline currently - notes that she typically receives remission from depression for about 2 years after a TMS series " and she is coming up on 2 years this Fall since her last series - during her last severe depressive episode it took several months to complete the intake and get scheduled for treatment - she is hoping to be proactive and see if it would make sense to connect with the [facility] clinic early this Fall or not   - Medications are going well - denies side effects or desire for a dose change  - She denies any recent SI, SIB, or thoughts/urges for self harm  - She continues regular therapy with Julieta Long  - She reports a lot of neck and shoulder pain recently - had imaging and there was foraminal stenosis which is causing nerve impingement - she has muscle spasms - is working on addressing these issues with her providers    RECENT PSYCH ROS:   Depression:  depressed mood and low energy  Elevated:  none  Psychosis:  none  Anxiety:  nervous/overwhelmed  Panic Attack:  none  Trauma Related:  none  Dysregulation:  none  Eating Disorder: none     Pertinent Negative Symptoms:  NO self-injurious behavior/urges, suicidal and violent ideation, psychosis and naye    RECENT SUBSTANCE USE:     Alcohol- none  Tobacco- none; quit 10/23/17  Caffeine- 1 cup coffee/day  Opioids- none          Narcan Kit- N/A   Cannabis- none   Other illicit drugs- none    CURRENT SOCIAL HISTORY:  Financial/ Work- SSDI + 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  Partner/ - Single since 2000; identifies as asexual  Children- none      Living situation- She lives with her 2 cats (Mary- 4yo and Carlosbu- kitten) in an apartment in [place] (section 8 housing).     Social/ Spiritual Support- DBT therapist, online friends, father and step mother in [place]; brother [place] sister both in the [place] (supportive, close with them), a few co workers and a few close friends from the feline rescue (Gray and  Anika). Mariaelena grew up Spiritism - continues to believe in God but does not identify with a specific Hindu.  FEELS SAFE AT HOME- yes     PSYCH and CD Critical Summary Points since July 2019 7/31/19- resident transition; added PM dose of Adderall for worsening depression (has been effective), referred back to [facility] for repeat TMS  11/22/19 - 2/13/20: repeat TMS series completed due to relapse of depression     * Disability forms have been completed x2 (Winter 2020 and Fall 2020) - see media section in chart for copies    PAST MED TRIALS        See last Diagnostic Assessment  MEDICAL / SURGICAL HISTORY          Pregnant or breastfeeding- no      Contraception- s/p tubal ligation, identifies as asexual    Patient Active Problem List   Diagnosis     Suicidal ideation     Major depression, recurrent (H)     Tobacco abuse     Hx of psychiatric care     Scar     Neck pain       Major Surgery- cholecystectomy, s/p tubal ligation (1999), rectal prolapse repair    MEDICAL REVIEW OF SYSTEMS    [2, 10]     A comprehensive review of systems was performed and is negative other than noted in the HPI.    ALLERGY       Patient has no known allergies.     MEDICATIONS        Current Outpatient Medications   Medication Sig Dispense Refill     amphetamine-dextroamphetamine (ADDERALL) 10 MG tablet Take 1 tablet (10 mg) by mouth 2 times daily 60 tablet 0     cyclobenzaprine (FLEXERIL) 5 MG tablet Take 1 tablet (5 mg) by mouth 3 times daily as needed for muscle spasms 30 tablet 0     ibuprofen (ADVIL,MOTRIN) 800 MG tablet Take 400 mg by mouth        lamoTRIgine (LAMICTAL) 150 MG tablet Take 1 tablet (150 mg) by mouth daily 30 tablet 2     levomilnacipran (FETZIMA ER) 120 MG 24 hr capsule Take 1 capsule (120 mg) by mouth daily 30 capsule 2     MAGNESIUM OXIDE PO        multivitamin, therapeutic (THERA-VIT) TABS tablet Take 1 tablet by mouth daily       predniSONE (DELTASONE) 20 MG tablet Take 2 tablets (40 mg) by mouth daily  (Patient not taking: Reported on 4/8/2021) 10 tablet 0     QUEtiapine (SEROQUEL) 25 MG tablet Take 2 tablets (50 mg) by mouth At Bedtime 60 tablet 2     vilazodone (VIIBRYD) 40 MG TABS tablet Take 1 tablet (40 mg) by mouth daily 30 tablet 2     VITAMIN D, CHOLECALCIFEROL, PO Take 1,000 Units by mouth 2 times daily        VITALS      [3, 3]   There were no vitals taken for this visit. Telehealth visit.    MENTAL STATUS EXAM      [9, 14 cog gs]     Alertness: alert  and oriented  Appearance: adequately groomed  Behavior/Demeanor: cooperative and calm, with good eye contact   Speech: regular rate and rhythm, non-pressured  Language: intact  Psychomotor: normal or unremarkable  Mood: mildly depressed  Affect: slightly blunted; was congruent to mood; was congruent to content  Thought Process/Associations: unremarkable  Thought Content:  Reports none;  Denies suicidal and violent ideation  Perception:  Reports none;  Denies auditory hallucinations and visual hallucinations  Insight: adequate  Judgment: good  Cognition: does  appear grossly intact; formal cognitive testing was not done  Gait and Station: unable to assess    LABS and DATA     AIMS:  not needed     PHQ9 TODAY = did not complete today  PHQ-9 SCORE 1/20/2020 1/21/2020 1/15/2021   PHQ-9 Total Score 7 5 5     ANTIPSYCHOTIC LABS  [glu, A1C, lipids (perla LDL), liver enzymes, WBC, ANEU, Hgb, plts]  q12 mo  Recent Labs   Lab Test 01/13/21  1149 06/05/20  1051 04/15/19  1401 02/27/17  1217 08/30/16  1031   GLC  --  94 87 93 86   A1C 5.1  --  5.4  --   --      Recent Labs   Lab Test 01/13/21  1149 04/15/19  1401 04/25/16  1117   CHOL 206* 191 177   TRIG 42 42 195*   * 112* 83   HDL 80 71 55     Recent Labs   Lab Test 04/15/19  1401 12/05/18  1246 02/06/17  1345 01/22/16  1852 10/06/15  0740   AST 22 19 17 19 20   ALT 23 24 17 25 37   ALKPHOS 62  --  64 63 85     Recent Labs   Lab Test 06/05/20  1051 04/15/19  1401 12/05/18  1246 06/20/16  1430 05/26/16  1415    WBC 5.7 7.5 6.5 8.8 8.9   ANEU 3.6 5.2  --  5.4 5.6   HGB 14.6 12.5 12.7 14.9 13.6   * 445 405 388 407       PSYCHOTROPIC DRUG INTERACTIONS     VILAZODONE + FETZIMA + ADDERALL + SEROQUEL may result in increased risk for serotonin syndrome.     VILAZODONE + FETZIMA may enhance the antiplatelet effect of other Agents with Antiplatelet Properties.     LAMOTRIGINE + ACETAMINOPHEN may result in decreased lamotrigine serum levels.    ADDERALL + LISINOPRIL:  amphetamines may diminish the antihypertensive effect of antihypertensive agents.    MANAGEMENT:  Monitoring for adverse effects, routine vitals and using lowest therapeutic dose of [psychotropics]    RISK STATEMENT for SAFETY     Mariaelena Jean Baptiste did not appear to be an imminent safety risk to self or others.     PSYCHIATRIC DIAGNOSIS     MDD, recurrent, moderate (treatment-resistant; h/o severe episodes)  BPD  PTSD  DID  Unspecified Eating Disorder, in remission  Insomnia, likely circadian rhythm disorder     ASSESSMENT      [m2, h3]     TODAY Mariaelena reports her depression is at baseline and she has not experienced any SI or urges for SIB since last visit. Her medications continue to work well for her in addition to psychotherapy and the repeat series of TMS over the winter 2868-4580. There is no indication for a medication change today.      Future considerations:  - refer for acupuncture if patient interested (given chronic pain) - not currently possible due to Covid     PLAN      [m2, h3]     1) PSYCHOTROPIC MEDICATIONS:  - Continue quetiapine 50 mg PRN nightly for sleep and treatment resistant depression   - Continue Adderall IR 10 mg BID (qAM + 2pm) for augmentation of depression treatment  - Continue levomilnacipran  mg QDAY for depression  - Continue vilazodone 40 mg QDAY for depression  - Continue lamotrigine 150 mg QDAY for depression and mood stabilization  - Continue melatonin 1 mg with dinner      Other:     - Resume light box daily in Fall      2) [place] checked [place]:  indicates patient only receives Adderall from this clinic.    3) THERAPY:    - Continue biweekly therapy w/ DBT therapist Dr. Julieta Gill Psy D    4) NEXT DUE:    Labs- AP monitoring labs due Jan 2022  EKG- as needed  Rating Scales- PHQ-9 at every visit    5) REFERRALS:    No Referrals needed; patient aware to call clinic social work team with any questions/concerns/or needs     6) RTC: in 2 months w/ Dr. Coronel (new PGY3 resident)     7) CRISIS NUMBERS:   Provided routinely in AVS   ONLY if a [facility] PT: [facility] [phone] (clinic), [phone] (after hours)     TREATMENT RISK STATEMENT:  The risks, benefits, alternatives and potential adverse effects have been discussed and are understood by the pt. The pt understands the risks of using street drugs or alcohol. There are no medical contraindications, the pt agrees to treatment with the ability to do so. The pt knows to call the clinic for any problems or to access emergency care if needed.  Medical and substance use concerns are documented above.  Psychotropic drug interaction check was done, including changes made today.    SOCIAL SECURITY DISABILITY  STATEMENT  Based on the assessment today, as well as longitudinal care, this provider team strongly supports this patient's application for social security disability. When this patient is at her best, she is not able to work more than 20 hrs a week.  When she has tried to work full time (in the past) she has experienced decompensation of her mental health including worsening of mood, sleep, energy, concentration, memory and ability to be organized. Her eating disorder worsened to the point of requiring a feeding tube and suicidal ideation increases. These symptoms make it impossible to meet deadlines, keep pace with job tasks and attend work reliably. Additionally, separate from work influences, her depression exacerbates periodically as has been the  case over the last year and a half. During these periods the patient cannot even work 20 hrs a week due to the same symptoms listed above. Depression treatment consists of medications, psychotherapy and upcoming neuromodulation (TMS- transcranial magnetic stimulation). The patient is hopeful that TMS will bring relief from mood symptoms and allow her to return her maximum workability which is half time.      PROVIDER: Katharine Tavares MD    Patient staffed with attending Dr. Petty who will review and sign the note.  Supervisor is Dr. Lunsford.       TELEHEALTH ATTENDING ATTESTATION  Following the ACGME guidelines on telemedicine and direct supervision due to COVID-19, I was concurrently participating in and/or monitoring the patient care through appropriate telecommunication technology.  I discussed the key portions of the service with the resident, including the mental status examination and developing the plan of care. I reviewed key portions of the history with the resident. I agree with the findings and plan as documented in this note.   Dwayne Petty MD

## 2021-05-10 ENCOUNTER — VIRTUAL VISIT (OUTPATIENT)
Dept: PSYCHIATRY | Facility: CLINIC | Age: 51
End: 2021-05-10
Attending: PSYCHIATRY & NEUROLOGY
Payer: MEDICARE

## 2021-05-10 DIAGNOSIS — F33.1 MODERATE EPISODE OF RECURRENT MAJOR DEPRESSIVE DISORDER (H): ICD-10-CM

## 2021-05-10 PROCEDURE — 99214 OFFICE O/P EST MOD 30 MIN: CPT | Mod: 95 | Performed by: STUDENT IN AN ORGANIZED HEALTH CARE EDUCATION/TRAINING PROGRAM

## 2021-05-10 RX ORDER — VILAZODONE HYDROCHLORIDE 40 MG/1
40 TABLET ORAL DAILY
Qty: 30 TABLET | Refills: 2 | Status: SHIPPED | OUTPATIENT
Start: 2021-05-10 | End: 2021-07-20

## 2021-05-10 RX ORDER — DEXTROAMPHETAMINE SACCHARATE, AMPHETAMINE ASPARTATE, DEXTROAMPHETAMINE SULFATE AND AMPHETAMINE SULFATE 2.5; 2.5; 2.5; 2.5 MG/1; MG/1; MG/1; MG/1
10 TABLET ORAL 2 TIMES DAILY
Qty: 60 TABLET | Refills: 0 | Status: SHIPPED | OUTPATIENT
Start: 2021-05-10 | End: 2021-07-20

## 2021-05-10 RX ORDER — LAMOTRIGINE 150 MG/1
150 TABLET ORAL DAILY
Qty: 30 TABLET | Refills: 2 | Status: SHIPPED | OUTPATIENT
Start: 2021-05-10 | End: 2021-07-20

## 2021-05-10 RX ORDER — QUETIAPINE FUMARATE 25 MG/1
50 TABLET, FILM COATED ORAL AT BEDTIME
Qty: 60 TABLET | Refills: 2 | Status: SHIPPED | OUTPATIENT
Start: 2021-05-10 | End: 2021-07-20

## 2021-05-10 ASSESSMENT — PAIN SCALES - GENERAL: PAINLEVEL: NO PAIN (0)

## 2021-05-10 NOTE — PATIENT INSTRUCTIONS
**For crisis resources, please see the information at the end of this document**     Patient Education      Thank you for coming to the [facility].    Lab Testing:  If you had lab testing today and your results are reassuring or normal they will be mailed to you or sent through [employer] within 7 days. If the lab tests need quick action we will call you with the results. The phone number we will call with results is # [phone] (home) . If this is not the best number please call our clinic and change the number.    Medication Refills:  If you need any refills please call your pharmacy and they will contact us. Our fax number for refills is [phone]. Please allow three business for refill processing. If you need to  your refill at a new pharmacy, please contact the new pharmacy directly. The new pharmacy will help you get your medications transferred.     Scheduling:  If you have any concerns about today's visit or wish to schedule another appointment please call our office during normal business hours [phone] (8-5:00 M-F)    Contact Us:  Please call [phone] during business hours (8-5:00 M-F).  If after clinic hours, or on the weekend, please call  [phone].    Financial Assistance [phone]  [facility] Billing [phone]  Central Billing Office, [facility]: [phone]  [facility] [facility] Billing [phone]  Medical Records [phone]  [place] Patient Bill of Rights [email or web address]       MENTAL HEALTH CRISIS NUMBERS:  For a medical emergency please call  911 or go to the nearest ER.     [facility]:   [facility] -[phone]   Crisis Residence [facility] -[phone]   Walk-In Counseling Center \[facility]\"" -[phone]   COPE 24/7 [facility] Mobile Team -[phone] (adults)/[phone] (child)  CHILD: [facility] needs assessment  team - [phone]      [facility]:   [facility] - [phone]   Walk-in counseling [facility] - [phone]   Walk-in counseling [facility] - [phone]   Crisis Residence [facility] - [phone]  [facility]-[phone] mobile unit/ 24/7 crisis line    National Crisis Numbers:   National Suicide Prevention Lifeline: 0-109-611-TALK (320-844-3376)  Poison Control Center - 5-233-631-9048  WeFi/resources for a list of additional resources (SOS)  Trans Lifeline a hotline for transgender people 1-952.913.9544  The Liam Project a hotline for LGBT youth 6-224-348-4831  Crisis Text Line: For any crisis 24/7   To: 945627  see www.crisistextline.org  - IF MAKING A CALL FEELS TOO HARD, send a text!         Again thank you for choosing [facility] and please let us know how we can best partner with you to improve you and your family's health.    You may be receiving a survey regarding this appointment. We would love to have your feedback, both positive and negative. The survey is done by an external company, so your answers are anonymous.

## 2021-05-10 NOTE — PROGRESS NOTES
"VIDEO VISIT  Mariaelena Jean Baptiste is a 50 year old patient who is being evaluated via a billable video visit.      The patient has been notified of following:   \"This video visit will be conducted via a call between you and your physician/provider. We have found that certain health care needs can be provided without the need for an in-person physical exam. This service lets us provide the care you need with a video conversation. If a prescription is necessary we can send it directly to your pharmacy. If lab work is needed we can place an order for that and you can then stop by our lab to have the test done at a later time. Insurers are generally covering virtual visits as they would in-office visits so billing should not be different than normal.  If for some reason you do get billed incorrectly, you should contact the billing office to correct it and that number is in the AVS .    Video Conference to be completed via:  [email or web address]    Patient has given verbal consent for video visit?:  Yes    Patient would prefer that any video invitations be sent by: Send to e-mail at: [email or web address]      How would patient like to obtain AVS?:  Danielle    AVS SmartPhrase [PsychAVS] has been placed in 'Patient Instructions':  Yes    "

## 2021-05-18 ENCOUNTER — TELEPHONE (OUTPATIENT)
Dept: PSYCHIATRY | Facility: CLINIC | Age: 51
End: 2021-05-18

## 2021-05-18 PROBLEM — M54.2 NECK PAIN: Status: RESOLVED | Noted: 2020-12-30 | Resolved: 2021-05-18

## 2021-05-18 NOTE — PROGRESS NOTES
Discharge Note    Progress reporting period is from initial evaluation date (please see noted date below) to Jan 20, 2021.  No linked episodes      Mariaelena failed to follow up and current status is unknown.  Please see information below for last relevant information on current status.  Patient seen for 4 visits.    SUBJECTIVE  Subjective changes noted by patient:  Pt returned to MD who sent her into an ortho specialist who took an xray and indicated that there were degenerative chagnes.  Pt was prescribed steroids which have really helped around 85% better.    .  Current pain level is  .     Previous pain level was   .   Changes in function:  Yes (See Goal flowsheet attached for changes in current functional level)  Adverse reaction to treatment or activity: None    OBJECTIVE  Changes noted in objective findings: CROM: flex wnl, ext min loss, rot L and R wnl.  Scap control poor/fair, posture: fair: tendency for overactivation of UT and forward head position.     ASSESSMENT/PLAN  Diagnosis: Neck/upper back pain L>R   STG/LTGs have been met or progress has been made towards goals:  Yes, please see goal flowsheet for most current information  Assessment of Progress: current status is unknown.    Last current status: Pt is progressing well   Self Management Plans:  HEP  I have re-evaluated this patient and find that the nature, scope, duration and intensity of the therapy is appropriate for the medical condition of the patient.  Mariaelena continues to require the following intervention to meet STG and LTG's:  HEP.    Recommendations:  Discharge with current home program.  Patient to follow up with MD as needed.    Please refer to the daily flowsheet for treatment today, total treatment time and time spent performing 1:1 timed codes.

## 2021-06-02 DIAGNOSIS — F33.1 MODERATE EPISODE OF RECURRENT MAJOR DEPRESSIVE DISORDER (H): ICD-10-CM

## 2021-06-07 RX ORDER — LEVOMILNACIPRAN HYDROCHLORIDE 120 MG/1
CAPSULE, EXTENDED RELEASE ORAL
Qty: 30 CAPSULE | Refills: 2 | OUTPATIENT
Start: 2021-06-07

## 2021-06-07 RX ORDER — QUETIAPINE FUMARATE 25 MG/1
TABLET, FILM COATED ORAL
Qty: 60 TABLET | Refills: 2 | OUTPATIENT
Start: 2021-06-07

## 2021-06-07 RX ORDER — VILAZODONE HYDROCHLORIDE 40 MG/1
TABLET ORAL
Qty: 30 TABLET | Refills: 2 | OUTPATIENT
Start: 2021-06-07

## 2021-06-09 ENCOUNTER — VIRTUAL VISIT (OUTPATIENT)
Dept: PSYCHIATRY | Facility: CLINIC | Age: 51
End: 2021-06-09
Payer: COMMERCIAL

## 2021-06-09 DIAGNOSIS — F33.2 SEVERE EPISODE OF RECURRENT MAJOR DEPRESSIVE DISORDER, WITHOUT PSYCHOTIC FEATURES (H): Primary | ICD-10-CM
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
## 2021-06-09 NOTE — PROGRESS NOTES
"Mariaelena is a 50 year old who is being evaluated via a billable video visit.      How would you like to obtain your AVS? MyChart  If the video visit is dropped, the invitation should be resent by: Text to cell phone: [phone]  Will anyone else be joining your video visit? No  {If patient encounters technical issues they should call [phone] :[id number]}    Video Start Time: {video visit start/end time for provider to select:152948}  Video-Visit Details    Type of service:  Video Visit    Video End Time:{video visit start/end time for provider to select:152948}    Originating Location (pt. Location): {video visit patient location:088196::\"Home\"}    Distant Location (provider location):  [facility] PSYCHIATRY     Platform used for Video Visit: {Virtual Visit Platforms:366262::\"AmWell\"}  "

## 2021-06-22 NOTE — PROGRESS NOTES
Provider is no longer on staff at [facility].     Account/Note Administratively Closed by Radha Haines (6/21/21, TLO)

## 2021-06-25 ENCOUNTER — OFFICE VISIT (OUTPATIENT)
Dept: URGENT CARE | Facility: URGENT CARE | Age: 51
End: 2021-06-25
Payer: MEDICARE

## 2021-06-25 ENCOUNTER — ANCILLARY PROCEDURE (OUTPATIENT)
Dept: GENERAL RADIOLOGY | Facility: CLINIC | Age: 51
End: 2021-06-25
Attending: INTERNAL MEDICINE
Payer: MEDICARE

## 2021-06-25 VITALS
DIASTOLIC BLOOD PRESSURE: 90 MMHG | SYSTOLIC BLOOD PRESSURE: 130 MMHG | HEART RATE: 122 BPM | OXYGEN SATURATION: 99 % | WEIGHT: 188 LBS | BODY MASS INDEX: 26.22 KG/M2 | TEMPERATURE: 98.8 F

## 2021-06-25 DIAGNOSIS — S09.92XA NASAL INJURY, INITIAL ENCOUNTER: ICD-10-CM

## 2021-06-25 DIAGNOSIS — T07.XXXA ABRASIONS OF MULTIPLE SITES: ICD-10-CM

## 2021-06-25 DIAGNOSIS — S02.2XXA CLOSED FRACTURE OF NASAL BONE, INITIAL ENCOUNTER: ICD-10-CM

## 2021-06-25 DIAGNOSIS — S09.92XA NASAL INJURY, INITIAL ENCOUNTER: Primary | ICD-10-CM

## 2021-06-25 PROCEDURE — 70160 X-RAY EXAM OF NASAL BONES: CPT | Performed by: RADIOLOGY

## 2021-06-25 PROCEDURE — 99214 OFFICE O/P EST MOD 30 MIN: CPT | Performed by: INTERNAL MEDICINE

## 2021-06-25 NOTE — PATIENT INSTRUCTIONS
Nasal saline  ibuprofen   Ice  No heavy activities    Otolaryngology referral          Patient Education     Nose Fracture, with X-Ray  A broken bone, or fracture, of the nose may be a minor crack. Or it may be a major break, with the parts of your nose pushed out of place. A fractured nose causes pain, swelling, and nasal stuffiness. You may have bleeding from your nose. By tomorrow, you may have bruising around your eyes.   A minor fracture will heal in 3 to 4 weeks, with no more treatment needed. A major break that changes the shape of your nose may need to be treated by a nose specialist, called an ENT (ear, nose, and throat) doctor.The ENT doctor will straighten the bones in your nose. This is called a reduction. Some fractures may need a reduction as soon as possible, such as when bleeding from the nose won't stop. Otherwise, it is best to wait a few days until the swelling has gone down. The doctor will then be able to easily see when your nose is back in the right position.     Home care    Use an ice pack on your nose for no more than 15 to 20 minutes at a time. Do this every 1 to 2 hours for the first 24 to 48 hours. Then use the ice as needed to ease pain and swelling. To make an ice pack, put ice cubes in a plastic bag that seals at the top. Wrap the bag in a clean, thin towel or cloth. Never put ice or an ice pack directly on the skin.    Tell your provider if you are taking aspirin or blood-thinning medicine. These medicines make it more likely that your nose will bleed. Your provider may need to change your dose.    You may use over-the-counter pain medicine to control pain, unless another medicine was prescribed. If you have chronic liver or kidney disease or history of gastrointestinal ulcers, talk with your provider before using this medicine.    Don t drink alcohol or hot liquids for the next 2 days. Alcohol and hot liquids can dilate blood vessels in your nose. This can cause bleeding.    Don t  blow your nose for the first 2 days. Then, do so gently so you don't cause bleeding.    Don t play contact sports in the next 6 weeks unless you can protect your nose from getting injured again. You can wear a special custom-fitted plastic face mask to protect your nose.    Special note on concussions  If you had any symptoms of a concussion today, don t return to sports or any activity that could result in another head injury.   These are symptoms of a concussion:    Nausea    Vomiting    Dizziness    Confusion    Headache    Memory loss    Loss of consciousness  Wait until all of your symptoms are gone and your provider says it s OK to resume your activity. Having a second head injury before you fully recover from the first one can lead to serious brain injury.   Follow-up care  Follow up with your healthcare provider, or as advised. If your nose looks crooked after the swelling goes down, call the ENT doctor for an appointment within the next 10 days. Also make an appointment if it s still hard to breathe through 1 or both sides of your nose. If you have trouble getting an ENT appointment, call your regular provider.   If the bones are out of place, a reduction should be done 6 to 10 days after the injury. In children, the reduction should be done 3 to 7 days after the injury. After that time, the bones are more difficult to move back into place.   If you had X-rays taken, you will be told of any new findings that may affect your care.   When to seek medical advice  Call your healthcare provider right away if any of these occur:    Bleeding from your nose even after you have pinched your nostrils together for 15 minutes without stopping    Swelling, pain, or redness on your face that gets worse    Fever of 100.4 F (38 C) or higher, or chills, as directed by your healthcare provider    Can't breathe from both sides of your nose after swelling goes down    Sinus pain  Call 911  Call 911 if you have:     Repeated  vomiting    Severe headache or dizziness    Headache or dizziness that gets worse    Abnormal drowsiness, or unable to wake up as usual    Confusion or change in behavior or speech    Convulsion, or seizure  [place] last reviewed this educational content on 4/1/2018 2000-2021 [employer]. All rights reserved. This information is not intended as a substitute for professional medical care. Always follow your healthcare professional's instructions.

## 2021-06-25 NOTE — PROGRESS NOTES
ASSESSMENT AND PLAN:      ICD-10-CM    1. Nasal injury, initial encounter  S09.92XA XR Nasal Bones 3 Views     OTOLARYNGOLOGY REFERRAL   2. Abrasions of multiple sites  T07.XXXA OTOLARYNGOLOGY REFERRAL   3. Closed fracture of nasal bone, initial encounter  S02.2XXA OTOLARYNGOLOGY REFERRAL       PLAN:  Patient Instructions   Nasal saline  ibuprofen   Ice  No heavy activities    Otolaryngology referral          Patient Education     Nose Fracture, with X-Ray  A broken bone, or fracture, of the nose may be a minor crack. Or it may be a major break, with the parts of your nose pushed out of place. A fractured nose causes pain, swelling, and nasal stuffiness. You may have bleeding from your nose. By tomorrow, you may have bruising around your eyes.   A minor fracture will heal in 3 to 4 weeks, with no more treatment needed. A major break that changes the shape of your nose may need to be treated by a nose specialist, called an ENT (ear, nose, and throat) doctor.The ENT doctor will straighten the bones in your nose. This is called a reduction. Some fractures may need a reduction as soon as possible, such as when bleeding from the nose won't stop. Otherwise, it is best to wait a few days until the swelling has gone down. The doctor will then be able to easily see when your nose is back in the right position.     Home care    Use an ice pack on your nose for no more than 15 to 20 minutes at a time. Do this every 1 to 2 hours for the first 24 to 48 hours. Then use the ice as needed to ease pain and swelling. To make an ice pack, put ice cubes in a plastic bag that seals at the top. Wrap the bag in a clean, thin towel or cloth. Never put ice or an ice pack directly on the skin.    Tell your provider if you are taking aspirin or blood-thinning medicine. These medicines make it more likely that your nose will bleed. Your provider may need to change your dose.    You may use over-the-counter pain medicine to control pain, unless  another medicine was prescribed. If you have chronic liver or kidney disease or history of gastrointestinal ulcers, talk with your provider before using this medicine.    Don t drink alcohol or hot liquids for the next 2 days. Alcohol and hot liquids can dilate blood vessels in your nose. This can cause bleeding.    Don t blow your nose for the first 2 days. Then, do so gently so you don't cause bleeding.    Don t play contact sports in the next 6 weeks unless you can protect your nose from getting injured again. You can wear a special custom-fitted plastic face mask to protect your nose.    Special note on concussions  If you had any symptoms of a concussion today, don t return to sports or any activity that could result in another head injury.   These are symptoms of a concussion:    Nausea    Vomiting    Dizziness    Confusion    Headache    Memory loss    Loss of consciousness  Wait until all of your symptoms are gone and your provider says it s OK to resume your activity. Having a second head injury before you fully recover from the first one can lead to serious brain injury.   Follow-up care  Follow up with your healthcare provider, or as advised. If your nose looks crooked after the swelling goes down, call the ENT doctor for an appointment within the next 10 days. Also make an appointment if it s still hard to breathe through 1 or both sides of your nose. If you have trouble getting an ENT appointment, call your regular provider.   If the bones are out of place, a reduction should be done 6 to 10 days after the injury. In children, the reduction should be done 3 to 7 days after the injury. After that time, the bones are more difficult to move back into place.   If you had X-rays taken, you will be told of any new findings that may affect your care.   When to seek medical advice  Call your healthcare provider right away if any of these occur:    Bleeding from your nose even after you have pinched your  nostrils together for 15 minutes without stopping    Swelling, pain, or redness on your face that gets worse    Fever of 100.4 F (38 C) or higher, or chills, as directed by your healthcare provider    Can't breathe from both sides of your nose after swelling goes down    Sinus pain  Call 911  Call 911 if you have:     Repeated vomiting    Severe headache or dizziness    Headache or dizziness that gets worse    Abnormal drowsiness, or unable to wake up as usual    Confusion or change in behavior or speech    Convulsion, or seizure  [place] last reviewed this educational content on 4/1/2018 2000-2021 [employer]. All rights reserved. This information is not intended as a substitute for professional medical care. Always follow your healthcare professional's instructions.             No follow-ups on file.        Latrice Cyr MD  [facility]    Subjective     Mariaelena Jean Baptiste is a 50 year old who presents for Patient presents with:  Urgent Care  Facial Injury: c/o nose injury today    an established patient of [facility].        injury was 2 hour(s) ago.  Tripped in parking lot, fell face first into brick wall  Injury nose, swollen & bleeding          Patient experienced immediate pain  Current and Associated symptoms: Pain, Swelling and bleeding    Therapies to improve symptoms include: washing          Objective    BP (!) 130/90   Pulse 122   Temp 98.8  F (37.1  C) (Oral)   Wt 85.3 kg (188 lb)   LMP 05/25/2021   SpO2 99%   BMI 26.22 kg/m    Physical Exam  Vitals signs reviewed.   Constitutional:       Appearance: Normal appearance.   HENT:      Nose:      Comments: Nose swollen, tender to touch  Appears in good alignment  Bruising  No obvious hematoma  Skin:     Comments: Multiple abrasions noted on extremities    M.A cleansed with hibiclens   Neurological:      Mental Status: Mariaelena Jean Baptiste is alert.            Xray - Reviewed and interpreted by me.  fracture noted

## 2021-06-25 NOTE — TELEPHONE ENCOUNTER
FUTURE VISIT INFORMATION      FUTURE VISIT INFORMATION:    Date: 6/28/21    Time: 11:15 AM    Location: [facility]  REFERRAL INFORMATION:    Referring provider:  Dr. Latrice Cyr    Referring providers clinic:  [facility]    Reason for visit/diagnosis: Nasal Fracture    RECORDS REQUESTED FROM:       Clinic name Comments Records Status Imaging Status   [facility] 6/25/21 - UC OV with Dr. Cyr [facility]    [facility] - Imaging 6/25/21 - XR Nasal Bone [facility] PACs

## 2021-06-28 ENCOUNTER — PRE VISIT (OUTPATIENT)
Dept: OTOLARYNGOLOGY | Facility: CLINIC | Age: 51
End: 2021-06-28

## 2021-06-28 ENCOUNTER — OFFICE VISIT (OUTPATIENT)
Dept: OTOLARYNGOLOGY | Facility: CLINIC | Age: 51
End: 2021-06-28
Payer: MEDICARE

## 2021-06-28 VITALS
WEIGHT: 185 LBS | HEIGHT: 71 IN | OXYGEN SATURATION: 100 % | HEART RATE: 107 BPM | TEMPERATURE: 98.7 F | BODY MASS INDEX: 25.9 KG/M2

## 2021-06-28 DIAGNOSIS — S02.2XXD CLOSED FRACTURE OF NASAL BONE WITH ROUTINE HEALING, SUBSEQUENT ENCOUNTER: Primary | ICD-10-CM

## 2021-06-28 PROCEDURE — 99202 OFFICE O/P NEW SF 15 MIN: CPT | Performed by: OTOLARYNGOLOGY

## 2021-06-28 ASSESSMENT — PAIN SCALES - GENERAL: PAINLEVEL: SEVERE PAIN (6)

## 2021-06-28 ASSESSMENT — MIFFLIN-ST. JEOR: SCORE: 1555.28

## 2021-06-28 NOTE — PATIENT INSTRUCTIONS
1. You were seen in the clinic today by Dr. Mendoza.    2.   Plan to return to the clinic in 3 weeks.    If you have any questions or concerns after your appointment, please call the clinic.    -Clinic phone [phone]. Press option #1 for scheduling related needs. Press option #3 for nurse advice.    Eunice Johns LPN  [phone]    Joana Montanez, RNHARJEET  [phone]    [facility]  Department of Otolaryngology

## 2021-06-28 NOTE — LETTER
6/28/2021       RE: Mariaelena Jean Baptiste  [address]  [place] [address]     Dear Colleague,    Thank you for referring your patient, Mariaelena Jean Baptiste, to the [facility] THROAT [facility]INIC [place] at [facility]. Please see a copy of my visit note below.     Dictation on: 07/12/2021 11:27 AM by: STEVEN BLACKWOOD [[id number]]           Again, thank you for allowing me to participate in the care of your patient.      Sincerely,    Steven Blackwood MD

## 2021-06-28 NOTE — LETTER
6/28/2021       RE: Mariaelena Jean Baptiste  [address]  [place] [address]     Dear Colleague,    Thank you for referring your patient, Mariaelena Jean Baptiste, to the [facility] EAR NOSE AND THROAT CLINIC [place] at [facility]. Please see a copy of my visit note below.     Dictation on: 07/12/2021 11:27 AM by: STEVEN BLACKWOOD [[id number]]         HISTORY OF PRESENT ILLNESS:  Mariaelena is seen here today for the first time.  She suffered a nasal bone fracture, less than a week prior to her visit.  She tripped in a parking lot and hit her nose on a brick wall.  There was some bleeding.  Things have slowly started to get better.  She did have an x-ray of her nasal bones, which did show a small fracture.  Currently, she notes no difficulty with nasal breathing or no salty rhinorrhea.  She does not feel that there is a significant permanent deviation of her nasal appearance.    PAST MEDICAL HISTORY: Noted and reviewed in the chart.    FAMILY HISTORY: Noted and reviewed in the chart.    PAST SURGICAL HISTORY: Noted and reviewed in the chart.    SOCIAL HISTORY: Noted and reviewed in the chart.    REVIEW OF SYSTEMS:  Pertinent positives and negatives as above.  Otherwise, complete review of systems noted in the chart.    PHYSICAL EXAMINATION:  This is a 50-year-old woman in no acute distress.  Normal mood and affect.  Normal ability to communicate.  Alert and appropriate.  Head is normocephalic.  Cranial nerve VII is House-Brackmann I/VI bilaterally.  Breathing without difficulty or stridor.  Eyes are anicteric.  Skin of the head and neck appears normal.  Examination of the nose shows external nasal exam to be relatively benign with no clear asymmetry of the nasal bones.  Intranasal exam shows no septal hematoma.    ASSESSMENT: A 50-year-old status post nasal trauma with possible mild small nasal bone fracture that does not require operative intervention at this time.      PLAN:  Should the patient develop any further symptoms, she will let us know.      Again, thank you for allowing me to participate in the care of your patient.      Sincerely,    Merlin Mendoza MD

## 2021-06-28 NOTE — NURSING NOTE
"Chief Complaint   Patient presents with     Consult     Nasal Fracture       Pulse 107, temperature 98.7  F (37.1  C), temperature source Temporal, height 1.803 m (5' 11\"), weight 83.9 kg (185 lb), SpO2 100 %, not currently breastfeeding.    Maggy Chairez, EMT  "

## 2021-06-29 ENCOUNTER — MYC MEDICAL ADVICE (OUTPATIENT)
Dept: INTERNAL MEDICINE | Facility: CLINIC | Age: 51
End: 2021-06-29

## 2021-07-01 NOTE — TELEPHONE ENCOUNTER
Ok for same day or next day visit any provider phone or video  DANIELA Vanegas at 10:46 AM on 7/1/2021.  [facility]  Clinics and Surgery Center  [place]  [phone]

## 2021-07-06 NOTE — TELEPHONE ENCOUNTER
Social Work   Incoming/Outgoing Call  [facility]    Outgoing Call To: Mariaelena Jean Baptiste    Reason for Call: Following up with Mariaelena after requesting someone to talk about SSDI questions.     Response/Plan:  Mariaelena stated she is currently on SSDI and has a court hearing on December 19 2019 to appeal the decision to end her SSDI. Mariaelena states she would like to get a  but can't get social security disability  because there is no back pay (since she is currently on SSDI) and that other  are too expensive. Mariaelena asked writer if he knows of any options to get an affordable . Writer will consult with coworkers about the situation and will call Mariaelena back when a recommendation has been determined.     Mike Gonzalez, SW Candidate  ([phone])    
show

## 2021-07-12 ENCOUNTER — ALLIED HEALTH/NURSE VISIT (OUTPATIENT)
Dept: PSYCHIATRY | Facility: CLINIC | Age: 51
End: 2021-07-12
Payer: COMMERCIAL

## 2021-07-12 ENCOUNTER — OFFICE VISIT (OUTPATIENT)
Dept: PSYCHIATRY | Facility: CLINIC | Age: 51
End: 2021-07-12
Payer: COMMERCIAL

## 2021-07-12 DIAGNOSIS — F33.2 SEVERE EPISODE OF RECURRENT MAJOR DEPRESSIVE DISORDER, WITHOUT PSYCHOTIC FEATURES (H): Primary | ICD-10-CM

## 2021-07-12 ASSESSMENT — PATIENT HEALTH QUESTIONNAIRE - PHQ9: SUM OF ALL RESPONSES TO PHQ QUESTIONS 1-9: 11

## 2021-07-12 NOTE — PROGRESS NOTES
HISTORY OF PRESENT ILLNESS:  Mariaelena is seen here today for the first time.  She suffered a nasal bone fracture, less than a week prior to her visit.  She tripped in a parking lot and hit her nose on a brick wall.  There was some bleeding.  Things have slowly started to get better.  She did have an x-ray of her nasal bones, which did show a small fracture.  Currently, she notes no difficulty with nasal breathing or no salty rhinorrhea.  She does not feel that there is a significant permanent deviation of her nasal appearance.    PAST MEDICAL HISTORY: Noted and reviewed in the chart.    FAMILY HISTORY: Noted and reviewed in the chart.    PAST SURGICAL HISTORY: Noted and reviewed in the chart.    SOCIAL HISTORY: Noted and reviewed in the chart.    REVIEW OF SYSTEMS:  Pertinent positives and negatives as above.  Otherwise, complete review of systems noted in the chart.    PHYSICAL EXAMINATION:  This is a 50-year-old woman in no acute distress.  Normal mood and affect.  Normal ability to communicate.  Alert and appropriate.  Head is normocephalic.  Cranial nerve VII is House-Brackmann I/VI bilaterally.  Breathing without difficulty or stridor.  Eyes are anicteric.  Skin of the head and neck appears normal.  Examination of the nose shows external nasal exam to be relatively benign with no clear asymmetry of the nasal bones.  Intranasal exam shows no septal hematoma.    ASSESSMENT: A 50-year-old status post nasal trauma with possible mild small nasal bone fracture that does not require operative intervention at this time.     PLAN:  Should the patient develop any further symptoms, she will let us know.

## 2021-07-12 NOTE — PROGRESS NOTES
Interventional Psychiatry Program  [address], [address]  [place] [address]  TMS Procedure Note   Mariaelena Jean Baptiste MRN# [id number]  Age: 49 year old year old YOB: 1970    Pre-Procedure:  History and Physical: Reviewed in medical record  Consent Signed by: Mariaelena Jean Baptiste for this course of treatment.  On: 11/22/2019     Clinical Narrative:  Patient presents to initiate an acute course of TMS for management of acute symptoms of depression that have not responded to conventional interventions (pharmacotherapy or psychotherapy).    Daily Adult Stimulation Safety Questionnaire: No or Yes in past 24 hours     1) Have you discontinued or started any new medication?  no  2) Have you missed any doses of your medication differently then prescribed?  no  3) Have you consumed alcohol or drugs (other than prescribed)?  no  4) Did you not sleep AT ALL last night?  no  5) Has your SI changed or worsened?  no    Indications for TMS:  MDD, recurrent, severe; 4+ medication trials (from 2+ classes) ineffective; Psychotherapy ineffective     Procedure Diagnosis:  F33.2    Treatment Hx:  Treatment number this series: 1  Total lifetime treatment number: 68    No Known Allergies   There were no vitals taken for this visit.     Pause for the Cause  Right patient:  Yes  Right procedure/correct coil:  Yes; rTMS; cpt 56179; H1 coil.   Earplugs in place:  Yes     Procedure  Patient was seated in procedure chair. Identity and procedure was verified. Ear plugs were placed in ears and patient-specific cap was placed on head and tightened appropriately. Ruler locations were placed on head per  specifications. Coil was placed and stimulator was set to initial 50%.  Mapping pulses were delivered and response was quantified by visual inspection..  MT was found with specific location and energy detailed below. Coil was then placed at treatment location and stimulator was set to parameters below Given pt  tolerance, 55 treatment trains were delivered. Pt tolerated procedure.    Motor Threshold Determination  Distance from nasion to inion: 36.0 cm  MT 1: 0 - 6.5 - 16 @ 47% on 11/22/2019  MT 2: 0 - 6.5 - 16 @ 47% on 12/24/2019  MT 3: 0 - 5.5 -16 @ 48% on 7/12/21    Stimulation Parameters  Frequency: 18 hz  Train Duration: 2 sec  Total pulses delivered: 2988  Inter-train interval: 20 sec  Tx Loc: 0 - eyebrows - 15  Energy: 48% (100%MT)  Trains: 83    Date MADRS IDS-SR PHQ-9   11/22/19 25 43 18   11/29/19 -- 45 18   12/6/19  45 18   12/13/19  47 20   12/20/19  48 13   12/27/19  26 9   1/10/19  22 5   1/17/20  21 5   1/21/20 2 21 5     Date MADRS IDS-SR PHQ-9   7/12/21  29 11         PHQ-9 SCORE 1/20/2020 1/21/2020 1/15/2021   PHQ-9 Total Score 7 5 5   Some encounter information is confidential and restricted. Go to Review Flowsheets activity to see all data.       Judy Steve, TMS Technician  [facility]  Neuromodulation Clinic    Plan   - Cont TMS  - Increase intensity as tolerable      I did not see patient but remained available in the clinic throughout the TMS session    Jennfier Perez MD  [facility]  Mental [facility] Neuromodulation

## 2021-07-13 ENCOUNTER — OFFICE VISIT (OUTPATIENT)
Dept: PSYCHIATRY | Facility: CLINIC | Age: 51
End: 2021-07-13
Payer: COMMERCIAL

## 2021-07-13 DIAGNOSIS — F33.2 SEVERE EPISODE OF RECURRENT MAJOR DEPRESSIVE DISORDER, WITHOUT PSYCHOTIC FEATURES (H): Primary | ICD-10-CM

## 2021-07-13 ASSESSMENT — PATIENT HEALTH QUESTIONNAIRE - PHQ9: SUM OF ALL RESPONSES TO PHQ QUESTIONS 1-9: 5

## 2021-07-13 NOTE — PROGRESS NOTES
Interventional Psychiatry Program  [address], [address]  [place] [address]  TMS Procedure Note   Mariaelena Jean Baptiste MRN# [id number]  Age: 49 year old year old YOB: 1970    Pre-Procedure:  History and Physical: Reviewed in medical record  Consent Signed by: Mariaelena Jean Baptiste for this course of treatment.  On: 11/22/2019     Clinical Narrative:  Patient tolerated treatment. No side effects from initial treatment. Patient was animated and talkative during treatment.    Daily Adult Stimulation Safety Questionnaire: No or Yes in past 24 hours     1) Have you discontinued or started any new medication?  no  2) Have you missed any doses of your medication differently then prescribed?  no  3) Have you consumed alcohol or drugs (other than prescribed)?  no  4) Did you not sleep AT ALL last night?  no  5) Has your SI changed or worsened?  no    Indications for TMS:  MDD, recurrent, severe; 4+ medication trials (from 2+ classes) ineffective; Psychotherapy ineffective     Procedure Diagnosis:  F33.2    Treatment Hx:  Treatment number this series: 2  Total lifetime treatment number: 69    No Known Allergies   There were no vitals taken for this visit.     Pause for the Cause  Right patient:  Yes  Right procedure/correct coil:  Yes; rTMS; cpt 23367; H1 coil.   Earplugs in place:  Yes    Procedure  Patient was seated in procedure chair. Identity and procedure was verified. Ear plugs were placed in ears and patient-specific cap was placed on head and tightened appropriately. Ruler locations were verified. Coil was placed at treatment location and stimulator was set to parameters described below. A test train was delivered and pt tolerated train. Given pt tolerance, 55 treatment trains were delivered. Pt tolerated procedure.    Motor Threshold Determination  Distance from nasion to inion: 36.0 cm  MT 1: 0 - 6.5 - 16 @ 47% on 11/22/2019  MT 2: 0 - 6.5 - 16 @ 47% on 12/24/2019  MT 3: 0 - 5.5 -16 @ 48% on  7/12/21    Stimulation Parameters  Frequency: 18 hz  Train Duration: 2 sec  Total pulses delivered: 2988  Inter-train interval: 20 sec  Tx Loc: 0 - eyebrows - 15  Energy: 51% (105%MT)  Trains: 83    Date MADRS IDS-SR PHQ-9   11/22/19 25 43 18   11/29/19 -- 45 18   12/6/19  45 18   12/13/19  47 20   12/20/19  48 13   12/27/19  26 9   1/10/19  22 5   1/17/20  21 5   1/21/20 2 21 5     Date MADRS IDS-SR PHQ-9   7/12/21  29 11         PHQ-9 SCORE 1/15/2021 7/12/2021 7/13/2021   PHQ-9 Total Score 5 11 5   Some encounter information is confidential and restricted. Go to Review Flowsheets activity to see all data.       Judy Steve, TMS Technician  [facility]  Neuromodulation Clinic    Plan   - Cont TMS  - Increase intensity as tolerable    I did not see the patient during/after treatment but remained available in the clinic during  treatment.    Moni Mcgee MD  [facility]  Mental Health Neuromodulation

## 2021-07-14 ENCOUNTER — OFFICE VISIT (OUTPATIENT)
Dept: PSYCHIATRY | Facility: CLINIC | Age: 51
End: 2021-07-14
Payer: COMMERCIAL

## 2021-07-14 DIAGNOSIS — F33.2 SEVERE EPISODE OF RECURRENT MAJOR DEPRESSIVE DISORDER, WITHOUT PSYCHOTIC FEATURES (H): Primary | ICD-10-CM

## 2021-07-14 ASSESSMENT — PATIENT HEALTH QUESTIONNAIRE - PHQ9: SUM OF ALL RESPONSES TO PHQ QUESTIONS 1-9: 8

## 2021-07-14 NOTE — PROGRESS NOTES
Interventional Psychiatry Program  [address], [address]  [place] [address]  TMS Procedure Note   Mariaelena Jean Baptiste MRN# [id number]  Age: 49 year old year old YOB: 1970    Pre-Procedure:  History and Physical: Reviewed in medical record  Consent Signed by: Mariaelena Jean Baptiste for this course of treatment.  On: 11/22/2019     Clinical Narrative:  Patient tolerated treatment. No side effects from initial treatment. Patient used phone during treatment.    Daily Adult Stimulation Safety Questionnaire: No or Yes in past 24 hours     1) Have you discontinued or started any new medication?  no  2) Have you missed any doses of your medication differently then prescribed?  no  3) Have you consumed alcohol or drugs (other than prescribed)?  no  4) Did you not sleep AT ALL last night?  no  5) Has your SI changed or worsened?  no    Indications for TMS:  MDD, recurrent, severe; 4+ medication trials (from 2+ classes) ineffective; Psychotherapy ineffective     Procedure Diagnosis:  F33.2    Treatment Hx:  Treatment number this series: 3  Total lifetime treatment number: 70    No Known Allergies   There were no vitals taken for this visit.     Pause for the Cause  Right patient:  Yes  Right procedure/correct coil:  Yes; rTMS; cpt 10167; H1 coil.   Earplugs in place:  Yes    Procedure  Patient was seated in procedure chair. Identity and procedure was verified. Ear plugs were placed in ears and patient-specific cap was placed on head and tightened appropriately. Ruler locations were verified. Coil was placed at treatment location and stimulator was set to parameters described below. A test train was delivered and pt tolerated train. Given pt tolerance, 55 treatment trains were delivered. Pt tolerated procedure.    Motor Threshold Determination  Distance from nasion to inion: 36.0 cm  MT 1: 0 - 6.5 - 16 @ 47% on 11/22/2019  MT 2: 0 - 6.5 - 16 @ 47% on 12/24/2019  MT 3: 0 - 5.5 -16 @ 48% on  7/12/21    Stimulation Parameters  Frequency: 18 hz  Train Duration: 2 sec  Total pulses delivered: 2988  Inter-train interval: 20 sec  Tx Loc: 0 - eyebrows - 15  Energy: 51% (105%MT)  Trains: 83    Date MADRS IDS-SR PHQ-9   11/22/19 25 43 18   11/29/19 -- 45 18   12/6/19  45 18   12/13/19  47 20   12/20/19  48 13   12/27/19  26 9   1/10/19  22 5   1/17/20  21 5   1/21/20 2 21 5     Date MADRS IDS-SR PHQ-9   7/12/21  29 11         PHQ-9 SCORE 7/12/2021 7/13/2021 7/14/2021   PHQ-9 Total Score 11 5 8   Some encounter information is confidential and restricted. Go to Review Flowsheets activity to see all data.       Laxmi Whitney, TMS Technician  [facility]  Neuromodulation Clinic    Plan   - Cont TMS  - Increase intensity as tolerable    I did not see patient but remained available in the clinic throughout the TMS session      Jennifer Perez MD  [facility]  Mental [facility] Neuromodulation

## 2021-07-15 ENCOUNTER — OFFICE VISIT (OUTPATIENT)
Dept: PSYCHIATRY | Facility: CLINIC | Age: 51
End: 2021-07-15
Payer: COMMERCIAL

## 2021-07-15 DIAGNOSIS — F33.2 SEVERE EPISODE OF RECURRENT MAJOR DEPRESSIVE DISORDER, WITHOUT PSYCHOTIC FEATURES (H): Primary | ICD-10-CM

## 2021-07-15 ASSESSMENT — PATIENT HEALTH QUESTIONNAIRE - PHQ9: SUM OF ALL RESPONSES TO PHQ QUESTIONS 1-9: 8

## 2021-07-15 NOTE — PROGRESS NOTES
TMS Education     Mariaelena Jean Baptiste MRN# [id number]  Age: 50 year old year old YOB: 1970        Patient is here in clinic for TMS education and consenting.  Patient will be starting TMS today on the [employer].  Writer and patient reviewed mechanics of TMS.  Discussed using magnetic pulses to stimulate and induce an electrical field inside the brain.  Discussed the difference between F8 and H1 coil.  Patient was able to verbalize understanding of this.  Discussed that the idea is that we are treating the DLPFC of the brain, as it has been shown by in studies that people who have depression have decreased activity in this part of the brain, the idea is that we stimulate this part of the brain to increase activity.       Reviewed processing of mapping and how visit today would go.  Encouraged patient to communicate with staff if treatment at anytime became painful.         Discussed side effects of TMS.  Side effects include headaches after treatment, hearing loss, lightheadedness/dizziness, short lived vision changes, and seizures.  Discussed ways that TMS Clinic helps with preventing these side effects.  Such as retesting motor thresholds and having patient wear ear plugs.  Instructed patient that their can take OTC pain relievers such as tylenol or IBU if their has a headache after today's treatment.  Reviewed safety concerns regarding sleep and use of illegal drugs/alcohol.        Patient was able to ask questions regarding procedure.  Patient informed of 10 minute late policy and attendance policy.  Consent was signed by patient today.

## 2021-07-15 NOTE — PROGRESS NOTES
Interventional Psychiatry Program  [address] [facility] [address], [address]  [place] [address]  TMS Procedure Note   Mariaelena Jean Baptiste MRN# [id number]  Age: 49 year old year old YOB: 1970    Pre-Procedure:  History and Physical: Reviewed in medical record  Consent Signed by: Mariaelena Jean Baptiste for this course of treatment.  On: 11/22/2019     Clinical Narrative:  Patient tolerated treatment. No side effects from initial treatment. Patient was very chatty today and talked about her cats.    Daily Adult Stimulation Safety Questionnaire: No or Yes in past 24 hours     1) Have you discontinued or started any new medication?  no  2) Have you missed any doses of your medication differently then prescribed?  no  3) Have you consumed alcohol or drugs (other than prescribed)?  no  4) Did you not sleep AT ALL last night?  no  5) Has your SI changed or worsened?  no    Indications for TMS:  MDD, recurrent, severe; 4+ medication trials (from 2+ classes) ineffective; Psychotherapy ineffective     Procedure Diagnosis:  F33.2    Treatment Hx:  Treatment number this series: 4  Total lifetime treatment number: 71    No Known Allergies   There were no vitals taken for this visit.     Pause for the Cause  Right patient:  Yes  Right procedure/correct coil:  Yes; rTMS; cpt 26969; H1 coil.   Earplugs in place:  Yes    Procedure  Patient was seated in procedure chair. Identity and procedure was verified. Ear plugs were placed in ears and patient-specific cap was placed on head and tightened appropriately. Ruler locations were verified. Coil was placed at treatment location and stimulator was set to parameters described below. A test train was delivered and pt tolerated train. Given pt tolerance, 55 treatment trains were delivered. Pt tolerated procedure.    Motor Threshold Determination  Distance from nasion to inion: 36.0 cm  MT 1: 0 - 6.5 - 16 @ 47% on 11/22/2019  MT 2: 0 - 6.5 - 16 @ 47% on 12/24/2019  MT 3: 0 - 5.5 -16 @ 48%  on 7/12/21    Stimulation Parameters  Frequency: 18 hz  Train Duration: 2 sec  Total pulses delivered: 2988  Inter-train interval: 20 sec  Tx Loc: 0 - eyebrows - 15  Energy: 51% (105%MT)  Trains: 83    Date MADRS IDS-SR PHQ-9   11/22/19 25 43 18   11/29/19 -- 45 18   12/6/19  45 18   12/13/19  47 20   12/20/19  48 13   12/27/19  26 9   1/10/19  22 5   1/17/20  21 5   1/21/20 2 21 5     Date MADRS IDS-SR PHQ-9   7/12/21  29 11         PHQ-9 SCORE 7/13/2021 7/14/2021 7/15/2021   PHQ-9 Total Score 5 8 8   Some encounter information is confidential and restricted. Go to Review Flowsheets activity to see all data.       Laxmi Whitney, TMS Technician  [facility]  Neuromodulation Clinic    Plan   - Cont TMS  - Increase intensity as tolerable    I did not see the patient during/after treatment but remained available in the clinic during  treatment.    Moni Mcgee MD  [facility]  Mental [facility] Neuromodulation

## 2021-07-16 ENCOUNTER — OFFICE VISIT (OUTPATIENT)
Dept: PSYCHIATRY | Facility: CLINIC | Age: 51
End: 2021-07-16
Payer: COMMERCIAL

## 2021-07-16 DIAGNOSIS — F33.2 SEVERE EPISODE OF RECURRENT MAJOR DEPRESSIVE DISORDER, WITHOUT PSYCHOTIC FEATURES (H): Primary | ICD-10-CM

## 2021-07-16 ASSESSMENT — PATIENT HEALTH QUESTIONNAIRE - PHQ9: SUM OF ALL RESPONSES TO PHQ QUESTIONS 1-9: 7

## 2021-07-16 NOTE — PROGRESS NOTES
Interventional Psychiatry Program  [address], [address]  [place] [address]  TMS Procedure Note   Mariaelena Jean Baptiste MRN# [id number]  Age: 49 year old year old YOB: 1970    Pre-Procedure:  History and Physical: Reviewed in medical record  Consent Signed by: Mariaelena Jean Baptiste for this course of treatment.  On: 11/22/2019     Clinical Narrative:  Patient tolerated treatment. No side effects from initial treatment. Patient happily discussed how she loves her job and mentions how much her job gives back to the community.    Daily Adult Stimulation Safety Questionnaire: No or Yes in past 24 hours     1) Have you discontinued or started any new medication?  no  2) Have you missed any doses of your medication differently then prescribed?  no  3) Have you consumed alcohol or drugs (other than prescribed)?  no  4) Did you not sleep AT ALL last night?  no  5) Has your SI changed or worsened?  no    Indications for TMS:  MDD, recurrent, severe; 4+ medication trials (from 2+ classes) ineffective; Psychotherapy ineffective     Procedure Diagnosis:  F33.2    Treatment Hx:  Treatment number this series: 5  Total lifetime treatment number: 72    No Known Allergies   There were no vitals taken for this visit.     Pause for the Cause  Right patient:  Yes  Right procedure/correct coil:  Yes; rTMS; cpt 00371; H1 coil.   Earplugs in place:  Yes    Procedure  Patient was seated in procedure chair. Identity and procedure was verified. Ear plugs were placed in ears and patient-specific cap was placed on head and tightened appropriately. Ruler locations were verified. Coil was placed at treatment location and stimulator was set to parameters described below. A test train was delivered and pt tolerated train. Given pt tolerance, 55 treatment trains were delivered. Pt tolerated procedure.    Motor Threshold Determination  Distance from nasion to inion: 36.0 cm  MT 1: 0 - 6.5 - 16 @ 47% on 11/22/2019  MT 2: 0 - 6.5  - 16 @ 47% on 12/24/2019  MT 3: 0 - 5.5 -16 @ 48% on 7/12/21    Stimulation Parameters  Frequency: 18 hz  Train Duration: 2 sec  Total pulses delivered: 2988  Inter-train interval: 20 sec  Tx Loc: 0 - eyebrows - 15  Energy: 51% (105%MT)  Trains: 83    Date MADRS IDS-SR PHQ-9   11/22/19 25 43 18   11/29/19 -- 45 18   12/6/19  45 18   12/13/19  47 20   12/20/19  48 13   12/27/19  26 9   1/10/19  22 5   1/17/20  21 5   1/21/20 2 21 5     Date MADRS IDS-SR PHQ-9   7/12/21  29 11         PHQ-9 SCORE 7/14/2021 7/15/2021 7/16/2021   PHQ-9 Total Score 8 8 7   Some encounter information is confidential and restricted. Go to Review Flowsheets activity to see all data.       Laxmi Whitney, TMS Technician  [facility]  Neuromodulation Clinic    Plan   - Cont TMS  - Increase intensity as tolerable    I did not see patient but remained available in the clinic throughout the TMS session    Jennifer Perez MD  [facility]  Mental [facility] Neuromodulation

## 2021-07-19 ENCOUNTER — OFFICE VISIT (OUTPATIENT)
Dept: PSYCHIATRY | Facility: CLINIC | Age: 51
End: 2021-07-19
Payer: COMMERCIAL

## 2021-07-19 DIAGNOSIS — F33.2 SEVERE EPISODE OF RECURRENT MAJOR DEPRESSIVE DISORDER, WITHOUT PSYCHOTIC FEATURES (H): Primary | ICD-10-CM

## 2021-07-19 ASSESSMENT — PATIENT HEALTH QUESTIONNAIRE - PHQ9: SUM OF ALL RESPONSES TO PHQ QUESTIONS 1-9: 7

## 2021-07-19 NOTE — PROGRESS NOTES
Interventional Psychiatry Program  [address] [facility], [address]  [place] [address]  TMS Procedure Note   Mariaelena Jean Baptiste MRN# [id number]  Age: 49 year old year old YOB: 1970    Pre-Procedure:  History and Physical: Reviewed in medical record  Consent Signed by: Mariaelena Jean Baptiste for this course of treatment.  On: 11/22/2019     Clinical Narrative:  Patient tolerated treatment. No side effects from initial treatment. Patient happily discussed how she loves her job and mentions how much her job gives back to the community.    Daily Adult Stimulation Safety Questionnaire: No or Yes in past 24 hours     1) Have you discontinued or started any new medication?  no  2) Have you missed any doses of your medication differently then prescribed?  no  3) Have you consumed alcohol or drugs (other than prescribed)?  no  4) Did you not sleep AT ALL last night?  no  5) Has your SI changed or worsened?  no    Indications for TMS:  MDD, recurrent, severe; 4+ medication trials (from 2+ classes) ineffective; Psychotherapy ineffective     Procedure Diagnosis:  F33.2    Treatment Hx:  Treatment number this series: 6  Total lifetime treatment number: 71    No Known Allergies   There were no vitals taken for this visit.     Pause for the Cause  Right patient:  Yes  Right procedure/correct coil:  Yes; rTMS; cpt 46781; H1 coil.   Earplugs in place:  Yes    Procedure  Patient was seated in procedure chair. Identity and procedure was verified. Ear plugs were placed in ears and patient-specific cap was placed on head and tightened appropriately. Ruler locations were verified. Coil was placed at treatment location and stimulator was set to parameters described below. A test train was delivered and pt tolerated train. Given pt tolerance, 55 treatment trains were delivered. Pt tolerated procedure.    Motor Threshold Determination  Distance from nasion to inion: 36.0 cm  MT 1: 0 - 6.5 - 16 @ 47% on 11/22/2019  MT 2: 0 - 6.5  - 16 @ 47% on 12/24/2019  MT 3: 0 - 5.5 -16 @ 48% on 7/12/21    Stimulation Parameters  Frequency: 18 hz  Train Duration: 2 sec  Total pulses delivered: 2988  Inter-train interval: 20 sec  Tx Loc: 0 - eyebrows - 15  Energy: 51% (105%MT)  Trains: 83    Date MADRS IDS-SR PHQ-9   11/22/19 25 43 18   11/29/19 -- 45 18   12/6/19  45 18   12/13/19  47 20   12/20/19  48 13   12/27/19  26 9   1/10/19  22 5   1/17/20  21 5   1/21/20 2 21 5     Date MADRS IDS-SR PHQ-9   7/12/21  29 11         PHQ-9 SCORE 7/15/2021 7/16/2021 7/19/2021   PHQ-9 Total Score 8 7 7   Some encounter information is confidential and restricted. Go to Review Flowsheets activity to see all data.       Laxmi Whitney, TMS Technician  [facility]  Neuromodulation Clinic    Plan   - Cont TMS  - Increase intensity as tolerable    I did not see patient but remained available in the clinic throughout the TMS session    Jennifer Perez MD  [facility]  Mental [facility] Neuromodulation

## 2021-07-20 ENCOUNTER — OFFICE VISIT (OUTPATIENT)
Dept: PSYCHIATRY | Facility: CLINIC | Age: 51
End: 2021-07-20
Payer: COMMERCIAL

## 2021-07-20 ENCOUNTER — VIRTUAL VISIT (OUTPATIENT)
Dept: PSYCHIATRY | Facility: CLINIC | Age: 51
End: 2021-07-20
Attending: PSYCHIATRY & NEUROLOGY
Payer: MEDICARE

## 2021-07-20 DIAGNOSIS — F33.1 MODERATE EPISODE OF RECURRENT MAJOR DEPRESSIVE DISORDER (H): ICD-10-CM

## 2021-07-20 DIAGNOSIS — F33.2 SEVERE EPISODE OF RECURRENT MAJOR DEPRESSIVE DISORDER, WITHOUT PSYCHOTIC FEATURES (H): Primary | ICD-10-CM

## 2021-07-20 PROCEDURE — 99215 OFFICE O/P EST HI 40 MIN: CPT | Mod: 95 | Performed by: STUDENT IN AN ORGANIZED HEALTH CARE EDUCATION/TRAINING PROGRAM

## 2021-07-20 RX ORDER — QUETIAPINE FUMARATE 25 MG/1
50 TABLET, FILM COATED ORAL AT BEDTIME
Qty: 60 TABLET | Refills: 2 | Status: SHIPPED | OUTPATIENT
Start: 2021-07-20 | End: 2021-09-02

## 2021-07-20 RX ORDER — VILAZODONE HYDROCHLORIDE 40 MG/1
40 TABLET ORAL DAILY
Qty: 30 TABLET | Refills: 2 | Status: SHIPPED | OUTPATIENT
Start: 2021-07-20 | End: 2021-09-02

## 2021-07-20 RX ORDER — DEXTROAMPHETAMINE SACCHARATE, AMPHETAMINE ASPARTATE, DEXTROAMPHETAMINE SULFATE AND AMPHETAMINE SULFATE 2.5; 2.5; 2.5; 2.5 MG/1; MG/1; MG/1; MG/1
10 TABLET ORAL 2 TIMES DAILY
Qty: 60 TABLET | Refills: 0 | Status: SHIPPED | OUTPATIENT
Start: 2021-07-20 | End: 2021-09-02

## 2021-07-20 RX ORDER — LAMOTRIGINE 150 MG/1
150 TABLET ORAL DAILY
Qty: 30 TABLET | Refills: 2 | Status: SHIPPED | OUTPATIENT
Start: 2021-07-20 | End: 2021-09-02

## 2021-07-20 ASSESSMENT — PATIENT HEALTH QUESTIONNAIRE - PHQ9
SUM OF ALL RESPONSES TO PHQ QUESTIONS 1-9: 13
10. IF YOU CHECKED OFF ANY PROBLEMS, HOW DIFFICULT HAVE THESE PROBLEMS MADE IT FOR YOU TO DO YOUR WORK, TAKE CARE OF THINGS AT HOME, OR GET ALONG WITH OTHER PEOPLE: SOMEWHAT DIFFICULT
SUM OF ALL RESPONSES TO PHQ QUESTIONS 1-9: 8
SUM OF ALL RESPONSES TO PHQ QUESTIONS 1-9: 13

## 2021-07-20 ASSESSMENT — PAIN SCALES - GENERAL: PAINLEVEL: NO PAIN (0)
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
## 2021-07-20 NOTE — PATIENT INSTRUCTIONS
**For crisis resources, please see the information at the end of this document**     Patient Education      Thank you for coming to the [facility] MENTAL HEALTH & ADDICTION [place] [facility].    Lab Testing:  If you had lab testing today and your results are reassuring or normal they will be mailed to you or sent through "[employer] within 7 days. If the lab tests need quick action we will call you with the results. The phone number we will call with results is # [phone] (home) . If this is not the best number please call our clinic and change the number.    Medication Refills:  If you need any refills please call your pharmacy and they will contact us. Our fax number for refills is [phone]. Please allow three business for refill processing. If you need to  your refill at a new pharmacy, please contact the new pharmacy directly. The new pharmacy will help you get your medications transferred.     Scheduling:  If you have any concerns about today's visit or wish to schedule another appointment please call our office during normal business hours [phone] (8-5:00 M-F)    Contact Us:  Please call [phone] during business hours (8-5:00 M-F).  If after clinic hours, or on the weekend, please call  [phone].    Financial Assistance [phone]  [facility] Billing [phone]  Central Billing Office, [facility]: [phone]  [facility] Billing [phone]  Medical Records [phone]  [facility] Patient Bill of Rights [email or web address]       MENTAL HEALTH CRISIS NUMBERS:  For a medical emergency please call  911 or go to the nearest ER.     [facility]:   [facility] -[phone]   Crisis Residence [facility] -[phone]   Walk-In Counseling Center [facility] -[phone]   COPE 24/7 [place] Mobile Team -[phone] (adults)/[phone] (child)  CHILD: [facility] needs assessment  team - [phone]      [facility]:   [facility] - [phone]   Walk-in counseling [facility] - [phone]   Walk-in counseling [facility] - [phone]   Crisis Residence [facility] Res[facility] - [phone]  Urgent Care Adult Mental Nf[facility]so-[phone] mobile unit/ 24/7 crisis line    National Crisis Numbers:   National Suicide Prevention Lifeline: 5-300-618-TALK (103-356-9395)  Poison Control Center - 8-783-463-4040  [employer]/resources for a list of additional resources (SOS)  Trans Lifeline a hotline for transgender people 1-391.496.7187  The Liam Project a hotline for LGBT youth 5-808-967-0683  Crisis Text Line: For any crisis 24/7   To: 783615  see www.crisistextline.org  - IF MAKING A CALL FEELS TOO HARD, send a text!         Again thank you for choosing [facility] MENTAL HEALTH & ADDICTION [facility] and please let us know how we can best partner with you to improve you and your family's health.    You may be receiving a survey regarding this appointment. We would love to have your feedback, both positive and negative. The survey is done by an external company, so your answers are anonymous.

## 2021-07-20 NOTE — PROGRESS NOTES
Interventional Psychiatry Program  [address], [address]  [place] [address]  TMS Procedure Note   Mariaelena Jean Baptiste MRN# [id number]  Age: 49 year old year old YOB: 1970    Pre-Procedure:  History and Physical: Reviewed in medical record  Consent Signed by: Mariaelena Jean Baptiste for this course of treatment.  On: 11/22/2019     Clinical Narrative:  Patient tolerated treatment. No side effects from initial treatment. Patient talked about her cats.    Daily Adult Stimulation Safety Questionnaire: No or Yes in past 24 hours     1) Have you discontinued or started any new medication?  no  2) Have you missed any doses of your medication differently then prescribed?  no  3) Have you consumed alcohol or drugs (other than prescribed)?  no  4) Did you not sleep AT ALL last night?  no  5) Has your SI changed or worsened?  no    Indications for TMS:  MDD, recurrent, severe; 4+ medication trials (from 2+ classes) ineffective; Psychotherapy ineffective     Procedure Diagnosis:  F33.2    Treatment Hx:  Treatment number this series: 7  Total lifetime treatment number: 72    No Known Allergies   There were no vitals taken for this visit.     Pause for the Cause  Right patient:  Yes  Right procedure/correct coil:  Yes; rTMS; cpt 08031; H1 coil.   Earplugs in place:  Yes    Procedure  Patient was seated in procedure chair. Identity and procedure was verified. Ear plugs were placed in ears and patient-specific cap was placed on head and tightened appropriately. Ruler locations were verified. Coil was placed at treatment location and stimulator was set to parameters described below. A test train was delivered and pt tolerated train. Given pt tolerance, 55 treatment trains were delivered. Pt tolerated procedure.    Motor Threshold Determination  Distance from nasion to inion: 36.0 cm  MT 1: 0 - 6.5 - 16 @ 47% on 11/22/2019  MT 2: 0 - 6.5 - 16 @ 47% on 12/24/2019  MT 3: 0 - 5.5 -16 @ 48% on 7/12/21    Stimulation  Parameters  Frequency: 18 hz  Train Duration: 2 sec  Total pulses delivered: 2988  Inter-train interval: 20 sec  Tx Loc: 0 - eyebrows - 15  Energy: 53% (110%MT)  Trains: 83    Date MADRS IDS-SR PHQ-9   11/22/19 25 43 18   11/29/19 -- 45 18   12/6/19  45 18   12/13/19  47 20   12/20/19  48 13   12/27/19  26 9   1/10/19  22 5   1/17/20  21 5   1/21/20 2 21 5     Date MADRS IDS-SR PHQ-9   7/12/21  29 11         PHQ-9 SCORE 7/16/2021 7/19/2021 7/20/2021   PHQ-9 Total Score MyChart - - 13 (Moderate depression)   PHQ-9 Total Score 7 7 8   Some encounter information is confidential and restricted. Go to Review Flowsheets activity to see all data.       Judy Steve, TMS Technician  [facility]  Neuromodulation Clinic    Plan   - Cont TMS  - Increase intensity as tolerable    I did not see the patient during/after treatment but remained available in the clinic during  treatment.    Moni Mcgee MD  [facility]  Mental [facility] Neuromodulation

## 2021-07-20 NOTE — PROGRESS NOTES
"VIDEO VISIT  Mariaelena Jean Baptiste is a 50 year old patient who is being evaluated via a billable video visit.      The patient has been notified of following:   \"This video visit will be conducted via a call between you and your physician/provider. We have found that certain health care needs can be provided without the need for an in-person physical exam. This service lets us provide the care you need with a video conversation. If a prescription is necessary we can send it directly to your pharmacy. If lab work is needed we can place an order for that and you can then stop by our lab to have the test done at a later time. Insurers are generally covering virtual visits as they would in-office visits so billing should not be different than normal.  If for some reason you do get billed incorrectly, you should contact the billing office to correct it and that number is in the AVS .    Video Conference to be completed via:  [place]    Patient has given verbal consent for video visit?:  Yes    Patient would prefer that any video invitations be sent by: Send to e-mail at: [email or web address]      How would patient like to obtain AVS?:  [employer]    AVS SmartPhrase [PsychAVS] has been placed in 'Patient Instructions':  Yes  "

## 2021-07-21 ENCOUNTER — TELEPHONE (OUTPATIENT)
Dept: PSYCHIATRY | Facility: CLINIC | Age: 51
End: 2021-07-21

## 2021-07-21 ENCOUNTER — OFFICE VISIT (OUTPATIENT)
Dept: PSYCHIATRY | Facility: CLINIC | Age: 51
End: 2021-07-21
Payer: COMMERCIAL

## 2021-07-21 DIAGNOSIS — F33.2 SEVERE EPISODE OF RECURRENT MAJOR DEPRESSIVE DISORDER, WITHOUT PSYCHOTIC FEATURES (H): Primary | ICD-10-CM

## 2021-07-21 ASSESSMENT — PATIENT HEALTH QUESTIONNAIRE - PHQ9
SUM OF ALL RESPONSES TO PHQ QUESTIONS 1-9: 13
SUM OF ALL RESPONSES TO PHQ QUESTIONS 1-9: 9

## 2021-07-21 NOTE — TELEPHONE ENCOUNTER
Started Viibryd on 10/19/2015.    Called the pharmacy to find out if the Rx just needs to be re-sent under a CMS provider. The pharmacist said that he does not think a PA is needed and that it was just too soon to fill the Rx. The patient received a 30 day supply on 7/2/21, so insurance likely denied the refill as too soon. He said Medicare typically will allow a refill on day 26 at the soonest. He said to ignore the PA request for now and they will send a new one if one is needed if they refill at the right time.     Routed to Dr. Homer Ibanez for FYI.

## 2021-07-21 NOTE — PROGRESS NOTES
Interventional Psychiatry Program  [address], [address]  [place] [address]  TMS Procedure Note   Mariaelena Jean Baptiste MRN# [id number]  Age: 49 year old year old YOB: 1970    Pre-Procedure:  History and Physical: Reviewed in medical record  Consent Signed by: Mariaelena Jean Baptiste for this course of treatment.  On: 11/22/2019     Clinical Narrative:  Patient tolerated treatment. No side effects from initial treatment. No changes.    Daily Adult Stimulation Safety Questionnaire: No or Yes in past 24 hours     1) Have you discontinued or started any new medication?  no  2) Have you missed any doses of your medication differently then prescribed?  no  3) Have you consumed alcohol or drugs (other than prescribed)?  no  4) Did you not sleep AT ALL last night?  no  5) Has your SI changed or worsened?  no    Indications for TMS:  MDD, recurrent, severe; 4+ medication trials (from 2+ classes) ineffective; Psychotherapy ineffective     Procedure Diagnosis:  F33.2    Treatment Hx:  Treatment number this series: 8  Total lifetime treatment number: 73    No Known Allergies   There were no vitals taken for this visit.     Pause for the Cause  Right patient:  Yes  Right procedure/correct coil:  Yes; rTMS; cpt 95823; H1 coil.   Earplugs in place:  Yes    Procedure  Patient was seated in procedure chair. Identity and procedure was verified. Ear plugs were placed in ears and patient-specific cap was placed on head and tightened appropriately. Ruler locations were verified. Coil was placed at treatment location and stimulator was set to parameters described below. A test train was delivered and pt tolerated train. Given pt tolerance, 55 treatment trains were delivered. Pt tolerated procedure.    Motor Threshold Determination  Distance from nasion to inion: 36.0 cm  MT 1: 0 - 6.5 - 16 @ 47% on 11/22/2019  MT 2: 0 - 6.5 - 16 @ 47% on 12/24/2019  MT 3: 0 - 5.5 -16 @ 48% on 7/12/21    Stimulation  Parameters  Frequency: 18 hz  Train Duration: 2 sec  Total pulses delivered: 2988  Inter-train interval: 20 sec  Tx Loc: 0 - eyebrows - 15  Energy: 53% (110%MT)  Trains: 83    Date MADRS IDS-SR PHQ-9   11/22/19 25 43 18   11/29/19 -- 45 18   12/6/19  45 18   12/13/19  47 20   12/20/19  48 13   12/27/19  26 9   1/10/19  22 5   1/17/20  21 5   1/21/20 2 21 5     Date MADRS IDS-SR PHQ-9   7/12/21  29 11         PHQ-9 SCORE 7/19/2021 7/20/2021 7/21/2021   PHQ-9 Total Score MyChart - 13 (Moderate depression) -   PHQ-9 Total Score 7 8 9   Some encounter information is confidential and restricted. Go to Review Flowsheets activity to see all data.       Laxmi Whitney, TMS Technician  [facility]  Neuromodulation Clinic    Plan   - Cont TMS  - Increase intensity as tolerable    I did not see patient but remained available in the clinic throughout the TMS session    Jennifer Perez MD  [facility]  Mental [facility] Neuromodulation

## 2021-07-21 NOTE — TELEPHONE ENCOUNTER
vilazodone (VIIBRYD) 40 MG TABS tablet  Last Written Prescription Date:  7/20/2021  Last Fill Quantity: 30,   # refills: 2  Last Office Visit : 7/21/2021  Future Office visit:  7/22/2021    Routing refill request to provider for review/approval because:  This medication not covered under the Pt's insurance.       Alternatives for Pt care are:  Bupropion  HCL, Bupropion XL, Mirtazapine, Nefazodone HCL, Trazodone HCL.      Please advise          Joseline Dejesus RN  Central Triage Red Flags/Med Refills

## 2021-07-22 ENCOUNTER — OFFICE VISIT (OUTPATIENT)
Dept: PSYCHIATRY | Facility: CLINIC | Age: 51
End: 2021-07-22
Payer: COMMERCIAL

## 2021-07-22 DIAGNOSIS — F33.2 SEVERE EPISODE OF RECURRENT MAJOR DEPRESSIVE DISORDER, WITHOUT PSYCHOTIC FEATURES (H): Primary | ICD-10-CM

## 2021-07-22 ASSESSMENT — PATIENT HEALTH QUESTIONNAIRE - PHQ9: SUM OF ALL RESPONSES TO PHQ QUESTIONS 1-9: 6

## 2021-07-22 NOTE — PROGRESS NOTES
Interventional Psychiatry Program  [address] [facility], [address]  [place] [address]  TMS Procedure Note   Mariaelena Jean Baptiste MRN# [id number]  Age: 49 year old year old YOB: 1970    Pre-Procedure:  History and Physical: Reviewed in medical record  Consent Signed by: Mariaelena Jean Baptiste for this course of treatment.  On: 11/22/2019     Clinical Narrative:  Patient tolerated treatment. No side effects from initial treatment. Patient talked about rescue cats and her own cats.    Daily Adult Stimulation Safety Questionnaire: No or Yes in past 24 hours     1) Have you discontinued or started any new medication?  no  2) Have you missed any doses of your medication differently then prescribed?  no  3) Have you consumed alcohol or drugs (other than prescribed)?  no  4) Did you not sleep AT ALL last night?  no  5) Has your SI changed or worsened?  no    Indications for TMS:  MDD, recurrent, severe; 4+ medication trials (from 2+ classes) ineffective; Psychotherapy ineffective     Procedure Diagnosis:  F33.2    Treatment Hx:  Treatment number this series: 9  Total lifetime treatment number: 74    No Known Allergies   There were no vitals taken for this visit.     Pause for the Cause  Right patient:  Yes  Right procedure/correct coil:  Yes; rTMS; cpt 51556; H1 coil.   Earplugs in place:  Yes    Procedure  Patient was seated in procedure chair. Identity and procedure was verified. Ear plugs were placed in ears and patient-specific cap was placed on head and tightened appropriately. Ruler locations were verified. Coil was placed at treatment location and stimulator was set to parameters described below. A test train was delivered and pt tolerated train. Given pt tolerance, 55 treatment trains were delivered. Pt tolerated procedure.    Motor Threshold Determination  Distance from nasion to inion: 36.0 cm  MT 1: 0 - 6.5 - 16 @ 47% on 11/22/2019  MT 2: 0 - 6.5 - 16 @ 47% on 12/24/2019  MT 3: 0 - 5.5 -16 @ 48% on  7/12/21    Stimulation Parameters  Frequency: 18 hz  Train Duration: 2 sec  Total pulses delivered: 2988  Inter-train interval: 20 sec  Tx Loc: 0 - eyebrows - 15  Energy: 53% (110%MT)  Trains: 83    Date MADRS IDS-SR PHQ-9   11/22/19 25 43 18   11/29/19 -- 45 18   12/6/19  45 18   12/13/19  47 20   12/20/19  48 13   12/27/19  26 9   1/10/19  22 5   1/17/20  21 5   1/21/20 2 21 5     Date MADRS IDS-SR PHQ-9   7/12/21  29 11         PHQ-9 SCORE 7/20/2021 7/21/2021 7/22/2021   PHQ-9 Total Score MyChart 13 (Moderate depression) - -   PHQ-9 Total Score 8 9 6   Some encounter information is confidential and restricted. Go to Review Flowsheets activity to see all data.       Laxmi Whitney, TMS Technician  [facility]  Neuromodulation Clinic    Plan   - Cont TMS  - Increase intensity as tolerable      I did not see the patient during/after treatment but remained available in the clinic during  treatment.    Moni Mcgee MD  [facility]  Mental Health Neuromodulation

## 2021-07-23 ENCOUNTER — TELEPHONE (OUTPATIENT)
Dept: PSYCHIATRY | Facility: CLINIC | Age: 51
End: 2021-07-23

## 2021-07-23 ENCOUNTER — OFFICE VISIT (OUTPATIENT)
Dept: PSYCHIATRY | Facility: CLINIC | Age: 51
End: 2021-07-23
Payer: COMMERCIAL

## 2021-07-23 DIAGNOSIS — F33.2 SEVERE EPISODE OF RECURRENT MAJOR DEPRESSIVE DISORDER, WITHOUT PSYCHOTIC FEATURES (H): Primary | ICD-10-CM

## 2021-07-23 ASSESSMENT — PATIENT HEALTH QUESTIONNAIRE - PHQ9: SUM OF ALL RESPONSES TO PHQ QUESTIONS 1-9: 8

## 2021-07-23 NOTE — PROGRESS NOTES
Interventional Psychiatry Program  [address] [facility], [address]  [place] [address]  TMS Procedure Note   Mariaelena Jean Baptiste MRN# [id number]  Age: 49 year old year old YOB: 1970    Pre-Procedure:  History and Physical: Reviewed in medical record  Consent Signed by: Mariaelena Jean Baptiste for this course of treatment.  On: 11/22/2019     Clinical Narrative:  Patient tolerated treatment. No side effects from initial treatment. No changes.    Daily Adult Stimulation Safety Questionnaire: No or Yes in past 24 hours     1) Have you discontinued or started any new medication?  no  2) Have you missed any doses of your medication differently then prescribed?  no  3) Have you consumed alcohol or drugs (other than prescribed)?  no  4) Did you not sleep AT ALL last night?  no  5) Has your SI changed or worsened?  no    Indications for TMS:  MDD, recurrent, severe; 4+ medication trials (from 2+ classes) ineffective; Psychotherapy ineffective     Procedure Diagnosis:  F33.2    Treatment Hx:  Treatment number this series: 10  Total lifetime treatment number: 75    No Known Allergies   There were no vitals taken for this visit.     Pause for the Cause  Right patient:  Yes  Right procedure/correct coil:  Yes; rTMS; cpt 72256; H1 coil.   Earplugs in place:  Yes    Procedure  Patient was seated in procedure chair. Identity and procedure was verified. Ear plugs were placed in ears and patient-specific cap was placed on head and tightened appropriately. Ruler locations were verified. Coil was placed at treatment location and stimulator was set to parameters described below. A test train was delivered and pt tolerated train. Given pt tolerance, 55 treatment trains were delivered. Pt tolerated procedure.    Motor Threshold Determination  Distance from nasion to inion: 36.0 cm  MT 1: 0 - 6.5 - 16 @ 47% on 11/22/2019  MT 2: 0 - 6.5 - 16 @ 47% on 12/24/2019  MT 3: 0 - 5.5 -16 @ 48% on 7/12/21    Stimulation  Parameters  Frequency: 18 hz  Train Duration: 2 sec  Total pulses delivered: 2988  Inter-train interval: 20 sec  Tx Loc: 0 - eyebrows - 15  Energy: 53% (110%MT)  Trains: 83    Date MADRS IDS-SR PHQ-9   11/22/19 25 43 18   11/29/19 -- 45 18   12/6/19  45 18   12/13/19  47 20   12/20/19  48 13   12/27/19  26 9   1/10/19  22 5   1/17/20  21 5   1/21/20 2 21 5     Date MADRS IDS-SR PHQ-9   7/12/21  29 11   7/23/21  29 8         PHQ-9 SCORE 7/21/2021 7/22/2021 7/23/2021   PHQ-9 Total Score MyChart - - -   PHQ-9 Total Score 9 6 8   Some encounter information is confidential and restricted. Go to Review Flowsheets activity to see all data.       Laxmi Whitney, TMS Technician  [facility]  Neuromodulation Clinic    Plan   - Cont TMS  - Increase intensity as tolerable      I did not see patient but remained available in the clinic throughout the TMS session      Marci Perez MD  [facility]  Mental [facility] Neuromodulation

## 2021-07-23 NOTE — TELEPHONE ENCOUNTER
Prior Authorization Retail Medication Request    Renewal    Medication/Dose: levomilnacipran (FETZIMA ER) 120 MG 24 hr capsule   ICD code (if different than what is on RX):  Moderate episode of recurrent major depressive disorder (H) [F33.1]   Previously Tried and Failed:  See 04/09/2020 encounter for previous approval  Rationale:  See 04/07/2020 encounter for previous approval    Insurance Name:  Same  Insurance ID:  Same      Pharmacy Information (if different than what is on RX)  Name:  [id number] [place], [place] - [address] [place] [address]  Phone:  [phone]

## 2021-07-26 ENCOUNTER — OFFICE VISIT (OUTPATIENT)
Dept: PSYCHIATRY | Facility: CLINIC | Age: 51
End: 2021-07-26
Payer: COMMERCIAL

## 2021-07-26 DIAGNOSIS — F33.2 SEVERE EPISODE OF RECURRENT MAJOR DEPRESSIVE DISORDER, WITHOUT PSYCHOTIC FEATURES (H): Primary | ICD-10-CM

## 2021-07-26 ASSESSMENT — PATIENT HEALTH QUESTIONNAIRE - PHQ9: SUM OF ALL RESPONSES TO PHQ QUESTIONS 1-9: 7

## 2021-07-26 NOTE — PROGRESS NOTES
Interventional Psychiatry Program  [address] [facility], [address]  [place] [address]  TMS Procedure Note   Mariaelena Jean Baptiste MRN# [id number]  Age: 49 year old year old YOB: 1970    Pre-Procedure:  History and Physical: Reviewed in medical record  Consent Signed by: Mariaelena Jean Baptiste for this course of treatment.  On: 11/22/2019     Clinical Narrative:  Patient tolerated treatment. No side effects from initial treatment. No changes.    Daily Adult Stimulation Safety Questionnaire: No or Yes in past 24 hours     1) Have you discontinued or started any new medication?  no  2) Have you missed any doses of your medication differently then prescribed?  no  3) Have you consumed alcohol or drugs (other than prescribed)?  no  4) Did you not sleep AT ALL last night?  no  5) Has your SI changed or worsened?  no    Indications for TMS:  MDD, recurrent, severe; 4+ medication trials (from 2+ classes) ineffective; Psychotherapy ineffective     Procedure Diagnosis:  F33.2    Treatment Hx:  Treatment number this series: 11  Total lifetime treatment number: 76    No Known Allergies   There were no vitals taken for this visit.     Pause for the Cause  Right patient:  Yes  Right procedure/correct coil:  Yes; rTMS; cpt 48953; H1 coil.   Earplugs in place:  Yes    Procedure  Patient was seated in procedure chair. Identity and procedure was verified. Ear plugs were placed in ears and patient-specific cap was placed on head and tightened appropriately. Ruler locations were verified. Coil was placed at treatment location and stimulator was set to parameters described below. A test train was delivered and pt tolerated train. Given pt tolerance, 55 treatment trains were delivered. Pt tolerated procedure.    Motor Threshold Determination  Distance from nasion to inion: 36.0 cm  MT 1: 0 - 6.5 - 16 @ 47% on 11/22/2019  MT 2: 0 - 6.5 - 16 @ 47% on 12/24/2019  MT 3: 0 - 5.5 -16 @ 48% on 7/12/21    Stimulation  Parameters  Frequency: 18 hz  Train Duration: 2 sec  Total pulses delivered: 2988  Inter-train interval: 20 sec  Tx Loc: 0 - eyebrows - 15  Energy: 55% (115%MT)  Trains: 83    Date MADRS IDS-SR PHQ-9   11/22/19 25 43 18   11/29/19 -- 45 18   12/6/19  45 18   12/13/19  47 20   12/20/19  48 13   12/27/19  26 9   1/10/19  22 5   1/17/20  21 5   1/21/20 2 21 5     Date MADRS IDS-SR PHQ-9   7/12/21  29 11   7/23/21  29 8         PHQ-9 SCORE 7/21/2021 7/22/2021 7/23/2021   PHQ-9 Total Score MyChart - - -   PHQ-9 Total Score 9 6 8   Some encounter information is confidential and restricted. Go to Review Flowsheets activity to see all data.       Judy Steve, TMS Technician  [facility]  Neuromodulation Clinic    Plan   - Cont TMS  - Increase intensity as tolerable      I did not see patient but remained available in the clinic throughout the TMS session      Marci Perez MD  [facility]  Mental [facility] Neuromodulation

## 2021-07-26 NOTE — TELEPHONE ENCOUNTER
Central Prior Authorization Team   Phone: [phone]      PA Initiation    Medication: levomilnacipran (FETZIMA ER) 120 MG 24 hr capsule   Insurance Company: [employer] - Phone [phone] Fax [phone]  Pharmacy Filling the Rx: [facility] #[id number] [place], [place] - [address] [place] [address]  Filling Pharmacy Phone: [phone]  Filling Pharmacy Fax:    Start Date: 7/26/2021

## 2021-07-26 NOTE — TELEPHONE ENCOUNTER
Prior Authorization Approval    Authorization Effective Date: 6/26/2021  Authorization Expiration Date: 7/26/2022  Medication: levomilnacipran (FETZIMA ER) 120 MG 24 hr capsule   Approved Dose/Quantity:  Reference #: [id number]   Insurance Company: [employer] - Phone [phone] Fax [phone]  Expected CoPay:       Which Pharmacy is filling the prescription (Not needed for infusion/clinic administered): [id number] [address]  Pharmacy Notified: Yes  Patient Notified: No

## 2021-07-27 ENCOUNTER — OFFICE VISIT (OUTPATIENT)
Dept: PSYCHIATRY | Facility: CLINIC | Age: 51
End: 2021-07-27
Payer: COMMERCIAL

## 2021-07-27 DIAGNOSIS — F33.2 SEVERE EPISODE OF RECURRENT MAJOR DEPRESSIVE DISORDER, WITHOUT PSYCHOTIC FEATURES (H): Primary | ICD-10-CM

## 2021-07-27 ASSESSMENT — PATIENT HEALTH QUESTIONNAIRE - PHQ9: SUM OF ALL RESPONSES TO PHQ QUESTIONS 1-9: 7

## 2021-07-27 NOTE — PROGRESS NOTES
Interventional Psychiatry Program  [address] [facility], [address]  [place] [address]  TMS Procedure Note   Mariaelena Jean Baptiste MRN# [id number]  Age: 49 year old year old YOB: 1970    Pre-Procedure:  History and Physical: Reviewed in medical record  Consent Signed by: Mariaelena Jean Baptiste for this course of treatment.  On: 11/22/2019     Clinical Narrative:  Patient tolerated treatment. No side effects from initial treatment. No changes.    Daily Adult Stimulation Safety Questionnaire: No or Yes in past 24 hours     1) Have you discontinued or started any new medication?  no  2) Have you missed any doses of your medication differently then prescribed?  no  3) Have you consumed alcohol or drugs (other than prescribed)?  no  4) Did you not sleep AT ALL last night?  no  5) Has your SI changed or worsened?  no    Indications for TMS:  MDD, recurrent, severe; 4+ medication trials (from 2+ classes) ineffective; Psychotherapy ineffective     Procedure Diagnosis:  F33.2    Treatment Hx:  Treatment number this series: 12  Total lifetime treatment number: 77    No Known Allergies   There were no vitals taken for this visit.     Pause for the Cause  Right patient:  Yes  Right procedure/correct coil:  Yes; rTMS; cpt 65892; H1 coil.   Earplugs in place:  Yes    Procedure  Patient was seated in procedure chair. Identity and procedure was verified. Ear plugs were placed in ears and patient-specific cap was placed on head and tightened appropriately. Ruler locations were verified. Coil was placed at treatment location and stimulator was set to parameters described below. A test train was delivered and pt tolerated train. Given pt tolerance, 55 treatment trains were delivered. Pt tolerated procedure.    Motor Threshold Determination  Distance from nasion to inion: 36.0 cm  MT 1: 0 - 6.5 - 16 @ 47% on 11/22/2019  MT 2: 0 - 6.5 - 16 @ 47% on 12/24/2019  MT 3: 0 - 5.5 -16 @ 48% on 7/12/21    Stimulation  Parameters  Frequency: 18 hz  Train Duration: 2 sec  Total pulses delivered: 2988  Inter-train interval: 20 sec  Tx Loc: 0 - eyebrows - 15  Energy: 55% (115%MT)  Trains: 83    Date MADRS IDS-SR PHQ-9   11/22/19 25 43 18   11/29/19 -- 45 18   12/6/19  45 18   12/13/19  47 20   12/20/19  48 13   12/27/19  26 9   1/10/19  22 5   1/17/20  21 5   1/21/20 2 21 5     Date MADRS IDS-SR PHQ-9   7/12/21  29 11   7/23/21  29 8         PHQ-9 SCORE 7/22/2021 7/23/2021 7/26/2021   PHQ-9 Total Score MyChart - - -   PHQ-9 Total Score 6 8 7   Some encounter information is confidential and restricted. Go to Review Flowsheets activity to see all data.       Laxmi Whitney,  TMS Technician  [facility]  Neuromodulation Clinic    Plan   - Cont TMS  - Increase intensity as tolerable    I did not see the patient but remained available in the clinic throughout the TMS session.     Jack Amaya M.D.   [facility]  Mental Health Neuromodulation

## 2021-07-28 ENCOUNTER — VIRTUAL VISIT (OUTPATIENT)
Dept: PSYCHIATRY | Facility: CLINIC | Age: 51
End: 2021-07-28
Payer: COMMERCIAL

## 2021-07-28 ENCOUNTER — OFFICE VISIT (OUTPATIENT)
Dept: PSYCHIATRY | Facility: CLINIC | Age: 51
End: 2021-07-28
Payer: COMMERCIAL

## 2021-07-28 DIAGNOSIS — F33.2 SEVERE EPISODE OF RECURRENT MAJOR DEPRESSIVE DISORDER, WITHOUT PSYCHOTIC FEATURES (H): Primary | ICD-10-CM

## 2021-07-28 DIAGNOSIS — F33.42 RECURRENT MAJOR DEPRESSIVE DISORDER, IN FULL REMISSION (H): Primary | ICD-10-CM

## 2021-07-28 ASSESSMENT — PATIENT HEALTH QUESTIONNAIRE - PHQ9: SUM OF ALL RESPONSES TO PHQ QUESTIONS 1-9: 4

## 2021-07-28 NOTE — PROGRESS NOTES
Interventional Psychiatry Program  [address] [facility], [address]  [place] [address]  TMS Procedure Note   Mariaelena Jean Baptiste MRN# [id number]  Age: 49 year old year old YOB: 1970    Pre-Procedure:  History and Physical: Reviewed in medical record  Consent Signed by: Mariaelena Jean Baptiste for this course of treatment.  On: 11/22/2019     Clinical Narrative:  Patient tolerated treatment. No side effects from initial treatment. No changes.    Daily Adult Stimulation Safety Questionnaire: No or Yes in past 24 hours     1) Have you discontinued or started any new medication?  no  2) Have you missed any doses of your medication differently then prescribed?  no  3) Have you consumed alcohol or drugs (other than prescribed)?  no  4) Did you not sleep AT ALL last night?  no  5) Has your SI changed or worsened?  no    Indications for TMS:  MDD, recurrent, severe; 4+ medication trials (from 2+ classes) ineffective; Psychotherapy ineffective     Procedure Diagnosis:  F33.2    Treatment Hx:  Treatment number this series: 13  Total lifetime treatment number: 78    No Known Allergies   There were no vitals taken for this visit.     Pause for the Cause  Right patient:  Yes  Right procedure/correct coil:  Yes; rTMS; cpt 77105; H1 coil.   Earplugs in place:  Yes    Procedure  Patient was seated in procedure chair. Identity and procedure was verified. Ear plugs were placed in ears and patient-specific cap was placed on head and tightened appropriately. Ruler locations were verified. Coil was placed at treatment location and stimulator was set to parameters described below. A test train was delivered and pt tolerated train. Given pt tolerance, 55 treatment trains were delivered. Pt tolerated procedure.    Motor Threshold Determination  Distance from nasion to inion: 36.0 cm  MT 1: 0 - 6.5 - 16 @ 47% on 11/22/2019  MT 2: 0 - 6.5 - 16 @ 47% on 12/24/2019  MT 3: 0 - 5.5 -16 @ 48% on 7/12/21    Stimulation  Parameters  Frequency: 18 hz  Train Duration: 2 sec  Total pulses delivered: 2988  Inter-train interval: 20 sec  Tx Loc: 0 - eyebrows - 15  Energy: 58% (120%MT)  Trains: 83    Date MADRS IDS-SR PHQ-9   11/22/19 25 43 18   11/29/19 -- 45 18   12/6/19  45 18   12/13/19  47 20   12/20/19  48 13   12/27/19  26 9   1/10/19  22 5   1/17/20  21 5   1/21/20 2 21 5     Date MADRS IDS-SR PHQ-9   7/12/21  29 11   7/23/21  29 8         PHQ-9 SCORE 7/26/2021 7/27/2021 7/28/2021   PHQ-9 Total Score MyChart - - -   PHQ-9 Total Score 7 7 4   Some encounter information is confidential and restricted. Go to Review Flowsheets activity to see all data.       Judy Steve,  TMS Technician  [facility]  Neuromodulation Clinic    Plan   - Cont TMS  - Increase intensity as tolerable    I did not see patient but remained available in the clinic throughout the TMS session     Jennifer Perez M.D.   [facility]  Mental Health Neuromodulation
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
## 2021-07-29 ENCOUNTER — OFFICE VISIT (OUTPATIENT)
Dept: PSYCHIATRY | Facility: CLINIC | Age: 51
End: 2021-07-29
Payer: COMMERCIAL

## 2021-07-29 DIAGNOSIS — F33.2 SEVERE EPISODE OF RECURRENT MAJOR DEPRESSIVE DISORDER, WITHOUT PSYCHOTIC FEATURES (H): Primary | ICD-10-CM

## 2021-07-29 ASSESSMENT — PATIENT HEALTH QUESTIONNAIRE - PHQ9: SUM OF ALL RESPONSES TO PHQ QUESTIONS 1-9: 5

## 2021-07-29 NOTE — PROGRESS NOTES
Interventional Psychiatry Program  [address], [address]  [place] [address]  TMS Procedure Note   Mariaelena Jean Baptiste MRN# [id number]  Age: 49 year old year old YOB: 1970    Pre-Procedure:  History and Physical: Reviewed in medical record  Consent Signed by: Mariaelena Jean Baptiste for this course of treatment.  On: 11/22/2019     Clinical Narrative:  Patient tolerated treatment. No side effects from initial treatment. No changes.    Daily Adult Stimulation Safety Questionnaire: No or Yes in past 24 hours     1) Have you discontinued or started any new medication?  no  2) Have you missed any doses of your medication differently then prescribed?  no  3) Have you consumed alcohol or drugs (other than prescribed)?  no  4) Did you not sleep AT ALL last night?  no  5) Has your SI changed or worsened?  no    Indications for TMS:  MDD, recurrent, severe; 4+ medication trials (from 2+ classes) ineffective; Psychotherapy ineffective     Procedure Diagnosis:  F33.2    Treatment Hx:  Treatment number this series: 14  Total lifetime treatment number: 79    No Known Allergies   There were no vitals taken for this visit.     Pause for the Cause  Right patient:  Yes  Right procedure/correct coil:  Yes; rTMS; cpt 23472; H1 coil.   Earplugs in place:  Yes    Procedure  Patient was seated in procedure chair. Identity and procedure was verified. Ear plugs were placed in ears and patient-specific cap was placed on head and tightened appropriately. Ruler locations were verified. Coil was placed at treatment location and stimulator was set to parameters described below. A test train was delivered and pt tolerated train. Given pt tolerance, 55 treatment trains were delivered. Pt tolerated procedure.    Motor Threshold Determination  Distance from nasion to inion: 36.0 cm  MT 1: 0 - 6.5 - 16 @ 47% on 11/22/2019  MT 2: 0 - 6.5 - 16 @ 47% on 12/24/2019  MT 3: 0 - 5.5 -16 @ 48% on 7/12/21    Stimulation  Parameters  Frequency: 18 hz  Train Duration: 2 sec  Total pulses delivered: 2988  Inter-train interval: 20 sec  Tx Loc: 0 - eyebrows - 15  Energy: 58% (120%MT)  Trains: 83    Date MADRS IDS-SR PHQ-9   11/22/19 25 43 18   11/29/19 -- 45 18   12/6/19  45 18   12/13/19  47 20   12/20/19  48 13   12/27/19  26 9   1/10/19  22 5   1/17/20  21 5   1/21/20 2 21 5     Date MADRS IDS-SR PHQ-9   7/12/21  29 11   7/23/21  29 8         PHQ-9 SCORE 7/27/2021 7/28/2021 7/29/2021   PHQ-9 Total Score MyChart - - -   PHQ-9 Total Score 7 4 5   Some encounter information is confidential and restricted. Go to Review Flowsheets activity to see all data.       Judy Steve,  TMS Technician  [facility]  Neuromodulation Clinic    Plan   - Cont TMS    I did not see the patient but remained available in the clinic throughout the TMS session.     Jack Amaya M.D.   [facility]  Mental Health Neuromodulation

## 2021-07-30 ENCOUNTER — OFFICE VISIT (OUTPATIENT)
Dept: PSYCHIATRY | Facility: CLINIC | Age: 51
End: 2021-07-30
Payer: COMMERCIAL

## 2021-07-30 DIAGNOSIS — F33.2 SEVERE EPISODE OF RECURRENT MAJOR DEPRESSIVE DISORDER, WITHOUT PSYCHOTIC FEATURES (H): Primary | ICD-10-CM

## 2021-07-30 ASSESSMENT — PATIENT HEALTH QUESTIONNAIRE - PHQ9: SUM OF ALL RESPONSES TO PHQ QUESTIONS 1-9: 5

## 2021-07-30 NOTE — PROGRESS NOTES
Interventional Psychiatry Program  [address], [address]  [place] [address]  TMS Procedure Note   Mariaelena Jean Baptiste MRN# [id number]  Age: 49 year old year old YOB: 1970    Pre-Procedure:  History and Physical: Reviewed in medical record  Consent Signed by: Mariaelena Jean Baptiste for this course of treatment.  On: 11/22/2019     Clinical Narrative:  Patient tolerated treatment. No side effects from initial treatment. No changes.    Daily Adult Stimulation Safety Questionnaire: No or Yes in past 24 hours     1) Have you discontinued or started any new medication?  no  2) Have you missed any doses of your medication differently then prescribed?  no  3) Have you consumed alcohol or drugs (other than prescribed)?  no  4) Did you not sleep AT ALL last night?  no  5) Has your SI changed or worsened?  no    Indications for TMS:  MDD, recurrent, severe; 4+ medication trials (from 2+ classes) ineffective; Psychotherapy ineffective     Procedure Diagnosis:  F33.2    Treatment Hx:  Treatment number this series: 15  Total lifetime treatment number: 80    No Known Allergies   There were no vitals taken for this visit.     Pause for the Cause  Right patient:  Yes  Right procedure/correct coil:  Yes; rTMS; cpt 13158; H1 coil.   Earplugs in place:  Yes    Procedure  Patient was seated in procedure chair. Identity and procedure was verified. Ear plugs were placed in ears and patient-specific cap was placed on head and tightened appropriately. Ruler locations were verified. Coil was placed at treatment location and stimulator was set to parameters described below. A test train was delivered and pt tolerated train. Given pt tolerance, 55 treatment trains were delivered. Pt tolerated procedure.    Motor Threshold Determination  Distance from nasion to inion: 36.0 cm  MT 1: 0 - 6.5 - 16 @ 47% on 11/22/2019  MT 2: 0 - 6.5 - 16 @ 47% on 12/24/2019  MT 3: 0 - 5.5 -16 @ 48% on 7/12/21    Stimulation  Parameters  Frequency: 18 hz  Train Duration: 2 sec  Total pulses delivered: 2988  Inter-train interval: 20 sec  Tx Loc: 0 - eyebrows - 15  Energy: 58% (120%MT)  Trains: 83    Date MADRS IDS-SR PHQ-9   11/22/19 25 43 18   11/29/19 -- 45 18   12/6/19  45 18   12/13/19  47 20   12/20/19  48 13   12/27/19  26 9   1/10/19  22 5   1/17/20  21 5   1/21/20 2 21 5     Date MADRS IDS-SR PHQ-9   7/12/21  29 11   7/23/21  29 8   7/30/21  19 5         PHQ-9 SCORE 7/27/2021 7/28/2021 7/29/2021   PHQ-9 Total Score MyChart - - -   PHQ-9 Total Score 7 4 5   Some encounter information is confidential and restricted. Go to Review Flowsheets activity to see all data.       Judy Steve,  TMS Technician  [facility]  Neuromodulation Clinic    Plan   - Cont TMS    I did not see patient but remained available in the clinic throughout the TMS session     Jennifer Perez M.D.   [facility]  Mental Health Neuromodulation

## 2021-08-02 ENCOUNTER — OFFICE VISIT (OUTPATIENT)
Dept: OTOLARYNGOLOGY | Facility: CLINIC | Age: 51
End: 2021-08-02
Payer: MEDICARE

## 2021-08-02 ENCOUNTER — PREP FOR PROCEDURE (OUTPATIENT)
Dept: OTOLARYNGOLOGY | Facility: CLINIC | Age: 51
End: 2021-08-02

## 2021-08-02 VITALS — OXYGEN SATURATION: 100 % | WEIGHT: 184.08 LBS | BODY MASS INDEX: 25.77 KG/M2 | HEIGHT: 71 IN | HEART RATE: 94 BPM

## 2021-08-02 DIAGNOSIS — J34.2 NASAL SEPTAL DEVIATION: Primary | ICD-10-CM

## 2021-08-02 DIAGNOSIS — J34.89 NASAL OBSTRUCTION: Primary | ICD-10-CM

## 2021-08-02 PROCEDURE — 99213 OFFICE O/P EST LOW 20 MIN: CPT | Performed by: OTOLARYNGOLOGY

## 2021-08-02 RX ORDER — DEXAMETHASONE SODIUM PHOSPHATE 4 MG/ML
10 INJECTION, SOLUTION INTRA-ARTICULAR; INTRALESIONAL; INTRAMUSCULAR; INTRAVENOUS; SOFT TISSUE ONCE
Status: CANCELLED | OUTPATIENT
Start: 2021-08-02 | End: 2021-08-02

## 2021-08-02 ASSESSMENT — MIFFLIN-ST. JEOR: SCORE: 1551.13

## 2021-08-02 ASSESSMENT — PAIN SCALES - GENERAL: PAINLEVEL: NO PAIN (0)
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
## 2021-08-02 NOTE — PATIENT INSTRUCTIONS
1. You were seen in the clinic today by Dr. Mendoza.    2.   Plan to return to the clinic after surgery.    If you have any questions or concerns after your appointment, please call the clinic.    -Clinic phone [phone]. Press option #1 for scheduling related needs. Press option #3 for nurse advice.    Eunice Johns, ROSANNE  [phone]    Joana Montanez, RNCC  [phone]    [facility]  Department of Otolaryngology        Surgery Teaching    1. Someone from our scheduling department will call you within the next few days to get you scheduled with your provider for surgery. If no one has called you in one week, please notify us.    2. You must have a physical exam (called  history and physical ) within 30 days of surgery. You may complete this with your primary care provider.   A. If your provider is outside of the [facility] please have them complete the preoperative forms provided to you in the surgery packet you will be mailed and be sure to have your provider fax them to the appropriate location prior to surgery. For surgery at the [facility] the fax number [phone]. For surgery at the [facility] the fax number is [phone].  B. In some cases we may have you see our Preoperative Assessment Center. If we have expressed this to you, our  will set up your appointment with them when they call to set up your surgery.    3. Complete a COVID test 4 days prior to surgery. You will need to have this done regardless of whether you have had the COVID vaccine. If you have the test performed at a clinic outside of the network, you will need to have the test results faxed to us.    4. For same-day surgery, you must arrange for an adult to take you home from the Center. An adult must stay with you for the first 24 hours after surgery. You cannot drive for 24 hours.     5. Ask your doctor what medicines are safe before surgery. For over the counter medications and supplements it is advised that you do NOT TAKE  MOTRIN, IBUPROFEN, ASPIRIN, ALEVE, GARLIC SUPPLEMENTS or FISH OIL x 7 days prior to surgery (to prevent excess bleeding and bruising at time of surgery). If your provider advises you to take any medication the morning of surgery you should take this with a sip of water.    6. A few days prior to surgery a nurse will call you to review your health history and instructions for before and after surgery. They will give you your final arrival time based upon your scheduled arrival time for surgery.    7. Call the surgical team if there's any change in your health prior to surgery. Things you should call for include but are not limited to signs of a cold or the flu (sore throat, runny nose, cough, rash, fever). Other things to notify them for is for any open wounds (cuts, scrapes, scratches) near to the surgery site.    8. If you drink alcohol, stop drinking alcohol at least 24 hours before surgery.    9. If you smoke, stop or at least cut down on smoking 24 hours before surgery.    10.Take a bath or shower the night before and the morning of surgery (as told by your surgeon). Use an antiseptic soap. If your doctor does not give you special soap, buy Hibiclens or Alesha-Stat at the drug store or ask the pharmacist to suggest a brand. You will wash with this from the neck down, washing your hair and face as you would normally.   A. When you are done with your shower please be sure to use clean towels to dry with, have clean linens on your bed, and put on clean clothes each time.   B. DO NOT put on lotion, powder, perfume, deodorant or make-up after bathing.    11. You can eat a normal meal the night before surgery. Do not eat any solid foods or drink any milk products for 8 hours before surgery.     12. You may drink clear liquids until 2 hours before surgery. Clear liquids include water, Gatorade, apple juice and liquids you can see through.    13. No eating or drinking 2 hours prior to surgery until after surgery. Your  post op team will review any diet limitations you might have and when you can start eating and drinking again after surgery.

## 2021-08-03 ENCOUNTER — OFFICE VISIT (OUTPATIENT)
Dept: PSYCHIATRY | Facility: CLINIC | Age: 51
End: 2021-08-03
Payer: COMMERCIAL

## 2021-08-03 DIAGNOSIS — F33.2 SEVERE EPISODE OF RECURRENT MAJOR DEPRESSIVE DISORDER, WITHOUT PSYCHOTIC FEATURES (H): Primary | ICD-10-CM

## 2021-08-03 ASSESSMENT — PATIENT HEALTH QUESTIONNAIRE - PHQ9: SUM OF ALL RESPONSES TO PHQ QUESTIONS 1-9: 6

## 2021-08-03 NOTE — PROGRESS NOTES
HISTORY OF PRESENT ILLNESS:  Mariaelena is back to see us again today.  She has continued to have problems with left sided nasal obstruction.  She did suffer nasal trauma six to eight weeks ago.  The nasal obstruction is very disruptive for her.    PHYSICAL EXAMINATION:  The patient is in no distress, alert and interactive.  Breathing without difficulty or stridor.  Examination of the nose was performed and the patient has deviation of her septum to the left in a substantially obstructed fashion.  Some right sided turbinate hypertrophy in addition,    ASSESSMENT:  A 50-year-old status post nasal trauma with subsequent nasal septal deviation.  External nasal appearance is not bothersome for the patient.      PLAN:  Therefore, plan on septoplasty and turbinate reduction.  We discussed the risks and benefits with the patient regarding this procedure today and she wishes to proceed.  We will therefore schedule at her convenience.

## 2021-08-03 NOTE — PROGRESS NOTES
Interventional Psychiatry Program  [address], [address]  [place] [address]  TMS Procedure Note   Mariaelena Jean Baptiste MRN# [id number]  Age: 49 year old year old YOB: 1970    Pre-Procedure:  History and Physical: Reviewed in medical record  Consent Signed by: Mariaelena Jean Baptiste for this course of treatment.  On: 11/22/2019     Clinical Narrative:  Patient tolerated treatment. No side effects from initial treatment. No changes.    Daily Adult Stimulation Safety Questionnaire: No or Yes in past 24 hours     1) Have you discontinued or started any new medication?  no  2) Have you missed any doses of your medication differently then prescribed?  no  3) Have you consumed alcohol or drugs (other than prescribed)?  no  4) Did you not sleep AT ALL last night?  no  5) Has your SI changed or worsened?  no    Indications for TMS:  MDD, recurrent, severe; 4+ medication trials (from 2+ classes) ineffective; Psychotherapy ineffective     Procedure Diagnosis:  F33.2    Treatment Hx:  Treatment number this series: 16  Total lifetime treatment number: 81    No Known Allergies   There were no vitals taken for this visit.     Pause for the Cause  Right patient:  Yes  Right procedure/correct coil:  Yes; rTMS; cpt 52274; H1 coil.   Earplugs in place:  Yes    Procedure  Patient was seated in procedure chair. Identity and procedure was verified. Ear plugs were placed in ears and patient-specific cap was placed on head and tightened appropriately. Ruler locations were verified. Coil was placed at treatment location and stimulator was set to parameters described below. A test train was delivered and pt tolerated train. Given pt tolerance, 55 treatment trains were delivered. Pt tolerated procedure.    Motor Threshold Determination  Distance from nasion to inion: 36.0 cm  MT 1: 0 - 6.5 - 16 @ 47% on 11/22/2019  MT 2: 0 - 6.5 - 16 @ 47% on 12/24/2019  MT 3: 0 - 5.5 -16 @ 48% on 7/12/21    Stimulation  Parameters  Frequency: 18 hz  Train Duration: 2 sec  Total pulses delivered: 2988  Inter-train interval: 20 sec  Tx Loc: 0 - eyebrows - 15  Energy: 58% (120%MT)  Trains: 83    Date MADRS IDS-SR PHQ-9   11/22/19 25 43 18   11/29/19 -- 45 18   12/6/19  45 18   12/13/19  47 20   12/20/19  48 13   12/27/19  26 9   1/10/19  22 5   1/17/20  21 5   1/21/20 2 21 5     Date MADRS IDS-SR PHQ-9   7/12/21  29 11   7/23/21  29 8   7/30/21  19 5         PHQ-9 SCORE 7/29/2021 7/30/2021 8/3/2021   PHQ-9 Total Score MyChart - - -   PHQ-9 Total Score 5 5 6   Some encounter information is confidential and restricted. Go to Review Flowsheets activity to see all data.       Judy Steve,  TMS Technician  [facility]  Neuromodulation Clinic    Plan   - Cont TMS    I did not see the patient during/after treatment but remained available in the clinic during  treatment.    Moni Mcgee MD  [facility]  Mental Health Neuromodulation

## 2021-08-04 ENCOUNTER — OFFICE VISIT (OUTPATIENT)
Dept: PSYCHIATRY | Facility: CLINIC | Age: 51
End: 2021-08-04
Payer: COMMERCIAL

## 2021-08-04 DIAGNOSIS — F33.2 SEVERE EPISODE OF RECURRENT MAJOR DEPRESSIVE DISORDER, WITHOUT PSYCHOTIC FEATURES (H): Primary | ICD-10-CM

## 2021-08-04 ASSESSMENT — PATIENT HEALTH QUESTIONNAIRE - PHQ9: SUM OF ALL RESPONSES TO PHQ QUESTIONS 1-9: 5

## 2021-08-04 NOTE — PROGRESS NOTES
Interventional Psychiatry Program  [address], [address]  [place] [address]  TMS Procedure Note   Mariaelena Jean Baptiste MRN# [id number]  Age: 49 year old year old YOB: 1970    Pre-Procedure:  History and Physical: Reviewed in medical record  Consent Signed by: Mariaelena Jean Baptiste for this course of treatment.  On: 11/22/2019     Clinical Narrative:  Patient tolerated treatment. No changes.    Daily Adult Stimulation Safety Questionnaire: No or Yes in past 24 hours     1) Have you discontinued or started any new medication?  no  2) Have you missed any doses of your medication differently then prescribed?  no  3) Have you consumed alcohol or drugs (other than prescribed)?  no  4) Did you not sleep AT ALL last night?  no  5) Has your SI changed or worsened?  no    Indications for TMS:  MDD, recurrent, severe; 4+ medication trials (from 2+ classes) ineffective; Psychotherapy ineffective     Procedure Diagnosis:  F33.2    Treatment Hx:  Treatment number this series: 17  Total lifetime treatment number: 82    No Known Allergies   There were no vitals taken for this visit.     Pause for the Cause  Right patient:  Yes  Right procedure/correct coil:  Yes; rTMS; cpt 65592; H1 coil.   Earplugs in place:  Yes    Procedure  Patient was seated in procedure chair. Identity and procedure was verified. Ear plugs were placed in ears and patient-specific cap was placed on head and tightened appropriately. Ruler locations were verified. Coil was placed at treatment location and stimulator was set to parameters described below. A test train was delivered and pt tolerated train. Given pt tolerance, 55 treatment trains were delivered. Pt tolerated procedure.    Motor Threshold Determination  Distance from nasion to inion: 36.0 cm  MT 1: 0 - 6.5 - 16 @ 47% on 11/22/2019  MT 2: 0 - 6.5 - 16 @ 47% on 12/24/2019  MT 3: 0 - 5.5 -16 @ 48% on 7/12/21    Stimulation Parameters  Frequency: 18 hz  Train Duration: 2 sec  Total  pulses delivered: 2988  Inter-train interval: 20 sec  Tx Loc: 0 - eyebrows - 15  Energy: 58% (120%MT)  Trains: 83    Date MADRS IDS-SR PHQ-9   11/22/19 25 43 18   11/29/19 -- 45 18   12/6/19  45 18   12/13/19  47 20   12/20/19  48 13   12/27/19  26 9   1/10/19  22 5   1/17/20  21 5   1/21/20 2 21 5     Date MADRS IDS-SR PHQ-9   7/12/21  29 11   7/23/21  29 8   7/30/21  19 5         PHQ-9 SCORE 7/29/2021 7/30/2021 8/3/2021   PHQ-9 Total Score MyChart - - -   PHQ-9 Total Score 5 5 6   Some encounter information is confidential and restricted. Go to Review Flowsheets activity to see all data.       Judy Steve,  TMS Technician  [facility]  Neuromodulation Clinic    Plan   - Cont TMS    I did not see patient but remained available in the clinic throughout the TMS session    Jennifer Perez MD  [facility]  Mental Health Neuromodulation

## 2021-08-05 ENCOUNTER — OFFICE VISIT (OUTPATIENT)
Dept: PSYCHIATRY | Facility: CLINIC | Age: 51
End: 2021-08-05
Payer: COMMERCIAL

## 2021-08-05 DIAGNOSIS — F33.2 SEVERE EPISODE OF RECURRENT MAJOR DEPRESSIVE DISORDER, WITHOUT PSYCHOTIC FEATURES (H): Primary | ICD-10-CM

## 2021-08-05 ASSESSMENT — PATIENT HEALTH QUESTIONNAIRE - PHQ9: SUM OF ALL RESPONSES TO PHQ QUESTIONS 1-9: 5

## 2021-08-05 NOTE — PROGRESS NOTES
Interventional Psychiatry Program  [address], [address]  [place] [address]  TMS Procedure Note   Mariaelena Jean Baptiste MRN# [id number]  Age: 49 year old year old YOB: 1970    Pre-Procedure:  History and Physical: Reviewed in medical record  Consent Signed by: Mariaelena Jean Baptiste for this course of treatment.  On: 11/22/2019     Clinical Narrative:  Patient tolerated treatment. No changes.    Daily Adult Stimulation Safety Questionnaire: No or Yes in past 24 hours     1) Have you discontinued or started any new medication?  no  2) Have you missed any doses of your medication differently then prescribed?  no  3) Have you consumed alcohol or drugs (other than prescribed)?  no  4) Did you not sleep AT ALL last night?  no  5) Has your SI changed or worsened?  no    Indications for TMS:  MDD, recurrent, severe; 4+ medication trials (from 2+ classes) ineffective; Psychotherapy ineffective     Procedure Diagnosis:  F33.2    Treatment Hx:  Treatment number this series: 18  Total lifetime treatment number: 83    No Known Allergies   There were no vitals taken for this visit.     Pause for the Cause  Right patient:  Yes  Right procedure/correct coil:  Yes; rTMS; cpt 03224; H1 coil.   Earplugs in place:  Yes    Procedure  Patient was seated in procedure chair. Identity and procedure was verified. Ear plugs were placed in ears and patient-specific cap was placed on head and tightened appropriately. Ruler locations were verified. Coil was placed at treatment location and stimulator was set to parameters described below. A test train was delivered and pt tolerated train. Given pt tolerance, 55 treatment trains were delivered. Pt tolerated procedure.    Motor Threshold Determination  Distance from nasion to inion: 36.0 cm  MT 1: 0 - 6.5 - 16 @ 47% on 11/22/2019  MT 2: 0 - 6.5 - 16 @ 47% on 12/24/2019  MT 3: 0 - 5.5 -16 @ 48% on 7/12/21    Stimulation Parameters  Frequency: 18 hz  Train Duration: 2 sec  Total  pulses delivered: 2988  Inter-train interval: 20 sec  Tx Loc: 0 - eyebrows - 15  Energy: 58% (120%MT)  Trains: 83    Date MADRS IDS-SR PHQ-9   11/22/19 25 43 18   11/29/19 -- 45 18   12/6/19  45 18   12/13/19  47 20   12/20/19  48 13   12/27/19  26 9   1/10/19  22 5   1/17/20  21 5   1/21/20 2 21 5     Date MADRS IDS-SR PHQ-9   7/12/21  29 11   7/23/21  29 8   7/30/21  19 5         PHQ-9 SCORE 8/3/2021 8/4/2021 8/5/2021   PHQ-9 Total Score MyChart - - -   PHQ-9 Total Score 6 5 5   Some encounter information is confidential and restricted. Go to Review Flowsheets activity to see all data.       Judy Steve,  TMS Technician  [facility]  Neuromodulation Clinic    Plan   - Cont TMS    I did not see the patient during/after treatment but remained available in the clinic during  treatment.    Moni Mcgee MD  [facility]  Mental Health Neuromodulation

## 2021-08-06 ENCOUNTER — OFFICE VISIT (OUTPATIENT)
Dept: PSYCHIATRY | Facility: CLINIC | Age: 51
End: 2021-08-06
Payer: COMMERCIAL

## 2021-08-06 DIAGNOSIS — F33.2 SEVERE EPISODE OF RECURRENT MAJOR DEPRESSIVE DISORDER, WITHOUT PSYCHOTIC FEATURES (H): Primary | ICD-10-CM

## 2021-08-06 ASSESSMENT — PATIENT HEALTH QUESTIONNAIRE - PHQ9: SUM OF ALL RESPONSES TO PHQ QUESTIONS 1-9: 4

## 2021-08-06 NOTE — PROGRESS NOTES
Interventional Psychiatry Program  [address], [address]  [place] [address]  TMS Procedure Note   Mariaelena Jean Baptiste MRN# [id number]  Age: 49 year old year old YOB: 1970    Pre-Procedure:  History and Physical: Reviewed in medical record  Consent Signed by: Mariaelena Jean Baptiste for this course of treatment.  On: 11/22/2019     Clinical Narrative:  Patient tolerated treatment. No changes.    Daily Adult Stimulation Safety Questionnaire: No or Yes in past 24 hours     1) Have you discontinued or started any new medication?  no  2) Have you missed any doses of your medication differently then prescribed?  no  3) Have you consumed alcohol or drugs (other than prescribed)?  no  4) Did you not sleep AT ALL last night?  no  5) Has your SI changed or worsened?  no    Indications for TMS:  MDD, recurrent, severe; 4+ medication trials (from 2+ classes) ineffective; Psychotherapy ineffective     Procedure Diagnosis:  F33.2    Treatment Hx:  Treatment number this series: 19  Total lifetime treatment number: 84    No Known Allergies   There were no vitals taken for this visit.     Pause for the Cause  Right patient:  Yes  Right procedure/correct coil:  Yes; rTMS; cpt non billable provider not in clinic ; H1 coil.   Earplugs in place:  Yes    Procedure  Patient was seated in procedure chair. Identity and procedure was verified. Ear plugs were placed in ears and patient-specific cap was placed on head and tightened appropriately. Ruler locations were verified. Coil was placed at treatment location and stimulator was set to parameters described below. A test train was delivered and pt tolerated train. Given pt tolerance, 55 treatment trains were delivered. Pt tolerated procedure.    Motor Threshold Determination  Distance from nasion to inion: 36.0 cm  MT 1: 0 - 6.5 - 16 @ 47% on 11/22/2019  MT 2: 0 - 6.5 - 16 @ 47% on 12/24/2019  MT 3: 0 - 5.5 -16 @ 48% on 7/12/21    Stimulation Parameters  Frequency: 18  hz  Train Duration: 2 sec  Total pulses delivered: 2988  Inter-train interval: 20 sec  Tx Loc: 0 - eyebrows - 15  Energy: 58% (120%MT)  Trains: 83    Date MADRS IDS-SR PHQ-9   11/22/19 25 43 18   11/29/19 -- 45 18   12/6/19  45 18   12/13/19  47 20   12/20/19  48 13   12/27/19  26 9   1/10/19  22 5   1/17/20  21 5   1/21/20 2 21 5     Date MADRS IDS-SR PHQ-9   7/12/21  29 11   7/23/21  29 8   7/30/21  19 5   08/06/21  19 4         PHQ-9 SCORE 8/4/2021 8/5/2021 8/6/2021   PHQ-9 Total Score MyChart - - -   PHQ-9 Total Score 5 5 4   Some encounter information is confidential and restricted. Go to Review Flowsheets activity to see all data.       Judy Steve,  TMS Technician  [facility]  Neuromodulation Clinic    Plan   - Cont TMS    I did not see the patient during/after treatment but remained available in the clinic during  treatment.      Carlton Graham MD  [facility] Neuromodulation

## 2021-08-09 ENCOUNTER — TELEPHONE (OUTPATIENT)
Dept: OTOLARYNGOLOGY | Facility: CLINIC | Age: 51
End: 2021-08-09

## 2021-08-09 ENCOUNTER — OFFICE VISIT (OUTPATIENT)
Dept: PSYCHIATRY | Facility: CLINIC | Age: 51
End: 2021-08-09
Payer: COMMERCIAL

## 2021-08-09 DIAGNOSIS — F33.2 SEVERE EPISODE OF RECURRENT MAJOR DEPRESSIVE DISORDER, WITHOUT PSYCHOTIC FEATURES (H): Primary | ICD-10-CM

## 2021-08-09 PROBLEM — J34.2 NASAL SEPTAL DEVIATION: Status: ACTIVE | Noted: 2021-08-09

## 2021-08-09 ASSESSMENT — PATIENT HEALTH QUESTIONNAIRE - PHQ9: SUM OF ALL RESPONSES TO PHQ QUESTIONS 1-9: 9

## 2021-08-09 NOTE — PROGRESS NOTES
Interventional Psychiatry Program  [address], [address]  [place] [address]  TMS Procedure Note   Mariaelena Jean Baptiste MRN# [id number]  Age: 49 year old year old YOB: 1970    Pre-Procedure:  History and Physical: Reviewed in medical record  Consent Signed by: Mariaelena Jean Baptiste for this course of treatment.  On: 11/22/2019     Clinical Narrative:  Patient tolerated treatment.Patient reports how this weekend was a very busy weekend for her at work and that there were so many customers.    Daily Adult Stimulation Safety Questionnaire: No or Yes in past 24 hours     1) Have you discontinued or started any new medication?  no  2) Have you missed any doses of your medication differently then prescribed?  no  3) Have you consumed alcohol or drugs (other than prescribed)?  no  4) Did you not sleep AT ALL last night?  no  5) Has your SI changed or worsened?  no    Indications for TMS:  MDD, recurrent, severe; 4+ medication trials (from 2+ classes) ineffective; Psychotherapy ineffective     Procedure Diagnosis:  F33.2    Treatment Hx:  Treatment number this series: 20  Total lifetime treatment number: 85    No Known Allergies   There were no vitals taken for this visit.     Pause for the Cause  Right patient:  Yes  Right procedure/correct coil:  Yes; rTMS; cpt 18428 ; H1 coil.   Earplugs in place:  Yes    Procedure  Patient was seated in procedure chair. Identity and procedure was verified. Ear plugs were placed in ears and patient-specific cap was placed on head and tightened appropriately. Ruler locations were verified. Coil was placed at treatment location and stimulator was set to parameters described below. A test train was delivered and pt tolerated train. Given pt tolerance, 55 treatment trains were delivered. Pt tolerated procedure.    Motor Threshold Determination  Distance from nasion to inion: 36.0 cm  MT 1: 0 - 6.5 - 16 @ 47% on 11/22/2019  MT 2: 0 - 6.5 - 16 @ 47% on 12/24/2019  MT 3: 0  - 5.5 -16 @ 48% on 7/12/21    Stimulation Parameters  Frequency: 18 hz  Train Duration: 2 sec  Total pulses delivered: 2988  Inter-train interval: 20 sec  Tx Loc: 0 - eyebrows - 15  Energy: 58% (120%MT)  Trains: 83    Date MADRS IDS-SR PHQ-9   11/22/19 25 43 18   11/29/19 -- 45 18   12/6/19  45 18   12/13/19  47 20   12/20/19  48 13   12/27/19  26 9   1/10/19  22 5   1/17/20  21 5   1/21/20 2 21 5     Date MADRS IDS-SR PHQ-9   7/12/21  29 11   7/23/21  29 8   7/30/21  19 5   08/06/21  19 4         PHQ-9 SCORE 8/4/2021 8/5/2021 8/6/2021   PHQ-9 Total Score MyChart - - -   PHQ-9 Total Score 5 5 4   Some encounter information is confidential and restricted. Go to Review Flowsheets activity to see all data.       Laxmi Whitney,  TMS Technician  [facility]  Neuromodulation Clinic    Plan   - Cont TMS    I did not see patient but remained available in the clinic throughout the TMS session    Jennifer Perez MD  [facility]  Mental [facility] Neuromodulation

## 2021-08-09 NOTE — TELEPHONE ENCOUNTER
Called and talked with patient about scheduling surgery with . Patient picked date of 9/20/2021 at [facility]. Informed patient to schedule her pre op within 30 days of surgery and we will call closer to surgery date to schedule her covid test. Patient aware pre op nurse will call for arrival time and instructions. Approximate arrival time given. Mailed surgery packet 8/9.    Radha Crews on 8/9/2021 at 9:44 AM

## 2021-08-10 ENCOUNTER — OFFICE VISIT (OUTPATIENT)
Dept: PSYCHIATRY | Facility: CLINIC | Age: 51
End: 2021-08-10
Payer: COMMERCIAL

## 2021-08-10 DIAGNOSIS — F33.2 SEVERE EPISODE OF RECURRENT MAJOR DEPRESSIVE DISORDER, WITHOUT PSYCHOTIC FEATURES (H): Primary | ICD-10-CM

## 2021-08-10 ASSESSMENT — PATIENT HEALTH QUESTIONNAIRE - PHQ9: SUM OF ALL RESPONSES TO PHQ QUESTIONS 1-9: 5

## 2021-08-10 NOTE — PROGRESS NOTES
Interventional Psychiatry Program  [address], [address]  [place] [address]  TMS Procedure Note   Mariaelena Jean Baptiste MRN# [id number]  Age: 49 year old year old YOB: 1970    Pre-Procedure:  History and Physical: Reviewed in medical record  Consent Signed by: Mariaelena Jean Baptiste for this course of treatment.  On: 11/22/2019     Clinical Narrative:  Patient tolerated treatment. No changes reported.      Daily Adult Stimulation Safety Questionnaire: No or Yes in past 24 hours     1) Have you discontinued or started any new medication?  no  2) Have you missed any doses of your medication differently then prescribed?  no  3) Have you consumed alcohol or drugs (other than prescribed)?  no  4) Did you not sleep AT ALL last night?  no  5) Has your SI changed or worsened?  no    Indications for TMS:  MDD, recurrent, severe; 4+ medication trials (from 2+ classes) ineffective; Psychotherapy ineffective     Procedure Diagnosis:  F33.2    Treatment Hx:  Treatment number this series: 21  Total lifetime treatment number: 86    No Known Allergies   There were no vitals taken for this visit.     Pause for the Cause  Right patient:  Yes  Right procedure/correct coil:  Yes; rTMS; cpt 31234 ; H1 coil.   Earplugs in place:  Yes    Procedure  Patient was seated in procedure chair. Identity and procedure was verified. Ear plugs were placed in ears and patient-specific cap was placed on head and tightened appropriately. Ruler locations were verified. Coil was placed at treatment location and stimulator was set to parameters described below. A test train was delivered and pt tolerated train. Given pt tolerance, 55 treatment trains were delivered. Pt tolerated procedure.    Motor Threshold Determination  Distance from nasion to inion: 36.0 cm  MT 1: 0 - 6.5 - 16 @ 47% on 11/22/2019  MT 2: 0 - 6.5 - 16 @ 47% on 12/24/2019  MT 3: 0 - 5.5 -16 @ 48% on 7/12/21    Stimulation Parameters  Frequency: 18 hz  Train Duration:  2 sec  Total pulses delivered: 2988  Inter-train interval: 20 sec  Tx Loc: 0 - eyebrows - 15  Energy: 58% (120%MT)  Trains: 83    Date MADRS IDS-SR PHQ-9   11/22/19 25 43 18   11/29/19 -- 45 18   12/6/19  45 18   12/13/19  47 20   12/20/19  48 13   12/27/19  26 9   1/10/19  22 5   1/17/20  21 5   1/21/20 2 21 5     Date MADRS IDS-SR PHQ-9   7/12/21  29 11   7/23/21  29 8   7/30/21  19 5   08/06/21  19 4         PHQ-9 SCORE 8/6/2021 8/9/2021 8/10/2021   PHQ-9 Total Score MyChart - - -   PHQ-9 Total Score 4 9 5   Some encounter information is confidential and restricted. Go to Review Flowsheets activity to see all data.       Laxmi Whitney,  TMS Technician  [facility]  Neuromodulation Clinic    Plan   - Cont TMS    I did not see the patient during/after treatment but remained available in the clinic during  treatment.    Moni Mcgee MD  [facility]  Mental Health Neuromodulation

## 2021-08-11 ENCOUNTER — OFFICE VISIT (OUTPATIENT)
Dept: PSYCHIATRY | Facility: CLINIC | Age: 51
End: 2021-08-11
Payer: COMMERCIAL

## 2021-08-11 DIAGNOSIS — F33.2 SEVERE EPISODE OF RECURRENT MAJOR DEPRESSIVE DISORDER, WITHOUT PSYCHOTIC FEATURES (H): Primary | ICD-10-CM

## 2021-08-11 ASSESSMENT — PATIENT HEALTH QUESTIONNAIRE - PHQ9: SUM OF ALL RESPONSES TO PHQ QUESTIONS 1-9: 5

## 2021-08-11 NOTE — PROGRESS NOTES
Interventional Psychiatry Program  [address], [address]  [place] [address]  TMS Procedure Note   Mariaelena Jean Baptiste MRN# [id number]  Age: 49 year old year old YOB: 1970    Pre-Procedure:  History and Physical: Reviewed in medical record  Consent Signed by: Mariaelena Jean Baptiste for this course of treatment.  On: 11/22/2019     Clinical Narrative:  Patient tolerated treatment. No changes reported.      Daily Adult Stimulation Safety Questionnaire: No or Yes in past 24 hours     1) Have you discontinued or started any new medication?  no  2) Have you missed any doses of your medication differently then prescribed?  no  3) Have you consumed alcohol or drugs (other than prescribed)?  no  4) Did you not sleep AT ALL last night?  no  5) Has your SI changed or worsened?  no    Indications for TMS:  MDD, recurrent, severe; 4+ medication trials (from 2+ classes) ineffective; Psychotherapy ineffective     Procedure Diagnosis:  F33.2    Treatment Hx:  Treatment number this series: 22  Total lifetime treatment number: 87    No Known Allergies   There were no vitals taken for this visit.     Pause for the Cause  Right patient:  Yes  Right procedure/correct coil:  Yes; rTMS; cpt 82627 ; H1 coil.   Earplugs in place:  Yes    Procedure  Patient was seated in procedure chair. Identity and procedure was verified. Ear plugs were placed in ears and patient-specific cap was placed on head and tightened appropriately. Ruler locations were verified. Coil was placed at treatment location and stimulator was set to parameters described below. A test train was delivered and pt tolerated train. Given pt tolerance, 55 treatment trains were delivered. Pt tolerated procedure.    Motor Threshold Determination  Distance from nasion to inion: 36.0 cm  MT 1: 0 - 6.5 - 16 @ 47% on 11/22/2019  MT 2: 0 - 6.5 - 16 @ 47% on 12/24/2019  MT 3: 0 - 5.5 -16 @ 48% on 7/12/21    Stimulation Parameters  Frequency: 18 hz  Train Duration:  2 sec  Total pulses delivered: 2988  Inter-train interval: 20 sec  Tx Loc: 0 - eyebrows - 15  Energy: 58% (120%MT)  Trains: 83    Date MADRS IDS-SR PHQ-9   11/22/19 25 43 18   11/29/19 -- 45 18   12/6/19  45 18   12/13/19  47 20   12/20/19  48 13   12/27/19  26 9   1/10/19  22 5   1/17/20  21 5   1/21/20 2 21 5     Date MADRS IDS-SR PHQ-9   7/12/21  29 11   7/23/21  29 8   7/30/21  19 5   08/06/21  19 4         PHQ-9 SCORE 8/6/2021 8/9/2021 8/10/2021   PHQ-9 Total Score MyChart - - -   PHQ-9 Total Score 4 9 5   Some encounter information is confidential and restricted. Go to Review Flowsheets activity to see all data.       Laxmi Whitney,  TMS Technician  [facility]  Neuromodulation Clinic    Plan   - Cont TMS    I did not see patient but remained available in the clinic throughout the TMS session    Jennifer Perez MD  [facility]  Mental Health Neuromodulation

## 2021-08-12 ENCOUNTER — OFFICE VISIT (OUTPATIENT)
Dept: PSYCHIATRY | Facility: CLINIC | Age: 51
End: 2021-08-12
Payer: COMMERCIAL

## 2021-08-12 DIAGNOSIS — F33.2 SEVERE EPISODE OF RECURRENT MAJOR DEPRESSIVE DISORDER, WITHOUT PSYCHOTIC FEATURES (H): Primary | ICD-10-CM

## 2021-08-12 ASSESSMENT — PATIENT HEALTH QUESTIONNAIRE - PHQ9: SUM OF ALL RESPONSES TO PHQ QUESTIONS 1-9: 5

## 2021-08-12 NOTE — PROGRESS NOTES
Interventional Psychiatry Program  [address] [facility] [address] [facility], [address]  [place] [address]  TMS Procedure Note   Mariaelena Jean Baptiste MRN# [id number]  Age: 49 year old year old YOB: 1970    Pre-Procedure:  History and Physical: Reviewed in medical record  Consent Signed by: Mariaelena Jean Baptiste for this course of treatment.  On: 11/22/2019     Clinical Narrative:  Patient tolerated treatment. No changes reported.      Daily Adult Stimulation Safety Questionnaire: No or Yes in past 24 hours     1) Have you discontinued or started any new medication?  no  2) Have you missed any doses of your medication differently then prescribed?  no  3) Have you consumed alcohol or drugs (other than prescribed)?  no  4) Did you not sleep AT ALL last night?  no  5) Has your SI changed or worsened?  no    Indications for TMS:  MDD, recurrent, severe; 4+ medication trials (from 2+ classes) ineffective; Psychotherapy ineffective     Procedure Diagnosis:  F33.2    Treatment Hx:  Treatment number this series: 23  Total lifetime treatment number: 88    No Known Allergies   There were no vitals taken for this visit.     Pause for the Cause  Right patient:  Yes  Right procedure/correct coil:  Yes; rTMS; cpt 24768 ; H1 coil.   Earplugs in place:  Yes    Procedure  Patient was seated in procedure chair. Identity and procedure was verified. Ear plugs were placed in ears and patient-specific cap was placed on head and tightened appropriately. Ruler locations were verified. Coil was placed at treatment location and stimulator was set to parameters described below. A test train was delivered and pt tolerated train. Given pt tolerance, 55 treatment trains were delivered. Pt tolerated procedure.    Motor Threshold Determination  Distance from nasion to inion: 36.0 cm  MT 1: 0 - 6.5 - 16 @ 47% on 11/22/2019  MT 2: 0 - 6.5 - 16 @ 47% on 12/24/2019  MT 3: 0 - 5.5 -16 @ 48% on 7/12/21    Stimulation Parameters  Frequency: 18 hz  Train Duration:  2 sec  Total pulses delivered: 2988  Inter-train interval: 20 sec  Tx Loc: 0 - eyebrows - 15  Energy: 58% (120%MT)  Trains: 83    Date MADRS IDS-SR PHQ-9   11/22/19 25 43 18   11/29/19 -- 45 18   12/6/19  45 18   12/13/19  47 20   12/20/19  48 13   12/27/19  26 9   1/10/19  22 5   1/17/20  21 5   1/21/20 2 21 5     Date MADRS IDS-SR PHQ-9   7/12/21  29 11   7/23/21  29 8   7/30/21  19 5   08/06/21  19 4         PHQ-9 SCORE 8/10/2021 8/11/2021 8/12/2021   PHQ-9 Total Score MyChart - - -   PHQ-9 Total Score 5 5 5   Some encounter information is confidential and restricted. Go to Review Flowsheets activity to see all data.       Laxmi Whitney,  TMS Technician  [facility]  Neuromodulation Clinic    Plan   - Cont TMS    I did not see the patient during/after treatment but remained available in the clinic during  treatment.    Moni Mcgee MD  [facility]  Mental Health Neuromodulation

## 2021-08-13 ENCOUNTER — OFFICE VISIT (OUTPATIENT)
Dept: PSYCHIATRY | Facility: CLINIC | Age: 51
End: 2021-08-13
Payer: COMMERCIAL

## 2021-08-13 DIAGNOSIS — F33.2 SEVERE EPISODE OF RECURRENT MAJOR DEPRESSIVE DISORDER, WITHOUT PSYCHOTIC FEATURES (H): Primary | ICD-10-CM

## 2021-08-13 ASSESSMENT — PATIENT HEALTH QUESTIONNAIRE - PHQ9: SUM OF ALL RESPONSES TO PHQ QUESTIONS 1-9: 4

## 2021-08-13 NOTE — PROGRESS NOTES
Interventional Psychiatry Program  [address] [facility] [facility], [address]  [place] [address]  TMS Procedure Note   Mariaelena Jean Baptiste MRN# [id number]  Age: 49 year old year old YOB: 1970    Pre-Procedure:  History and Physical: Reviewed in medical record  Consent Signed by: Mariaelena Jean Baptiste for this course of treatment.  On: 11/22/2019     Clinical Narrative:  Patient tolerated treatment.       Daily Adult Stimulation Safety Questionnaire: No or Yes in past 24 hours     1) Have you discontinued or started any new medication?  no  2) Have you missed any doses of your medication differently then prescribed?  no  3) Have you consumed alcohol or drugs (other than prescribed)?  no  4) Did you not sleep AT ALL last night?  no  5) Has your SI changed or worsened?  no    Indications for TMS:  MDD, recurrent, severe; 4+ medication trials (from 2+ classes) ineffective; Psychotherapy ineffective     Procedure Diagnosis:  F33.2    Treatment Hx:  Treatment number this series: 24  Total lifetime treatment number: 89    No Known Allergies   There were no vitals taken for this visit.     Pause for the Cause  Right patient:  Yes  Right procedure/correct coil:  Yes; rTMS; cpt 03743 ; H1 coil.   Earplugs in place:  Yes    Procedure  Patient was seated in procedure chair. Identity and procedure was verified. Ear plugs were placed in ears and patient-specific cap was placed on head and tightened appropriately. Ruler locations were verified. Coil was placed at treatment location and stimulator was set to parameters described below. A test train was delivered and pt tolerated train. Given pt tolerance, 55 treatment trains were delivered. Pt tolerated procedure.    Motor Threshold Determination  Distance from nasion to inion: 36.0 cm  MT 1: 0 - 6.5 - 16 @ 47% on 11/22/2019  MT 2: 0 - 6.5 - 16 @ 47% on 12/24/2019  MT 3: 0 - 5.5 -16 @ 48% on 7/12/21    Stimulation Parameters  Frequency: 18 hz  Train Duration: 2 sec  Total pulses  delivered: 2988  Inter-train interval: 20 sec  Tx Loc: 0 - eyebrows - 15  Energy: 55% (115%MT)  Trains: 83    Date MADRS IDS-SR PHQ-9   11/22/19 25 43 18   11/29/19 -- 45 18   12/6/19  45 18   12/13/19  47 20   12/20/19  48 13   12/27/19  26 9   1/10/19  22 5   1/17/20  21 5   1/21/20 2 21 5     Date MADRS IDS-SR PHQ-9   7/12/21  29 11   7/23/21  29 8   7/30/21  19 5   08/06/21  19 4   8/13/21  15 4         PHQ-9 SCORE 8/10/2021 8/11/2021 8/12/2021   PHQ-9 Total Score MyChart - - -   PHQ-9 Total Score 5 5 5   Some encounter information is confidential and restricted. Go to Review Flowsheets activity to see all data.       Judy Steve,  TMS Technician  [facility]  Neuromodulation Clinic    Plan   - Cont TMS    I did not see the patient during/after treatment but remained available in the clinic during  treatment.      Carlton Graham MD PhD  [facility]  Mental Health Neuromodulation

## 2021-08-16 ENCOUNTER — OFFICE VISIT (OUTPATIENT)
Dept: PSYCHIATRY | Facility: CLINIC | Age: 51
End: 2021-08-16
Payer: COMMERCIAL

## 2021-08-16 DIAGNOSIS — F33.2 SEVERE EPISODE OF RECURRENT MAJOR DEPRESSIVE DISORDER, WITHOUT PSYCHOTIC FEATURES (H): Primary | ICD-10-CM

## 2021-08-16 ASSESSMENT — PATIENT HEALTH QUESTIONNAIRE - PHQ9: SUM OF ALL RESPONSES TO PHQ QUESTIONS 1-9: 4

## 2021-08-16 NOTE — PROGRESS NOTES
Interventional Psychiatry Program  [address], [address]  [place] [address]  TMS Procedure Note   Mariaelena Jean Baptiste MRN# [id number]  Age: 49 year old year old YOB: 1970    Pre-Procedure:  History and Physical: Reviewed in medical record  Consent Signed by: Mariaelena Jean Baptiste for this course of treatment.  On: 11/22/2019     Clinical Narrative:  Patient tolerated treatment. Had a good weekend at work.      Daily Adult Stimulation Safety Questionnaire: No or Yes in past 24 hours     1) Have you discontinued or started any new medication?  no  2) Have you missed any doses of your medication differently then prescribed?  no  3) Have you consumed alcohol or drugs (other than prescribed)?  no  4) Did you not sleep AT ALL last night?  no  5) Has your SI changed or worsened?  no    Indications for TMS:  MDD, recurrent, severe; 4+ medication trials (from 2+ classes) ineffective; Psychotherapy ineffective     Procedure Diagnosis:  F33.2    Treatment Hx:  Treatment number this series: 25  Total lifetime treatment number: 90    No Known Allergies   There were no vitals taken for this visit.     Pause for the Cause  Right patient:  Yes  Right procedure/correct coil:  Yes; rTMS; cpt non billable - provider not in clinic ; H1 coil.   Earplugs in place:  Yes    Procedure  Patient was seated in procedure chair. Identity and procedure was verified. Ear plugs were placed in ears and patient-specific cap was placed on head and tightened appropriately. Ruler locations were verified. Coil was placed at treatment location and stimulator was set to parameters described below. A test train was delivered and pt tolerated train. Given pt tolerance, 55 treatment trains were delivered. Pt tolerated procedure.    Motor Threshold Determination  Distance from nasion to inion: 36.0 cm  MT 1: 0 - 6.5 - 16 @ 47% on 11/22/2019  MT 2: 0 - 6.5 - 16 @ 47% on 12/24/2019  MT 3: 0 - 5.5 -16 @ 48% on 7/12/21    Stimulation  Parameters  Frequency: 18 hz  Train Duration: 2 sec  Total pulses delivered: 2988  Inter-train interval: 20 sec  Tx Loc: 0 - eyebrows - 15  Energy: 58% (120%MT)  Trains: 83    Date MADRS IDS-SR PHQ-9   11/22/19 25 43 18   11/29/19 -- 45 18   12/6/19  45 18   12/13/19  47 20   12/20/19  48 13   12/27/19  26 9   1/10/19  22 5   1/17/20  21 5   1/21/20 2 21 5     Date MADRS IDS-SR PHQ-9   7/12/21  29 11   7/23/21  29 8   7/30/21  19 5   08/06/21  19 4   8/13/21  15 4         PHQ-9 SCORE 8/12/2021 8/13/2021 8/16/2021   PHQ-9 Total Score MyChart - - -   PHQ-9 Total Score 5 4 4   Some encounter information is confidential and restricted. Go to Review Flowsheets activity to see all data.       Judy Steve,  TMS Technician  [facility]  Neuromodulation Clinic    Plan   - Cont TMS    I did not see patient but remained available in the clinic throughout the TMS session.        Jennifer Perez MD  [facility]  Mental [facility] Neuromodulation

## 2021-08-17 ENCOUNTER — OFFICE VISIT (OUTPATIENT)
Dept: PSYCHIATRY | Facility: CLINIC | Age: 51
End: 2021-08-17
Payer: COMMERCIAL

## 2021-08-17 DIAGNOSIS — F33.2 SEVERE EPISODE OF RECURRENT MAJOR DEPRESSIVE DISORDER, WITHOUT PSYCHOTIC FEATURES (H): Primary | ICD-10-CM

## 2021-08-17 ASSESSMENT — PATIENT HEALTH QUESTIONNAIRE - PHQ9: SUM OF ALL RESPONSES TO PHQ QUESTIONS 1-9: 4

## 2021-08-17 NOTE — PROGRESS NOTES
Interventional Psychiatry Program  [address] [facility] [facility], [address]  [place] [address]  TMS Procedure Note   Mariaelena Jean Baptiste MRN# [id number]  Age: 49 year old year old YOB: 1970    Pre-Procedure:  History and Physical: Reviewed in medical record  Consent Signed by: Mariaelena Jean Baptiste for this course of treatment.  On: 11/22/2019     Clinical Narrative:  Patient tolerated treatment. Talked about her cats.      Daily Adult Stimulation Safety Questionnaire: No or Yes in past 24 hours     1) Have you discontinued or started any new medication?  no  2) Have you missed any doses of your medication differently then prescribed?  no  3) Have you consumed alcohol or drugs (other than prescribed)?  no  4) Did you not sleep AT ALL last night?  no  5) Has your SI changed or worsened?  no    Indications for TMS:  MDD, recurrent, severe; 4+ medication trials (from 2+ classes) ineffective; Psychotherapy ineffective     Procedure Diagnosis:  F33.2    Treatment Hx:  Treatment number this series: 26  Total lifetime treatment number: 91    No Known Allergies   There were no vitals taken for this visit.     Pause for the Cause  Right patient:  Yes  Right procedure/correct coil:  Yes; rTMS; cpt 07127 ; H1 coil.   Earplugs in place:  Yes    Procedure  Patient was seated in procedure chair. Identity and procedure was verified. Ear plugs were placed in ears and patient-specific cap was placed on head and tightened appropriately. Ruler locations were verified. Coil was placed at treatment location and stimulator was set to parameters described below. A test train was delivered and pt tolerated train. Given pt tolerance, 55 treatment trains were delivered. Pt tolerated procedure.    Motor Threshold Determination  Distance from nasion to inion: 36.0 cm  MT 1: 0 - 6.5 - 16 @ 47% on 11/22/2019  MT 2: 0 - 6.5 - 16 @ 47% on 12/24/2019  MT 3: 0 - 5.5 -16 @ 48% on 7/12/21    Stimulation Parameters  Frequency: 18 hz  Train  Duration: 2 sec  Total pulses delivered: 2988  Inter-train interval: 20 sec  Tx Loc: 0 - eyebrows - 15  Energy: 58% (120%MT)  Trains: 83    Date MADRS IDS-SR PHQ-9   11/22/19 25 43 18   11/29/19 -- 45 18   12/6/19  45 18   12/13/19  47 20   12/20/19  48 13   12/27/19  26 9   1/10/19  22 5   1/17/20  21 5   1/21/20 2 21 5     Date MADRS IDS-SR PHQ-9   7/12/21  29 11   7/23/21  29 8   7/30/21  19 5   08/06/21  19 4   8/13/21  15 4         PHQ-9 SCORE 8/12/2021 8/13/2021 8/16/2021   PHQ-9 Total Score MyChart - - -   PHQ-9 Total Score 5 4 4   Some encounter information is confidential and restricted. Go to Review Flowsheets activity to see all data.       Judy Steve,  TMS Technician  [facility]  Neuromodulation Clinic    Plan   - Cont TMS    I did not see the patient during/after treatment but remained available in the clinic during  treatment.    Moni Mcgee MD  [facility]  Mental [facility] Neuromodulation

## 2021-08-18 ENCOUNTER — OFFICE VISIT (OUTPATIENT)
Dept: PSYCHIATRY | Facility: CLINIC | Age: 51
End: 2021-08-18
Payer: COMMERCIAL

## 2021-08-18 DIAGNOSIS — F33.2 SEVERE EPISODE OF RECURRENT MAJOR DEPRESSIVE DISORDER, WITHOUT PSYCHOTIC FEATURES (H): Primary | ICD-10-CM

## 2021-08-18 ASSESSMENT — PATIENT HEALTH QUESTIONNAIRE - PHQ9: SUM OF ALL RESPONSES TO PHQ QUESTIONS 1-9: 4

## 2021-08-18 NOTE — PROGRESS NOTES
Interventional Psychiatry Program  [address] [facility], [address]  [place] [address]  TMS Procedure Note   Mariaelena Jean Baptiste MRN# [id number]  Age: 49 year old year old YOB: 1970    Pre-Procedure:  History and Physical: Reviewed in medical record  Consent Signed by: Mariaelena Jaen Baptiste for this course of treatment.  On: 11/22/2019     Clinical Narrative:  Patient tolerated treatment. Talked about vaccinations and [place].      Daily Adult Stimulation Safety Questionnaire: No or Yes in past 24 hours     1) Have you discontinued or started any new medication?  no  2) Have you missed any doses of your medication differently then prescribed?  no  3) Have you consumed alcohol or drugs (other than prescribed)?  no  4) Did you not sleep AT ALL last night?  no  5) Has your SI changed or worsened?  no    Indications for TMS:  MDD, recurrent, severe; 4+ medication trials (from 2+ classes) ineffective; Psychotherapy ineffective     Procedure Diagnosis:  F33.2    Treatment Hx:  Treatment number this series: 27  Total lifetime treatment number: 92    No Known Allergies   There were no vitals taken for this visit.     Pause for the Cause  Right patient:  Yes  Right procedure/correct coil:  Yes; rTMS; cpt 31250 ; H1 coil.   Earplugs in place:  Yes    Procedure  Patient was seated in procedure chair. Identity and procedure was verified. Ear plugs were placed in ears and patient-specific cap was placed on head and tightened appropriately. Ruler locations were verified. Coil was placed at treatment location and stimulator was set to parameters described below. A test train was delivered and pt tolerated train. Given pt tolerance, 55 treatment trains were delivered. Pt tolerated procedure.    Motor Threshold Determination  Distance from nasion to inion: 36.0 cm  MT 1: 0 - 6.5 - 16 @ 47% on 11/22/2019  MT 2: 0 - 6.5 - 16 @ 47% on 12/24/2019  MT 3: 0 - 5.5 -16 @ 48% on 7/12/21    Stimulation Parameters  Frequency:  18 hz  Train Duration: 2 sec  Total pulses delivered: 2988  Inter-train interval: 20 sec  Tx Loc: 0 - eyebrows - 15  Energy: 58% (120%MT)  Trains: 83    Date MADRS IDS-SR PHQ-9   11/22/19 25 43 18   11/29/19 -- 45 18   12/6/19  45 18   12/13/19  47 20   12/20/19  48 13   12/27/19  26 9   1/10/19  22 5   1/17/20  21 5   1/21/20 2 21 5     Date MADRS IDS-SR PHQ-9   7/12/21  29 11   7/23/21  29 8   7/30/21  19 5   08/06/21  19 4   8/13/21  15 4         PHQ-9 SCORE 8/16/2021 8/17/2021 8/18/2021   PHQ-9 Total Score MyChart - - -   PHQ-9 Total Score 4 4 4   Some encounter information is confidential and restricted. Go to Review Flowsheets activity to see all data.       Judy Steve,  TMS Technician  [facility]  Neuromodulation Clinic    Plan   - Cont TMS    I did not see patient but remained available in the clinic throughout the TMS session.      Jareth Finch MD  [facility]  Mental Health Neuromodulation

## 2021-08-19 ENCOUNTER — OFFICE VISIT (OUTPATIENT)
Dept: PSYCHIATRY | Facility: CLINIC | Age: 51
End: 2021-08-19
Payer: COMMERCIAL

## 2021-08-19 DIAGNOSIS — F33.2 SEVERE EPISODE OF RECURRENT MAJOR DEPRESSIVE DISORDER, WITHOUT PSYCHOTIC FEATURES (H): Primary | ICD-10-CM

## 2021-08-19 ASSESSMENT — PATIENT HEALTH QUESTIONNAIRE - PHQ9: SUM OF ALL RESPONSES TO PHQ QUESTIONS 1-9: 4

## 2021-08-19 NOTE — PROGRESS NOTES
Interventional Psychiatry Program  [address] [facility] [address] [facility], [address]  [place] [address]  TMS Procedure Note   Mariaelena Jean Baptiste MRN# [id number]  Age: 49 year old year old YOB: 1970    Pre-Procedure:  History and Physical: Reviewed in medical record  Consent Signed by: Mariaelena Jean Baptiste for this course of treatment.  On: 11/22/2019     Clinical Narrative:  Patient tolerated treatment. No changes reported.      Daily Adult Stimulation Safety Questionnaire: No or Yes in past 24 hours     1) Have you discontinued or started any new medication?  no  2) Have you missed any doses of your medication differently then prescribed?  no  3) Have you consumed alcohol or drugs (other than prescribed)?  no  4) Did you not sleep AT ALL last night?  no  5) Has your SI changed or worsened?  no    Indications for TMS:  MDD, recurrent, severe; 4+ medication trials (from 2+ classes) ineffective; Psychotherapy ineffective     Procedure Diagnosis:  F33.2    Treatment Hx:  Treatment number this series: 28  Total lifetime treatment number: 93    No Known Allergies   There were no vitals taken for this visit.     Pause for the Cause  Right patient:  Yes  Right procedure/correct coil:  Yes; rTMS; cpt 63691 ; H1 coil.   Earplugs in place:  Yes    Procedure  Patient was seated in procedure chair. Identity and procedure was verified. Ear plugs were placed in ears and patient-specific cap was placed on head and tightened appropriately. Ruler locations were verified. Coil was placed at treatment location and stimulator was set to parameters described below. A test train was delivered and pt tolerated train. Given pt tolerance, 55 treatment trains were delivered. Pt tolerated procedure.    Motor Threshold Determination  Distance from nasion to inion: 36.0 cm  MT 1: 0 - 6.5 - 16 @ 47% on 11/22/2019  MT 2: 0 - 6.5 - 16 @ 47% on 12/24/2019  MT 3: 0 - 5.5 -16 @ 48% on 7/12/21    Stimulation Parameters  Frequency: 18 hz  Train Duration:  2 sec  Total pulses delivered: 2988  Inter-train interval: 20 sec  Tx Loc: 0 - eyebrows - 15  Energy: 58% (120%MT)  Trains: 83    Date MADRS IDS-SR PHQ-9   11/22/19 25 43 18   11/29/19 -- 45 18   12/6/19  45 18   12/13/19  47 20   12/20/19  48 13   12/27/19  26 9   1/10/19  22 5   1/17/20  21 5   1/21/20 2 21 5     Date MADRS IDS-SR PHQ-9   7/12/21  29 11   7/23/21  29 8   7/30/21  19 5   08/06/21  19 4   8/13/21  15 4         PHQ-9 SCORE 8/16/2021 8/17/2021 8/18/2021   PHQ-9 Total Score MyChart - - -   PHQ-9 Total Score 4 4 4   Some encounter information is confidential and restricted. Go to Review Flowsheets activity to see all data.       Judy Steve,  TMS Technician  [facility]  Neuromodulation Clinic    Plan   - Cont TMS    I did not see the patient during/after treatment but remained available in the clinic during  treatment.    Moni Mcgee MD  [facility]  Mental [facility] Neuromodulation

## 2021-08-20 ENCOUNTER — OFFICE VISIT (OUTPATIENT)
Dept: PSYCHIATRY | Facility: CLINIC | Age: 51
End: 2021-08-20
Payer: COMMERCIAL

## 2021-08-20 DIAGNOSIS — F33.2 SEVERE EPISODE OF RECURRENT MAJOR DEPRESSIVE DISORDER, WITHOUT PSYCHOTIC FEATURES (H): Primary | ICD-10-CM

## 2021-08-20 ASSESSMENT — PATIENT HEALTH QUESTIONNAIRE - PHQ9: SUM OF ALL RESPONSES TO PHQ QUESTIONS 1-9: 6

## 2021-08-20 NOTE — PROGRESS NOTES
Interventional Psychiatry Program  [address], [address]  [place] [address]  TMS Procedure Note   Mariaelena Jean Baptiste MRN# [id number]  Age: 49 year old year old YOB: 1970    Pre-Procedure:  History and Physical: Reviewed in medical record  Consent Signed by: Mariaelena Jean Baptiste for this course of treatment.  On: 11/22/2019     Clinical Narrative:  Patient tolerated treatment. No changes reported.      Daily Adult Stimulation Safety Questionnaire: No or Yes in past 24 hours     1) Have you discontinued or started any new medication?  no  2) Have you missed any doses of your medication differently then prescribed?  no  3) Have you consumed alcohol or drugs (other than prescribed)?  no  4) Did you not sleep AT ALL last night?  no  5) Has your SI changed or worsened?  no    Indications for TMS:  MDD, recurrent, severe; 4+ medication trials (from 2+ classes) ineffective; Psychotherapy ineffective     Procedure Diagnosis:  F33.2    Treatment Hx:  Treatment number this series: 29  Total lifetime treatment number: 94    No Known Allergies   There were no vitals taken for this visit.     Pause for the Cause  Right patient:  Yes  Right procedure/correct coil:  Yes; rTMS; cpt non-billable, provider not in clinic ; H1 coil.   Earplugs in place:  Yes    Procedure  Patient was seated in procedure chair. Identity and procedure was verified. Ear plugs were placed in ears and patient-specific cap was placed on head and tightened appropriately. Ruler locations were verified. Coil was placed at treatment location and stimulator was set to parameters described below. A test train was delivered and pt tolerated train. Given pt tolerance, 55 treatment trains were delivered. Pt tolerated procedure.    Motor Threshold Determination  Distance from nasion to inion: 36.0 cm  MT 1: 0 - 6.5 - 16 @ 47% on 11/22/2019  MT 2: 0 - 6.5 - 16 @ 47% on 12/24/2019  MT 3: 0 - 5.5 -16 @ 48% on 7/12/21    Stimulation  Parameters  Frequency: 18 hz  Train Duration: 2 sec  Total pulses delivered: 2988  Inter-train interval: 20 sec  Tx Loc: 0 - eyebrows - 15  Energy: 58% (120%MT)  Trains: 83    Date MADRS IDS-SR PHQ-9   11/22/19 25 43 18   11/29/19 -- 45 18   12/6/19  45 18   12/13/19  47 20   12/20/19  48 13   12/27/19  26 9   1/10/19  22 5   1/17/20  21 5   1/21/20 2 21 5     Date MADRS IDS-SR PHQ-9   7/12/21  29 11   7/23/21  29 8   7/30/21  19 5   08/06/21  19 4   8/13/21  15 4   8/20/21  17 6         PHQ-9 SCORE 8/17/2021 8/18/2021 8/19/2021   PHQ-9 Total Score MyChart - - -   PHQ-9 Total Score 4 4 4   Some encounter information is confidential and restricted. Go to Review Flowsheets activity to see all data.       Judy Steve,  TMS Technician  [facility]  Neuromodulation Clinic    Plan   - Cont TMS    I did not see patient but remained available in the clinic throughout the TMS session.      Jennifer Perez MD  [facility]  Mental [facility] Neuromodulation

## 2021-08-22 DIAGNOSIS — Z11.59 ENCOUNTER FOR SCREENING FOR OTHER VIRAL DISEASES: ICD-10-CM

## 2021-08-23 ENCOUNTER — OFFICE VISIT (OUTPATIENT)
Dept: PSYCHIATRY | Facility: CLINIC | Age: 51
End: 2021-08-23
Payer: COMMERCIAL

## 2021-08-23 DIAGNOSIS — F33.2 SEVERE EPISODE OF RECURRENT MAJOR DEPRESSIVE DISORDER, WITHOUT PSYCHOTIC FEATURES (H): Primary | ICD-10-CM

## 2021-08-23 ASSESSMENT — PATIENT HEALTH QUESTIONNAIRE - PHQ9: SUM OF ALL RESPONSES TO PHQ QUESTIONS 1-9: 4

## 2021-08-23 NOTE — PROGRESS NOTES
Interventional Psychiatry Program  [address], [address]  [place] [address]  TMS Procedure Note   Mariaelena Jean Baptiste MRN# [id number]  Age: 49 year old year old YOB: 1970    Pre-Procedure:  History and Physical: Reviewed in medical record  Consent Signed by: Mariaelena Jean Baptiste for this course of treatment.  On: 11/22/2019     Clinical Narrative:  Patient tolerated treatment. No changes reported.      Daily Adult Stimulation Safety Questionnaire: No or Yes in past 24 hours     1) Have you discontinued or started any new medication?  no  2) Have you missed any doses of your medication differently then prescribed?  no  3) Have you consumed alcohol or drugs (other than prescribed)?  no  4) Did you not sleep AT ALL last night?  no  5) Has your SI changed or worsened?  no    Indications for TMS:  MDD, recurrent, severe; 4+ medication trials (from 2+ classes) ineffective; Psychotherapy ineffective     Procedure Diagnosis:  F33.2    Treatment Hx:  Treatment number this series: 30  Total lifetime treatment number: 95    No Known Allergies   There were no vitals taken for this visit.     Pause for the Cause  Right patient:  Yes  Right procedure/correct coil:  Yes; rTMS; cpt non-billable, provider not in clinic ; H1 coil.   Earplugs in place:  Yes    Procedure  Patient was seated in procedure chair. Identity and procedure was verified. Ear plugs were placed in ears and patient-specific cap was placed on head and tightened appropriately. Ruler locations were verified. Coil was placed at treatment location and stimulator was set to parameters described below. A test train was delivered and pt tolerated train. Given pt tolerance, 55 treatment trains were delivered. Pt tolerated procedure.    Motor Threshold Determination  Distance from nasion to inion: 36.0 cm  MT 1: 0 - 6.5 - 16 @ 47% on 11/22/2019  MT 2: 0 - 6.5 - 16 @ 47% on 12/24/2019  MT 3: 0 - 5.5 -16 @ 48% on 7/12/21    Stimulation  Parameters  Frequency: 18 hz  Train Duration: 2 sec  Total pulses delivered: 2988  Inter-train interval: 20 sec  Tx Loc: 0 - eyebrows - 15  Energy: 58% (120%MT)  Trains: 83    Date MADRS IDS-SR PHQ-9   11/22/19 25 43 18   11/29/19 -- 45 18   12/6/19  45 18   12/13/19  47 20   12/20/19  48 13   12/27/19  26 9   1/10/19  22 5   1/17/20  21 5   1/21/20 2 21 5     Date MADRS IDS-SR PHQ-9   7/12/21  29 11   7/23/21  29 8   7/30/21  19 5   08/06/21  19 4   8/13/21  15 4   8/20/21  17 6         PHQ-9 SCORE 8/18/2021 8/19/2021 8/20/2021   PHQ-9 Total Score MyChart - - -   PHQ-9 Total Score 4 4 6   Some encounter information is confidential and restricted. Go to Review Flowsheets activity to see all data.       Judy Steve,  TMS Technician  [facility]  Neuromodulation Clinic    Plan   - Cont TMS    Physician Attestation   I, Aparna Lee MD, did not see this patient but was present for any pscyhiatric urgent care if needed. I agree with the findings and plan of care as documented in the note.      To note, this encounter is not billable as the supervising physician is a psychiatry fellow:      Aparna Lee MD (Neuromodulation Med Fellow).  [facility]  Mental Health Neuromodulation

## 2021-08-24 ENCOUNTER — OFFICE VISIT (OUTPATIENT)
Dept: PSYCHIATRY | Facility: CLINIC | Age: 51
End: 2021-08-24
Payer: COMMERCIAL

## 2021-08-24 DIAGNOSIS — F33.2 SEVERE EPISODE OF RECURRENT MAJOR DEPRESSIVE DISORDER, WITHOUT PSYCHOTIC FEATURES (H): Primary | ICD-10-CM

## 2021-08-24 ASSESSMENT — PATIENT HEALTH QUESTIONNAIRE - PHQ9: SUM OF ALL RESPONSES TO PHQ QUESTIONS 1-9: 3

## 2021-08-24 NOTE — PROGRESS NOTES
Interventional Psychiatry Program  [address], [address]  [place] [address]  TMS Procedure Note   Mariaelena Jean Baptiste MRN# [id number]  Age: 49 year old year old YOB: 1970    Pre-Procedure:  History and Physical: Reviewed in medical record  Consent Signed by: Mariaelena Jean Baptiste for this course of treatment.  On: 11/22/2019     Clinical Narrative:  Patient tolerated treatment. No changes reported.      Daily Adult Stimulation Safety Questionnaire: No or Yes in past 24 hours     1) Have you discontinued or started any new medication?  no  2) Have you missed any doses of your medication differently then prescribed?  no  3) Have you consumed alcohol or drugs (other than prescribed)?  no  4) Did you not sleep AT ALL last night?  no  5) Has your SI changed or worsened?  no    Indications for TMS:  MDD, recurrent, severe; 4+ medication trials (from 2+ classes) ineffective; Psychotherapy ineffective     Procedure Diagnosis:  F33.2    Treatment Hx:  Treatment number this series: 31  Total lifetime treatment number: 96    No Known Allergies   There were no vitals taken for this visit.     Pause for the Cause  Right patient:  Yes  Right procedure/correct coil:  Yes; rTMS; cpt 98548 ; H1 coil.   Earplugs in place:  Yes    Procedure  Patient was seated in procedure chair. Identity and procedure was verified. Ear plugs were placed in ears and patient-specific cap was placed on head and tightened appropriately. Ruler locations were verified. Coil was placed at treatment location and stimulator was set to parameters described below. A test train was delivered and pt tolerated train. Given pt tolerance, 55 treatment trains were delivered. Pt tolerated procedure.    Motor Threshold Determination  Distance from nasion to inion: 36.0 cm  MT 1: 0 - 6.5 - 16 @ 47% on 11/22/2019  MT 2: 0 - 6.5 - 16 @ 47% on 12/24/2019  MT 3: 0 - 5.5 -16 @ 48% on 7/12/21    Stimulation Parameters  Frequency: 18 hz  Train Duration:  2 sec  Total pulses delivered: 2988  Inter-train interval: 20 sec  Tx Loc: 0 - eyebrows - 15  Energy: 58% (120%MT)  Trains: 83    Date MADRS IDS-SR PHQ-9   11/22/19 25 43 18   11/29/19 -- 45 18   12/6/19  45 18   12/13/19  47 20   12/20/19  48 13   12/27/19  26 9   1/10/19  22 5   1/17/20  21 5   1/21/20 2 21 5     Date MADRS IDS-SR PHQ-9   7/12/21  29 11   7/23/21  29 8   7/30/21  19 5   08/06/21  19 4   8/13/21  15 4   8/20/21  17 6         PHQ-9 SCORE 8/20/2021 8/23/2021 8/24/2021   PHQ-9 Total Score MyChart - - -   PHQ-9 Total Score 6 4 3   Some encounter information is confidential and restricted. Go to Review Flowsheets activity to see all data.       Judy Steve,  TMS Technician  [facility]  Neuromodulation Clinic    Plan   - Cont TMS    I did not see the patient during/after treatment but remained available in the clinic during  treatment.    Moni Mcgee MD  [facility] Neuromodulation

## 2021-08-25 ENCOUNTER — OFFICE VISIT (OUTPATIENT)
Dept: PSYCHIATRY | Facility: CLINIC | Age: 51
End: 2021-08-25
Payer: COMMERCIAL

## 2021-08-25 DIAGNOSIS — F33.2 SEVERE EPISODE OF RECURRENT MAJOR DEPRESSIVE DISORDER, WITHOUT PSYCHOTIC FEATURES (H): Primary | ICD-10-CM

## 2021-08-25 ASSESSMENT — PATIENT HEALTH QUESTIONNAIRE - PHQ9: SUM OF ALL RESPONSES TO PHQ QUESTIONS 1-9: 4

## 2021-08-25 NOTE — PROGRESS NOTES
Mariaelena Jean Baptiste is a 50 year old adult who presents for a preoperative evaluation.  PCP: Thalia Bradford     Subjective     PREOPERATIVE EVALUATION:  Today's date: 08/26/21    Surgical Information (as known today):  Surgery/Procedure: Nasal septoplasty  Surgery Location: [facility]  Surgeon: Dr. Merlin Mendoza  Surgery Date: 09/20/2021  Time of Surgery: 2:50PM  Where patient plans to recover: At home alone      Type of Anesthesia Anticipated: General      HPI related to upcoming procedure:  A 50-year-old with a history of hypertension (not on medication), MDD and post nasal trauma in June 2021 with subsequent nasal septal deviation undergoing nasal septoplasty on 09/20/2021.    Pre-Operative Exam Questions  1. Have you ever had a heart attack or stroke? No  2. Have you ever had surgery on your heart or blood vessels, such as a stent, coronary bypass, or surgery on an artery in the head, neck, heart, or legs? No  3. Do you have chest pain when you're physically active? No  4. Do you have a history of heart failure? No  5. Do you currently have a cold, bronchitis, or symptoms of other respiratory (head and chest) infections? No  6. Do you have a cough, shortness of breath, or wheezing? No  7. Do you or anyone in your family have a history of blood clots? No  8. Do you or anyone in your family have a serious bleeding problem, such as long-lasting bleeding after surgeries or cuts? No  9. Have you ever had anemia or been told to take iron pills? No  10. Have you had an abnormal blood loss such as black, tarry or bloody stools, or abnormal vaginal bleeding? No  11. Have you ever had a blood infusion No  12. Are you willing to have a blood transfusion if it is medically needed before, during, or after your surgery? Yes  13. Have you or anyone in your family ever had problems with anesthesia or sedation? No  14. Do you have sleep apnea, excessive snoring, or daytime  "drowsiness? No  15. Do you have any artificial heart values or other implanted medical devices, such as a pacemaker, defibrillator, or continuous glucose monitor? No  16. Do you have any artificial joints? No  17. Are you allergic to latex? No  18. Is there any chance that you may be pregnant? No        Risk Assessment    Physical activity:   Currently not limited. Patient states that she can walk for miles and climb a flight of stairs without stopping.       Respiratory concerns:  None      Clotting concerns:  None      Other chronic medical concerns  None        Preoperative Review of :   reviewed - controlled substances reflected in medication list.  {Review MNPMP for all patients per [employer] MNPMP Profile:150      No Known Allergies      Health Care Directive:  Patient does not have a Health Care Directive or Living Will    Review of Systems   Constitutional: Negative.    HENT: Negative.    Eyes: Negative.    Respiratory: Negative.    Cardiovascular: Negative.    Gastrointestinal: Negative.    Endocrine: Negative.    Genitourinary: Negative.    Musculoskeletal: Negative.    Skin: Negative.    Allergic/Immunologic: Negative.    Neurological: Negative.    Hematological: Negative.        As part of this encounter, I reviewed (and updated as able) the past medical, family, and social history.  (Please see past histories (etc) appended to this note at the bottom)    Objective     There were no vitals taken for this visit.    Physical Exam   BP (!) 163/97 (BP Location: Right arm, Patient Position: Sitting, Cuff Size: Adult Regular)   Pulse 108   Resp 16   Ht 1.78 m (5' 10.08\")   Wt 85 kg (187 lb 4.8 oz)   LMP 08/26/2021 (Exact Date)   SpO2 100%   Breastfeeding No   BMI 26.81 kg/m     After resting, patient's BP went down to 141/95,  bpm    General:  Conversant, generally healthy appearing, no acute distress  Head: atraumatic  Eyes:  Pupils 2-3 mm, sclera white, EOM's full, conjunctiva moist, no " periorbital swelling    Ears:  TM's normal, EAC's patent, clear of cerumen  Nose:  Nasal passages clear, turbinates not swollen. Mildly deviated nasal bone.  Throat/Mouth:  No pharyngeal erythema, exudate, ulcers, oral mucosa and tongue moist, normal hard and soft palate  Neck:  Trachea midline, Full AROM, supple, thyroid smooth, symmetric, not enlarged, no nodules, no neck lymphadenopathy  Lungs:  Clear to auscultation throughout, no wheezes, rhonchi or rales. Normal respiratory effort and no intercostal retractions.  Cardiovascular:  Regular rate and rhythm, no murmurs, rubs or gallops.  No JVD, no carotid bruits. Radial and DP pulses 2+, equal and regular.  Abdomen:  Not distended.  Bowel sounds active.  No tenderness, no hepatosplenomegaly or masses.  No CVA tenderness or masses.  Lymph:  No cervical lymph nodes.  Neuro: Alert and oriented, face symmetric. Able to get on/off exam table without assistance.  Strength grossly intact. No tremor.  Gait steady.   M/S:   No joint deformities noted.  No joint swelling.  Skin:   Normal temperature., turgor and texture. No rashes, suspicious lesions, or jaundice on exposed skin surfaces.   Extremities:  No peripheral edema, no digital cyanosis  Psych:  Alert and oriented. Appropriate affect.  Not psychomotor slowed.  No signs of anxiety or agitation.    No EKG required for low risk surgery (cataract, skin procedure, breast biopsy, etc).    Assessment & Plan     Preoperative Assessment and recommendation      Revised Cardiac Risk Index (RCRI):  The patient has the following serious cardiovascular risks for perioperative complications:   - No serious cardiac risks = 0 points   RCRI Interpretation: 0 points: Class I (very low risk - 0.4% complication rate)      Procedure risk. My understanding is that the described procedure is considered LOW risk.      Overall, there are no apparent contraindications to planned nasal septoplastyprocedure.  Risks and Recommendations:  The  patient has the following additional risks and recommendations for perioperative complications:     - No identified additional risk factors other than previously addressed    Otherwise, patient appears to be medically optimized for upcoming procedure, assuming appropriate medical supervision onsite for anesthesia or other developments.    Medication Instructions:  Patient is to take all scheduled medications on the day of surgery      Additional medical comments    Patient has autonomic labile blood pressure.       Comment about data reviewed  I personally reviewed, interpreted, and/or confirmed interpretation of patient medical history and physical examination.    Carlitos Reed MD  Internal Medicine Resident (PGY-1)  [phone]    This patient was discussed and seen with LONNIE Medina          Addendum: Past histories included in Epic as of this documentation      Patient Active Problem List   Diagnosis     Suicidal ideation     Major depression, recurrent (H)     Tobacco abuse     Hx of psychiatric care     Scar     Nasal septal deviation       Past Medical History:   Diagnosis Date     Anxiety 1988     Chronic osteoarthritis      Depressive disorder      Dissociative identity disorder (H)      Gastro-oesophageal reflux disease      Hypertension      Migraines      PTSD (post-traumatic stress disorder)      Social phobia        Past Surgical History:   Procedure Laterality Date     CHOLECYSTECTOMY       COLONOSCOPY N/A 4/14/2021    Procedure: COLONOSCOPY, WITH POLYPECTOMY;  Surgeon: Rickie Mccarthy MD;  Location: [facility] OR     LAPAROSCOPIC TUBAL LIGATION  1999     rectal prolapse repair         Current Outpatient Medications   Medication Sig Dispense Refill     amphetamine-dextroamphetamine (ADDERALL) 10 MG tablet Take 1 tablet (10 mg) by mouth 2 times daily 60 tablet 0     ibuprofen (ADVIL,MOTRIN) 800 MG tablet Take 400 mg by mouth as needed        lamoTRIgine (LAMICTAL) 150 MG tablet Take 1  tablet (150 mg) by mouth daily 30 tablet 2     levomilnacipran (FETZIMA ER) 120 MG 24 hr capsule Take 1 capsule (120 mg) by mouth daily 30 capsule 2     MAGNESIUM OXIDE PO        multivitamin, therapeutic (THERA-VIT) TABS tablet Take 1 tablet by mouth daily       predniSONE (DELTASONE) 20 MG tablet Take 2 tablets (40 mg) by mouth daily 10 tablet 0     QUEtiapine (SEROQUEL) 25 MG tablet Take 2 tablets (50 mg) by mouth At Bedtime 60 tablet 2     vilazodone (VIIBRYD) 40 MG TABS tablet Take 1 tablet (40 mg) by mouth daily 30 tablet 2     VITAMIN D, CHOLECALCIFEROL, PO Take 1,000 Units by mouth 2 times daily          Family History   Problem Relation Age of Onset     Depression Father      Hypertension Father      Substance Abuse Father      Cancer Father         non hodgkins lymphoma     Depression Sister      Cancer Maternal Grandmother         breast cancer (mastectomy in 40's), melanoma, leukemia     Melanoma Maternal Grandmother      Cancer Maternal Grandfather         leukemia     Substance Abuse Maternal Grandfather      Cancer Paternal Grandmother         lung cancer, nonsmoker     Substance Abuse Brother      Substance Abuse Sister      Skin Cancer No family hx of        Social History     Social History Narrative     Not on file       Tobacco Use     Smoking status: Former Smoker     Packs/day: 1.00     Years: 20.00     Pack years: 20.00     Types: Cigarettes, Other     Start date: 5/1/1995     Quit date: 10/23/2017     Years since quitting: 3.8     Smokeless tobacco: Former User     Tobacco comment: E-cig    Substance and Sexual Activity     Alcohol use: No     Drug use: No     Sexual activity: Not Currently     Partners: Female, Male

## 2021-08-25 NOTE — PROGRESS NOTES
Interventional Psychiatry Program  [address], [address]  [place] [address]  TMS Procedure Note   Mariaelena Jean Baptiste MRN# [id number]  Age: 49 year old year old YOB: 1970    Pre-Procedure:  History and Physical: Reviewed in medical record  Consent Signed by: Mariaelena Jean Baptiste for this course of treatment.  On: 11/22/2019     Clinical Narrative:  Patient tolerated treatment. No changes reported.      Daily Adult Stimulation Safety Questionnaire: No or Yes in past 24 hours     1) Have you discontinued or started any new medication?  no  2) Have you missed any doses of your medication differently then prescribed?  no  3) Have you consumed alcohol or drugs (other than prescribed)?  no  4) Did you not sleep AT ALL last night?  no  5) Has your SI changed or worsened?  no    Indications for TMS:  MDD, recurrent, severe; 4+ medication trials (from 2+ classes) ineffective; Psychotherapy ineffective     Procedure Diagnosis:  F33.2    Treatment Hx:  Treatment number this series: 32  Total lifetime treatment number: 97    No Known Allergies   There were no vitals taken for this visit.     Pause for the Cause  Right patient:  Yes  Right procedure/correct coil:  Yes; rTMS; cpt 62684 ; H1 coil.   Earplugs in place:  Yes    Procedure  Patient was seated in procedure chair. Identity and procedure was verified. Ear plugs were placed in ears and patient-specific cap was placed on head and tightened appropriately. Ruler locations were verified. Coil was placed at treatment location and stimulator was set to parameters described below. A test train was delivered and pt tolerated train. Given pt tolerance, 55 treatment trains were delivered. Pt tolerated procedure.    Motor Threshold Determination  Distance from nasion to inion: 36.0 cm  MT 1: 0 - 6.5 - 16 @ 47% on 11/22/2019  MT 2: 0 - 6.5 - 16 @ 47% on 12/24/2019  MT 3: 0 - 5.5 -16 @ 48% on 7/12/21    Stimulation Parameters  Frequency: 18 hz  Train Duration:  2 sec  Total pulses delivered: 2988  Inter-train interval: 20 sec  Tx Loc: 0 - eyebrows - 15  Energy: 58% (120%MT)  Trains: 83    Date MADRS IDS-SR PHQ-9   11/22/19 25 43 18   11/29/19 -- 45 18   12/6/19  45 18   12/13/19  47 20   12/20/19  48 13   12/27/19  26 9   1/10/19  22 5   1/17/20  21 5   1/21/20 2 21 5     Date MADRS IDS-SR PHQ-9   7/12/21  29 11   7/23/21  29 8   7/30/21  19 5   08/06/21  19 4   8/13/21  15 4   8/20/21  17 6         PHQ-9 SCORE 8/20/2021 8/23/2021 8/24/2021   PHQ-9 Total Score MyChart - - -   PHQ-9 Total Score 6 4 3   Some encounter information is confidential and restricted. Go to Review Flowsheets activity to see all data.       Judy Steve,  TMS Technician  [facility]  Neuromodulation Clinic    Plan   - Cont TMS    I did not see the patient during/after treatment but remained available in the clinic during  treatment.    Moni Mcgee MD  [facility] Neuromodulation

## 2021-08-26 ENCOUNTER — OFFICE VISIT (OUTPATIENT)
Dept: PSYCHIATRY | Facility: CLINIC | Age: 51
End: 2021-08-26
Payer: COMMERCIAL

## 2021-08-26 ENCOUNTER — OFFICE VISIT (OUTPATIENT)
Dept: INTERNAL MEDICINE | Facility: CLINIC | Age: 51
End: 2021-08-26
Payer: MEDICARE

## 2021-08-26 VITALS
HEIGHT: 70 IN | DIASTOLIC BLOOD PRESSURE: 95 MMHG | BODY MASS INDEX: 26.81 KG/M2 | WEIGHT: 187.3 LBS | HEART RATE: 106 BPM | RESPIRATION RATE: 16 BRPM | OXYGEN SATURATION: 100 % | SYSTOLIC BLOOD PRESSURE: 141 MMHG

## 2021-08-26 DIAGNOSIS — F33.2 SEVERE EPISODE OF RECURRENT MAJOR DEPRESSIVE DISORDER, WITHOUT PSYCHOTIC FEATURES (H): Primary | ICD-10-CM

## 2021-08-26 DIAGNOSIS — Z01.818 PREOPERATIVE EXAMINATION: Primary | ICD-10-CM

## 2021-08-26 PROCEDURE — 99214 OFFICE O/P EST MOD 30 MIN: CPT | Mod: GC

## 2021-08-26 ASSESSMENT — ENCOUNTER SYMPTOMS
RESPIRATORY NEGATIVE: 1
HEMATOLOGIC/LYMPHATIC NEGATIVE: 1
MUSCULOSKELETAL NEGATIVE: 1
GASTROINTESTINAL NEGATIVE: 1
CARDIOVASCULAR NEGATIVE: 1
EYES NEGATIVE: 1
ENDOCRINE NEGATIVE: 1
ALLERGIC/IMMUNOLOGIC NEGATIVE: 1
NEUROLOGICAL NEGATIVE: 1
CONSTITUTIONAL NEGATIVE: 1

## 2021-08-26 ASSESSMENT — MIFFLIN-ST. JEOR: SCORE: 1551.09

## 2021-08-26 ASSESSMENT — PAIN SCALES - GENERAL: PAINLEVEL: NO PAIN (0)

## 2021-08-26 ASSESSMENT — PATIENT HEALTH QUESTIONNAIRE - PHQ9: SUM OF ALL RESPONSES TO PHQ QUESTIONS 1-9: 3

## 2021-08-26 NOTE — PROGRESS NOTES
Interventional Psychiatry Program  [address], [address]  [place] [address]  TMS Procedure Note   Mariaelena Jean Baptiste MRN# [id number]  Age: 49 year old year old YOB: 1970    Pre-Procedure:  History and Physical: Reviewed in medical record  Consent Signed by: Mariaelena Jean Baptiste for this course of treatment.  On: 11/22/2019     Clinical Narrative:  Patient tolerated treatment. No changes reported.      Daily Adult Stimulation Safety Questionnaire: No or Yes in past 24 hours     1) Have you discontinued or started any new medication?  no  2) Have you missed any doses of your medication differently then prescribed?  no  3) Have you consumed alcohol or drugs (other than prescribed)?  no  4) Did you not sleep AT ALL last night?  no  5) Has your SI changed or worsened?  no    Indications for TMS:  MDD, recurrent, severe; 4+ medication trials (from 2+ classes) ineffective; Psychotherapy ineffective     Procedure Diagnosis:  F33.2    Treatment Hx:  Treatment number this series: 33  Total lifetime treatment number: 98    No Known Allergies   There were no vitals taken for this visit.     Pause for the Cause  Right patient:  Yes  Right procedure/correct coil:  Yes; rTMS; cpt 67399 ; H1 coil.   Earplugs in place:  Yes    Procedure  Patient was seated in procedure chair. Identity and procedure was verified. Ear plugs were placed in ears and patient-specific cap was placed on head and tightened appropriately. Ruler locations were verified. Coil was placed at treatment location and stimulator was set to parameters described below. A test train was delivered and pt tolerated train. Given pt tolerance, 55 treatment trains were delivered. Pt tolerated procedure.    Motor Threshold Determination  Distance from nasion to inion: 36.0 cm  MT 1: 0 - 6.5 - 16 @ 47% on 11/22/2019  MT 2: 0 - 6.5 - 16 @ 47% on 12/24/2019  MT 3: 0 - 5.5 -16 @ 48% on 7/12/21    Stimulation Parameters  Frequency: 18 hz  Train Duration:  2 sec  Total pulses delivered: 2988  Inter-train interval: 20 sec  Tx Loc: 0 - eyebrows - 15  Energy: 58% (120%MT)  Trains: 83    Date MADRS IDS-SR PHQ-9   11/22/19 25 43 18   11/29/19 -- 45 18   12/6/19  45 18   12/13/19  47 20   12/20/19  48 13   12/27/19  26 9   1/10/19  22 5   1/17/20  21 5   1/21/20 2 21 5     Date MADRS IDS-SR PHQ-9   7/12/21  29 11   7/23/21  29 8   7/30/21  19 5   08/06/21  19 4   8/13/21  15 4   8/20/21  17 6         PHQ-9 SCORE 8/24/2021 8/25/2021 8/26/2021   PHQ-9 Total Score MyChart - - -   PHQ-9 Total Score 3 4 3   Some encounter information is confidential and restricted. Go to Review Flowsheets activity to see all data.       Judy Steve,  TMS Technician  [facility]  Neuromodulation Clinic    Plan   - Cont TMS    I did not see the patient during/after treatment but remained available in the clinic during  treatment.    Moni Mcgee MD  [facility] Neuromodulation

## 2021-08-26 NOTE — PROGRESS NOTES
"I, Wily Mukherjee MD saw the patient with the resident, and agree with the resident's findings and plan of care as documented in the resident's note.  BP (!) 163/97 (BP Location: Right arm, Patient Position: Sitting, Cuff Size: Adult Regular)   Pulse 108   Resp 16   Ht 1.78 m (5' 10.08\")   Wt 85 kg (187 lb 4.8 oz)   LMP 08/26/2021 (Exact Date)   SpO2 100%   Breastfeeding No   BMI 26.81 kg/m    I personally reviewed vital signs and past record.  Key findings: refractory depression on 5 meds (if adderall included).  Preop for nasal surgery. No apparent contraindications to planned low-risk surgery. Of note, I gather she has autonomic dysfunction with labile BP. This can be managed perioperatively.    "

## 2021-08-26 NOTE — PROGRESS NOTES
Mariaelena Jean Baptiste presents on 8/26/2021 for a pre-operative exam.    Patient Name: Mariaelena Jean Baptiste  Location of Surgery: [facility]  Surgeon: Merlin Baca MD  Type of Surgery or Procedure: Septoplasty, Nose  Date and Time of Surgery or Procedure: 9/20/21 at 2:50 PM  Where are you planning to recover after surgery:     ( ) at home with family   (X) at home alone   ( ) at home with home care   ( ) at a nursing home   ( ) at a TCU (Transitional Care Unit)   ( ) at a rehab center   ( ) other:     Pre-Operative Exam Questions  1. Have you ever had a heart attack or stroke? No  2. Have you ever had surgery on your heart or blood vessels, such as a stent, coronary bypass, or surgery on an artery in the head, neck, heart, or legs? No  3. Do you have chest pain when you're physically active? No  4. Do you have a history of heart failure? No  5. Do you currently have a cold, bronchitis, or symptoms of other respiratory (head and chest) infections? No  6. Do you have a cough, shortness of breath, or wheezing? No  7. Do you or anyone in your family have a history of blood clots? No  8. Do you or anyone in your family have a serious bleeding problem, such as long-lasting bleeding after surgeries or cuts? No  9. Have you ever had anemia or been told to take iron pills? No  10. Have you had an abnormal blood loss such as black, tarry or bloody stools, or abnormal vaginal bleeding? No  11. Have you ever had a blood infusion No  12. Are you willing to have a blood transfusion if it is medically needed before, during, or after your surgery? Yes  13. Have you or anyone in your family ever had problems with anesthesia or sedation? No  14. Do you have sleep apnea, excessive snoring, or daytime drowsiness? No  15. Do you have any artificial heart values or other implanted medical devices, such as a pacemaker, defibrillator, or continuous glucose monitor? No  16. Do you have any artificial  joints? No  17. Are you allergic to latex? No  18. Is there any chance that you may be pregnant? No    Eyad Maria, EMT at 1:49 PM on 8/26/2021

## 2021-08-26 NOTE — NURSING NOTE
Mariaelena Jean Baptiste is a 50 year old adult patient that presents today in clinic for the following:    Chief Complaint   Patient presents with     Pre-Op Exam     See note.       The patient's allergies and medications were reviewed as noted. A set of vitals were recorded as noted without incident. The patient does not have any other questions for the provider.    Eyad Maria, EMT at 1:55 PM on 8/26/2021

## 2021-08-27 ENCOUNTER — OFFICE VISIT (OUTPATIENT)
Dept: PSYCHIATRY | Facility: CLINIC | Age: 51
End: 2021-08-27
Payer: COMMERCIAL

## 2021-08-27 DIAGNOSIS — F33.2 SEVERE EPISODE OF RECURRENT MAJOR DEPRESSIVE DISORDER, WITHOUT PSYCHOTIC FEATURES (H): Primary | ICD-10-CM

## 2021-08-27 ASSESSMENT — PATIENT HEALTH QUESTIONNAIRE - PHQ9: SUM OF ALL RESPONSES TO PHQ QUESTIONS 1-9: 4

## 2021-08-27 NOTE — PROGRESS NOTES
Interventional Psychiatry Program  [address], [address]  [place] [address]  TMS Procedure Note   Mariaelena Jean Baptiste MRN# [id number]  Age: 49 year old year old YOB: 1970    Pre-Procedure:  History and Physical: Reviewed in medical record  Consent Signed by: Mariaelena Jean Baptiste for this course of treatment.  On: 11/22/2019     Clinical Narrative:  Patient tolerated treatment. No changes reported. Patient works this weekend.      Daily Adult Stimulation Safety Questionnaire: No or Yes in past 24 hours     1) Have you discontinued or started any new medication?  no  2) Have you missed any doses of your medication differently then prescribed?  no  3) Have you consumed alcohol or drugs (other than prescribed)?  no  4) Did you not sleep AT ALL last night?  no  5) Has your SI changed or worsened?  no    Indications for TMS:  MDD, recurrent, severe; 4+ medication trials (from 2+ classes) ineffective; Psychotherapy ineffective     Procedure Diagnosis:  F33.2    Treatment Hx:  Treatment number this series: 34  Total lifetime treatment number: 99    No Known Allergies   LMP 08/26/2021 (Exact Date)      Pause for the Cause  Right patient:  Yes  Right procedure/correct coil:  Yes; rTMS; cpt 48553 ; H1 coil.   Earplugs in place:  Yes    Procedure  Patient was seated in procedure chair. Identity and procedure was verified. Ear plugs were placed in ears and patient-specific cap was placed on head and tightened appropriately. Ruler locations were verified. Coil was placed at treatment location and stimulator was set to parameters described below. A test train was delivered and pt tolerated train. Given pt tolerance, 55 treatment trains were delivered. Pt tolerated procedure.    Motor Threshold Determination  Distance from nasion to inion: 36.0 cm  MT 1: 0 - 6.5 - 16 @ 47% on 11/22/2019  MT 2: 0 - 6.5 - 16 @ 47% on 12/24/2019  MT 3: 0 - 5.5 -16 @ 48% on 7/12/21    Stimulation Parameters  Frequency: 18  hz  Train Duration: 2 sec  Total pulses delivered: 2988  Inter-train interval: 20 sec  Tx Loc: 0 - eyebrows - 15  Energy: 58% (120%MT)  Trains: 83    Date MADRS IDS-SR PHQ-9   11/22/19 25 43 18   11/29/19 -- 45 18   12/6/19  45 18   12/13/19  47 20   12/20/19  48 13   12/27/19  26 9   1/10/19  22 5   1/17/20  21 5   1/21/20 2 21 5     Date MADRS IDS-SR PHQ-9   7/12/21  29 11   7/23/21  29 8   7/30/21  19 5   08/06/21  19 4   8/13/21  15 4   8/20/21  17 6   8/27/21  15 4         PHQ-9 SCORE 8/25/2021 8/26/2021 8/27/2021   PHQ-9 Total Score MyChart - - -   PHQ-9 Total Score 4 3 4   Some encounter information is confidential and restricted. Go to Review Flowsheets activity to see all data.       Judy Steve,  TMS Technician  [facility]  Neuromodulation Clinic    Plan   - Cont TMS    I did not see the patient during/after treatment but remained available in the clinic during  treatment.    Moni Mcgee MD  [facility]  Mental Health Neuromodulation

## 2021-08-31 ENCOUNTER — OFFICE VISIT (OUTPATIENT)
Dept: PSYCHIATRY | Facility: CLINIC | Age: 51
End: 2021-08-31
Payer: COMMERCIAL

## 2021-08-31 DIAGNOSIS — F33.2 SEVERE EPISODE OF RECURRENT MAJOR DEPRESSIVE DISORDER, WITHOUT PSYCHOTIC FEATURES (H): Primary | ICD-10-CM

## 2021-08-31 ASSESSMENT — PATIENT HEALTH QUESTIONNAIRE - PHQ9: SUM OF ALL RESPONSES TO PHQ QUESTIONS 1-9: 7

## 2021-08-31 NOTE — PROGRESS NOTES
Interventional Psychiatry Program  [address], [address]  [place] [address]  TMS Procedure Note   Mariaelena Jean Baptiste MRN# [id number]  Age: 49 year old year old YOB: 1970    Pre-Procedure:  History and Physical: Reviewed in medical record  Consent Signed by: Mariaelena Jean Baptiste for this course of treatment.  On: 11/22/2019     Clinical Narrative:  Patient tolerated treatment. No changes reported. Patient worked over the weekend.    Daily Adult Stimulation Safety Questionnaire: No or Yes in past 24 hours     1) Have you discontinued or started any new medication?  no  2) Have you missed any doses of your medication differently then prescribed?  no  3) Have you consumed alcohol or drugs (other than prescribed)?  no  4) Did you not sleep AT ALL last night?  no  5) Has your SI changed or worsened?  no    Indications for TMS:  MDD, recurrent, severe; 4+ medication trials (from 2+ classes) ineffective; Psychotherapy ineffective     Procedure Diagnosis:  F33.2    Treatment Hx:  Treatment number this series: 35  Total lifetime treatment number: 100    No Known Allergies   LMP 08/26/2021 (Exact Date)      Pause for the Cause  Right patient:  Yes  Right procedure/correct coil:  Yes; rTMS; cpt 34464 ; H1 coil.   Earplugs in place:  Yes    Procedure  Patient was seated in procedure chair. Identity and procedure was verified. Ear plugs were placed in ears and patient-specific cap was placed on head and tightened appropriately. Ruler locations were verified. Coil was placed at treatment location and stimulator was set to parameters described below. A test train was delivered and pt tolerated train. Given pt tolerance, 55 treatment trains were delivered. Pt tolerated procedure.    Motor Threshold Determination  Distance from nasion to inion: 36.0 cm  MT 1: 0 - 6.5 - 16 @ 47% on 11/22/2019  MT 2: 0 - 6.5 - 16 @ 47% on 12/24/2019  MT 3: 0 - 5.5 -16 @ 48% on 7/12/21    Stimulation Parameters  Frequency: 18  hz  Train Duration: 2 sec  Total pulses delivered: 2988  Inter-train interval: 20 sec  Tx Loc: 0 - eyebrows - 15  Energy: 58% (120%MT)  Trains: 83    Date MADRS IDS-SR PHQ-9   11/22/19 25 43 18   11/29/19 -- 45 18   12/6/19  45 18   12/13/19  47 20   12/20/19  48 13   12/27/19  26 9   1/10/19  22 5   1/17/20  21 5   1/21/20 2 21 5     Date MADRS IDS-SR PHQ-9   7/12/21  29 11   7/23/21  29 8   7/30/21  19 5   08/06/21  19 4   8/13/21  15 4   8/20/21  17 6   8/27/21  15 4         PHQ-9 SCORE 8/25/2021 8/26/2021 8/27/2021   PHQ-9 Total Score MyChart - - -   PHQ-9 Total Score 4 3 4   Some encounter information is confidential and restricted. Go to Review Flowsheets activity to see all data.       Judy Steve,  TMS Technician  [facility]  Neuromodulation Clinic    Plan   - Cont TMS    I did not see patient but remained available in the clinic throughout the TMS session.      Jennifer Perez MD  [facility]  Mental Health Neuromodulation

## 2021-09-01 ENCOUNTER — OFFICE VISIT (OUTPATIENT)
Dept: PSYCHIATRY | Facility: CLINIC | Age: 51
End: 2021-09-01
Payer: COMMERCIAL

## 2021-09-01 DIAGNOSIS — F33.2 SEVERE EPISODE OF RECURRENT MAJOR DEPRESSIVE DISORDER, WITHOUT PSYCHOTIC FEATURES (H): Primary | ICD-10-CM

## 2021-09-01 ASSESSMENT — PATIENT HEALTH QUESTIONNAIRE - PHQ9: SUM OF ALL RESPONSES TO PHQ QUESTIONS 1-9: 6

## 2021-09-01 NOTE — PROGRESS NOTES
[facility]  Psychiatry Clinic  TRANSFER of CARE DIAGNOSTIC ASSESSMENT     CARE TEAM:  PCP- Thalia Bradford, Psychotherapist- Dr. Julieta Gill, Specialty: Dr. Marie at [facility]    Mariaelena Jean Baptiste is a 50 year old who prefers the name Mariaelena and uses pronouns she, her, they.      DIAGNOSIS     MDD, recurrent, moderate (treatment-resistant; h/o severe episodes)  BPD  PTSD  DID  Unspecified Eating Disorder, in remission  Insomnia, likely circadian rhythm disorder      ASSESSMENT   Mariaelena is currently undergoing TMS.She states today is her last session. She reports that her mood has been stable and she is doing well. Patient would like to keep medications the same as they have been very helpful. Agree with patient to make no med changes - since TMS is ending, advised patient to reach out if she notices any worsening mood. Also advised patient to start using her light box soon - she reports she plans to start in the next few weeks as the days get shorter.     MNPMP was checked today:  Indicates taking controlled medication as prescribed.     PLAN                                                                                                                1) Meds-  - Continue quetiapine 50 mg PRN nightly for sleep and treatment resistant depression (patient reports she uses this every night)  - Continue Adderall IR 10 mg BID (qAM + 2pm) for augmentation of depression treatment  - Continue levomilnacipran  mg QDAY for depression  - Continue vilazodone 40 mg QDAY for depression  - Continue lamotrigine 150 mg QDAY for depression and mood stabilization  - Continue melatonin 1 mg with dinner     Other:     - Resume light box daily in Fall - patient has new blue light box which she finds to be more helpful than white light    2) Psychotherapy- Continue biweekly therapy w/ DBT therapist Dr. Julieta Gill Psy D    3) Next due-  Labs- AP monitoring labs due Jan 2022  EKG- as needed  Rating  Scales- PHQ-9 at every visit, AIMS done this visit 9/2/2021 - score 0    4) Referrals-  None    5) Dispo- RTC in 4-6 weeks       PERTINENT BACKGROUND                           [most recent eval 07/20/21]   Pertinent Background:  Mariaelena first experienced mental health issues as a young adult and has received treatment for treatment-resistant depression, BPD with severe self harm, and PTSD. Notably, both naltrexone and clozapine have reduced SIB immensely, with return when taper off has been initiated in the past (although no return of SIB to date since d/c of clozapine). She does better when she uses a light box in the Fall/Winter, best started in September as she typically experiences return of depressive symptoms in October. A taper of Lamictal was associated with decline in mood and subsequent hospitalization in the past which is a common theme for her as medication changes, even small ones, tend to be very destabilizing. A TMS series through the [facility] in August 2016 was transformative in terms of ongoing remission of her depression for the following 2+ years. She often is not aware of reemerging depressive symptoms until they become severe.    Psych pertinent item history includes suicide attempts {2x], suicidal ideation, severe SIB [has required skin grafts and long term hospitalization at [place]], mutiple psychotropic trials, trauma hx, ECT, TMS, psych hosp (>5), and commitment.      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 Advised patient we would look into this.     RECENT PSYCH ROS:   Depression:  none  Elevated:  none  Psychosis:  none  Anxiety:  none  Trauma Related:  none  Sleep: no difficulty with sleep - believes this is related to having a young cat that wakes her up very early  Other: N/A    Adverse Effects: headaches - however doesn't believe related to meds  Pertinent Negative Symptoms: No suicidal ideation, self-injurious behavior/urges or violent ideation  Recent Substance Use:     Tobacco- vapes nicotine     FAMILY and SOCIAL HISTORY                                 pt reported     Family Hx: Depression in her father and sister. Anxiety and CD issues in many family members. Paternal grandfather committed suicide in his 50s. No h/o BPAD or Schizophrenia.    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    Social/ Spiritual Support- DBT therapist, online friends, father and step mother in [place]; brother and sister both in the [facility] [place] [facility] area (supportive, close with them), a few co workers and a few close friends from the feline rescue (Ahsan and Anika). Mariaelena grew up [place] - continues to believe in God but does not identify with a specific Scientologist.  Feels Safe at Home- yes     PSYCH and SUBSTANCE USE Critical Summary Points since July 2021 July 2021 - Transfer DA. No med changes. Patient was doing her  3rd series of TMS which was very helpful - noticed improved mood.  Sept 2021 - Patient completed TMS. No med changes.        PAST MED TRIALS     Per chart review:  Numerous including TCAs, MAOI's, Prozac, Zoloft, Viibryd, Brintellix, Celexa, Effexor, Cymbalta, lithium, clonidine, Provigil, cytomel, naltrexone (for SIB), dextroamphetamine, lithium, lamictal, clozapine (used for self-harm urges and cross-titrated to Latuda in 2016 in anticipation of TMS).     Per discussion with  Dr. Matthews 2/2016:  Both naltrexone and clozapine have reduced SIB immensely, with return when taper off has been initiated in the past.      MEDICAL HISTORY and ALLERGY     ALLERGIES: Patient has no known allergies.    Patient Active Problem List   Diagnosis     Suicidal ideation     Major depression, recurrent (H)     Tobacco abuse     Hx of psychiatric care     Scar     Nasal septal deviation        MEDICAL REVIEW OF SYSTEMS   Contraception- s/p tubal ligation, otherwise did not discuss    A comprehensive review of systems was performed and is negative other than noted in the HPI.     MEDICATIONS     Current Outpatient Medications   Medication Sig Dispense Refill     amphetamine-dextroamphetamine (ADDERALL) 10 MG tablet Take 1 tablet (10 mg) by mouth 2 times daily 60 tablet 0     ibuprofen (ADVIL,MOTRIN) 800 MG tablet Take 400 mg by mouth as needed        lamoTRIgine (LAMICTAL) 150 MG tablet Take 1 tablet (150 mg) by mouth daily 30 tablet 2     levomilnacipran (FETZIMA ER) 120 MG 24 hr capsule Take 1 capsule (120 mg) by mouth daily 30 capsule 2     MAGNESIUM OXIDE PO        multivitamin, therapeutic (THERA-VIT) TABS tablet Take 1 tablet by mouth daily       predniSONE (DELTASONE) 20 MG tablet Take 2 tablets (40 mg) by mouth daily 10 tablet 0     QUEtiapine (SEROQUEL) 25 MG tablet Take 2 tablets (50 mg) by mouth At Bedtime 60 tablet 2     vilazodone (VIIBRYD) 40 MG TABS tablet Take 1 tablet (40 mg) by mouth daily 30 tablet 2      VITAMIN D, CHOLECALCIFEROL, PO Take 1,000 Units by mouth 2 times daily         VITALS   BP (!) 146/99   Pulse 106   Wt 84.6 kg (186 lb 9.6 oz)   LMP 08/26/2021 (Exact Date)   BMI 26.71 kg/m      MENTAL STATUS EXAM     Alertness: alert  and oriented  Appearance: well groomed  Behavior/Demeanor: cooperative and pleasant, with good  eye contact   Speech: normal and regular rate and rhythm  Language: intact  Psychomotor: normal or unremarkable  Mood: description consistent with euthymia  Affect: full range; congruent to: mood- yes, content- yes  Thought Process/Associations: unremarkable  Thought Content:  Reports none;  Denies suicidal ideation  Perception:  Reports none;  Denies hallucinations  Insight: good  Judgment: good  Cognition: does  appear grossly intact; formal cognitive testing was not done  Gait and Station: unremarkable     LABS and DATA     PHQ9 TODAY = 8  PHQ 8/31/2021 9/1/2021 9/2/2021   PHQ-9 Total Score 7 6 6   Q9: Thoughts of better off dead/self-harm past 2 weeks Not at all Not at all Not at all       Recent Labs   Lab Test 01/13/21  1149 06/05/20  1051 05/20/20  0317 04/15/19  1401   GLC  --  94 137* 87   A1C 5.1  --   --  5.4     Recent Labs   Lab Test 01/13/21  1149 04/15/19  1401   CHOL 206* 191   TRIG 42 42   * 112*   HDL 80 71     Recent Labs   Lab Test 05/20/20  0317 04/15/19  1401   AST 27 22   ALT 19 23   ALKPHOS 95 62     Recent Labs   Lab Test 06/05/20  1051 05/20/20  0217 04/15/19  1401   WBC 5.7 11.5* 7.5   ANEU 3.6  --  5.2   HGB 14.6 12.7 12.5   * 403 445       ECG 5/2020 QTc = 462 ms     PSYCHOTROPIC DRUG INTERACTIONS     VILAZODONE + FETZIMA + ADDERALL + SEROQUEL may result in increased risk for serotonin syndrome.      VILAZODONE + FETZIMA may enhance the antiplatelet effect of other Agents with Antiplatelet Properties.      LAMOTRIGINE + ACETAMINOPHEN may result in decreased lamotrigine serum levels.     ADDERALL + LISINOPRIL:  amphetamines may diminish the  antihypertensive effect of antihypertensive agents.     MANAGEMENT:  Monitoring for adverse effects, routine vitals and using lowest therapeutic dose of [psychotropics]     RISK STATEMENT for SAFETY     Mariaelena Jean Baptiste did not appear to be an imminent safety risk to self or others.    TREATMENT RISK STATEMENT: The risks, benefits, alternatives and potential adverse effects have been discussed and are understood by the pt. The pt understands the risks of using street drugs or alcohol. There are no medical contraindications, the pt agrees to treatment with the ability to do so. The pt knows to call the clinic for any problems or to access emergency care if needed.  Medical and substance use concerns are documented above.  Psychotropic drug interaction check was done, including changes made today.    PROVIDER: Robert Ibanez DO, MPH    Patient staffed in clinic with Dr. Byrne who will sign the note.  Supervisor is Dr. Byrne.    I saw the patient with the resident, and participated in key portions of the service, including the mental status examination and developing the plan of care. I reviewed key portions of the history with the resident. I agree with the findings and plan as documented in this note.    Argentina Byrne MD

## 2021-09-01 NOTE — PROGRESS NOTES
Interventional Psychiatry Program  [address], [address]  [place] [address]  TMS Procedure Note   Mariaelena Jean Baptiste MRN# [id number]  Age: 49 year old year old YOB: 1970    Pre-Procedure:  History and Physical: Reviewed in medical record  Consent Signed by: Mariaelena Jean Baptiste for this course of treatment.  On: 11/22/2019     Clinical Narrative:  Patient tolerated treatment. No changes reported.    Daily Adult Stimulation Safety Questionnaire: No or Yes in past 24 hours     1) Have you discontinued or started any new medication?  no  2) Have you missed any doses of your medication differently then prescribed?  no  3) Have you consumed alcohol or drugs (other than prescribed)?  no  4) Did you not sleep AT ALL last night?  no  5) Has your SI changed or worsened?  no    Indications for TMS:  MDD, recurrent, severe; 4+ medication trials (from 2+ classes) ineffective; Psychotherapy ineffective     Procedure Diagnosis:  F33.2    Treatment Hx:  Treatment number this series: 36  Total lifetime treatment number: 101    No Known Allergies   LMP 08/26/2021 (Exact Date)      Pause for the Cause  Right patient:  Yes  Right procedure/correct coil:  Yes; rTMS; cpt 34116 ; H1 coil.   Earplugs in place:  Yes    Procedure  Patient was seated in procedure chair. Identity and procedure was verified. Ear plugs were placed in ears and patient-specific cap was placed on head and tightened appropriately. Ruler locations were verified. Coil was placed at treatment location and stimulator was set to parameters described below. A test train was delivered and pt tolerated train. Given pt tolerance, 55 treatment trains were delivered. Pt tolerated procedure.    Motor Threshold Determination  Distance from nasion to inion: 36.0 cm  MT 1: 0 - 6.5 - 16 @ 47% on 11/22/2019  MT 2: 0 - 6.5 - 16 @ 47% on 12/24/2019  MT 3: 0 - 5.5 -16 @ 48% on 7/12/21    Stimulation Parameters  Frequency: 18 hz  Train Duration: 2 sec  Total  pulses delivered: 2988  Inter-train interval: 20 sec  Tx Loc: 0 - eyebrows - 15  Energy: 58% (120%MT)  Trains: 83    Date MADRS IDS-SR PHQ-9   11/22/19 25 43 18   11/29/19 -- 45 18   12/6/19  45 18   12/13/19  47 20   12/20/19  48 13   12/27/19  26 9   1/10/19  22 5   1/17/20  21 5   1/21/20 2 21 5     Date MADRS IDS-SR PHQ-9   7/12/21  29 11   7/23/21  29 8   7/30/21  19 5   08/06/21  19 4   8/13/21  15 4   8/20/21  17 6   8/27/21  15 4         PHQ-9 SCORE 8/27/2021 8/31/2021 9/1/2021   PHQ-9 Total Score MyChart - - -   PHQ-9 Total Score 4 7 6   Some encounter information is confidential and restricted. Go to Review Flowsheets activity to see all data.       Judy Steve,  TMS Technician  [facility]  Neuromodulation Clinic    Plan   - Cont TMS    I did not see patient but remained available in the clinic throughout the TMS session.    Jennifer Perez MD  [facility]  Mental Health Neuromodulation

## 2021-09-02 ENCOUNTER — OFFICE VISIT (OUTPATIENT)
Dept: PSYCHIATRY | Facility: CLINIC | Age: 51
End: 2021-09-02
Payer: COMMERCIAL

## 2021-09-02 ENCOUNTER — OFFICE VISIT (OUTPATIENT)
Dept: PSYCHIATRY | Facility: CLINIC | Age: 51
End: 2021-09-02
Attending: PSYCHIATRY & NEUROLOGY
Payer: MEDICARE

## 2021-09-02 ENCOUNTER — TELEPHONE (OUTPATIENT)
Dept: PSYCHIATRY | Facility: CLINIC | Age: 51
End: 2021-09-02

## 2021-09-02 VITALS
DIASTOLIC BLOOD PRESSURE: 99 MMHG | BODY MASS INDEX: 26.71 KG/M2 | SYSTOLIC BLOOD PRESSURE: 146 MMHG | WEIGHT: 186.6 LBS | HEART RATE: 106 BPM

## 2021-09-02 DIAGNOSIS — F33.1 MODERATE EPISODE OF RECURRENT MAJOR DEPRESSIVE DISORDER (H): ICD-10-CM

## 2021-09-02 DIAGNOSIS — F33.2 SEVERE EPISODE OF RECURRENT MAJOR DEPRESSIVE DISORDER, WITHOUT PSYCHOTIC FEATURES (H): Primary | ICD-10-CM

## 2021-09-02 DIAGNOSIS — F33.42 RECURRENT MAJOR DEPRESSIVE DISORDER, IN FULL REMISSION (H): Primary | ICD-10-CM

## 2021-09-02 PROCEDURE — G0463 HOSPITAL OUTPT CLINIC VISIT: HCPCS

## 2021-09-02 PROCEDURE — 99214 OFFICE O/P EST MOD 30 MIN: CPT | Mod: 25 | Performed by: STUDENT IN AN ORGANIZED HEALTH CARE EDUCATION/TRAINING PROGRAM

## 2021-09-02 RX ORDER — QUETIAPINE FUMARATE 25 MG/1
50 TABLET, FILM COATED ORAL AT BEDTIME
Qty: 60 TABLET | Refills: 2 | Status: SHIPPED | OUTPATIENT
Start: 2021-09-02 | End: 2021-09-30

## 2021-09-02 RX ORDER — VILAZODONE HYDROCHLORIDE 40 MG/1
40 TABLET ORAL DAILY
Qty: 30 TABLET | Refills: 2 | Status: SHIPPED | OUTPATIENT
Start: 2021-09-02 | End: 2021-09-30

## 2021-09-02 RX ORDER — DEXTROAMPHETAMINE SACCHARATE, AMPHETAMINE ASPARTATE, DEXTROAMPHETAMINE SULFATE AND AMPHETAMINE SULFATE 2.5; 2.5; 2.5; 2.5 MG/1; MG/1; MG/1; MG/1
10 TABLET ORAL 2 TIMES DAILY
Qty: 60 TABLET | Refills: 0 | Status: SHIPPED | OUTPATIENT
Start: 2021-09-02 | End: 2021-09-30

## 2021-09-02 RX ORDER — LAMOTRIGINE 150 MG/1
150 TABLET ORAL DAILY
Qty: 30 TABLET | Refills: 2 | Status: SHIPPED | OUTPATIENT
Start: 2021-09-02 | End: 2021-09-30

## 2021-09-02 ASSESSMENT — PAIN SCALES - GENERAL: PAINLEVEL: MODERATE PAIN (4)

## 2021-09-02 ASSESSMENT — PATIENT HEALTH QUESTIONNAIRE - PHQ9
SUM OF ALL RESPONSES TO PHQ QUESTIONS 1-9: 6
SUM OF ALL RESPONSES TO PHQ QUESTIONS 1-9: 8

## 2021-09-02 NOTE — PROGRESS NOTES
Interventional Psychiatry Program  [address], [address]  [place] [address]  TMS Procedure Note   Mariaelena Jean Baptiste MRN# [id number]  Age: 49 year old year old YOB: 1970    Pre-Procedure:  History and Physical: Reviewed in medical record  Consent Signed by: Mariaelena Jean Baptiste for this course of treatment.  On: 11/22/2019     Clinical Narrative:  Patient tolerated treatment. Patient talks about a new tower she got for her cats and how they are enjoying it. Patient states how she wants to make a house for the cats and have trees around for the cats to climb around. Patient reports she is feeling good that this is her last day of TMS.    Daily Adult Stimulation Safety Questionnaire: No or Yes in past 24 hours     1) Have you discontinued or started any new medication?  no  2) Have you missed any doses of your medication differently then prescribed?  no  3) Have you consumed alcohol or drugs (other than prescribed)?  no  4) Did you not sleep AT ALL last night?  no  5) Has your SI changed or worsened?  no    Indications for TMS:  MDD, recurrent, severe; 4+ medication trials (from 2+ classes) ineffective; Psychotherapy ineffective     Procedure Diagnosis:  F33.2    Treatment Hx:  Treatment number this series: 37  Total lifetime treatment number: 102    No Known Allergies   LMP 08/26/2021 (Exact Date)      Pause for the Cause  Right patient:  Yes  Right procedure/correct coil:  Yes; rTMS; cpt 29358 ; H1 coil.   Earplugs in place:  Yes    Procedure  Patient was seated in procedure chair. Identity and procedure was verified. Ear plugs were placed in ears and patient-specific cap was placed on head and tightened appropriately. Ruler locations were verified. Coil was placed at treatment location and stimulator was set to parameters described below. A test train was delivered and pt tolerated train. Given pt tolerance, 55 treatment trains were delivered. Pt tolerated procedure.    Motor Threshold  Determination  Distance from nasion to inion: 36.0 cm  MT 1: 0 - 6.5 - 16 @ 47% on 11/22/2019  MT 2: 0 - 6.5 - 16 @ 47% on 12/24/2019  MT 3: 0 - 5.5 -16 @ 48% on 7/12/21    Stimulation Parameters  Frequency: 18 hz  Train Duration: 2 sec  Total pulses delivered: 2988  Inter-train interval: 20 sec  Tx Loc: 0 - eyebrows - 15  Energy: 58% (120%MT)  Trains: 83    Date MADRS IDS-SR PHQ-9   11/22/19 25 43 18   11/29/19 -- 45 18   12/6/19  45 18   12/13/19  47 20   12/20/19  48 13   12/27/19  26 9   1/10/19  22 5   1/17/20  21 5   1/21/20 2 21 5     Date MADRS IDS-SR PHQ-9   7/12/21  29 11   7/23/21  29 8   7/30/21  19 5   08/06/21  19 4   8/13/21  15 4   8/20/21  17 6   8/27/21  15 4         PHQ-9 SCORE 8/31/2021 9/1/2021 9/2/2021   PHQ-9 Total Score MyChart - - -   PHQ-9 Total Score 7 6 6   Some encounter information is confidential and restricted. Go to Review Flowsheets activity to see all data.       Laxmi Whitney,  TMS Technician  [facility]  Neuromodulation Clinic    Plan   - Cont TMS    I did not see the patient during/after treatment but remained available in the clinic during  treatment.      Carlton Graham MD  [facility] Neuromodulation

## 2021-09-02 NOTE — NURSING NOTE
Chief Complaint   Patient presents with     RECHECK     Severe episode of recurrent major depressive disorder, without psychotic features

## 2021-09-02 NOTE — TELEPHONE ENCOUNTER
Called [facility] pharmacy and spoke with the pharmacist. He said that their records indicate that patient has [employer] and they are rejecting coverage of vilazodone because it is not on the formulary. Asked the pharmacist if their records indicate that patient no longer has Medicare coverage because Epic indicates [employer] is only secondary. They did not have information that patient has Medicare Part D, but he was able to find coverage information and the Rx was processed successfully.

## 2021-09-02 NOTE — TELEPHONE ENCOUNTER
Robert Ugarte MD Valena, Victoria, RN  Caller: Unspecified (Today, 10:02 AM)  Crow Schwartz,     Thanks for looking into this so quickly! Mariaelena said she has been taking the medication consistently - she thought it might affect picking up the medication now as she was coming to an end of her current prescriptions. I appreciate you giving me a heads up about re-titrating though!     Mik,   Robert           Follow up:  - called and left  for patient letting her know that the pharmacy updated her insurance information and they were able to process her Rx successfully. Invited her to call back if she has any questions. Provided clinic number.

## 2021-09-02 NOTE — TELEPHONE ENCOUNTER
----- Message from Robert Ibanez MD sent at 9/2/2021  9:53 AM CDT -----  Crow Schwartz,    Can you check in with Mariaelena's pharmacy about her Viibryd? She mentioned that the pharmacy is waiting on a PA for it. I don't remember seeing this before so wanted to check in.    Thanks,  Robert

## 2021-09-02 NOTE — PATIENT INSTRUCTIONS
Treatment Plan Today:     1) Medications-  Continue medications a sprescribed  - Continue quetiapine 50 mg PRN nightly for sleep and treatment resistant depression   - Continue Adderall IR 10 mg BID (qAM + 2pm) for augmentation of depression treatment  - Continue levomilnacipran  mg QDAY for depression  - Continue vilazodone 40 mg QDAY for depression  - Continue lamotrigine 150 mg QDAY for depression and mood stabilization  - Continue melatonin 1 mg with dinner     2) Follow-up appt with Dr Coronel in 4-6 weeks    3) Crisis numbers are below and clinic after hours number is [phone]      **For crisis resources, please see the information at the end of this document**     Patient Education      Thank you for coming to the [facility]AL HEALTH & [facility] [place] [facility].    Lab Testing:  If you had lab testing today and your results are reassuring or normal they will be mailed to you or sent through "[employer] within 7 days. If the lab tests need quick action we will call you with the results. The phone number we will call with results is # [phone] (home) . If this is not the best number please call our clinic and change the number.    Medication Refills:  If you need any refills please call your pharmacy and they will contact us. Our fax number for refills is [phone]. Please allow three business for refill processing. If you need to  your refill at a new pharmacy, please contact the new pharmacy directly. The new pharmacy will help you get your medications transferred.     Scheduling:  If you have any concerns about today's visit or wish to schedule another appointment please call our office during normal business hours [phone] (8-5:00 M-F)    Contact Us:  Please call [phone] during business hours (8-5:00 M-F).  If after clinic hours, or on the weekend, please call  [phone].    Financial Assistance [phone]  [facility] Billing [phone]  Central Billing Office,  [facility]: [phone]  [facility] Billing [phone]  Medical Records [phone]  [facility] Patient Bill of Rights [email or web address]       MENTAL HEALTH CRISIS NUMBERS:  For a medical emergency please call  911 or go to the nearest ER.     [facility]:   [facility] -[phone]   Crisis Residence [facility] Residence -[phone]   Walk-In Counseling Center [facility] -[phone]   COPE 24/7 [place] Mobile Team -[phone] (adults)/[phone] (child)  CHILD: [facility] needs assessment team - [phone]      [facility]:   [facility] - [phone]   Walk-in counseling [facility] - [phone]   Walk-in counseling [facility] - [phone]   Crisis Residence [facility] Residence - [phone]  Urgent Care Adult Mental Qzyywx-[phone] mobile unit/ 24/7 crisis line    National Crisis Numbers:   National Suicide Prevention Lifeline: 8-077-146-TALK (963-787-9001)  Poison Control Center - 3-523-042-8602  [employer]/resources for a list of additional resources (SOS)  Trans Lifeline a hotline for transgender people 3-734-474-1628  The Liam Project a hotline for LGBT youth 1-779.835.1741  Crisis Text Line: For any crisis 24/7   To: 991846  see www.crisistextline.org  - IF MAKING A CALL FEELS TOO HARD, send a text!         Again thank you for choosing [facility] MENTAL HEALTH & ADDICTION [facility] and please let us know how we can best partner with you to improve you and your family's health.    You may be receiving a survey regarding this appointment. We would love to have your feedback, both positive and negative. The survey is done by an external company, so your answers are anonymous.

## 2021-09-03 ENCOUNTER — TELEPHONE (OUTPATIENT)
Dept: PSYCHIATRY | Facility: CLINIC | Age: 51
End: 2021-09-03

## 2021-09-03 ENCOUNTER — MYC MEDICAL ADVICE (OUTPATIENT)
Dept: PSYCHIATRY | Facility: CLINIC | Age: 51
End: 2021-09-03

## 2021-09-03 NOTE — TELEPHONE ENCOUNTER
Called the pharmacy to find out why Adderall is not being covered when it was covered before. Per pharmacy staff, insurance coverage changed in July, and the current insurance carrier is not covering for off-label use.

## 2021-09-03 NOTE — Clinical Note
Robert Maria!    Please review and edit. I can't find info on which TCAs and MAOIs she has tried, but maybe the insurance company won't care so much. Let me know what you think.    Thanks!  VV

## 2021-09-03 NOTE — Clinical Note
Linda Maria! What do you think of the first paragraph and on the second page, I added the cost of inpatient hospitalization. Please delete if over the top.  Thanks!

## 2021-09-03 NOTE — TELEPHONE ENCOUNTER
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     Rationale:  adjunct treatment for treatment resistant depression        Key: [id number]  Patient name: Ita   DALLAS: 1970    Submitted through [email or web address].

## 2021-09-03 NOTE — LETTER
September 3, 2021      Re: Mariaelena Jean Baptiste  [address]  [place] [address]     To: [facility]   Appeals fax: [phone]      To the ,    I am writing in response to the denial of the prior authorization we submitted for coverage of Adderall for Major Depressive Disorder, Recurrent, Moderate. Your letter indicated that Adderall is only approved for ADHD and narcolepsy.     Please reconsider your decision to deny coverage of amphetamine-dextroamphetamine (Adderall) 10 mg tablets that Ms. Jean Baptiste has been taking twice a day for treatment-resistant depression. She has been taking this medication since February 2016 as adjunctive treatment for severe depression that has not responded to numerous medication trials. At the time this medication was initiated, she was on an inpatient unit for suicidal ideation. She has been maintained on this medication since then, and has been stable. The addition of this medication is what has helped lift the severity of Ms. Jean Baptiste's depression from severe to moderate.     While Adderall does not carry FDA approval at this time for treatment-resistant depression, the American Psychiatric Association does recognize the validity of its use as adjunctive therapy in combination with antidepressants, mood stabilizers, and antipsychotics. Currently, her depression is stable on a combination of transcranial magnetic stimulation and medications. She is taking levomilnacipran (a serotonin norepinephrine reuptake inhibitor), vilazodone (a serotonin modulator and stimulator), lamotrigine (a voltage-gated sodium and calcium-channel blocker), quetiapine (a weak dopamine antagonist, serotonin antagonist, and norepinephrine reuptake inhibitor), and amphetamine-dextroamphetamine (dopamine and norepinephrine reuptake inhibitor). Together, these medications have acted synergistically to increase the level of available serotonin, norepinephrine, and  dopamine in the appropriate parts of the brain to help with elevating her mood and keeping suicidal thoughts at bay. As a result, she has been able to maintain employment and live productively. It is also important to note that since Adderall was added to her medication regimen in 2016, she has not been hospitalized psychiatrically. Prior to the initiation of this medication, she required admission for stabilization and safety one to two times a year. With inpatient psychiatric hospitalization costing $4,200 per day, this is a significant cost that insurance is not having to cover.     She has tried and failed the following medications:  - tricyclic antidepressants  - monoamine oxidase inhibitors  - selective serotonin reuptake inhibitors: citalopram, fluoxetine, sertraline  - serotonin-norepinephrine reuptake inhibitors: duloxetine, venlafaxine  - serotonin modulator and stimulator: vortioxetine  In addition, she has also undergone treatment with electroconvulsive therapy.    Please reconsider your decision not to cover Adderall for Ms. Jean Baptiste. As mentioned previously, she has been taking this medication as part of her regimen for treatment-resistant depression since February 2016. She has not had any hospitalizations since then, and based on her history, any medication changes lead to severe destabilization and hospitalization. I would like to support her continued ability to maintain safety and stability in the community.      Sincerely,      Robert Fisher DO, MPH

## 2021-09-07 ENCOUNTER — HOSPITAL ENCOUNTER (OUTPATIENT)
Dept: MAMMOGRAPHY | Facility: CLINIC | Age: 51
Discharge: HOME | End: 2021-09-07
Attending: INTERNAL MEDICINE | Admitting: INTERNAL MEDICINE
Payer: MEDICARE

## 2021-09-07 DIAGNOSIS — R92.8 BI-RADS CATEGORY 3 MAMMOGRAM RESULT: ICD-10-CM

## 2021-09-07 PROCEDURE — 77061 BREAST TOMOSYNTHESIS UNI: CPT | Mod: LT

## 2021-09-08 NOTE — TELEPHONE ENCOUNTER
PA was denied as Adderall does not have an FDA approved indication for MDD. Drafted appeal letter and routed to Dr. Homer Ibanez for review.    [facility]  Appeals fax: [phone]

## 2021-09-09 NOTE — TELEPHONE ENCOUNTER
Denial letter and amended appeal faxed to [facility] Appeals Department and sent to scanning.

## 2021-09-09 NOTE — TELEPHONE ENCOUNTER
Robert Ugarte MD Valena, Victoria RN  Caller: Unspecified (6 days ago, 11:00 AM)  Crow Schwartz,     I looked at the letter and it looks good. It has all of the pertinent information in it. Thank you for writing it!     Best,   Robert

## 2021-09-09 NOTE — TELEPHONE ENCOUNTER
----- Message from Linda Lewis RN sent at 9/9/2021  2:43 PM CDT -----  Regarding: FW: elena's pt - appeal for meds, clarification needed  Contact: [phone]   oh... let me know if there's anything I can do. Not sure if Robetr is still here, but I can try and track her down to sign a new letter if needed.    -Linda   ----- Message -----  From: Sara Greenfield  Sent: 9/9/2021   2:36 PM CDT  To: [facility]  Subject: jessica pt - appeal for meds, clarification#    Hi Team,    Medication: amphetamine-dextroamphetamine (ADDERALL) 10 MG tablet    Neelam from [facility] Appeals department calling in regards to Pt's medication. The meds were denied for refills and an appeal was sent in, however there was no diagnosis included on the appeal and [facility] were calling back for clarification.    Phone: [phone]  Fax: [phone]      Thank you,  Sara

## 2021-09-09 NOTE — TELEPHONE ENCOUNTER
Amended the letter to include the diagnosis that the insurance company identified as the reason for the denial.

## 2021-09-09 NOTE — TELEPHONE ENCOUNTER
Letter signed by Dr. Coronel. This was marked as urgent and faxed to [facility] at [phone]. Copy sent to scanning and held at writer's desk.

## 2021-09-10 NOTE — TELEPHONE ENCOUNTER
Writer filled in the ICD-10 code and faxed it back to [facility] at [phone] per GUERLINE Ricci's instriuction.  Kiley Chan, CMA

## 2021-09-10 NOTE — TELEPHONE ENCOUNTER
Writer [facility] a [facility] they need the ICD -10 code in order to process the the coverage.  Writer is awaiting further instruction from RN's.  Kliey Chan, [facility]

## 2021-09-13 ENCOUNTER — TELEPHONE (OUTPATIENT)
Dept: PSYCHIATRY | Facility: CLINIC | Age: 51
End: 2021-09-13

## 2021-09-13 ENCOUNTER — TELEPHONE (OUTPATIENT)
Dept: OTOLARYNGOLOGY | Facility: CLINIC | Age: 51
End: 2021-09-13

## 2021-09-13 ENCOUNTER — PATIENT OUTREACH (OUTPATIENT)
Dept: OTOLARYNGOLOGY | Facility: CLINIC | Age: 51
End: 2021-09-13

## 2021-09-13 DIAGNOSIS — Z20.822 COVID-19 RULED OUT: ICD-10-CM

## 2021-09-13 DIAGNOSIS — Z11.59 ENCOUNTER FOR SCREENING FOR OTHER VIRAL DISEASES: Primary | ICD-10-CM

## 2021-09-13 NOTE — TELEPHONE ENCOUNTER
Talked to Mariaelena regarding upcoming surgery with Dr. Mendoza. Patient tested positive via saliva test on 9/8/2021/[facility] test. Explained to patient that we are able to either reschedule surgery to next available or we can check with anesthesia. Patient states she does not want to get anyone sick within the OR team/Dr. Mendoza. Agreeable to reschedule to 11/29/2021. Patient states that she is mildly symptomatic. Discussed not re-testing within 90 days of positive test result. Discussed with RN supervisor. Patient to retest at [facility] location so test is officially recorded in patient chart for surgery in the future. Needs to be RT-PCR test. Patient aware pre-op H&P to be completed within 30 days of surgery. Patient was contacted and scheduled on 9/14. She has no further questions at this time.     Pati Mabry on 9/13/2021 at 3:04 PM

## 2021-09-13 NOTE — TELEPHONE ENCOUNTER
"Received a denial of the appeal via fax from "[employer].    The letter indicated that the request for coverage of Adderall is being denied because:    \"Medicare law states prescription drugs are covered by Part D if used for a 'medically accepted indication.' This means it is approved by the FDA or the indication is supported by a Medicare approved drug compendia.\"    \"Amphetamine-dextroamphetamine tablet for Major Depressive Disorder Recurrent Moderate is not approved by the FDA or supported by a Medicare approved drug compendia. Therefore, it cannot be paid for by your Medicare Part D.    The only FDA approved medically accepted indications for Amphetamine-Dextroamphetamine tablets are: Attention deficit hyperactivity disorder and Narcolepsy.\"    The letter further states that patient has the right to ask for an independent review (appeal) of the decision. \"If your case involves an exception request and your physician or other prescriber did not already provide your plan with a statement supporting your request, they must provide a statement to support your exception request and you should attach a copy of this statement to your appeal request.     Fax number:  Standard appeal: [phone]  Expedited appeal: [phone]      Sent the denial letter to [facility]. Will request that PA Team review to see if the secondary insurance can cover the medication.     "

## 2021-09-13 NOTE — TELEPHONE ENCOUNTER
Patient indicated that she received a call from [employer] stating that the appeal for coverage of Adderall was denied for the same reason that the PA was denied - off-label use.     From [employer]:  Generic Adderall 10 mg tabs cost:    30 day supply/60 tabs  - [facility] $22.98  - [facility] $22.98    90 day supply/180 tabs  - [facility] $54.95  - [employer] $56.15    Coupons sent to patient's email to review.    Routed to Dr. Homer Ibanez for FYI and further recommendations.

## 2021-09-14 ENCOUNTER — LAB (OUTPATIENT)
Dept: FAMILY MEDICINE | Facility: CLINIC | Age: 51
End: 2021-09-14
Attending: OTOLARYNGOLOGY
Payer: MEDICARE

## 2021-09-14 ENCOUNTER — TELEPHONE (OUTPATIENT)
Dept: PSYCHIATRY | Facility: CLINIC | Age: 51
End: 2021-09-14

## 2021-09-14 DIAGNOSIS — Z20.822 COVID-19 RULED OUT: ICD-10-CM

## 2021-09-14 DIAGNOSIS — Z11.59 ENCOUNTER FOR SCREENING FOR OTHER VIRAL DISEASES: ICD-10-CM

## 2021-09-14 LAB — SARS-COV-2 RNA RESP QL NAA+PROBE: POSITIVE

## 2021-09-14 PROCEDURE — U0003 INFECTIOUS AGENT DETECTION BY NUCLEIC ACID (DNA OR RNA); SEVERE ACUTE RESPIRATORY SYNDROME CORONAVIRUS 2 (SARS-COV-2) (CORONAVIRUS DISEASE [COVID-19]), AMPLIFIED PROBE TECHNIQUE, MAKING USE OF HIGH THROUGHPUT TECHNOLOGIES AS DESCRIBED BY CMS-2020-01-R: HCPCS

## 2021-09-14 PROCEDURE — U0005 INFEC AGEN DETEC AMPLI PROBE: HCPCS

## 2021-09-14 NOTE — TELEPHONE ENCOUNTER
Will try PA through [facility] to see if they will cover the medication. If not, the second level appeal (Independent Reviewer) can be submitted through [employer]. Second level appeal is handled by the clinic and/or the patient. See new prior auth encounter for new PA.

## 2021-09-14 NOTE — TELEPHONE ENCOUNTER
"Telephone Call with Patient    Start time: 22:01  End time: 22:11    Patient called with concerns for medication side effects.    Mariaelena states new onset symptoms starting today.  Symptoms include increased blood pressure after standing, measured at 183/110.  She is also had rapid heartbeat.  She is also been having nausea and heavy sweating.  She also feels dizzy lightheaded after standing.     Rise in blood pressure only occurs after standing.  She does state having a history of orthostatic hypotension related to chronic autonomic dysregulation.    Notably patient was diagnosed with acute COVID last week.  Has had rather \"mild symptoms.\"  When asked about patient's oral intake she does endorse reduced fluid intake the last couple days, possibly drinking only 40 ounces.  Patient also states the use of Sudafed for the last 3 days.  She is taking as prescribed.    She has also been taking her psychotropic medications as prescribed.  Has taken her current medication regimen and Sudafed together before and had no issues previously.     Etiology of new onset symptoms not entirely clear.  Symptoms could be related to interactions between prescribed medications and Sudafed exacerbated by decreased oral fluid intake.  Symptoms are also concerning for possible serotonin syndrome given she is prescribed several serotonergic agents.  COVID further complicates the picture as there've been reports of varying autonomic instability with acute and post infection.    Given the complex medical picture Mariaelena is presenting with it was recommended that she present to the emergency department for evaluation (including labs) as well as IV repletion of fluids.     All he was agreeable to this and was going to call a close friend of hers who is COVID recovered and vaccinated to drive her to the ED as it was recommended she not drive due to near syncope.  A friend is unavailable Mariaelena would call EMS to transport her.      Darrian Silva, " MD  Psychiatry Resident PGY-2

## 2021-09-15 NOTE — TELEPHONE ENCOUNTER
Central Prior Authorization Team   Phone: [phone]      PA Initiation via fax    Medication: amphetamine-dextroamphetamine (ADDERALL) 10 MG tablet  Insurance Company: [employer] - Phone [phone] Fax [phone]  Pharmacy Filling the Rx: [id number] [address]  Filling Pharmacy Phone: [phone]  Filling Pharmacy Fax:    Start Date: 9/14/2021                     
Prior Authorization Not Needed per Insurance    Medication: amphetamine-dextroamphetamine (ADDERALL) 10 MG tablet-APPROVED  Insurance Company: [employer] - Phone [phone] Fax [phone]  Expected CoPay:      Pharmacy Filling the Rx: [facility] #[id number] [address]  Pharmacy Notified: Yes  Patient Notified: No    Pharmacy was able to get a paid claim for $1. They will fill medication and notify patient via text or call when ready.        
Prior Authorization Retail Medication Request    Medication/Dose: amphetamine-dextroamphetamine (ADDERALL) 10 MG tablet  ICD code (if different than what is on RX):  Moderate episode of recurrent major depressive disorder (H) [F33.1]   Previously Tried and Failed:    Rationale:      Insurance Name:  [employer]  Insurance ID:  [id number]      Pharmacy Information (if different than what is on RX)  Name:  [facility] #[id number] [address]  Phone:  [phone]    Copied from encounter 9/3/21 to start PA through [employer]            
Robert Ugarte MD Valena, Victoria, RN  Cc: Tiffanie Day  Caller: Unspecified (Yesterday,  8:24 AM)  Crow Jackman,     This is great news! Thank you so much for all of your help.     Sincerely,   Robert     
gradual onset

## 2021-09-15 NOTE — TELEPHONE ENCOUNTER
Tiffanie Day Victoria, RN  Caller: Unspecified (1 week ago)  Crow Schwartz,     A lot of the time pharmacies processes scripts through both primary and secondary but the primary rejection will say PA needed so they immediately send the request, and since PA is needed through primary, they didn't think about processing just the secondary alone. Sometimes secondary won't cover the medication if they see that patient has a primary. In that case we would get a letter stating that patient has other primary insurance and bill first. But it doesn't hurt to submit PA's through secondary just to see and turns out, it's paid through secondary alone! Glad we were able to get this approved for the patient!     Tiffanie             Previous Messages       ----- Message -----   From: Lana Barone RN   Sent: 9/15/2021   9:47 AM CDT   To: Tiffanie Hong!     I really appreciate your help with this. In cases like this, should I ask the pharmacy first to run the claim through both insurance coverages? It seems like maybe they did not do that here and only did the Medicare Part D insurance. If Medicaid did not require a PA, there was no need to do the PA for the primary insurance in the first place, right?     Thank you!   VV

## 2021-09-29 NOTE — PROGRESS NOTES
"VIDEO VISIT  Mariaelena Jean Baptiste is a 50 year old patient who is being evaluated via a billable video visit.      The patient has been notified of following:   \"We have found that certain health care needs can be provided without the need for an in-person physical exam. This service lets us provide the care you need with a video conversation. If a prescription is necessary we can send it directly to your pharmacy. If lab work is needed we can place an order for that and you can then stop by our lab to have the test done at a later time. Insurers are generally covering virtual visits as they would in-office visits so billing should not be different than normal.  If for some reason you do get billed incorrectly, you should contact the billing office to correct it and that number is in the AVS .    Patient has given verbal consent for video visit?: Yes   How would you like to obtain your AVS?: Resultly  AVS SmartPhrase [PsychAVS] has been placed in 'Patient Instructions': Yes      Video- Visit Details  Type of service:  video visit for medication management  Time of service:    Date:  09/30/2021    Video Start Time:  10:46 AM        Video End Time:  11:30AM    Reason for video visit:  Patient unable to travel due to Covid-19  Originating Site (patient location):  [facility]   Location- Patient's home  Distant Site (provider location):  [facility]  Mode of Communication:  Video Conference via AmWell  Consent:  Patient has given verbal consent for video visit?: Yes              [facility]  Psychiatry Clinic  MEDICAL PROGRESS NOTE     CARE TEAM:  PCP- Thalia Bradford, Psychotherapist- Dr. Julieta Gill, Specialty: Dr. Marie at [facility]    Mariaelena Jean Baptiste is a 50 year old who prefers the name Mariaelena and uses pronouns she, her, they.      DIAGNOSIS     MDD, recurrent, moderate (treatment-resistant; h/o severe episodes)  BPD  PTSD  DID  Unspecified Eating Disorder, in " remission  Insomnia, likely circadian rhythm disorder      ASSESSMENT   Mariaelena reports she has had difficulty sleeping. She believes this is due to her nasal septum injury - she had a surgery scheduled but had to reschedule as she contracted Covid from an unvaccinated coworker. She also believes her upper back pain is preventing her from sleeping well. At this time, she does not have anyone managing her pain. She states she has not seen pain management in the past - would be willing to have a referral however patient is not interested in starting opioids. She also would like to try acupuncture - she has tried this in the past and it was helpful. As far as mental health - she is overall stable. Her lack of sleep is impacting her mood, but she has no safety concerns.    [place] was [place] today:  Indicates taking controlled medication as prescribed.     PLAN                                                                                                                1) Meds-  - Continue quetiapine 50 mg PRN nightly for sleep and treatment resistant depression (patient reports she uses this every night)  - Continue Adderall IR 10 mg BID (qAM + 2pm) for augmentation of depression treatment  - Continue levomilnacipran  mg QDAY for depression  - Continue vilazodone 40 mg QDAY for depression  - Continue lamotrigine 150 mg QDAY for depression and mood stabilization  - Continue melatonin 1 mg with dinner     Other:     - Resume light box daily in Fall - patient has new blue light box which she finds to be more helpful than white light    2) Psychotherapy- Continue biweekly therapy w/ DBT therapist Dr. Julieta Gill Psy D    3) Next due-  Labs- AP monitoring labs due Jan 2022  EKG- as needed  Rating Scales- PHQ-9 at every in-person visit, AIMS done 9/2/2021 - score 0    4) Referrals-  None    5) Dispo- RTC in 4-6 weeks       PERTINENT BACKGROUND                           [most recent eval 07/20/21]   Pertinent Background:   Mariaelena first experienced mental health issues as a young adult and has received treatment for treatment-resistant depression, BPD with severe self harm, and PTSD. Notably, both naltrexone and clozapine have reduced SIB immensely, with return when taper off has been initiated in the past (although no return of SIB to date since d/c of clozapine). She does better when she uses a light box in the Fall/Winter, best started in September as she typically experiences return of depressive symptoms in October. A taper of Lamictal was associated with decline in mood and subsequent hospitalization in the past which is a common theme for her as medication changes, even small ones, tend to be very destabilizing. A TMS series through the [facility] in August 2016 was transformative in terms of ongoing remission of her depression for the following 2+ years. She often is not aware of reemerging depressive symptoms until they become severe.    Psych pertinent item history includes suicide attempts {2x], suicidal ideation, severe SIB [has required skin grafts and long term hospitalization at [place]], mutiple psychotropic trials, trauma hx, ECT, TMS, psych hosp (>5), and commitment.      SUBJECTIVE     - Mariaelena states she contracted Covid from an unvaccinated coworker - had mild symptoms   - She states she is not sleeping well - Will sleep for 3 hours - will wake up for 2-3 hours and go back to sleep, but sometimes will wake up at 2am and stay up  - Thinks it could be related to not being able to breathe through her nose - had to postpone septal surgery due to having Covid and surgery will be on November 29th   - Also has chronic upper back pain - has lidocaine, CBD balm, and has a scheduled a massage next week - has seen PCP for this in the past, would like to consider acupuncture or pain management, however not interested in opioids   - States that since ending TMS, she has been doing well - the series helped her mood  - Denied any  SI/SIB or HI - had no safety concerns    RECENT PSYCH ROS:   Depression:  none  Elevated:  none  Psychosis:  none  Anxiety:  none  Trauma Related:  none   Sleep: dysregulation - believes this is related to her nasal septum injury and upper back pain  Other: N/A    Adverse Effects: headaches - however doesn't believe related to meds  Pertinent Negative Symptoms: No suicidal ideation, self-injurious behavior/urges or violent ideation  Recent Substance Use:     Tobacco- vapes nicotine     FAMILY and SOCIAL HISTORY                                 pt reported     Family Hx: Depression in her father and sister. Anxiety and CD issues in many family members. Paternal grandfather committed suicide in his 50s. No h/o BPAD or Schizophrenia.    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    Social/ Spiritual Support- DBT therapist, online friends, father and step mother in [place]; brother [place] sister [place] in the [place] (supportive, close with them), a few co workers and a few close friends from the feline rescue (Ahsan and Anika). Mariaelena grew up Orthodoxy - continues to believe in God but does not identify with a specific [employer].  Feels Safe at Home- yes     PSYCH and SUBSTANCE USE Critical Summary Points since July 2021 July 2021 - Transfer DA. No med changes. Patient was doing her 3rd series of TMS which was very helpful - noticed improved mood.  Sept 2021 - Patient completed TMS. No med changes.        PAST MED TRIALS     Per chart review:  Numerous including TCAs, MAOI's, Prozac, Zoloft,  Viibryd, Brintellix, Celexa, Effexor, Cymbalta, lithium, clonidine, Provigil, cytomel, naltrexone (for SIB), dextroamphetamine, lithium, lamictal, clozapine (used for self-harm urges and cross-titrated to Latuda in 2016 in anticipation of TMS).     Per discussion with  Dr. Matthews 2/2016:  Both naltrexone and clozapine have reduced SIB immensely, with return when taper off has been initiated in the past.      MEDICAL HISTORY and ALLERGY     ALLERGIES: Patient has no known allergies.    Patient Active Problem List   Diagnosis     Suicidal ideation     Major depression, recurrent (H)     Tobacco abuse     Hx of psychiatric care     Scar     Nasal septal deviation        MEDICAL REVIEW OF SYSTEMS   Contraception- s/p tubal ligation, otherwise did not discuss    A comprehensive review of systems was performed and is negative other than noted in the HPI.     MEDICATIONS     Current Outpatient Medications   Medication Sig Dispense Refill     amphetamine-dextroamphetamine (ADDERALL) 10 MG tablet Take 1 tablet (10 mg) by mouth 2 times daily 60 tablet 0     ibuprofen (ADVIL,MOTRIN) 800 MG tablet Take 400 mg by mouth as needed        lamoTRIgine (LAMICTAL) 150 MG tablet Take 1 tablet (150 mg) by mouth daily 30 tablet 2     levomilnacipran (FETZIMA ER) 120 MG 24 hr capsule Take 1 capsule (120 mg) by mouth daily 30 capsule 2     MAGNESIUM OXIDE PO        multivitamin, therapeutic (THERA-VIT) TABS tablet Take 1 tablet by mouth daily       predniSONE (DELTASONE) 20 MG tablet Take 2 tablets (40 mg) by mouth daily 10 tablet 0     QUEtiapine (SEROQUEL) 25 MG tablet Take 2 tablets (50 mg) by mouth At Bedtime 60 tablet 2     vilazodone (VIIBRYD) 40 MG TABS tablet Take 1 tablet (40 mg) by mouth daily 30 tablet 2     VITAMIN D, CHOLECALCIFEROL, PO Take 1,000 Units by mouth 2 times daily         VITALS   There were no vitals taken for this visit.    MENTAL STATUS EXAM     Alertness: alert  and oriented  Appearance: well  groomed  Behavior/Demeanor: cooperative and pleasant, with good  eye contact   Speech: normal and regular rate and rhythm  Language: intact  Psychomotor: normal or unremarkable  Mood: description consistent with euthymia  Affect: full range; congruent to: mood- yes, content- yes  Thought Process/Associations: unremarkable  Thought Content:  Reports none;  Denies suicidal ideation  Perception:  Reports none;  Denies hallucinations  Insight: good  Judgment: good  Cognition: does  appear grossly intact; formal cognitive testing was not done  Gait and Station: unremarkable     LABS and DATA     PHQ9 TODAY = 8  PHQ 9/1/2021 9/2/2021 9/2/2021   PHQ-9 Total Score 6 6 8   Q9: Thoughts of better off dead/self-harm past 2 weeks Not at all Not at all Not at all       Recent Labs   Lab Test 01/13/21  1149 06/05/20  1051 05/20/20  0317 04/15/19  1401   GLC  --  94 137* 87   A1C 5.1  --   --  5.4     Recent Labs   Lab Test 01/13/21  1149 04/15/19  1401   CHOL 206* 191   TRIG 42 42   * 112*   HDL 80 71     Recent Labs   Lab Test 05/20/20  0317 04/15/19  1401   AST 27 22   ALT 19 23   ALKPHOS 95 62     Recent Labs   Lab Test 06/05/20  1051 05/20/20  0217 04/15/19  1401   WBC 5.7 11.5* 7.5   ANEU 3.6  --  5.2   HGB 14.6 12.7 12.5   * 403 445       ECG 5/2020 QTc = 462 ms     PSYCHOTROPIC DRUG INTERACTIONS     VILAZODONE + FETZIMA + ADDERALL + SEROQUEL may result in increased risk for serotonin syndrome.      VILAZODONE + FETZIMA may enhance the antiplatelet effect of other Agents with Antiplatelet Properties.      LAMOTRIGINE + ACETAMINOPHEN may result in decreased lamotrigine serum levels.     ADDERALL + LISINOPRIL:  amphetamines may diminish the antihypertensive effect of antihypertensive agents.     MANAGEMENT:  Monitoring for adverse effects, routine vitals and using lowest therapeutic dose of [psychotropics]     RISK STATEMENT for SAFETY     Mariaelena Jean Baptiste did not appear to be an imminent safety risk to self or  others.    TREATMENT RISK STATEMENT: The risks, benefits, alternatives and potential adverse effects have been discussed and are understood by the pt. The pt understands the risks of using street drugs or alcohol. There are no medical contraindications, the pt agrees to treatment with the ability to do so. The pt knows to call the clinic for any problems or to access emergency care if needed.  Medical and substance use concerns are documented above.  Psychotropic drug interaction check was done, including changes made today.    PROVIDER: Robert Ibanez DO, MPH    Patient staffed in clinic with Dr. Perez who will sign the note.  Supervisor is Dr. Byrne.      TELEHEALTH ATTENDING ATTESTATION  Following the ACGME guidelines on telemedicine and direct supervision due to COVID-19, I was concurrently participating in and/or monitoring the patient care through appropriate telecommunication technology.  I discussed the key portions of the service with the resident, including the mental status examination and developing the plan of care. I reviewed key portions of the history with the resident. I agree with the findings and plan as documented in this note.   Josemanuel Perez MD

## 2021-09-30 ENCOUNTER — TELEPHONE (OUTPATIENT)
Dept: PSYCHIATRY | Facility: CLINIC | Age: 51
End: 2021-09-30

## 2021-09-30 ENCOUNTER — VIRTUAL VISIT (OUTPATIENT)
Dept: PSYCHIATRY | Facility: CLINIC | Age: 51
End: 2021-09-30
Attending: PSYCHIATRY & NEUROLOGY
Payer: MEDICARE

## 2021-09-30 ENCOUNTER — MYC MEDICAL ADVICE (OUTPATIENT)
Dept: INTERNAL MEDICINE | Facility: CLINIC | Age: 51
End: 2021-09-30

## 2021-09-30 DIAGNOSIS — F33.1 MODERATE EPISODE OF RECURRENT MAJOR DEPRESSIVE DISORDER (H): ICD-10-CM

## 2021-09-30 PROCEDURE — 99214 OFFICE O/P EST MOD 30 MIN: CPT | Mod: GC | Performed by: STUDENT IN AN ORGANIZED HEALTH CARE EDUCATION/TRAINING PROGRAM

## 2021-09-30 RX ORDER — DEXTROAMPHETAMINE SACCHARATE, AMPHETAMINE ASPARTATE, DEXTROAMPHETAMINE SULFATE AND AMPHETAMINE SULFATE 2.5; 2.5; 2.5; 2.5 MG/1; MG/1; MG/1; MG/1
10 TABLET ORAL 2 TIMES DAILY
Qty: 60 TABLET | Refills: 0 | Status: SHIPPED | OUTPATIENT
Start: 2021-09-30 | End: 2021-11-09

## 2021-09-30 RX ORDER — QUETIAPINE FUMARATE 25 MG/1
50 TABLET, FILM COATED ORAL AT BEDTIME
Qty: 60 TABLET | Refills: 2 | Status: SHIPPED | OUTPATIENT
Start: 2021-09-30 | End: 2021-11-09

## 2021-09-30 RX ORDER — VILAZODONE HYDROCHLORIDE 40 MG/1
40 TABLET ORAL DAILY
Qty: 30 TABLET | Refills: 2 | Status: SHIPPED | OUTPATIENT
Start: 2021-09-30 | End: 2021-11-09

## 2021-09-30 RX ORDER — LAMOTRIGINE 150 MG/1
150 TABLET ORAL DAILY
Qty: 30 TABLET | Refills: 2 | Status: SHIPPED | OUTPATIENT
Start: 2021-09-30 | End: 2021-11-09

## 2021-09-30 ASSESSMENT — PAIN SCALES - GENERAL: PAINLEVEL: MODERATE PAIN (4)

## 2021-09-30 NOTE — PATIENT INSTRUCTIONS
**For crisis resources, please see the information at the end of this document**     Patient Education      Thank you for coming to the [facility] [place] [facility].    Lab Testing:  If you had lab testing today and your results are reassuring or normal they will be mailed to you or sent through Datam within 7 days. If the lab tests need quick action we will call you with the results. The phone number we will call with results is # [phone] (home) . If this is not the best number please call our clinic and change the number.    Medication Refills:  If you need any refills please call your pharmacy and they will contact us. Our fax number for refills is [phone]. Please allow three business for refill processing. If you need to  your refill at a new pharmacy, please contact the new pharmacy directly. The new pharmacy will help you get your medications transferred.     Scheduling:  If you have any concerns about today's visit or wish to schedule another appointment please call our office during normal business hours [phone] (8-5:00 M-F)    Contact Us:  Please call [phone] during business hours (8-5:00 M-F).  If after clinic hours, or on the weekend, please call  [phone].    Financial Assistance [phone]  [facility] Billing [phone]  Central Billing Office, [facility]: [phone]  [facility] Billing [phone]  Medical Records [phone]  [facility] Patient Bill of Rights [email or web address]       MENTAL HEALTH CRISIS NUMBERS:  For a medical emergency please call  911 or go to the nearest ER.     [facility]:   [facility] -[phone]   Crisis Residence [facility]idence -[phone]   Walk-In Counseling Center [facility] -[phone]   COPE 24/7 [facility] Mobile Team -[phone] (adults)/[phone] (child)  CHILD: [facility] needs assessment  team - [phone]      [facility]:   [facility] - [phone]   Walk-in counseling [facility] - [phone]   Walk-in counseling [facility] - [phone]   Crisis Residence [facility] - [phone]  Urgent Care Adult Mental [facility]-[phone] mobile unit/ 24/7 crisis line    National Crisis Numbers:   National Suicide Prevention Lifeline: 1-550-077-TALK (629-861-6921)  Poison Control Center - 5-361-884-9663  [employer]/resources for a list of additional resources (SOS)  Trans Lifeline a hotline for transgender people 1-349.797.2296  The Liam Project a hotline for LGBT youth 2-621-335-9054  Crisis Text Line: For any crisis 24/7   To: 708397  see www.crisistextline.org  - IF MAKING A CALL FEELS TOO HARD, send a text!         Again thank you for choosing [facility] [place] and please let us know how we can best partner with you to improve you and your family's health.    You may be receiving a survey regarding this appointment. We would love to have your feedback, both positive and negative. The survey is done by an external company, so your answers are anonymous.

## 2021-09-30 NOTE — TELEPHONE ENCOUNTER
On 9/30/2021 a PA was received from [employer] and is required for the Fetzima 120 mg caps, this was routed and emailed to GUERLINE Parr and a copy was held. Kiley Chan CMA

## 2021-09-30 NOTE — PROGRESS NOTES
"VIDEO VISIT  Mariaelena Jean Baptiste is a 50 year old patient who is being evaluated via a billable video visit.      The patient has been notified of following:   \"This video visit will be conducted via a call between you and your physician/provider. We have found that certain health care needs can be provided without the need for an in-person physical exam. This service lets us provide the care you need with a video conversation. If a prescription is necessary we can send it directly to your pharmacy. If lab work is needed we can place an order for that and you can then stop by our lab to have the test done at a later time. Insurers are generally covering virtual visits as they would in-office visits so billing should not be different than normal.  If for some reason you do get billed incorrectly, you should contact the billing office to correct it and that number is in the AVS .    Video Conference to be completed via:  [place]    Patient has given verbal consent for video visit?:  Yes    Patient would prefer that any video invitations be sent by: Send to e-mail at: [email or web address]      How would patient like to obtain AVS?:  [place]    AVS SmartPhrase [PsychAVS] has been placed in 'Patient Instructions':  Yes    "

## 2021-10-01 ENCOUNTER — MYC MEDICAL ADVICE (OUTPATIENT)
Dept: PSYCHIATRY | Facility: CLINIC | Age: 51
End: 2021-10-01

## 2021-10-01 NOTE — TELEPHONE ENCOUNTER
Key: [id number] - PA Case ID: [id number]    Approved today    Approved. This drug has been approved under the Member's Medicare Part D benefit. Approved quantity: 30 capsules per 30 day(s). You may fill up to a 90 day supply except for those on Specialty Tier 5, which can be filled up to a 30 day supply. Please call the pharmacy to process the prescription claim.    Follow up:    - called the pharmacy and left VM advising them that the PA for Fetzima has been approved. Provided PA Case ID number.  - requested a return call if they have questions, provided clinic number  - sent patient a message via [employer] to let her know    Routed to Dr. Homer Ibanez for FYI.

## 2021-10-01 NOTE — TELEPHONE ENCOUNTER
----- Message -----   From: Sara Greenfield   Sent: 10/1/2021  11:45 AM CDT   To: [facility] [place]   Subject: elena's pt - insurance req PA for med.           Hi Team,     Pt's Insurance called and said they needed a PA for medication: levomilnacipran (FETZIMA ER) 120 MG 24 hr capsule.     Insurance phone number is: [phone]   Just need to give them the pt's name and date of birth.       Thank you,   Sara        Follow up:    - called the insurance company and was on hold for 30 minutes before the call was disconnected.

## 2021-10-01 NOTE — TELEPHONE ENCOUNTER
Called the pharmacy and was informed that the primary insurance is requiring a prior authorization. They are not able to submit to the secondary insurance without first getting a response from the primary insurance regarding coverage.    BIN [id number]  N [id number]  RxGrp [id number]  ID [id number]    Fetzima was started on 2/10/2016 during a hospitalization for suicidal ideation.    She has tried and failed the following medications:  - tricyclic antidepressants  - monoamine oxidase inhibitors  - selective serotonin reuptake inhibitors: citalopram, fluoxetine, sertraline  - serotonin-norepinephrine reuptake inhibitors: duloxetine, venlafaxine  - serotonin modulator and stimulator: vortioxetine  In addition, she has also undergone treatment with electroconvulsive therapy.    Submitted PA request through [employer]  Key: [id number]

## 2021-10-04 NOTE — TELEPHONE ENCOUNTER
On 10/1/2021 a fax was received from [employer] stating the above named medication has been approved for 30 for 30 d/s - 90/90 etc.  Approval date starting 9/30/2021 through no end date listed.  Writer called the pharmacy to confirm and pharmacy will contact pt when it will be ready for . Kiley Chan, CMA

## 2021-10-15 ENCOUNTER — OFFICE VISIT (OUTPATIENT)
Dept: PALLIATIVE MEDICINE | Facility: CLINIC | Age: 51
End: 2021-10-15
Payer: MEDICARE

## 2021-10-15 VITALS — SYSTOLIC BLOOD PRESSURE: 136 MMHG | HEART RATE: 109 BPM | DIASTOLIC BLOOD PRESSURE: 92 MMHG | OXYGEN SATURATION: 98 %

## 2021-10-15 DIAGNOSIS — M47.812 CERVICAL SPONDYLOSIS WITHOUT MYELOPATHY: ICD-10-CM

## 2021-10-15 DIAGNOSIS — F33.1 MODERATE EPISODE OF RECURRENT MAJOR DEPRESSIVE DISORDER (H): ICD-10-CM

## 2021-10-15 DIAGNOSIS — G89.29 CHRONIC UPPER BACK PAIN: Primary | ICD-10-CM

## 2021-10-15 DIAGNOSIS — M54.9 CHRONIC UPPER BACK PAIN: Primary | ICD-10-CM

## 2021-10-15 PROCEDURE — 99204 OFFICE O/P NEW MOD 45 MIN: CPT | Performed by: PHYSICAL MEDICINE & REHABILITATION

## 2021-10-15 RX ORDER — CYCLOBENZAPRINE HCL 5 MG
5-10 TABLET ORAL 3 TIMES DAILY PRN
Qty: 90 TABLET | Refills: 0 | Status: SHIPPED | OUTPATIENT
Start: 2021-10-15 | End: 2022-05-04

## 2021-10-15 RX ORDER — CYCLOBENZAPRINE HCL 5 MG
5 TABLET ORAL 3 TIMES DAILY PRN
Qty: 90 TABLET | Refills: 0 | Status: SHIPPED | OUTPATIENT
Start: 2021-10-15 | End: 2021-10-15

## 2021-10-15 ASSESSMENT — PAIN SCALES - GENERAL: PAINLEVEL: MODERATE PAIN (4)

## 2021-10-15 NOTE — PATIENT INSTRUCTIONS
1. Try the following yoga videos:  [email or web address]    2. I ordered pain physical therapy and pain psychology, you'll be called to schedule this.    3. You can take ibuprofen 400-600mg twice daily as needed, max 4 days/week.    4. You can take tylenol 1000mg 3-4 times a day as needed.    5. You can take flexeril 5-10mg three times daily as needed. Take half tabs if this is making you tired.    6. Follow up in 2 months, call the number below to schedule.    Take care,    Epi [facility]        ----------------------------------------------------------------  Clinic Number:  [phone]     Call with any questions about your care and for scheduling assistance.     Calls are returned Monday through Friday between 8 AM and 4:30 PM. We usually get back to you within 2 business days depending on the issue/request.    If we are prescribing your medications:    For opioid medication refills, call the clinic or send a [employer] message 7 days in advance.  Please include:    Name of requested medication    Name of the pharmacy.    For non-opioid medications, call your pharmacy directly to request a refill. Please allow 3-4 days to be processed.     Per [place] State Law:    All controlled substance prescriptions must be filled within 30 days of being written.      For those controlled substances allowing refills, pickup must occur within 30 days of last fill.      We believe regular attendance is key to your success in our program!      Any time you are unable to keep your appointment we ask that you call us at least 24 hours in advance to cancel.This will allow us to offer the appointment time to another patient.     Multiple missed appointments may lead to dismissal from the clinic.

## 2021-10-15 NOTE — PROGRESS NOTES
[facility] Consultation    Date of visit: 10/15/2021      Assessment:  Mariaelena Jean Baptiste is a 50 year old with past medical history including: GERD, HTN, Migraines, PTSD, Anxiety, Depression who presents for evaluation and treatment of the followin. Chronic bilateral upper back pain: Mariaelena reports a long standing history of intermittent upper back pain flare ups that has worsened in the past year and a half. On exam they have tenderness and trigger points in the bilateral trapezius, levator scapulae and rhomboids. MRI shows C5-6 spinal canal stenosis and moderate right foraminal stenosis. Cervical epidural steroid injection has been completed without any improvement. Discussed that their symptoms are likely due to a combination of chronic myofascial pain and cervical spondylosis.        Plan:  The following recommendations were given to the patient. Diagnosis, treatment options, risks, benefits, and alternatives were discussed, and all questions were answered. The patient expressed understanding of the plan for management.     I am recommending a multidisciplinary treatment plan to help this patient better manage Mariaelena Jean Baptiste's pain.  This includes:     1. Physical Therapy: chronic pain PT referral placed to develop a hep.  2. Clinical Health Pain Psychologist: pain phd referral placed. Discussed using mindfulness techniques.   1. Therapy can help reduce physical and psychosocial triggers or reinforcers of pain by adapting thoughts, feelings and behaviors to reduce symptoms and increase quality of life.  Evidence indicates that the practice of relaxation, meditation, and mindfulness techniques can significantly affect pain levels and overall well being.  3. Self Care Recommendations: Gentle progressive exercise that does not increase pain - gradually increase daily walking program.  Take mini breaks - 5 minutes of mindfullness a couple times a day.   1. Yoga  exercises provided.  4. Diagnostic Studies: reviewed cervical MRI  5. Medication Management:   1. Tylenol 1000mg qid prn  2. Ibuprofen 400-600mg bid prn, max 4 days/week  3. Flexeril 5-10mg tid prn  6. Further procedures recommended: consider TPI  7. Follow up: 2 months.      Epi Jennings DO  [facility]          Reason for consultation:    Mariaelena Jean Baptiste is a 50 year old adult who is seen in consultation today at the request of Dr. Homer Ibanez.     Consultation and Evaluation for: chronic pain    Review of [place] Prescription Monitoring Program (): Today I have also reviewed the patient's history of controlled substance use, as provided by [place] licensed pharmacies and prescriber dispensers.     Review of Pain Questionnaire: Please see the [facility] health questionnaire, which the patient completed and reviewed with me in detail.    Review of Electronic Chart: Today I have also reviewed available medical information in the patient's medical record at [place] ([facility]), including relevant provider notes, laboratory work, and imaging.     Mariaelena Jean Baptiste has not been seen at a pain clinic in the past.      Chief Complaint:    No chief complaint on file.      Pain history:  Mariaelena Jean Baptiste is a 50 year old adult who presents for initial evaluation of chief pain complaint of chronic upper back pain.     Mariaelena has had intermittent upper back and neck pain for over 20 years. In the past year and a half this has been more constant and the severity has been worsening.    Previously they would get massages and they would do a PT home exercise regimen which was very effective, unfortunately this is not as effective as it was in the past. The exercises are harder to do and make their pain worse at this time. They continue trying to do the exercises daily. When the pain flares up it can radiate down her arms and can radiate up the back of their head as well and can  "trigger headaches as well.    Onset: 1.5 years more consistent  Location/Radiation: upper back  Quality: \"sharp, shooting\"  Severity/Intensity: 8/10 at worst, 2/10 at best, 6/10 on average  Aggravating factors include: \"almost everything\"  Relieving factors include: \"laying flat on the floor\"  Red Flags: The patient denies bowel or bladder incontinence, parasthesias, weakness, saddle anesthesia, unintentional weight loss, or fever/chills/sweats.         Pain Treatments:  1. Medications:       Current pain medications:  -Levomilnacipran 120mg extended release  -Tyelnol 1000mg twice daily as needed  -Ibuprofen 400-600mg       Previous pain medications:  -Steroid burst - helped for a couple weeks  -Flexeril   2. Physical Therapy: -Did PT earlier this year    TENS unit: hasn't tried  3. Pain psychology: hasn't tried  4. Surgery: -none  5. Injections: -cervical epidural steroid injection 4/21/21 - no relief  6. Alternative Therapies:    Chiropractic: when they were in their 20s, didn't help   Acupuncture: had acupuncture from a friend a couple times      Diagnostic tests:  MRI of C-spine was completed on 2/5/21 was reviewed with the patient and shows:.    Impression:   Cervical spondylosis at C5-6 with mild spinal canal stenosis and  moderate right foraminal stenosis as detailed above.     Labs:   Lab Results   Component Value Date    WBC 5.7 06/05/2020     Lab Results   Component Value Date    RBC 5.05 06/05/2020     Lab Results   Component Value Date    HGB 14.6 06/05/2020     Lab Results   Component Value Date    HCT 46.7 06/05/2020     Lab Results   Component Value Date    MCV 93 06/05/2020     Lab Results   Component Value Date    MCH 28.9 06/05/2020     Lab Results   Component Value Date    MCHC 31.3 06/05/2020     Lab Results   Component Value Date    RDW 12.5 06/05/2020     Lab Results   Component Value Date     06/05/2020     Last Comprehensive Metabolic Panel:  Sodium   Date Value Ref Range Status "   06/05/2020 138 133 - 144 mmol/L Final     Potassium   Date Value Ref Range Status   06/05/2020 4.4 3.4 - 5.3 mmol/L Final     Chloride   Date Value Ref Range Status   06/05/2020 107 94 - 109 mmol/L Final     Carbon Dioxide   Date Value Ref Range Status   06/05/2020 25 20 - 32 mmol/L Final     Anion Gap   Date Value Ref Range Status   06/05/2020 6 3 - 14 mmol/L Final     Glucose   Date Value Ref Range Status   06/05/2020 94 70 - 99 mg/dL Final     Urea Nitrogen   Date Value Ref Range Status   06/05/2020 15 7 - 30 mg/dL Final     Creatinine   Date Value Ref Range Status   06/05/2020 0.77 0.52 - 1.04 mg/dL Final     GFR Estimate   Date Value Ref Range Status   06/05/2020 >90 >60 mL/min/[1.73_m2] Final     Comment:     Non  GFR Calc  Starting 12/18/2018, serum creatinine based estimated GFR (eGFR) will be   calculated using the Chronic Kidney Disease Epidemiology Collaboration   (CKD-EPI) equation.       Calcium   Date Value Ref Range Status   06/05/2020 9.0 8.5 - 10.1 mg/dL Final     Bilirubin Total   Date Value Ref Range Status   05/20/2020 0.4 0.0 - 1.0 mg/dL Final   04/15/2019 0.4 0.2 - 1.3 mg/dL Final     Alkaline Phosphatase   Date Value Ref Range Status   05/20/2020 95 45 - 120 U/L Final   04/15/2019 62 40 - 150 U/L Final     ALT   Date Value Ref Range Status   05/20/2020 19 0 - 45 U/L Final   04/15/2019 23 0 - 50 U/L Final     AST   Date Value Ref Range Status   05/20/2020 27 0 - 40 U/L Final   04/15/2019 22 0 - 45 U/L Final                 Past Medical History:  Past Medical History:   Diagnosis Date     Anxiety 1988     Chronic osteoarthritis      Depressive disorder      Dissociative identity disorder (H)      Gastro-oesophageal reflux disease      Hypertension      Migraines      PTSD (post-traumatic stress disorder)      Social phobia        Past Surgical History:  Past Surgical History:   Procedure Laterality Date     CHOLECYSTECTOMY       COLONOSCOPY N/A 4/14/2021    Procedure:  COLONOSCOPY, WITH POLYPECTOMY;  Surgeon: Rickie Mccarthy MD;  Location: [facility] OR     LAPAROSCOPIC TUBAL LIGATION  1999     rectal prolapse repair         Medications:  Current Outpatient Medications   Medication Sig Dispense Refill     amphetamine-dextroamphetamine (ADDERALL) 10 MG tablet Take 1 tablet (10 mg) by mouth 2 times daily 60 tablet 0     ibuprofen (ADVIL,MOTRIN) 800 MG tablet Take 400 mg by mouth as needed        lamoTRIgine (LAMICTAL) 150 MG tablet Take 1 tablet (150 mg) by mouth daily 30 tablet 2     levomilnacipran (FETZIMA ER) 120 MG 24 hr capsule Take 1 capsule (120 mg) by mouth daily 30 capsule 2     MAGNESIUM OXIDE PO        multivitamin, therapeutic (THERA-VIT) TABS tablet Take 1 tablet by mouth daily       predniSONE (DELTASONE) 20 MG tablet Take 2 tablets (40 mg) by mouth daily 10 tablet 0     QUEtiapine (SEROQUEL) 25 MG tablet Take 2 tablets (50 mg) by mouth At Bedtime 60 tablet 2     vilazodone (VIIBRYD) 40 MG TABS tablet Take 1 tablet (40 mg) by mouth daily 30 tablet 2     VITAMIN D, CHOLECALCIFEROL, PO Take 1,000 Units by mouth 2 times daily          Allergies:   No Known Allergies    Social History:  Home situation: lives in [place]  Occupation/Schooling: table runner at [employer]  Tobacco use: quit years ago, vapes  Drug use: denies  Alcohol use: denies  History of chemical dependency treatment: denies  Mental health admissions: denies    Family history:  Family History   Problem Relation Age of Onset     Depression Father      Hypertension Father      Substance Abuse Father      Cancer Father         non hodgkins lymphoma     Depression Sister      Cancer Maternal Grandmother         breast cancer (mastectomy in 40's), melanoma, leukemia     Melanoma Maternal Grandmother      Cancer Maternal Grandfather         leukemia     Substance Abuse Maternal Grandfather      Cancer Paternal Grandmother         lung cancer, nonsmoker     Substance Abuse Brother      Substance  Abuse Sister      Skin Cancer No family hx of        Review of Systems:    POSTIVE IN BOLD  GENERAL: fever/chills, fatigue, general unwell feeling, weight gain/loss.  HEAD/EYES:  headache, dizziness, or vision changes.    EARS/NOSE/THROAT:  Nosebleeds, hearing loss, sinus infection, earache, tinnitus.  IMMUNE:  Allergies, cancer, immune deficiency, or infections.  SKIN:  Urticaria, rash, hives  HEME/Lymphatic:   anemia, easy bruising, easy bleeding.  RESPIRATORY:  cough, wheezing, or shortness of breath  CARDIOVASCULAR/Circulation:  Extremity edema, syncope, hypertension, tachycardia, or angina.  GASTROINTESTINAL:  abdominal pain, nausea/emesis, diarrhea, constipation,  hematochezia, or melena.  ENDOCRINE:  Diabetes, steroid use,  thyroid disease or osteoporosis.  MUSCULOSKELETAL: neck pain, back pain, arthralgia, arthritis, or gout.  GENITOURINARY:  frequency, urgency, dysuria, difficulty voiding, hematuria or incontinence.  NEUROLOGIC:  weakness, numbness, paresthesias, seizure, tremor, stroke or memory loss.  PSYCHIATRIC:  depression, anxiety, stress, suicidal thoughts or mood swings.     Physical Exam:  Vitals:    10/15/21 0959   BP: (!) 136/92   Pulse: 109   SpO2: 98%     Exam:  Constitutional: Well developed, well nourished, appears stated age.  HEENT: Head atraumatic, normocephalic. Eyes without conjunctival injection or jaundice. Oropharynx clear.   Skin: No rash, lesions, or petechiae of exposed skin.   Extremities: Peripheral pulses intact. No clubbing, cyanosis, or edema.  Psychiatric/mental status: Alert, without lethargy or stupor. Speech fluent. Appropriate affect. Mood normal. Able to follow commands without difficulty.     Musculoskeletal exam:  Gait/Station/Posture: Normal stance, arm swing, and stride; no antalgia or Trendelenburg  Normal bulk and tone. Unremarkable spinal curvature. ASIS heights even.     Cervical spine:  Range of motion within normal limits   Myofascial tenderness: bilateral  trapezius, rhomboids, levator, paraspinals  No focal spinous process tenderness.   Spurling's negative bilaterally.          Shoulder exam:  No shoulder girdle wasting or scapular dyskinesis. No palmar muscle wasting.Shoulder range of motion within normal limits.         Neurologic exam:  CN:  Cranial nerves 2-12 are grossly intact  Motor Strength:  5/5 symmetric UE and LE strength    Reflexes:     Biceps C5:     R:  2/4 L: 2/4   Brachioradialis  C6: R:  2/4 L: 2/4   Triceps C7:  R:  2/4 L: 2/4   Patella L4:  R:  2/4 L: 2/4   Achilles S1:  R:  2/4 L: 2/4      Sensory:  (upper and lower extremities):   Light touch: normal    BILLING TIME DOCUMENTATION:   The total TIME spent on this patient on the date of the encounter/appointment was 50 minutes.      TOTAL TIME includes:   Time spent preparing to see the patient (reviewing records and tests)   Time spent face to face (or over the phone) with the patient   Time spent ordering tests, medications, procedures and referralsTime spent Referring and communicating with other healthcare professionals   Time spent documenting clinical information in Epic    Epi Jennings DO  [facility]

## 2021-10-15 NOTE — Clinical Note
Marty was seen today for chronic upper back pain. They will work with our chronic pain physical therapist and psychologist, we will try muscle relaxants for their pain as well and marty will start Yoga exercises and advance as tolerated.    Thanks for this referral,    Epi Jennings [facility]

## 2021-10-18 ENCOUNTER — TELEPHONE (OUTPATIENT)
Dept: INTERNAL MEDICINE | Facility: CLINIC | Age: 51
End: 2021-10-18

## 2021-10-18 DIAGNOSIS — Z20.822 ENCOUNTER FOR LABORATORY TESTING FOR COVID-19 VIRUS: Primary | ICD-10-CM

## 2021-10-18 NOTE — TELEPHONE ENCOUNTER
Patient was reached by phone, and RN reviewed her questions about previous Covid positive test and likelihood of transmitting covid to family members coming up soon.  Patient requested an order for a covid test, (order placed) and lab appt was made.    Eileen Carlos RN on 10/18/2021 at 4:29 PM

## 2021-10-18 NOTE — TELEPHONE ENCOUNTER
[facility]    Phone Message    May a detailed message be left on voicemail: yes     Reason for Call: Other: Pt requesting call back. Pt stated she is fully vaccinated, but tested positive for COVID on 9/8. Pt is supposed to drive down to [place] in a few days to visit family. Her family is concerned they could pass it to them, but they are fully vaccinated as well. Pt wanting to know if that would happen or what steps she should be taking     Action Taken: Message routed to:  [facility] justo

## 2021-10-24 ENCOUNTER — HEALTH MAINTENANCE LETTER (OUTPATIENT)
Age: 51
End: 2021-10-24

## 2021-10-25 DIAGNOSIS — Z11.59 ENCOUNTER FOR SCREENING FOR OTHER VIRAL DISEASES: ICD-10-CM

## 2021-10-29 ENCOUNTER — VIRTUAL VISIT (OUTPATIENT)
Dept: INTERNAL MEDICINE | Facility: CLINIC | Age: 51
End: 2021-10-29
Payer: MEDICARE

## 2021-10-29 DIAGNOSIS — M54.2 NECK PAIN: Primary | ICD-10-CM

## 2021-10-29 PROCEDURE — 99213 OFFICE O/P EST LOW 20 MIN: CPT | Mod: 95 | Performed by: INTERNAL MEDICINE

## 2021-10-29 NOTE — PROGRESS NOTES
"VIDEO VISIT    Assessment & Plan     Neck pain  Trapezius pain  Has tried PT, injection and medications.  Pain continues to wax and wane.  She tried a few sessions of acupuncture, but it was interrupted by COVID.  Would be interested in resuming  - Pain Management Referral (acupuncture referral)  - continue tylenol, flexeril, lidocaine patch prn and ibuprofen when pain most severe      HM:  Reviewed immunizations.  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 keep schedule appt  I spent a total of 25 minutes on the day of the visit.   Time was spent doing chart review, history and exam, documentation and further activities as noted above.  Thalia Bradford MD  Securely message with the Vocera Web Console (learn more here)  _________________________________________________________________________________________    Chief Complaint   Mariaelena Jean Baptiste is a 50 year old adult presents for   Chief Complaint   Patient presents with     Recheck Medication     follow up back pain         SUBJECTIVE  Back is \"ok\".  Having bad muscle spasms in upper back  Did epidural cortisone injection that did not help.  After the injection has severe pressure.  Could barely move the day after  Initially told me about pain 1/2021  The pain has gotten better and worse intermittently since that time.  Sometimes tylenol will be sufficient, sometimes will wake her up at night.    Uses OTC lidocaine patch.  They have been very effective  Lidocaine + tylenol + cyclobenzaprine work the best.  When pain is very severe, even the combination doesn't work.  When really severe will add in ibuprofen 600 mg.  Taking tylenol 1000mg up to 3 times per day    Started acupuncture prior to pandemic.  Then lockdown mode and friend moved to [place]    She continues with the PT exercises.      Medications and allergies were reviewed by me today.     Social History   History   Smoking " Status     Former Smoker     Packs/day: 1.00     Years: 20.00     Types: Cigarettes, Other     Start date: 5/1/1995     Quit date: 10/23/2017   Smokeless Tobacco     Former User     Comment: E-cig       PHQ-2 ( 1999 Pfizer) 9/2/2021 8/2/2021   Q1: Little interest or pleasure in doing things 1 0   Q2: Feeling down, depressed or hopeless 1 0   PHQ-2 Score 2 0     PHQ-9 SCORE 9/1/2021 9/2/2021 9/2/2021   PHQ-9 Total Score MyChart - - -   PHQ-9 Total Score 6 6 8       Past Medical History   Patient Active Problem List   Diagnosis     Suicidal ideation     Major depression, recurrent (H)     Tobacco abuse     Hx of psychiatric care     Scar     Nasal septal deviation       Review of Systems   5 point ROS completed and negative except noted above, including Gen, CV, Resp, GI, MS    Physical Exam   Gen: no distress, comfortable, pleasant   Eyes: anicteric   Respiratory: able to talk in full sentences.  No evidence of respiratory distress  Skin: no concerning lesions, no jaundice   Psychological: appropriate mood     Visit/Communication Style   Type of service:  Video Visit  Video Start Time: 4:32 PM  Video End Time:4:32 PM  Originating Location (pt. Location): Home  Distant Location (provider location):  Home   Platform used for Video Visit: Shona

## 2021-10-29 NOTE — NURSING NOTE
Chief Complaint   Patient presents with     Recheck Medication     follow up back pain        Krista Rios, EMT at 4:08 PM on 10/29/2021.

## 2021-10-29 NOTE — PATIENT INSTRUCTIONS
[facility] Medication Refill Request Information:  * Please contact your pharmacy regarding ANY request for medication refills.  ** [facility] Prescription Fax = [phone]  * Please allow 3 business days for routine medication refills.  * Please allow 5 business days for controlled substance medication refills.     [facility] Test Result notification information:  *You will be notified with in 7-10 days of your appointment day regarding the results of your test.  If you are on MyChart you will be notified as soon as the provider has reviewed the results and signed off on them.    [facility]: [phone]

## 2021-11-09 ENCOUNTER — VIRTUAL VISIT (OUTPATIENT)
Dept: PSYCHIATRY | Facility: CLINIC | Age: 51
End: 2021-11-09
Attending: PSYCHIATRY & NEUROLOGY
Payer: MEDICARE

## 2021-11-09 DIAGNOSIS — F33.1 MODERATE EPISODE OF RECURRENT MAJOR DEPRESSIVE DISORDER (H): ICD-10-CM

## 2021-11-09 DIAGNOSIS — G89.29 CHRONIC UPPER BACK PAIN: ICD-10-CM

## 2021-11-09 DIAGNOSIS — M54.9 CHRONIC UPPER BACK PAIN: ICD-10-CM

## 2021-11-09 PROCEDURE — 99214 OFFICE O/P EST MOD 30 MIN: CPT | Mod: 95 | Performed by: STUDENT IN AN ORGANIZED HEALTH CARE EDUCATION/TRAINING PROGRAM

## 2021-11-09 RX ORDER — QUETIAPINE FUMARATE 25 MG/1
50 TABLET, FILM COATED ORAL AT BEDTIME
Qty: 60 TABLET | Refills: 2 | Status: SHIPPED | OUTPATIENT
Start: 2021-11-09 | End: 2021-12-21

## 2021-11-09 RX ORDER — VILAZODONE HYDROCHLORIDE 40 MG/1
40 TABLET ORAL DAILY
Qty: 30 TABLET | Refills: 2 | Status: SHIPPED | OUTPATIENT
Start: 2021-11-09 | End: 2021-12-21

## 2021-11-09 RX ORDER — DEXTROAMPHETAMINE SACCHARATE, AMPHETAMINE ASPARTATE, DEXTROAMPHETAMINE SULFATE AND AMPHETAMINE SULFATE 2.5; 2.5; 2.5; 2.5 MG/1; MG/1; MG/1; MG/1
10 TABLET ORAL 2 TIMES DAILY
Qty: 60 TABLET | Refills: 0 | Status: SHIPPED | OUTPATIENT
Start: 2021-11-09 | End: 2021-12-21

## 2021-11-09 RX ORDER — LAMOTRIGINE 150 MG/1
150 TABLET ORAL DAILY
Qty: 30 TABLET | Refills: 2 | Status: SHIPPED | OUTPATIENT
Start: 2021-11-09 | End: 2021-12-21

## 2021-11-09 ASSESSMENT — PAIN SCALES - GENERAL: PAINLEVEL: NO PAIN (0)

## 2021-11-09 NOTE — PROGRESS NOTES
"VIDEO VISIT  Mariaelena Jean Baptiste is a 50 year old patient that has consented to receive services via billable video visit.      The patient has been notified of following:   \"This video visit will be conducted via a call between you and your physician/provider. We have found that certain health care needs can be provided without the need for an in-person physical exam. This service lets us provide the care you need with a video conversation. If a prescription is necessary we can send it directly to your pharmacy. If lab work is needed we can place an order for that and you can then stop by our lab to have the test done at a later time. Insurers are generally covering virtual visits as they would in-office visits so billing should not be different than normal.  If for some reason you do get billed incorrectly, you should contact the billing office to correct it and that number is in the AVS .    Patient will join video visit via:  [employer] (Patient / guardian confirmed to join via [employer])    If patient attempts to join the video via [email or web address] at appointment start time, but is unable to, they would prefer that the provider send them a video invitation via:   Send to preferred e-mail: [email or web address]      How would patient like to obtain AVS?:  MyChart    "

## 2021-11-09 NOTE — PATIENT INSTRUCTIONS
**For crisis resources, please see the information at the end of this document**     Patient Education      Thank you for coming to the [facility].    Lab Testing:  If you had lab testing today and your results are reassuring or normal they will be mailed to you or sent through [employer] within 7 days. If the lab tests need quick action we will call you with the results. The phone number we will call with results is # [phone] (home) . If this is not the best number please call our clinic and change the number.    Medication Refills:  If you need any refills please call your pharmacy and they will contact us. Our fax number for refills is [phone]. Please allow three business for refill processing. If you need to  your refill at a new pharmacy, please contact the new pharmacy directly. The new pharmacy will help you get your medications transferred.     Scheduling:  If you have any concerns about today's visit or wish to schedule another appointment please call our office during normal business hours [phone] (8-5:00 M-F)    Contact Us:  Please call [phone] during business hours (8-5:00 M-F).  If after clinic hours, or on the weekend, please call  [phone].    Financial Assistance [phone]  [facility] Billing [phone]  Central Billing Office, [facility]: [phone]  [facility] Billing [phone]  Medical Records [phone]  [facility] Patient Bill of Rights [email or web address]       MENTAL HEALTH CRISIS NUMBERS:  For a medical emergency please call  911 or go to the nearest ER.     [facility]:   [facility] -[phone]   Crisis Residence [facility] -[phone]   Walk-In Counseling Center [facility] -[phone]   COPE 24/7 [facility] Mobile Team -[phone] (adults)/[phone] (child)  CHILD: [facility] needs assessment  team - [phone]      [facility]:   [facility] - [phone]   Walk-in counseling [facility] - [phone]   Walk-in counseling [facility] - [phone]   Crisis Residence [facility] - [phone]  Urgent Care Adult Mental [facility]-[phone] mobile unit/ 24/7 crisis line    National Crisis Numbers:   National Suicide Prevention Lifeline: 5-096-197-TALK (484-133-0375)  Poison Control Center - 0-509-296-9458  [place]ine/resources for a list of additional resources (SOS)  Trans Lifeline a hotline for transgender people 1-948.860.3953  The [employer] Project a hotline for LGBT youth 3-457-532-4145  Crisis Text Line: For any crisis 24/7   To: 969037  see www.crisistextline.org  - IF MAKING A CALL FEELS TOO HARD, send a text!         Again thank you for choosing [facility]AL HEALTH & ADDICTION [facility] and please let us know how we can best partner with you to improve you and your family's health.    You may be receiving a survey regarding this appointment. We would love to have your feedback, both positive and negative. The survey is done by an external company, so your answers are anonymous.

## 2021-11-09 NOTE — PROGRESS NOTES
"VIDEO VISIT  Mariaelena Jean Baptiste is a 50 year old patient who is being evaluated via a billable video visit.      The patient has been notified of following:   \"We have found that certain health care needs can be provided without the need for an in-person physical exam. This service lets us provide the care you need with a video conversation. If a prescription is necessary we can send it directly to your pharmacy. If lab work is needed we can place an order for that and you can then stop by our lab to have the test done at a later time. Insurers are generally covering virtual visits as they would in-office visits so billing should not be different than normal.  If for some reason you do get billed incorrectly, you should contact the billing office to correct it and that number is in the AVS .    Patient has given verbal consent for video visit?: Yes   How would you like to obtain your AVS?: 3D Forms  AVS SmartPhrase [PsychAVS] has been placed in 'Patient Instructions': Yes      Video- Visit Details  Type of service:  video visit for medication management  Time of service:    Date:  11/09/2021    Video Start Time:  11:00AM      Video End Time:  11:30AM    Reason for video visit:  Patient unable to travel due to Covid-19  Originating Site (patient location):  [facility]   Location- Patient's home  Distant Site (provider location):  [facility]  Mode of Communication:  Video Conference via [employer]  Consent:  Patient has given verbal consent for video visit?: Yes              [facility]  Psychiatry Clinic  MEDICAL PROGRESS NOTE     CARE TEAM:  PCP- Thalia Bradford, Psychotherapist- Dr. Julieta Gill, Specialty: Dr. Marie at [facility]    Mariaelena Jean Baptiste is a 50 year old who prefers the name Mariaelena and uses pronouns she, her, they.      DIAGNOSIS     MDD, recurrent, moderate (treatment-resistant; h/o severe episodes)  BPD  PTSD  DID  Unspecified Eating Disorder, in remission  Insomnia, " likely circadian rhythm disorder      ASSESSMENT   Mariaelena reports is doing well, however has had some psychosocial stressors that have been difficult. Her nephew is going through the MH commitment process as he has been decompensating for some time now. She has helped her family navigate this process, however it has been stressful. She has been coping well - continues to be adherent to medications and goes to therapy regularly. No med changes were made today. Patient had no safety concerns.     [place] was checked today:  Indicates taking controlled medication as prescribed.     PLAN                                                                                                                1) Meds-  - Continue quetiapine 50 mg PRN nightly for sleep and treatment resistant depression (patient reports she uses this every night)  - Continue Adderall IR 10 mg BID (qAM + 2pm) for augmentation of depression treatment  - Continue levomilnacipran  mg QDAY for depression  - Continue vilazodone 40 mg QDAY for depression  - Continue lamotrigine 150 mg QDAY for depression and mood stabilization  - Continue melatonin 1 mg with dinner     Other:     - Resume light box daily in Fall - patient has new blue light box which she finds to be more helpful than white light    2) Psychotherapy- Continue biweekly therapy w/ DBT therapist Dr. Julieta Gill Psy D    3) Next due-  Labs- AP monitoring labs due Jan 2022  EKG- as needed  Rating Scales- PHQ-9 at every in-person visit, AIMS done 9/2/2021 - score 0    4) Referrals-  None    5) Dispo- RTC in 4-6 weeks       PERTINENT BACKGROUND                           [most recent eval 07/20/21]   Pertinent Background:  Mariaelena first experienced mental health issues as a young adult and has received treatment for treatment-resistant depression, BPD with severe self harm, and PTSD. Notably, both naltrexone and clozapine have reduced SIB immensely, with return when taper off has been initiated in the  past (although no return of SIB to date since d/c of clozapine). She does better when she uses a light box in the Fall/Winter, best started in September as she typically experiences return of depressive symptoms in October. A taper of Lamictal was associated with decline in mood and subsequent hospitalization in the past which is a common theme for her as medication changes, even small ones, tend to be very destabilizing. A TMS series through [facility] in August 2016 was transformative in terms of ongoing remission of her depression for the following 2+ years. She often is not aware of reemerging depressive symptoms until they become severe.    Psych pertinent item history includes suicide attempts {2x], suicidal ideation, severe SIB [has required skin grafts and long term hospitalization at [place]], mutiple psychotropic trials, trauma hx, ECT, TMS, psych hosp (>5), and commitment.      SUBJECTIVE     - Mariaelena states things have been going well since the last visit  - States she got her Covid booster and flu shot recently  - Has seen pain management - has been doing yoga which was recommended, was also referred to PT and pain psychologist; PCP also placed acupuncture referral and now has rx for cyclobenzaprine as well  - Nephew has hearing on Friday for mental health commitment & [place] which has been stressful because nephew was not doing well, however has helped her brother navigate this process and is glad to be a support for them  - Has started using her light box, continues going to therapy with Julieta Long  - Denied any SI/SIB or HI - had no safety concerns today    RECENT PSYCH ROS:   Depression:  none  Elevated:  none  Psychosis:  none  Anxiety:  none  Trauma Related:  none   Sleep: some sleep difficulty - believes this is related to her nasal septum injury and upper back pain  Other: N/A    Adverse Effects: none  Pertinent Negative Symptoms: No suicidal ideation, self-injurious behavior/urges or violent  ideation  Recent Substance Use:     Tobacco- vapes nicotine     FAMILY and SOCIAL HISTORY                                 pt reported     Family Hx: Depression in her father and sister. Anxiety and CD issues in many family members. Paternal grandfather committed suicide in his 50s. No h/o BPAD or Schizophrenia.    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    Social/ Spiritual Support- DBT therapist, online friends, father and step mother in [place]; brother [place] (supportive, close with them), a few co workers and a few close friends from the feline rescue (Ahsan and nAika). Mariaelena grew up Yarsanism - continues to believe in God but does not identify with a specific Oriental orthodox.  Feels Safe at Home- yes     PSYCH and SUBSTANCE USE Critical Summary Points since July 2021 July 2021 - Transfer DA. No med changes. Patient was doing her 3rd series of TMS which was very helpful - noticed improved mood.     Sept 2021 - Patient completed TMS. Referred to pain management. No med changes.     November 2021 - No med changes       PAST MED TRIALS     Per chart review:  Numerous including TCAs, MAOI's, Prozac, Zoloft, Viibryd, Brintellix, Celexa, Effexor, Cymbalta, lithium, clonidine, Provigil, cytomel, naltrexone (for SIB), dextroamphetamine, lithium, lamictal, clozapine (used for self-harm urges and cross-titrated to Latuda in 2016 in anticipation of TMS).     Per discussion with  Dr. Matthews 2/2016:  Both naltrexone and clozapine have reduced SIB immensely, with return  when taper off has been initiated in the past.      MEDICAL HISTORY and ALLERGY     ALLERGIES: Patient has no known allergies.    Patient Active Problem List   Diagnosis     Suicidal ideation     Major depression, recurrent (H)     Tobacco abuse     Hx of psychiatric care     Scar     Nasal septal deviation        MEDICAL REVIEW OF SYSTEMS   Contraception- s/p tubal ligation, otherwise did not discuss    A comprehensive review of systems was performed and is negative other than noted in the HPI.     MEDICATIONS     Current Outpatient Medications   Medication Sig Dispense Refill     amphetamine-dextroamphetamine (ADDERALL) 10 MG tablet Take 1 tablet (10 mg) by mouth 2 times daily 60 tablet 0     cyclobenzaprine (FLEXERIL) 5 MG tablet Take 1-2 tablets (5-10 mg) by mouth 3 times daily as needed for muscle spasms 90 tablet 0     ibuprofen (ADVIL,MOTRIN) 800 MG tablet Take 400 mg by mouth as needed        lamoTRIgine (LAMICTAL) 150 MG tablet Take 1 tablet (150 mg) by mouth daily 30 tablet 2     levomilnacipran (FETZIMA ER) 120 MG 24 hr capsule Take 1 capsule (120 mg) by mouth daily 30 capsule 2     MAGNESIUM OXIDE PO        multivitamin, therapeutic (THERA-VIT) TABS tablet Take 1 tablet by mouth daily       QUEtiapine (SEROQUEL) 25 MG tablet Take 2 tablets (50 mg) by mouth At Bedtime 60 tablet 2     vilazodone (VIIBRYD) 40 MG TABS tablet Take 1 tablet (40 mg) by mouth daily 30 tablet 2     VITAMIN D, CHOLECALCIFEROL, PO Take 1,000 Units by mouth 2 times daily         VITALS   There were no vitals taken for this visit.    MENTAL STATUS EXAM     Alertness: alert  and oriented  Appearance: well groomed  Behavior/Demeanor: cooperative and pleasant, with good  eye contact   Speech: normal and regular rate and rhythm  Language: intact  Psychomotor: normal or unremarkable  Mood: description consistent with euthymia  Affect: full range; congruent to: mood- yes, content- yes  Thought Process/Associations: unremarkable  Thought  Content:  Reports none;  Denies suicidal ideation  Perception:  Reports none;  Denies hallucinations  Insight: good  Judgment: good  Cognition: does  appear grossly intact; formal cognitive testing was not done  Gait and Station: unremarkable     LABS and DATA     PHQ9 TODAY = NA  PHQ 9/1/2021 9/2/2021 9/2/2021   PHQ-9 Total Score 6 6 8   Q9: Thoughts of better off dead/self-harm past 2 weeks Not at all Not at all Not at all       Recent Labs   Lab Test 01/13/21  1149 06/05/20  1051 05/20/20  0317 04/15/19  1401   GLC  --  94 137* 87   A1C 5.1  --   --  5.4     Recent Labs   Lab Test 01/13/21  1149 04/15/19  1401   CHOL 206* 191   TRIG 42 42   * 112*   HDL 80 71     Recent Labs   Lab Test 05/20/20  0317 04/15/19  1401   AST 27 22   ALT 19 23   ALKPHOS 95 62     Recent Labs   Lab Test 06/05/20  1051 05/20/20  0217 04/15/19  1401   WBC 5.7 11.5* 7.5   ANEU 3.6  --  5.2   HGB 14.6 12.7 12.5   * 403 445       ECG 5/2020 QTc = 462 ms     PSYCHOTROPIC DRUG INTERACTIONS     VILAZODONE + FETZIMA + ADDERALL + SEROQUEL may result in increased risk for serotonin syndrome.      VILAZODONE + FETZIMA may enhance the antiplatelet effect of other Agents with Antiplatelet Properties.      LAMOTRIGINE + ACETAMINOPHEN may result in decreased lamotrigine serum levels.     ADDERALL + LISINOPRIL:  amphetamines may diminish the antihypertensive effect of antihypertensive agents.     MANAGEMENT:  Monitoring for adverse effects, routine vitals and using lowest therapeutic dose of [psychotropics]     RISK STATEMENT for SAFETY     Mariaelena Jean Baptiste did not appear to be an imminent safety risk to self or others.    TREATMENT RISK STATEMENT: The risks, benefits, alternatives and potential adverse effects have been discussed and are understood by the pt. The pt understands the risks of using street drugs or alcohol. There are no medical contraindications, the pt agrees to treatment with the ability to do so. The pt knows to call the  clinic for any problems or to access emergency care if needed.  Medical and substance use concerns are documented above.  Psychotropic drug interaction check was done, including changes made today.    PROVIDER: Robert Ibanez DO, MPH    Patient staffed in clinic with Dr. Perez who will sign the note.  Supervisor is Dr. Byrne.      TELEHEALTH ATTENDING ATTESTATION  Following the ACGME guidelines on telemedicine and direct supervision due to COVID-19, I was concurrently participating in and/or monitoring the patient care through appropriate telecommunication technology.  I discussed the key portions of the service with the resident, including the mental status examination and developing the plan of care. I reviewed key portions of the history with the resident. I agree with the findings and plan as documented in this note.   Josemanuel Perez MD

## 2021-11-15 NOTE — PROGRESS NOTES
[facility]  [address] [address]  Phone: [phone]  Fax: [phone]  Primary Provider: Thalia Bradford  Pre-op Performing Provider: AMOS NGUYEN      PREOPERATIVE EVALUATION:  Today's date: 11/16/2021    Mariaelena Jean Baptiste is a 50 year old adult who presents for a preoperative evaluation.    Surgical Information:  Surgery/Procedure: SEPTOPLASTY, NOSE  [place] [facility] [place]geon: Merlin Mendoza MD  Surgery Date: 11/29/21  Time of Surgery: 12:00pm  Where patient plans to recover: At home alone  Fax number for surgical facility: Note does not need to be faxed, will be available electronically in Epic.    Type of Anesthesia Anticipated: General    Assessment & Plan     The proposed surgical procedure is considered LOW-INTERMEDIATE risk.    1. Preop general physical exam  No current signs of infection. BP is high on 2 readings today. She indicates she has history of dysautonomia with wide fluctuations in BP (See subjective below). Will need to monitor throughout surgery  - CBC with platelets; Future  - Basic metabolic panel; Future  - EKG 12-lead complete w/read - Clinics  - CBC with platelets  - Basic metabolic panel    2. Tachycardia  NSR  - EKG 12-lead complete w/read - Clinics    3. Elevated BP without diagnosis of hypertension  As above  - Basic metabolic panel; Future  - Basic metabolic panel      Risks and Recommendations:  The patient has the following additional risks and recommendations for perioperative complications:  Cardiovascular:   - dysautonomia; labile BP      Medication Instructions:  Continue psychiatric medications    RECOMMENDATION:  APPROVAL GIVEN to proceed with proposed procedure, without further diagnostic evaluation.  Reviewed previous records: Cardiology, IM,ED and lab work. Treadmill 5/20/2020:  Result Text     There is no prior study for comparison.     The patient's exercise capacity is moderately impaired.  No exercise induced angina.      Exercise stress ECG is negative for inducible myocardial ischemia.     Exercise nuclear study is negative for inducible myocardial ischemia or infarction.     Normal left ventricular size, wall thickness and wall motion.  The calculated left ventricular ejection fraction 72%.     The patient is at a low risk of future cardiac ischemic events.        Subjective     HPI related to upcoming procedure: Broke nose in August and can't breathe through left side of nose. Has to mouth breathe at work. It sometimes wakes her up at night and will try sitting up, repositioning to try and help. Also uses breathe right strips at night.      History of dysautonomia. Has blood pressure fluctuations. Saw a Cardiac EP 6 months ago and trialed lisinopril but stopped due to hypotension and side effects. Typical resting heart rate is 105. Denies chest pain, palpitations, shortness of breath. Keeps track of BP at home, checks once a week. Typically runs 135-140/80. If elevated will try paced and deep breathing to bring it down. Also tries to increase fluids and salt. Saw cardiac EP once, advised to continue follow up with PCP. She has only had 2 major episodes, last one was about one year ago.     Advised to hold NSAIDS for 1 week prior to exam      Preop Questions 11/15/2021   1. Have you ever had a heart attack or stroke? No   2. Have you ever had surgery on your heart or blood vessels, such as a stent placement, a coronary artery bypass, or surgery on an artery in your head, neck, heart, or legs? No   3. Do you have chest pain with activity? No   4. Do you have a history of  heart failure? No   5. Do you currently have a cold, bronchitis or symptoms of other infection? No   6. Do you have a cough, shortness of breath, or wheezing? No   7. Do you or anyone in your family have previous history of blood clots? UNKNOWN - father had a blood clot   8. Do you or does anyone in your family have a serious bleeding problem such as prolonged  bleeding following surgeries or cuts? No   9. Have you ever had problems with anemia or been told to take iron pills? No   10. Have you had any abnormal blood loss such as black, tarry or bloody stools, or abnormal vaginal bleeding? No   11. Have you ever had a blood transfusion? No   12. Are you willing to have a blood transfusion if it is medically needed before, during, or after your surgery? Yes   13. Have you or any of your relatives ever had problems with anesthesia? No   14. Do you have sleep apnea, excessive snoring or daytime drowsiness? No   15. Do you have any artifical heart valves or other implanted medical devices like a pacemaker, defibrillator, or continuous glucose monitor? No   16. Do you have artificial joints? No   17. Are you allergic to latex? No   18. Is there any chance that you may be pregnant? No     Health Care Directive:  Patient does not have a Health Care Directive or Living Will: Discussed advance care planning with patient; however, patient declined at this time.    Preoperative Review of :   Reviewed:    Narx Scores  Narcotic  000  Sedative  000  Stimulant  221  Explanation and Guidance  Overdose Risk Score  000  Dextroamp-Amphetamin 10 Mg Tab      Status of Chronic Conditions:  HYPERTENSION - Patient has longstanding history of HTN , currently denies any symptoms referable to elevated blood pressure. Specifically denies chest pain, palpitations, dyspnea, orthopnea, PND or peripheral edema. Blood pressure readings have been in normal range at home. But elevated on 2 occasions here today. Current medication regimen is as listed below. Patient denies any side effects of medication.   Depression: currently well controlled on medications.   Current Outpatient Medications   Medication     amphetamine-dextroamphetamine (ADDERALL) 10 MG tablet     cyclobenzaprine (FLEXERIL) 5 MG tablet     ibuprofen (ADVIL,MOTRIN) 800 MG tablet     lamoTRIgine (LAMICTAL) 150 MG tablet      levomilnacipran (FETZIMA ER) 120 MG 24 hr capsule     MAGNESIUM OXIDE PO     multivitamin, therapeutic (THERA-VIT) TABS tablet     QUEtiapine (SEROQUEL) 25 MG tablet     triamcinolone (NASACORT) 55 MCG/ACT nasal aerosol     vilazodone (VIIBRYD) 40 MG TABS tablet     VITAMIN D, CHOLECALCIFEROL, PO     No current facility-administered medications for this visit.       Review of Systems  GEN: negative for fever, fatigue, weight change  HEENT: negative for vision changes, eye irritation, ear pain, nasal congestion, rhinorrhea, sore throat or teeth pain  NECK: negative for pain stiffness  RESP: negative for SOB, cough, wheeze. Vapes  CV: negative for chest pain, irregular heart beat, peripheral edema. BP as above  GI: negative for nausea or vomiting, abdominal pain, heartburn, stool pattern change, constipation or diarrhea. Has abdominal hernia for many years that is reducible.   : negative for dysuria, urgency, frequency  MSK: negative for bone or joint pain or stiffness  NEURO: negative for headache, dizziness, numbness, tingling or weakness  ENDO: negative for increased thirst or urination or temperature intolerance  HEME: negative for bruising or bleeding or clotting problems.   DERM: negative for rash or lesions  MOOD: denies depressed mood or anxiety      Patient Active Problem List    Diagnosis Date Noted     Nasal septal deviation 08/09/2021     Priority: Medium     Added automatically from request for surgery [id number]       Scar 12/07/2016     Priority: Medium     Hx of psychiatric care 07/04/2016     Priority: Medium     PAST PSYCH MED TRIALS   Numerous including TCAs, MAOI's, Prozac, Zoloft, Viibryd, Brintellix, Celexa, Effexor, Cymbalta, lithium, clonidine, Provigil, cytomel, naltrexone (for SIB), dextroamphetamine, lithium, lamictal, clozapine (used for self-harm urges and cross-titrated to Latuda in 2016 in anticipation of TMS).     Per discussion with  Dr. Matthews 2/2016:  Both naltrexone and  clozapine have reduced SIB immensely, with return when taper off has been initiated in the past.        Tobacco abuse 05/26/2016     Priority: Medium     Major depression, recurrent (H) 05/03/2016     Priority: Medium     Suicidal ideation 09/19/2013     Priority: Medium      Past Medical History:   Diagnosis Date     Anxiety 1988     Chronic osteoarthritis      Depressive disorder      Dissociative identity disorder (H)      Gastro-oesophageal reflux disease      Hypertension      Migraines      PTSD (post-traumatic stress disorder)      Social phobia      Past Surgical History:   Procedure Laterality Date     CHOLECYSTECTOMY       COLONOSCOPY N/A 4/14/2021    Procedure: COLONOSCOPY, WITH POLYPECTOMY;  Surgeon: Rickie Mccarthy MD;  Location: [facility] OR     LAPAROSCOPIC TUBAL LIGATION  1999     rectal prolapse repair       Current Outpatient Medications   Medication Sig Dispense Refill     amphetamine-dextroamphetamine (ADDERALL) 10 MG tablet Take 1 tablet (10 mg) by mouth 2 times daily 60 tablet 0     cyclobenzaprine (FLEXERIL) 5 MG tablet Take 1-2 tablets (5-10 mg) by mouth 3 times daily as needed for muscle spasms 90 tablet 0     ibuprofen (ADVIL,MOTRIN) 800 MG tablet Take 400 mg by mouth as needed        lamoTRIgine (LAMICTAL) 150 MG tablet Take 1 tablet (150 mg) by mouth daily 30 tablet 2     levomilnacipran (FETZIMA ER) 120 MG 24 hr capsule Take 1 capsule (120 mg) by mouth daily 30 capsule 2     MAGNESIUM OXIDE PO        multivitamin, therapeutic (THERA-VIT) TABS tablet Take 1 tablet by mouth daily       QUEtiapine (SEROQUEL) 25 MG tablet Take 2 tablets (50 mg) by mouth At Bedtime 60 tablet 2     vilazodone (VIIBRYD) 40 MG TABS tablet Take 1 tablet (40 mg) by mouth daily 30 tablet 2     VITAMIN D, CHOLECALCIFEROL, PO Take 1,000 Units by mouth 2 times daily          No Known Allergies     Social History     Tobacco Use     Smoking status: Former Smoker     Packs/day: 1.00     Years: 20.00     Pack years:  "20.00     Types: Cigarettes, Other     Start date: 1995     Quit date: 10/23/2017     Years since quittin.0     Smokeless tobacco: Former User     Tobacco comment: E-cig    Substance Use Topics     Alcohol use: No     Family History   Problem Relation Age of Onset     Depression Father      Hypertension Father      Substance Abuse Father      Cancer Father         non hodgkins lymphoma     Depression Sister      Cancer Maternal Grandmother         breast cancer (mastectomy in 40's), melanoma, leukemia     Melanoma Maternal Grandmother      Cancer Maternal Grandfather         leukemia     Substance Abuse Maternal Grandfather      Cancer Paternal Grandmother         lung cancer, nonsmoker     Substance Abuse Brother      Substance Abuse Sister      Skin Cancer No family hx of      History   Drug Use No         Objective     BP (!) 175/110   Pulse 108   Temp 98.6  F (37  C) (Oral)   Ht 1.753 m (5' 9\")   Wt 85.3 kg (188 lb)   LMP 09/15/2021 (Approximate)   SpO2 100%   BMI 27.76 kg/m      Physical Exam    GENERAL APPEARANCE: healthy, alert and no distress     EYES: EOMI, PERRL     HENT: ear canals and TM's without erythema or edema. Left septal deviation, some nasal discharge, mild erythema. Missing most of back teeth upper and lower bilaterally. No dentures     NECK: no adenopathy, no asymmetry, masses, or scars and thyroid normal to palpation     RESP: lungs clear to auscultation - no rales, rhonchi or wheezes     CV: tachycardic, normal S1 S2, no S3 or S4 and no murmur, click or rub.BHP recheck 150/107.      ABDOMEN:  soft, nontender, no HSM and bowel sounds normal. Reducible abdominal hernia. No pain.     MS: extremities normal- no gross deformities noted, no evidence of inflammation in joints, FROM in all extremities. Tension in upper back and shoulders.     SKIN: no suspicious lesions or rashes     NEURO: Normal strength and tone, sensory exam grossly normal, mentation intact and speech normal     " PSYCH: mentation appears normal. and affect normal/bright     LYMPHATICS: No cervical adenopathy    Recent Labs   Lab Test 01/13/21  1149 06/05/20  1051 05/20/20  0317 05/20/20  0217   HGB  --  14.6  --  12.7   PLT  --  452*  --  403   NA  --  138 138  --    POTASSIUM  --  4.4 3.8  --    CR  --  0.77 0.99  --    A1C 5.1  --   --   --         Diagnostics:  Labs pending at this time.  Results will be reviewed when available.   EKG: appears normal, NSR, unchanged from previous tracings    Revised Cardiac Risk Index (RCRI):  The patient has the following serious cardiovascular risks for perioperative complications:   - No serious cardiac risks = 0 points     RCRI Interpretation: 0 points: Class I (very low risk - 0.4% complication rate)        ADOLFO Ritter, RN, FNP Student    I was present with the NPP student who participated in the service and in the documentation of the services provided. I have verified the history and personally performed the physical exam and medical decision making, as documented by the student and edited by me    ALYSSA Conklin CNP  11/16/21  8:50 PM        Signed Electronically by: ALYSSA Conklin CNP  Copy of this evaluation report is provided to requesting physician.  TC to Dr. Mendoza' office re: elevated BP, history dysautonomia, asymptomatic with normal labs. Reports normal BP at home, history of not tolerating lisinopril. Digna SHAH CNP

## 2021-11-16 ENCOUNTER — OFFICE VISIT (OUTPATIENT)
Dept: FAMILY MEDICINE | Facility: CLINIC | Age: 51
End: 2021-11-16
Payer: COMMERCIAL

## 2021-11-16 VITALS
WEIGHT: 188 LBS | OXYGEN SATURATION: 100 % | HEIGHT: 69 IN | TEMPERATURE: 98.6 F | BODY MASS INDEX: 27.85 KG/M2 | SYSTOLIC BLOOD PRESSURE: 175 MMHG | HEART RATE: 108 BPM | DIASTOLIC BLOOD PRESSURE: 110 MMHG

## 2021-11-16 DIAGNOSIS — Z01.818 PREOP GENERAL PHYSICAL EXAM: Primary | ICD-10-CM

## 2021-11-16 DIAGNOSIS — R03.0 ELEVATED BP WITHOUT DIAGNOSIS OF HYPERTENSION: ICD-10-CM

## 2021-11-16 DIAGNOSIS — R00.0 TACHYCARDIA: ICD-10-CM

## 2021-11-16 LAB
ANION GAP SERPL CALCULATED.3IONS-SCNC: 7 MMOL/L (ref 3–14)
BUN SERPL-MCNC: 14 MG/DL (ref 7–30)
CALCIUM SERPL-MCNC: 8.9 MG/DL (ref 8.5–10.1)
CHLORIDE BLD-SCNC: 107 MMOL/L (ref 94–109)
CO2 SERPL-SCNC: 25 MMOL/L (ref 20–32)
CREAT SERPL-MCNC: 0.79 MG/DL (ref 0.52–1.25)
ERYTHROCYTE [DISTWIDTH] IN BLOOD BY AUTOMATED COUNT: 12.8 % (ref 10–15)
GFR SERPL CREATININE-BSD FRML MDRD: 88 ML/MIN/1.73M2
GLUCOSE BLD-MCNC: 83 MG/DL (ref 70–99)
HCT VFR BLD AUTO: 40.9 % (ref 35–53)
HGB BLD-MCNC: 13.2 G/DL (ref 11.7–17.7)
MCH RBC QN AUTO: 29.1 PG (ref 26.5–33)
MCHC RBC AUTO-ENTMCNC: 32.3 G/DL (ref 31.5–36.5)
MCV RBC AUTO: 90 FL (ref 78–100)
PLATELET # BLD AUTO: 441 10E3/UL (ref 150–450)
POTASSIUM BLD-SCNC: 3.9 MMOL/L (ref 3.4–5.3)
RBC # BLD AUTO: 4.53 10E6/UL (ref 3.8–5.9)
SODIUM SERPL-SCNC: 139 MMOL/L (ref 133–144)
WBC # BLD AUTO: 7.4 10E3/UL (ref 4–11)

## 2021-11-16 PROCEDURE — 85027 COMPLETE CBC AUTOMATED: CPT | Performed by: NURSE PRACTITIONER

## 2021-11-16 PROCEDURE — 80048 BASIC METABOLIC PNL TOTAL CA: CPT | Performed by: NURSE PRACTITIONER

## 2021-11-16 RX ORDER — TRIAMCINOLONE ACETONIDE 55 UG/1
2 SPRAY, METERED NASAL DAILY
COMMUNITY

## 2021-11-16 ASSESSMENT — MIFFLIN-ST. JEOR: SCORE: 1537.14

## 2021-11-16 NOTE — NURSING NOTE
"ROOM:2    Preferred Name: Mariaelena     50 year old  Chief Complaint   Patient presents with     Pre-Op Exam       Blood pressure (!) 175/110, pulse 108, temperature 98.6  F (37  C), temperature source Oral, height 1.753 m (5' 9\"), weight 85.3 kg (188 lb), last menstrual period 09/15/2021, SpO2 100 %, not currently breastfeeding. Body mass index is 27.76 kg/m .      Patient Active Problem List   Diagnosis     Suicidal ideation     Major depression, recurrent (H)     Tobacco abuse     Hx of psychiatric care     Scar     Nasal septal deviation       Wt Readings from Last 2 Encounters:   21 85.3 kg (188 lb)   21 84.6 kg (186 lb 9.6 oz)     BP Readings from Last 3 Encounters:   21 (!) 175/110   10/15/21 (!) 136/92   21 (!) 146/99       No Known Allergies    Current Outpatient Medications   Medication     amphetamine-dextroamphetamine (ADDERALL) 10 MG tablet     cyclobenzaprine (FLEXERIL) 5 MG tablet     ibuprofen (ADVIL,MOTRIN) 800 MG tablet     lamoTRIgine (LAMICTAL) 150 MG tablet     levomilnacipran (FETZIMA ER) 120 MG 24 hr capsule     MAGNESIUM OXIDE PO     multivitamin, therapeutic (THERA-VIT) TABS tablet     QUEtiapine (SEROQUEL) 25 MG tablet     vilazodone (VIIBRYD) 40 MG TABS tablet     VITAMIN D, CHOLECALCIFEROL, PO     No current facility-administered medications for this visit.       Social History     Tobacco Use     Smoking status: Former Smoker     Packs/day: 1.00     Years: 20.00     Pack years: 20.00     Types: Cigarettes, Other     Start date: 1995     Quit date: 10/23/2017     Years since quittin.0     Smokeless tobacco: Former User     Tobacco comment: E-cig    Substance Use Topics     Alcohol use: No     Drug use: No       Honoring Choices - Health Care Directive Guide offered to patient at time of visit.    Health Maintenance Due   Topic Date Due     ADVANCE CARE PLANNING  Never done     DEPRESSION ACTION PLAN  Never done     MEDICARE ANNUAL WELLNESS VISIT  2017     " HPV TEST  05/23/2021     PAP  05/23/2021     ZOSTER IMMUNIZATION (1 of 2) 12/05/2020       Immunization History   Administered Date(s) Administered     COVID-19,PF,Pfizer (12+ Yrs) 03/10/2021, 03/31/2021, 11/01/2021     FLU 6-35 months 10/26/2011     Flu, Unspecified 10/15/2013     [id number] Novel Flu 02/18/2010     Influenza (IIV3) PF 02/18/2010, 10/26/2011, 02/14/2017     Influenza Vaccine IM > 6 months Valent IIV4 (Alfuria,Fluzone) 10/07/2013, 09/22/2014, 10/06/2015, 11/19/2019, 01/15/2021     Influenza Vaccine, 6+MO IM (QUADRIVALENT W/PRESERVATIVES) 10/08/2018     TDAP Vaccine (Boostrix) 02/18/2010, 05/23/2016       Lab Results   Component Value Date    PAP NIL 05/23/2016         Recent Labs   Lab Test 01/13/21  1149 06/05/20  1051 05/20/20  0317 04/15/19  1401 12/05/18  1246 08/30/16  1031 04/25/16  1117   A1C 5.1  --   --  5.4  --   --   --    *  --   --  112*  --   --  83   HDL 80  --   --  71  --   --  55   TRIG 42  --   --  42  --   --  195*   ALT  --   --  19 23 24   < >  --    CR  --  0.77 0.99 0.70 0.62   < >  --    GFRESTIMATED  --  >90 60* >90 >90   < >  --    GFRESTBLACK  --  >90 >60 >90 >90   < >  --    ALBUMIN  --   --  3.7 3.7 3.6   < >  --    POTASSIUM  --  4.4 3.8 4.3  --    < >  --    TSH  --  0.89 5.55*  --  1.20   < > 1.69    < > = values in this interval not displayed.       PHQ-2 ( 1999 Pfizer) 11/16/2021 9/2/2021   Q1: Little interest or pleasure in doing things 1 1   Q2: Feeling down, depressed or hopeless 1 1   PHQ-2 Score 2 2       PHQ-9 SCORE 8/31/2021 9/1/2021 9/2/2021 9/2/2021   PHQ-9 Total Score [place] - - - -   PHQ-9 Total Score 7 6 6 8       IBIS-7 SCORE 9/9/2019 1/15/2021   Total Score 6 8       No flowsheet data found.      Mari Garcia, [facility]  November 16, 2021 2:50 PM

## 2021-11-16 NOTE — PATIENT INSTRUCTIONS
Pre op, history of dysautonomia  Check BMP, CBC, EKG   Hold NSAIDS for 1 week prior to surgery  NPO at midnight 11/28  Use surgical scrub the night before surgery and the morning of surgery  EKG Within normal

## 2021-11-18 ENCOUNTER — TELEPHONE (OUTPATIENT)
Dept: FAMILY MEDICINE | Facility: CLINIC | Age: 51
End: 2021-11-18
Payer: COMMERCIAL

## 2021-11-18 NOTE — TELEPHONE ENCOUNTER
TC to patient re: Follow up of BP. Determine if she has been checking at home and recommend she does. Informed that I did notify surgeon's office of high BP, report of dysautonomia, inability to tolerate lisinopril. Labs WNL. Message left to call if she has questions. Digna SHAH CNP

## 2021-11-26 ENCOUNTER — ANESTHESIA EVENT (OUTPATIENT)
Dept: SURGERY | Facility: AMBULATORY SURGERY CENTER | Age: 51
End: 2021-11-26
Payer: MEDICARE

## 2021-11-29 ENCOUNTER — HOSPITAL ENCOUNTER (OUTPATIENT)
Facility: AMBULATORY SURGERY CENTER | Age: 51
End: 2021-11-29
Attending: OTOLARYNGOLOGY
Payer: MEDICARE

## 2021-11-29 ENCOUNTER — ANESTHESIA (OUTPATIENT)
Dept: SURGERY | Facility: AMBULATORY SURGERY CENTER | Age: 51
End: 2021-11-29
Payer: MEDICARE

## 2021-11-29 VITALS
SYSTOLIC BLOOD PRESSURE: 132 MMHG | HEIGHT: 71 IN | BODY MASS INDEX: 25.9 KG/M2 | TEMPERATURE: 97.9 F | RESPIRATION RATE: 15 BRPM | OXYGEN SATURATION: 99 % | DIASTOLIC BLOOD PRESSURE: 77 MMHG | HEART RATE: 89 BPM | WEIGHT: 185 LBS

## 2021-11-29 DIAGNOSIS — J34.2 NASAL SEPTAL DEVIATION: ICD-10-CM

## 2021-11-29 PROCEDURE — 30802 ABLATE INF TURBINATE SUBMUC: CPT

## 2021-11-29 PROCEDURE — 30520 REPAIR OF NASAL SEPTUM: CPT

## 2021-11-29 PROCEDURE — 30520 REPAIR OF NASAL SEPTUM: CPT | Mod: GC | Performed by: OTOLARYNGOLOGY

## 2021-11-29 PROCEDURE — 30802 ABLATE INF TURBINATE SUBMUC: CPT | Mod: 51 | Performed by: OTOLARYNGOLOGY

## 2021-11-29 RX ORDER — LORAZEPAM 2 MG/ML
.5-1 INJECTION INTRAMUSCULAR
Status: DISCONTINUED | OUTPATIENT
Start: 2021-11-29 | End: 2021-11-30 | Stop reason: HOSPADM

## 2021-11-29 RX ORDER — FENTANYL CITRATE 50 UG/ML
INJECTION, SOLUTION INTRAMUSCULAR; INTRAVENOUS PRN
Status: DISCONTINUED | OUTPATIENT
Start: 2021-11-29 | End: 2021-11-29

## 2021-11-29 RX ORDER — FENTANYL CITRATE 50 UG/ML
25-50 INJECTION, SOLUTION INTRAMUSCULAR; INTRAVENOUS EVERY 5 MIN PRN
Status: DISCONTINUED | OUTPATIENT
Start: 2021-11-29 | End: 2021-11-30 | Stop reason: HOSPADM

## 2021-11-29 RX ORDER — KETOROLAC TROMETHAMINE 30 MG/ML
15 INJECTION, SOLUTION INTRAMUSCULAR; INTRAVENOUS EVERY 6 HOURS PRN
Status: DISCONTINUED | OUTPATIENT
Start: 2021-11-29 | End: 2021-11-30 | Stop reason: HOSPADM

## 2021-11-29 RX ORDER — DEXAMETHASONE SODIUM PHOSPHATE 4 MG/ML
INJECTION, SOLUTION INTRA-ARTICULAR; INTRALESIONAL; INTRAMUSCULAR; INTRAVENOUS; SOFT TISSUE PRN
Status: DISCONTINUED | OUTPATIENT
Start: 2021-11-29 | End: 2021-11-29

## 2021-11-29 RX ORDER — MUPIROCIN 20 MG/G
OINTMENT TOPICAL PRN
Status: DISCONTINUED | OUTPATIENT
Start: 2021-11-29 | End: 2021-11-29 | Stop reason: HOSPADM

## 2021-11-29 RX ORDER — HYDRALAZINE HYDROCHLORIDE 20 MG/ML
2.5-5 INJECTION INTRAMUSCULAR; INTRAVENOUS EVERY 10 MIN PRN
Status: DISCONTINUED | OUTPATIENT
Start: 2021-11-29 | End: 2021-11-30 | Stop reason: HOSPADM

## 2021-11-29 RX ORDER — SODIUM CHLORIDE, SODIUM LACTATE, POTASSIUM CHLORIDE, CALCIUM CHLORIDE 600; 310; 30; 20 MG/100ML; MG/100ML; MG/100ML; MG/100ML
INJECTION, SOLUTION INTRAVENOUS CONTINUOUS
Status: DISCONTINUED | OUTPATIENT
Start: 2021-11-29 | End: 2021-11-29 | Stop reason: HOSPADM

## 2021-11-29 RX ORDER — PROPOFOL 10 MG/ML
INJECTION, EMULSION INTRAVENOUS CONTINUOUS PRN
Status: DISCONTINUED | OUTPATIENT
Start: 2021-11-29 | End: 2021-11-29

## 2021-11-29 RX ORDER — CEPHALEXIN 500 MG/1
500 CAPSULE ORAL 4 TIMES DAILY
Qty: 28 CAPSULE | Refills: 0 | Status: SHIPPED | OUTPATIENT
Start: 2021-11-29 | End: 2021-12-06

## 2021-11-29 RX ORDER — OXYCODONE AND ACETAMINOPHEN 5; 325 MG/1; MG/1
1-2 TABLET ORAL ONCE
Status: DISCONTINUED | OUTPATIENT
Start: 2021-11-29 | End: 2021-11-30 | Stop reason: HOSPADM

## 2021-11-29 RX ORDER — ONDANSETRON 2 MG/ML
INJECTION INTRAMUSCULAR; INTRAVENOUS PRN
Status: DISCONTINUED | OUTPATIENT
Start: 2021-11-29 | End: 2021-11-29

## 2021-11-29 RX ORDER — LIDOCAINE 40 MG/G
CREAM TOPICAL
Status: DISCONTINUED | OUTPATIENT
Start: 2021-11-29 | End: 2021-11-29 | Stop reason: HOSPADM

## 2021-11-29 RX ORDER — PROPOFOL 10 MG/ML
INJECTION, EMULSION INTRAVENOUS PRN
Status: DISCONTINUED | OUTPATIENT
Start: 2021-11-29 | End: 2021-11-29

## 2021-11-29 RX ORDER — MEPERIDINE HYDROCHLORIDE 25 MG/ML
12.5 INJECTION INTRAMUSCULAR; INTRAVENOUS; SUBCUTANEOUS
Status: DISCONTINUED | OUTPATIENT
Start: 2021-11-29 | End: 2021-11-30 | Stop reason: HOSPADM

## 2021-11-29 RX ORDER — FENTANYL CITRATE 50 UG/ML
25 INJECTION, SOLUTION INTRAMUSCULAR; INTRAVENOUS
Status: DISCONTINUED | OUTPATIENT
Start: 2021-11-29 | End: 2021-11-30 | Stop reason: HOSPADM

## 2021-11-29 RX ORDER — LIDOCAINE HYDROCHLORIDE 20 MG/ML
INJECTION, SOLUTION INFILTRATION; PERINEURAL PRN
Status: DISCONTINUED | OUTPATIENT
Start: 2021-11-29 | End: 2021-11-29

## 2021-11-29 RX ORDER — DEXAMETHASONE SODIUM PHOSPHATE 10 MG/ML
10 INJECTION, SOLUTION INTRAMUSCULAR; INTRAVENOUS ONCE
Status: DISCONTINUED | OUTPATIENT
Start: 2021-11-29 | End: 2021-11-29 | Stop reason: HOSPADM

## 2021-11-29 RX ORDER — LABETALOL HYDROCHLORIDE 5 MG/ML
5 INJECTION, SOLUTION INTRAVENOUS EVERY 5 MIN PRN
Status: DISCONTINUED | OUTPATIENT
Start: 2021-11-29 | End: 2021-11-30 | Stop reason: HOSPADM

## 2021-11-29 RX ORDER — ACETAMINOPHEN 325 MG/1
975 TABLET ORAL
Status: DISCONTINUED | OUTPATIENT
Start: 2021-11-29 | End: 2021-11-30 | Stop reason: HOSPADM

## 2021-11-29 RX ORDER — OXYCODONE HYDROCHLORIDE 5 MG/1
5 TABLET ORAL EVERY 4 HOURS PRN
Status: DISCONTINUED | OUTPATIENT
Start: 2021-11-29 | End: 2021-11-30 | Stop reason: HOSPADM

## 2021-11-29 RX ORDER — LIDOCAINE HYDROCHLORIDE AND EPINEPHRINE 10; 10 MG/ML; UG/ML
INJECTION, SOLUTION INFILTRATION; PERINEURAL PRN
Status: DISCONTINUED | OUTPATIENT
Start: 2021-11-29 | End: 2021-11-29 | Stop reason: HOSPADM

## 2021-11-29 RX ORDER — ACETAMINOPHEN 325 MG/1
975 TABLET ORAL ONCE
Status: COMPLETED | OUTPATIENT
Start: 2021-11-29 | End: 2021-11-29

## 2021-11-29 RX ORDER — ONDANSETRON 2 MG/ML
4 INJECTION INTRAMUSCULAR; INTRAVENOUS EVERY 30 MIN PRN
Status: DISCONTINUED | OUTPATIENT
Start: 2021-11-29 | End: 2021-11-30 | Stop reason: HOSPADM

## 2021-11-29 RX ORDER — HYDROMORPHONE HYDROCHLORIDE 1 MG/ML
.2-.4 INJECTION, SOLUTION INTRAMUSCULAR; INTRAVENOUS; SUBCUTANEOUS EVERY 5 MIN PRN
Status: DISCONTINUED | OUTPATIENT
Start: 2021-11-29 | End: 2021-11-30 | Stop reason: HOSPADM

## 2021-11-29 RX ORDER — OXYMETAZOLINE HYDROCHLORIDE 0.05 G/100ML
SPRAY NASAL PRN
Status: DISCONTINUED | OUTPATIENT
Start: 2021-11-29 | End: 2021-11-29 | Stop reason: HOSPADM

## 2021-11-29 RX ORDER — SODIUM CHLORIDE, SODIUM LACTATE, POTASSIUM CHLORIDE, CALCIUM CHLORIDE 600; 310; 30; 20 MG/100ML; MG/100ML; MG/100ML; MG/100ML
INJECTION, SOLUTION INTRAVENOUS CONTINUOUS
Status: DISCONTINUED | OUTPATIENT
Start: 2021-11-29 | End: 2021-11-30 | Stop reason: HOSPADM

## 2021-11-29 RX ORDER — ALBUTEROL SULFATE 0.83 MG/ML
2.5 SOLUTION RESPIRATORY (INHALATION) EVERY 4 HOURS PRN
Status: DISCONTINUED | OUTPATIENT
Start: 2021-11-29 | End: 2021-11-30 | Stop reason: HOSPADM

## 2021-11-29 RX ORDER — ONDANSETRON 4 MG/1
4 TABLET, ORALLY DISINTEGRATING ORAL EVERY 30 MIN PRN
Status: DISCONTINUED | OUTPATIENT
Start: 2021-11-29 | End: 2021-11-30 | Stop reason: HOSPADM

## 2021-11-29 RX ORDER — OXYCODONE AND ACETAMINOPHEN 5; 325 MG/1; MG/1
1-2 TABLET ORAL EVERY 4 HOURS PRN
Qty: 20 TABLET | Refills: 0 | Status: SHIPPED | OUTPATIENT
Start: 2021-11-29 | End: 2021-12-16

## 2021-11-29 RX ADMIN — LIDOCAINE HYDROCHLORIDE 100 MG: 20 INJECTION, SOLUTION INFILTRATION; PERINEURAL at 12:20

## 2021-11-29 RX ADMIN — FENTANYL CITRATE 50 MCG: 50 INJECTION, SOLUTION INTRAMUSCULAR; INTRAVENOUS at 12:39

## 2021-11-29 RX ADMIN — SODIUM CHLORIDE, SODIUM LACTATE, POTASSIUM CHLORIDE, CALCIUM CHLORIDE: 600; 310; 30; 20 INJECTION, SOLUTION INTRAVENOUS at 10:43

## 2021-11-29 RX ADMIN — ONDANSETRON 4 MG: 2 INJECTION INTRAMUSCULAR; INTRAVENOUS at 13:52

## 2021-11-29 RX ADMIN — ACETAMINOPHEN 975 MG: 325 TABLET ORAL at 10:39

## 2021-11-29 RX ADMIN — FENTANYL CITRATE 50 MCG: 50 INJECTION, SOLUTION INTRAMUSCULAR; INTRAVENOUS at 12:19

## 2021-11-29 RX ADMIN — Medication 0.5 MG: at 12:56

## 2021-11-29 RX ADMIN — Medication 30 MG: at 12:21

## 2021-11-29 RX ADMIN — PROPOFOL 150 MCG/KG/MIN: 10 INJECTION, EMULSION INTRAVENOUS at 12:20

## 2021-11-29 RX ADMIN — PROPOFOL 200 MG: 10 INJECTION, EMULSION INTRAVENOUS at 12:20

## 2021-11-29 RX ADMIN — ONDANSETRON 4 MG: 2 INJECTION INTRAMUSCULAR; INTRAVENOUS at 12:30

## 2021-11-29 RX ADMIN — DEXAMETHASONE SODIUM PHOSPHATE 10 MG: 4 INJECTION, SOLUTION INTRA-ARTICULAR; INTRALESIONAL; INTRAMUSCULAR; INTRAVENOUS; SOFT TISSUE at 12:28

## 2021-11-29 ASSESSMENT — MIFFLIN-ST. JEOR: SCORE: 1547.34

## 2021-11-29 ASSESSMENT — LIFESTYLE VARIABLES: TOBACCO_USE: 1

## 2021-11-29 NOTE — DISCHARGE INSTRUCTIONS
[facility]  Post-Operative Instructions Following NASAL SURGERY    Call promptly if:    Your temperature is over 100 F    You experience an unusual amount of bleeding    Re-injury of the operative site    What to Expect After Your Surgery:    Nasal stuffiness is the most annoying problem you will encounter. It is most distressful the first week after surgery. Often you will find that the pain medication will not alleviate the pressure. Significant improvement can be expected when the nasal dressings are removed one week after surgery, and further gradual improvement can be expected several weeks thereafter.    Mild nasal or facial pain is expected after surgery and is well managed with oral pain medication except in the most unusual circumstances.    Some bloody discharge is expected after surgery. During the first 24 hours after surgery, you may need to change the nasal absorbent dressing 10-20 times. This is typical. The nurses at the surgery center will instruct you on how to change these absorbent dressings and this can be easily accomplished at home. Some oozing is expected because we avoid packing the nose, which causes considerable discomfort.    Some bruising after surgery is to be expected. The amount of bruising that occurs varies significantly from one individual to the next. Most swelling occurs around the eyes and reaches a peak the second day following surgery and then steadily improves.    Numbness in the tip of the nose, upper front teeth, or roof of the mouth following surgery is to be expected because the intranasal surgery has caused a temporary disruption of some of the nerves in this area.    Taking Care of Your Nose: It is important to take care of your nose after surgery and following these instructions to help optimize your recovery period.    1. Nasal Saline    Saline mist spray is an over the counter medication that must be used the day after  surgery and for 3 weeks of recovery or more if deemed necessary by your surgeon    Spray each nostril 4-5 times a day, this will make splint removal a week after surgery much easier  2. Dermal Wound Cleanser    If your incision or nostrils have crusted/dry blood, use a Q-tip and roll over the area two times a day to help remove the dried blood    If incisions are clean, then a moist Q-tip can be used  3. Moisturizer    After cleaning the external incision with the dermal wound cleanser, place Hydrogel on a Q-tip and roll over the incision twice a day    Post-Operative Wound Care Bag  The items in this bag are recommended by your doctor to help with the post-operative wound care. A post-operative wound care bag is available for you to purchase prior to your surgery.   Dermal Wound Cleanser - this helps remove dried, crusty blood on suture line  and around the surgery area   Moisturizer - this helps keep the suture line moist to facilitate healing    Other suggested items:   Arnica - this is a homeopathic medication that can help reduce swelling and  bruising   Sunscreen        Some decrease in the sense of smell and alterations of taste is typical after surgery as the nerve fibers that are responsible for your sense of smell are present high in the nose and the nasal congestion blocks the flow of air to this area. This will improve within the first one to two weeks following surgery.    Swelling from the surgery will give some distorted appearance to the nose and should not be a cause for alarm. The nose will appear excessively broad and the tip turned up more than is desirable. This is due to the swelling and will improve in the days following surgery.    A special ice pack will be given to you by the nurse before going home and is very helpful if used as much as possible int he first 24 hours after surgery to minimize the swelling. Thereafter, its use is not essential.    Blowing the nose is prohibited after  surgery because it can stimulate bleeding and compromise your final result. If you feel the urge to sneeze, sneeze with your mouth open, as this will minimize any disturbance of the nasal tissues    If you wear glasses, the nurse will need to instruct you on the use of alternate methods for suspension of the glasses. They must NOT rest on the nose for any more than a brief period of time within the first 3-4 weeks following surgery. It is also important to note that your glasses may need to be refitted because of alterations in the shape of the nose may change the resting place for the glasses.    The nose must be kept dry or the external splint may be disrupted, compromising your final result. Bathing is permitted but soaking the nasal area and dressing is to be avoided.    Typically a light tape and plastic external nasal dressing is present after surgery along with surgical rubber splints, which have been placed inside the nose. Both the internal and external dressings are removed one week following surgery and arrangements for an office visit will be made prior to your leaving the surgical area.    Sun avoidance for several months is important following surgery so as to avoid any excess sun damage to the nasal skin. If you anticipate outdoor activity, sunscreen is mandatory. A sun protection factor (SPF) of 15 or greater is advisable, we can recommend something for you.     Airline travel should be limited in the immediate week following surgery so as to avoid any possibility of sinus blockage.    Exercise Restrictions:    Refrain from rigorous exercise for the first 2 weeks after surgery. Do not lift anything greater than 10 pounds. Avoid activities that would cause you to hold your breath, which would increase pressure.    After 2 weeks from surgery you can slowly and gradually increase your activity.    [facility]  Home Care Following Anesthesia  For 24 hours after  surgery:  1. Get plenty of rest.  A responsible adult must stay with you for at least 24 hours after you leave the surgery center.  2. Do not drive or use heavy equipment.  If you have weakness or tingling, don't drive or use heavy equipment until this feeling goes away.   3. Do not drink alcohol.   4. Avoid strenuous or risky activities.  Ask for help when climbing stairs.  5. You may feel lightheaded.  IF so, sit for a few minutes before standing.  Have someone help you get up.   6. If you have nausea (feel sick to your stomach): Drink only clear liquids such as apple juice, ginger ale, broth or 7-Up.  Rest may also help.  Be sure to drink enough fluids.  Move to a regular diet as you feel able.   7. You may have a slight fever.  Call the doctor if your fever is over 100 F (37.7 C) (taken under the tongue) or lasts longer than 24 hours.  8. You may have a dry mouth, a sore throat, muscle aches or trouble sleeping. These should go away after 24 hours.  9. Do not make important or legal decisions.   10. It is recommended to avoid smoking.               Tips for taking pain medications  To get the best pain relief possible, remember these points:    Take pain medications as directed, before pain becomes severe.    Pain medication can upset your stomach: taking it with food may help.    Constipation is a common side effect of pain medication. Drink plenty of  fluids.    Eat foods high in fiber. Take a stool softener if recommended by your doctor or pharmacist.    Do not drink alcohol, drive or operate machinery while taking pain medications.    Ask about other ways to control pain, such as with heat, ice or relaxation.    Tylenol/Acetaminophen Consumption  To help encourage the safe use of acetaminophen, the makers of TYLENOL  have lowered the maximum daily dose for single-ingredient Extra Strength TYLENOL  (acetaminophen) products sold in the [place] from 8 pills per day (4,000 mg) to 6 pills per day (3,000 mg). The  dosing interval has also changed from 2 pills every 4-6 hours to 2 pills every 6 hours.    If you feel your pain relief is insufficient, you may take Tylenol/Acetaminophen in addition to your narcotic pain medication.     Be careful not to exceed 3,000 mg of Tylenol/Acetaminophen in a 24 hour period from all sources.    If you are taking extra strength Tylenol/acetaminophen (500 mg), the maximum dose is 6 tablets in 24 hours.    If you are taking regular strength acetaminophen (325 mg), the maximum dose is 9 tablets in 24 hours.    You received a dose of  975 mg of Tylenol @ 10:40 AM . You may take another dose of Tylenol @ 4:40 PM .     Call a doctor for any of the followin. Signs of infection (fever, growing tenderness at the surgery site, a large amount of drainage or bleeding, severe pain, foul-smelling drainage, redness, swelling).  2. It has been over 8 to 10 hours since surgery and you are still not able to urinate (pass water).  3. Headache for over 24 hours.  4. Numbness, tingling or weakness the day after surgery (if you had spinal anesthesia).  5. Signs of Covid-19 infection (temperature over 100 degrees, shortness of breath, cough, loss of taste/smell, generalized body aches, persistent headache, chills, sore throat, nausea/vomiting/diarrhea)  Your doctor is:  Dr. Merlin Mendoza, ENT Otolaryngology: [phone]                    Or dial [phone] and ask for the resident on call for:  ENT Otolaryngology  For emergency care, call the:  [facility]:  [phone] (TTY for hearing impaired: [phone])

## 2021-11-29 NOTE — BRIEF OP NOTE
[facility] Brief Operative Note    Pre-operative diagnosis: Nasal septal deviation [J34.2]   Post-operative diagnosis same   Procedure: Procedure(s):  Septoplasty, turbinate reduction   Surgeon(s): Surgeon(s) and Role:     * Merlin Mendoza MD - Primary   Estimated blood loss: 700 mL    Specimens: * No specimens in log *       Merlin Mendoza MD

## 2021-11-29 NOTE — ANESTHESIA CARE TRANSFER NOTE
Patient: Mariaelena Jean Baptiste    Procedure: Procedure(s):  Septoplasty, turbinate reduction       Diagnosis: Nasal septal deviation [J34.2]  Diagnosis Additional Information: No value filed.    Anesthesia Type:   General     Note:    Oropharynx: oropharynx clear of all foreign objects and spontaneously breathing  Level of Consciousness: awake  Oxygen Supplementation: face mask  Level of Supplemental Oxygen (L/min / FiO2): 6  Independent Airway: airway patency satisfactory and stable  Dentition: dentition unchanged  Vital Signs Stable: post-procedure vital signs reviewed and stable  Report to RN Given: handoff report given  Patient transferred to: PACU    Handoff Report: Identifed the Patient, Identified the Reponsible Provider, Reviewed the pertinent medical history, Discussed the surgical course, Reviewed Intra-OP anesthesia mangement and issues during anesthesia, Set expectations for post-procedure period and Allowed opportunity for questions and acknowledgement of understanding      Vitals:  Vitals Value Taken Time   /89 11/29/21 1345   Temp     Pulse 102 11/29/21 1347   Resp 11 11/29/21 1347   SpO2 100 % 11/29/21 1347   Vitals shown include unvalidated device data.    Electronically Signed By: ALYSSA Polanco CRNA  November 29, 2021  1:49 PM

## 2021-11-29 NOTE — ANESTHESIA PREPROCEDURE EVALUATION
Anesthesia Pre-Procedure Evaluation    Patient: Mariaelena Jean Baptiste   MRN: [id number] : 1970        Preoperative Diagnosis: Nasal septal deviation [J34.2]    Procedure : Procedure(s):  SEPTOPLASTY, NOSE          Past Medical History:   Diagnosis Date     Anxiety      Chronic osteoarthritis      Depressive disorder      Dissociative identity disorder (H)      Gastro-oesophageal reflux disease      Hypertension      Migraines      PTSD (post-traumatic stress disorder)      Social phobia       Past Surgical History:   Procedure Laterality Date     CHOLECYSTECTOMY       COLONOSCOPY N/A 2021    Procedure: COLONOSCOPY, WITH POLYPECTOMY;  Surgeon: Rickie Mccarthy MD;  Location: [facility] OR     LAPAROSCOPIC TUBAL LIGATION  1999     rectal prolapse repair       SKIN GRAFT Bilateral     4 times, most recent in       No Known Allergies   Social History     Tobacco Use     Smoking status: Former Smoker     Packs/day: 1.00     Years: 20.00     Pack years: 20.00     Types: Cigarettes, Other     Start date: 1995     Quit date: 10/23/2017     Years since quittin.1     Smokeless tobacco: Former User     Tobacco comment: E-cig    Substance Use Topics     Alcohol use: No      Wt Readings from Last 1 Encounters:   21 85.3 kg (188 lb)        Anesthesia Evaluation            ROS/MED HX  ENT/Pulmonary:     (+) tobacco use, Past use,     Neurologic:     (+) migraines,     Cardiovascular:     (+) hypertension----- (-) murmur   METS/Exercise Tolerance:     Hematologic:  - neg hematologic  ROS     Musculoskeletal:   (+) arthritis,     GI/Hepatic:     (+) GERD,     Renal/Genitourinary:  - neg Renal ROS     Endo:  - neg endo ROS     Psychiatric/Substance Use: Comment: PTSD    (+) psychiatric history anxiety and depression     Infectious Disease:  - neg infectious disease ROS     Malignancy:  - neg malignancy ROS     Other:            Physical Exam    Airway        Mallampati: I   TM distance: > 3 FB   Neck  ROM: full   Mouth opening: > 3 cm    Respiratory Devices and Support         Dental     Comment: Many missing teeth        Cardiovascular          Rhythm and rate: regular and tachycardia (-) no murmur    Pulmonary   pulmonary exam normal                OUTSIDE LABS:  CBC:   Lab Results   Component Value Date    WBC 7.4 11/16/2021    WBC 5.7 06/05/2020    HGB 13.2 11/16/2021    HGB 14.6 06/05/2020    HCT 40.9 11/16/2021    HCT 46.7 06/05/2020     11/16/2021     (H) 06/05/2020     BMP:   Lab Results   Component Value Date     11/16/2021     06/05/2020    POTASSIUM 3.9 11/16/2021    POTASSIUM 4.4 06/05/2020    CHLORIDE 107 11/16/2021    CHLORIDE 107 06/05/2020    CO2 25 11/16/2021    CO2 25 06/05/2020    BUN 14 11/16/2021    BUN 15 06/05/2020    CR 0.79 11/16/2021    CR 0.77 06/05/2020    GLC 83 11/16/2021    GLC 94 06/05/2020     COAGS: No results found for: PTT, INR, FIBR  POC:   Lab Results   Component Value Date     (H) 05/07/2011    HCG Negative 01/22/2016    HCGS Negative 09/19/2013     HEPATIC:   Lab Results   Component Value Date    ALBUMIN 3.7 05/20/2020    PROTTOTAL 7.1 05/20/2020    ALT 19 05/20/2020    AST 27 05/20/2020    GGT 16 12/14/2007    ALKPHOS 95 05/20/2020    BILITOTAL 0.4 05/20/2020     OTHER:   Lab Results   Component Value Date    LACT 1.2 05/20/2020    A1C 5.1 01/13/2021    ARUN 8.9 11/16/2021    PHOS 3.6 11/09/2009    MAG 2.1 05/20/2020    TSH 0.89 06/05/2020    T4 0.43 (L) 01/22/2016    T3 105 01/26/2016    CRP 0.2 05/20/2020    SED 10 12/05/2018       Anesthesia Plan    ASA Status:  2      Anesthesia Type: General.     - Airway: ETT   Induction: Intravenous.   Maintenance: TIVA.        Consents    Anesthesia Plan(s) and associated risks, benefits, and realistic alternatives discussed. Questions answered and patient/representative(s) expressed understanding.    - Discussed:     - Discussed with:  Patient      - Extended Intubation/Ventilatory Support  Discussed: No.      - Patient is DNR/DNI Status: No    Use of blood products discussed: No .     Postoperative Care    Pain management: IV analgesics, Oral pain medications.   PONV prophylaxis: Ondansetron (or other 5HT-3), Dexamethasone or Solumedrol     Comments:                Alka Alcazar MD

## 2021-11-29 NOTE — ANESTHESIA POSTPROCEDURE EVALUATION
Patient: Mariaelena Jean Baptiste    Procedure: Procedure(s):  Septoplasty, turbinate reduction       Diagnosis:Nasal septal deviation [J34.2]  Diagnosis Additional Information: No value filed.    Anesthesia Type:  General    Note:  Disposition: Outpatient   Postop Pain Control: Uneventful            Sign Out: Well controlled pain   PONV: No   Neuro/Psych: Uneventful            Sign Out: Acceptable/Baseline neuro status   Airway/Respiratory: Uneventful            Sign Out: Acceptable/Baseline resp. status   CV/Hemodynamics: Uneventful            Sign Out: Acceptable CV status; No obvious hypovolemia; No obvious fluid overload   Other NRE: NONE   DID A NON-ROUTINE EVENT OCCUR? No           Last vitals:  Vitals Value Taken Time   /80 11/29/21 1400   Temp     Pulse 109 11/29/21 1403   Resp 13 11/29/21 1403   SpO2 98 % 11/29/21 1403   Vitals shown include unvalidated device data.    Electronically Signed By: Gianna Ybarra MD  November 29, 2021  5:11 PM

## 2021-12-03 ENCOUNTER — OFFICE VISIT (OUTPATIENT)
Dept: INTERNAL MEDICINE | Facility: CLINIC | Age: 51
End: 2021-12-03
Payer: MEDICARE

## 2021-12-03 VITALS
RESPIRATION RATE: 16 BRPM | HEART RATE: 106 BPM | TEMPERATURE: 98.3 F | HEIGHT: 71 IN | DIASTOLIC BLOOD PRESSURE: 98 MMHG | OXYGEN SATURATION: 100 % | SYSTOLIC BLOOD PRESSURE: 163 MMHG | BODY MASS INDEX: 26.23 KG/M2 | WEIGHT: 187.4 LBS

## 2021-12-03 DIAGNOSIS — I10 BENIGN ESSENTIAL HYPERTENSION: Primary | ICD-10-CM

## 2021-12-03 DIAGNOSIS — Z12.4 SCREENING FOR CERVICAL CANCER: ICD-10-CM

## 2021-12-03 PROCEDURE — 99214 OFFICE O/P EST MOD 30 MIN: CPT | Performed by: INTERNAL MEDICINE

## 2021-12-03 PROCEDURE — 87624 HPV HI-RISK TYP POOLED RSLT: CPT | Performed by: INTERNAL MEDICINE

## 2021-12-03 PROCEDURE — G0145 SCR C/V CYTO,THINLAYER,RESCR: HCPCS | Performed by: INTERNAL MEDICINE

## 2021-12-03 RX ORDER — LISINOPRIL 10 MG/1
10 TABLET ORAL DAILY
Qty: 90 TABLET | Refills: 3 | Status: SHIPPED | OUTPATIENT
Start: 2021-12-03

## 2021-12-03 ASSESSMENT — MIFFLIN-ST. JEOR: SCORE: 1558.23

## 2021-12-03 ASSESSMENT — PAIN SCALES - GENERAL: PAINLEVEL: NO PAIN (0)

## 2021-12-03 NOTE — PROGRESS NOTES
Assessment & Plan      Benign essential hypertension  BP has been running high.  Will cautiously resume lisinopril as it was previously stopped with episodes of near syncope about 1 year prior  - lisinopril (ZESTRIL) 10 MG tablet  Dispense: 90 tablet; Refill: 3  - continue monitoring BP at home  - referral to [facility] for f/u    Screening for cervical cancer  Pap done today  - Pap screen with HPV - recommended age 30 - 65 years      HM:  UTD with covid vaccine    RTC: Return in about 6 months (around 6/3/2022) for BP f/u.  I spent a total of 30 minutes on the day of the visit.   Time was spent doing chart review, history and exam, documentation and further activities as noted above.    Thalia Bradford MD  Securely message with the Vocera Web Console (learn more here)    Chief Complaint   Mariaelena Jean Baptiste is a 50 year old adult presents for   Chief Complaint   Patient presents with     Recheck Medication     Patient comes in for a follow up.        History of Present Illness   She had a septoplasty on Monday.    She thinks it is healing well    Started yoga for back/neck pain  Has only tried a few sessions prior to nasal surgery  She has just been taking tylenol.  Will resume ibuprofen as needed now that the surgery is over  Has not started accupuncture    BP:  Has been running high  Checking at home, as been around 155  Previously on lisinopril 20 mg daily  Stopped meds after episodes of syncope/near syncope.      Medications and allergies were reviewed by me today.     Social History   History   Smoking Status     Former Smoker     Packs/day: 1.00     Years: 20.00     Types: Cigarettes, Other     Start date: 5/1/1995     Quit date: 10/23/2017   Smokeless Tobacco     Former User     Comment: E-cig       PHQ-2 ( 1999 Pf[employer]) 11/16/2021 9/2/2021   Q1: Little interest or pleasure in doing things 1 1   Q2: Feeling down, depressed or hopeless 1 1   PHQ-2 Score 2 2   PHQ-2 Total Score (12-17 Years)- Positive if 3 or more  "points; Administer PHQ-A if positive - 2     PHQ-9 SCORE 9/1/2021 9/2/2021 9/2/2021   PHQ-9 Total Score MyChart - - -   PHQ-9 Total Score 6 6 8     Past Medical History   Patient Active Problem List   Diagnosis     Suicidal ideation     Major depression, recurrent (H)     Tobacco abuse     Hx of psychiatric care     Scar     Nasal septal deviation       Review of Systems   5 point ROS completed and negative except noted above, including Gen, CV, Resp, GI, MS    Physical Exam   BP (!) 163/98   Pulse 106   Temp 98.3  F (36.8  C) (Oral)   Resp 16   Ht 1.791 m (5' 10.5\")   Wt 85 kg (187 lb 6.4 oz)   SpO2 100%   BMI 26.51 kg/m    Gen: no distress, comfortable, pleasant   Eyes: anicteric, normal extra-ocular movements   Pelvic Exam:  Vulva: No external lesions, normal hair distribution, no adenopathy  Vagina: Moist, pink, no abnormal discharge  Cervix: Pap smear is taken, parous, smooth, pink, no visible lesions  Skin: no concerning lesions, no jaundice   Psychological: appropriate mood       "

## 2021-12-03 NOTE — NURSING NOTE
Chief Complaint   Patient presents with     Recheck Medication     Patient comes in for a follow up.         Stephen Vallejo MA on 12/3/2021 at 8:26 AM

## 2021-12-06 ENCOUNTER — APPOINTMENT (OUTPATIENT)
Dept: OTOLARYNGOLOGY | Facility: CLINIC | Age: 51
End: 2021-12-06
Payer: MEDICARE

## 2021-12-07 LAB
BKR LAB AP GYN ADEQUACY: NORMAL
BKR LAB AP GYN INTERPRETATION: NORMAL
BKR LAB AP HPV REFLEX: NORMAL
BKR LAB AP PREVIOUS ABNORMAL: NORMAL
PATH REPORT.COMMENTS IMP SPEC: NORMAL
PATH REPORT.COMMENTS IMP SPEC: NORMAL
PATH REPORT.RELEVANT HX SPEC: NORMAL

## 2021-12-08 LAB
HUMAN PAPILLOMA VIRUS 16 DNA: NEGATIVE
HUMAN PAPILLOMA VIRUS 18 DNA: NEGATIVE
HUMAN PAPILLOMA VIRUS FINAL DIAGNOSIS: NORMAL
HUMAN PAPILLOMA VIRUS OTHER HR: NEGATIVE

## 2021-12-10 ENCOUNTER — PRE VISIT (OUTPATIENT)
Dept: OTOLARYNGOLOGY | Facility: CLINIC | Age: 51
End: 2021-12-10
Payer: COMMERCIAL

## 2021-12-10 NOTE — TELEPHONE ENCOUNTER
Septoplasty, turbinate reduction on 11/29/21    Splints removed 12/06/21    Two week post op visit

## 2021-12-10 NOTE — PATIENT INSTRUCTIONS
"1. You were seen in the clinic today by Dr. Mendoza. If you have any questions or concerns after your appointment, please call the clinic at [phone]. Press \"1\" for scheduling, press \"3\" for nurse advice.    2.   Plan to return the clinic in 6 weeks.       Eunice Johns LPN  [facility]  Department of Otolaryngology  [phone]    "

## 2021-12-13 ENCOUNTER — OFFICE VISIT (OUTPATIENT)
Dept: OTOLARYNGOLOGY | Facility: CLINIC | Age: 51
End: 2021-12-13
Payer: MEDICARE

## 2021-12-13 VITALS — OXYGEN SATURATION: 98 % | HEART RATE: 117 BPM | HEIGHT: 71 IN | BODY MASS INDEX: 26.18 KG/M2 | WEIGHT: 187 LBS

## 2021-12-13 DIAGNOSIS — Z98.890 POSTOPERATIVE STATE: Primary | ICD-10-CM

## 2021-12-13 PROCEDURE — 99024 POSTOP FOLLOW-UP VISIT: CPT | Performed by: OTOLARYNGOLOGY

## 2021-12-13 ASSESSMENT — MIFFLIN-ST. JEOR: SCORE: 1551.42

## 2021-12-13 ASSESSMENT — PAIN SCALES - GENERAL: PAINLEVEL: NO PAIN (0)

## 2021-12-13 NOTE — PROGRESS NOTES
HISTORY OF PRESENT ILLNESS:  Mariaelena is here to see us today.      PHYSICAL EXAMINATION: She has no active bleeding or substantial pain and is breathing well through her nose.  It is dry.    PLAN:  At this point, plan on increasing nasal hydration.  Follow up in six weeks.

## 2021-12-13 NOTE — LETTER
12/13/2021       RE: Mariaelena Jean Baptiste  [address]  [place] [address]     Dear Colleague,    Thank you for referring your patient, Mariaelena Jean Baptiste, to the [facility] [place] at [facility]. Please see a copy of my visit note below.    HISTORY OF PRESENT ILLNESS:  Mariaelena is here to see us today.      PHYSICAL EXAMINATION: She has no active bleeding or substantial pain and is breathing well through her nose.  It is dry.    PLAN:  At this point, plan on increasing nasal hydration.  Follow up in six weeks.          Again, thank you for allowing me to participate in the care of your patient.      Sincerely,    Merlin Mendoza MD

## 2021-12-13 NOTE — NURSING NOTE
"Chief Complaint   Patient presents with     RECHECK     1 week post op       Pulse 117, height 1.791 m (5' 10.5\"), weight 84.8 kg (187 lb), SpO2 98 %, not currently breastfeeding.    Rojelio Mayer, EMT  "

## 2021-12-16 ENCOUNTER — VIRTUAL VISIT (OUTPATIENT)
Dept: PHARMACY | Facility: CLINIC | Age: 51
End: 2021-12-16
Attending: INTERNAL MEDICINE
Payer: COMMERCIAL

## 2021-12-16 DIAGNOSIS — G47.9 SLEEP DISORDER, UNSPECIFIED: ICD-10-CM

## 2021-12-16 DIAGNOSIS — Z78.9 TAKES DIETARY SUPPLEMENTS: ICD-10-CM

## 2021-12-16 DIAGNOSIS — F33.1 MODERATE EPISODE OF RECURRENT MAJOR DEPRESSIVE DISORDER (H): Primary | ICD-10-CM

## 2021-12-16 DIAGNOSIS — R09.81 NASAL CONGESTION: ICD-10-CM

## 2021-12-16 DIAGNOSIS — I10 BENIGN ESSENTIAL HYPERTENSION: ICD-10-CM

## 2021-12-16 DIAGNOSIS — M62.838 MUSCLE SPASM: ICD-10-CM

## 2021-12-16 PROCEDURE — 99605 MTMS BY PHARM NP 15 MIN: CPT | Performed by: PHARMACIST

## 2021-12-16 PROCEDURE — 2894A VOIDCORRECT: CPT | Performed by: PHARMACIST

## 2021-12-16 NOTE — PROGRESS NOTES
Medication Therapy Management (MTM) Encounter    ASSESSMENT:                            Medication Adherence/Access: No issues identified    Hypertension: Stable.  Meeting goal of less than 130/80 mmHg.  Needs basic metabolic panel after new start of lisinopril.    Muscle Spasms/Pain: Stable.     Depression: Stable.    Allergies/Congestion: Stable.    Sleep issues: Doxylamine is not ideal for continued use due to potential anticholinergic side effects such as potential memory issues over long-term use.  Although melatonin has been useful in the past she has not been using it due to the ideas that it could cause issues with serotonin, however we discussed this and I noted that I am not aware of any issues with serotonin white with use of melatonin and that she should use it more often.    Supplements: Stable.     PLAN:                            1. Get a Basic Metabolic Panel drawn in the next week.  2. Start using melatonin 5 mg more often to see if this helps for sleep and use doxylamine less.     Follow-up: 3 months for a blood pressure follow-up       SUBJECTIVE/OBJECTIVE:                          Mariaelena Jean Baptiste is a 51 year old adult contacted via secure video for an initial visit. Mariaelena Jean Baptiste was referred to me from Thalia Bradford MD.     Reason for visit: blood pressure management.    Allergies/ADRs: Reviewed in chart  Past Medical History: Reviewed in chart  Tobacco: Mariaelena Jean Baptiste reports that Mariaelena Jean Baptiste quit smoking about 4 years ago. Mariaelena Jean Baptiste's smoking use included cigarettes and other. Mariaelena Jean Baptiste started smoking about 26 years ago. Mariaelena Jean Baptiste has a 20.00 pack-year smoking history. Mariaelena Jean Baptiste has quit using smokeless tobacco. Mariaelena is vaping low THC vape.  Alcohol: none  Caffeine: coffee and water additives that have caffeine (about 40 oz a day of something caffeine)  Social: none  Personal Healthcare Goals: not sure of the answer to this and will think about  "it.  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    Medication Adherence/Access: no issues reported. Uses a \"med minder\"     Hypertension: Current medications include lisinopril 10 mg (has been on it before and just restarted it about a week and a half ago).  Patient does self-monitor blood pressure. Home BP monitoring in range of 125's systolic over 77's diastolic. Patient reports the following medication side effects: orthostatic lightheadedness. Most recent reading was 123/67 mmHg on 12/14/21.   BP Readings from Last 3 Encounters:   12/03/21 (!) 163/98   11/29/21 132/77   11/16/21 (!) 175/110       Muscle Spasms/Pain: Currently taking cyclobenzaprine 5 mg 1 to 2 tablets once or twice a day, and ibuprofen 400 mg as needed (for headaches or upper back pain).. Has chronic upper back/neck muscle spasms. Does have some drowsiness with the cyclobenzapine 10 mg will make her a little drowsy.     Depression: Currently taking lamotrigine 150 mg daily, quetiapine 50 mg daily, Fetzima  mg daily, and the Viibryd 40 mg daily. Also takes Adderall 10 mg twice a day to help with energy levels. Reports that these medications have been working well for her.  \"I've been on virtually every antidepressant in the book.\" Classifies depression as stable currently. Gets TMS therapy every year or two.     PHQ 8/31/2021 9/1/2021 9/2/2021   PHQ-9 Total Score 7 6 6   Q9: Thoughts of better off dead/self-harm past 2 weeks Not at all Not at all Not at all   Some encounter information is confidential and restricted. Go to Review Flowsheets activity to see all data.     Allergies/Congestion: Uses Nasacort 55 mcg 2 sprays into the nostrils daily to help relieve symptoms of cat allergies and this does help. Uses Ocean 0.65% nasal spray for nasal dryness post surgery. No concerns or questions at this time.     Sleep issues: Reports issues with sleeping so she uses " doxylamine 4-5 nights per week. Uses quetiapine for this as well but some nights needs more than that to get to sleep. Reports that the biggest issue is getting to sleep and falling back to sleep if she wakes up. Has used any meletonin in the past with some success but thought she wasn't supposed to take it for more than a few days at a time.    Supplements: Currently takes vitamin D 2000 units once a day, a multivitamin daily, and magnesium oxide 250 mg once daily.     Vitamin D Deficiency Screening Results:  Lab Results   Component Value Date    VITDT 34 12/05/2018    VITDT 32 08/24/2014       Today's Vitals: There were no vitals taken for this visit.  ----------------      I spent 32 minutes with this patient today. All changes were made via collaborative practice agreement with Thalia Bradford MD. A copy of the visit note was provided to the patient's primary care provider.    The patient was sent via Tunesat a summary of these recommendations.     Ihsan Zabala, Pharm. D., BCACP  Medication Therapy Management Pharmacist  Direct Voicemail: [phone]      Telemedicine Visit Details  Type of service:  Video Conference via Variable  Start Time: 1:30 pm  End Time: 2:02 PM  Originating Location (patient location): Home  Distant Location (provider location):  [facility]     Medication Therapy Recommendations  Sleep disorder, unspecified    Current Medication: doxylamine (UNISOM) 25 MG TABS tablet   Rationale: Unsafe medication for the patient - Adverse medication event - Safety   Recommendation: Change Medication - melatonin 5 MG tablet   Status: Accepted - no CPA Needed

## 2021-12-16 NOTE — PATIENT INSTRUCTIONS
Recommendations from today's MTM visit:                                                    MTM (medication therapy management) is a service provided by a clinical pharmacist designed to help you get the most of out of your medicines.   Today we reviewed what your medicines are for, how to know if they are working, that your medicines are safe and how to make your medicine regimen as easy as possible.      1. Get a Basic Metabolic Panel drawn in the next week.  2. Start using melatonin 5 mg more often to see if this helps for sleep and use doxylamine less.     Follow-up: 3 months for a blood pressure follow-up      It was great to speak with you today.  I value your experience and would be very thankful for your time with providing feedback on our clinic survey. You may receive a survey via email or text message in the next few days.     To schedule another MTM appointment, please call the clinic directly or you may call the MTM scheduling line at [phone] or toll-free at [phone].     My Clinical Pharmacist's contact information:                                                      Please feel free to contact me with any questions or concerns you have.      Ihsan Zabala, Pharm. D., BCACP  Medication Therapy Management Pharmacist  Direct Voicemail: [phone]

## 2021-12-21 ENCOUNTER — VIRTUAL VISIT (OUTPATIENT)
Dept: PSYCHIATRY | Facility: CLINIC | Age: 51
End: 2021-12-21
Attending: PSYCHIATRY & NEUROLOGY
Payer: MEDICARE

## 2021-12-21 ENCOUNTER — TELEPHONE (OUTPATIENT)
Dept: PSYCHIATRY | Facility: CLINIC | Age: 51
End: 2021-12-21
Payer: COMMERCIAL

## 2021-12-21 DIAGNOSIS — F33.1 MODERATE EPISODE OF RECURRENT MAJOR DEPRESSIVE DISORDER (H): ICD-10-CM

## 2021-12-21 PROCEDURE — 99214 OFFICE O/P EST MOD 30 MIN: CPT | Mod: GC | Performed by: STUDENT IN AN ORGANIZED HEALTH CARE EDUCATION/TRAINING PROGRAM

## 2021-12-21 RX ORDER — LAMOTRIGINE 150 MG/1
150 TABLET ORAL DAILY
Qty: 30 TABLET | Refills: 2 | Status: SHIPPED | OUTPATIENT
Start: 2021-12-21 | End: 2022-02-01

## 2021-12-21 RX ORDER — QUETIAPINE FUMARATE 25 MG/1
50 TABLET, FILM COATED ORAL AT BEDTIME
Qty: 60 TABLET | Refills: 2 | Status: SHIPPED | OUTPATIENT
Start: 2021-12-21 | End: 2022-02-01

## 2021-12-21 RX ORDER — DEXTROAMPHETAMINE SACCHARATE, AMPHETAMINE ASPARTATE, DEXTROAMPHETAMINE SULFATE AND AMPHETAMINE SULFATE 2.5; 2.5; 2.5; 2.5 MG/1; MG/1; MG/1; MG/1
10 TABLET ORAL 2 TIMES DAILY
Qty: 60 TABLET | Refills: 0 | Status: SHIPPED | OUTPATIENT
Start: 2021-12-21 | End: 2022-02-01

## 2021-12-21 RX ORDER — VILAZODONE HYDROCHLORIDE 40 MG/1
40 TABLET ORAL DAILY
Qty: 30 TABLET | Refills: 2 | Status: SHIPPED | OUTPATIENT
Start: 2021-12-21 | End: 2022-02-01

## 2021-12-21 NOTE — TELEPHONE ENCOUNTER
On December 21, 2021, at 9:46 AM, writer called patient at mobile to confirm Virtual Visit. Writer unable to make contact with patient. Writer left detailed voice message for call back. [phone] left as call back number. DANIELA Goncalves    On December 21, 2021, at 10:29 AM, writer called patient at mobile to confirm Virtual Visit. Writer unable to make contact with patient. A link to the video visit was sent to the patient's email address and mobile phone number. Naveed Anders, EMT

## 2021-12-21 NOTE — PROGRESS NOTES
"VIDEO VISIT  Mariaelena Jean Baptiste is a 50 year old patient who is being evaluated via a billable video visit.      The patient has been notified of following:   \"We have found that certain health care needs can be provided without the need for an in-person physical exam. This service lets us provide the care you need with a video conversation. If a prescription is necessary we can send it directly to your pharmacy. If lab work is needed we can place an order for that and you can then stop by our lab to have the test done at a later time. Insurers are generally covering virtual visits as they would in-office visits so billing should not be different than normal.  If for some reason you do get billed incorrectly, you should contact the billing office to correct it and that number is in the AVS .    Patient has given verbal consent for video visit?: Yes   How would you like to obtain your AVS?: Social & Loyal  AVS SmartPhrase [PsychAVS] has been placed in 'Patient Instructions': Yes      Video- Visit Details  Type of service:  video visit for medication management  Time of service:    Date:  12/21/2021    Video Start Time:  10:45AM      Video End Time:  11:15AM    Reason for video visit:  Patient unable to travel due to Covid-19  Originating Site (patient location):  [facility]   Location- Patient's home  Distant Site (provider location):  [facility]  Mode of Communication:  Video Conference via AmWell  Consent:  Patient has given verbal consent for video visit?: Yes              [facility]  MEDICAL PROGRESS NOTE     CARE TEAM:  PCP- Thalia Bradford, Psychotherapist- Dr. Julieta Gill, Specialty: Dr. Marie at [facility]    Mariaelena Jean Baptiste is a 51 year old who prefers the name Mariaelena and uses pronouns she, her, they.      DIAGNOSIS     MDD, recurrent, moderate (treatment-resistant; h/o severe episodes)  BPD  PTSD  DID  Unspecified Eating Disorder, in remission  Insomnia, " likely circadian rhythm disorder      ASSESSMENT   Mariaelena reports is doing well. Her family has been respectful when discussing her nephew who was recently committed with her, which has been helpful in maintaining good mental health. Patient had her nasal septum surgery and this has significantly improved her sleep. No med changes were made today. Patient had no safety concerns.     [place]NPMP was checked today:  Indicates taking controlled medication as prescribed.     PLAN                                                                                                                1) Meds-  - Continue quetiapine 50 mg PRN nightly for sleep and treatment resistant depression (patient reports she uses this every night)  - Continue Adderall IR 10 mg BID (qAM + 2pm) for augmentation of depression treatment  - Continue levomilnacipran  mg QDAY for depression  - Continue vilazodone 40 mg QDAY for depression  - Continue lamotrigine 150 mg QDAY for depression and mood stabilization  - Continue melatonin 1 mg with dinner     Other:     - Resume light box daily in Fall - patient has new blue light box which she finds to be more helpful than white light    2) Psychotherapy- Continue biweekly therapy w/ DBT therapist Dr. Julieta Gill Psy D    3) Next due-  Labs- AP monitoring labs due Jan 2022  EKG- as needed  Rating Scales- PHQ-9 at every in-person visit, AIMS done 9/2/2021 - score 0    4) Referrals-  None    5) Dispo- RTC in 4-6 weeks       PERTINENT BACKGROUND                           [most recent eval 07/20/21]   Pertinent Background:  Mariaelena first experienced mental health issues as a young adult and has received treatment for treatment-resistant depression, BPD with severe self harm, and PTSD. Notably, both naltrexone and clozapine have reduced SIB immensely, with return when taper off has been initiated in the past (although no return of SIB to date since d/c of clozapine). She does better when she uses a light box in the  Fall/Winter, best started in September as she typically experiences return of depressive symptoms in October. A taper of Lamictal was associated with decline in mood and subsequent hospitalization in the past which is a common theme for her as medication changes, even small ones, tend to be very destabilizing. A TMS series through the [facility] in August 2016 was transformative in terms of ongoing remission of her depression for the following 2+ years. She often is not aware of reemerging depressive symptoms until they become severe.    Psych pertinent item history includes suicide attempts {2x], suicidal ideation, severe SIB [has required skin grafts and long term hospitalization at [place]], mutiple psychotropic trials, trauma hx, ECT, TMS, psych hosp (>5), and commitment.      SUBJECTIVE     - Mariaelena states things have been going well since the last visit  - States she got her septum surgery and has been sleeping well since then - says recovery was long, but she can breathe much better  - Reports her nephew was committed with [facility] and her family has better boundaries about discussing his mental health, which has been helpful  - Patient will be seeing family for the holidays and is looking forward to that  - Denied any SI/SIB or HI - had no safety concerns today    RECENT PSYCH ROS:   Depression:  none  Elevated:  none  Psychosis:  none  Anxiety:  none  Trauma Related:  none   Sleep: some sleep difficulty - believes this is related to her nasal septum injury and upper back pain  Other: N/A    Adverse Effects: none  Pertinent Negative Symptoms: No suicidal ideation, self-injurious behavior/urges or violent ideation  Recent Substance Use:     Tobacco- vapes nicotine     FAMILY and SOCIAL HISTORY                                 pt reported     Family Hx: Depression in her father and sister. Anxiety and CD issues in many family members. Paternal grandfather committed suicide in his 50s. No h/o BPAD or  Schizophrenia.    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    Social/ Spiritual Support- DBT therapist, online friends, father and step mother in [place]; brother and sister both in the [place] area (supportive, close with them), a few co workers and a few close friends from the feline rescue (Ahsan and Anika). Mariaelena grew up Yazidism - continues to believe in God but does not identify with a specific Roman Catholic.  Feels Safe at Home- yes     PSYCH and SUBSTANCE USE Critical Summary Points since July 2021 July 2021 - Transfer DA. No med changes. Patient was doing her 3rd series of TMS which was very helpful - noticed improved mood.     Sept 2021 - Patient completed TMS. Referred to pain management. No med changes.     November 2021 - No med changes    December 2021 - No med changes     PAST MED TRIALS     Per chart review:  Numerous including TCAs, MAOI's, Prozac, Zoloft, Viibryd, Brintellix, Celexa, Effexor, Cymbalta, lithium, clonidine, Provigil, cytomel, naltrexone (for SIB), dextroamphetamine, lithium, lamictal, clozapine (used for self-harm urges and cross-titrated to Latuda in 2016 in anticipation of TMS).     Per discussion with  Dr. Matthews 2/2016:  Both naltrexone and clozapine have reduced SIB immensely, with return when taper off has been initiated in the past.      MEDICAL HISTORY and ALLERGY     ALLERGIES: Patient has no known allergies.    Patient Active Problem List   Diagnosis     Suicidal ideation     Major depression, recurrent (H)     Tobacco abuse     Hx of  psychiatric care     Scar     Nasal septal deviation        MEDICAL REVIEW OF SYSTEMS   Contraception- s/p tubal ligation, otherwise did not discuss    A comprehensive review of systems was performed and is negative other than noted in the HPI.     MEDICATIONS     Current Outpatient Medications   Medication Sig Dispense Refill     amphetamine-dextroamphetamine (ADDERALL) 10 MG tablet Take 1 tablet (10 mg) by mouth 2 times daily 60 tablet 0     cyclobenzaprine (FLEXERIL) 5 MG tablet Take 1-2 tablets (5-10 mg) by mouth 3 times daily as needed for muscle spasms 90 tablet 0     doxylamine (UNISOM) 25 MG TABS tablet Take 25 mg by mouth nightly as needed       ibuprofen (ADVIL,MOTRIN) 800 MG tablet Take 400 mg by mouth as needed        lamoTRIgine (LAMICTAL) 150 MG tablet Take 1 tablet (150 mg) by mouth daily 30 tablet 2     levomilnacipran (FETZIMA ER) 120 MG 24 hr capsule Take 1 capsule (120 mg) by mouth daily 30 capsule 2     lisinopril (ZESTRIL) 10 MG tablet Take 1 tablet (10 mg) by mouth daily 90 tablet 3     MAGNESIUM OXIDE PO Take 250 mg by mouth daily        melatonin 5 MG tablet Take 5 mg by mouth nightly as needed for sleep       multivitamin, therapeutic (THERA-VIT) TABS tablet Take 1 tablet by mouth daily       QUEtiapine (SEROQUEL) 25 MG tablet Take 2 tablets (50 mg) by mouth At Bedtime 60 tablet 2     sodium chloride (OCEAN) 0.65 % nasal spray Spray 1-2 sprays in nostril every hour as needed for congestion (nasal dryness) Use in EACH nostril. 100 mL 3     triamcinolone (NASACORT) 55 MCG/ACT nasal aerosol Spray 2 sprays into both nostrils daily       vilazodone (VIIBRYD) 40 MG TABS tablet Take 1 tablet (40 mg) by mouth daily 30 tablet 2     VITAMIN D, CHOLECALCIFEROL, PO Take 1,000 Units by mouth 2 times daily         VITALS   There were no vitals taken for this visit.    MENTAL STATUS EXAM     Alertness: alert  and oriented  Appearance: well groomed  Behavior/Demeanor: cooperative and pleasant, with good   eye contact   Speech: normal and regular rate and rhythm  Language: intact  Psychomotor: normal or unremarkable  Mood: description consistent with euthymia  Affect: full range; congruent to: mood- yes, content- yes  Thought Process/Associations: unremarkable  Thought Content:  Reports none;  Denies suicidal ideation  Perception:  Reports none;  Denies hallucinations  Insight: good  Judgment: good  Cognition: does  appear grossly intact; formal cognitive testing was not done  Gait and Station: unremarkable     LABS and DATA     PHQ9 TODAY = NA  PHQ 9/1/2021 9/2/2021 9/2/2021   PHQ-9 Total Score 6 6 8   Q9: Thoughts of better off dead/self-harm past 2 weeks Not at all Not at all Not at all       Recent Labs   Lab Test 11/16/21  1607 01/13/21  1149 06/05/20  1051 05/20/20  0317 04/15/19  1401   GLC 83  --  94   < > 87   A1C  --  5.1  --   --  5.4    < > = values in this interval not displayed.     Recent Labs   Lab Test 01/13/21  1149 04/15/19  1401   CHOL 206* 191   TRIG 42 42   * 112*   HDL 80 71     Recent Labs   Lab Test 05/20/20  0317 04/15/19  1401   AST 27 22   ALT 19 23   ALKPHOS 95 62     Recent Labs   Lab Test 11/16/21  1607 06/05/20  1051 05/20/20  0217 04/15/19  1401   WBC 7.4 5.7   < > 7.5   ANEU  --  3.6  --  5.2   HGB 13.2 14.6   < > 12.5    452*   < > 445    < > = values in this interval not displayed.       ECG 5/2020 QTc = 462 ms     PSYCHOTROPIC DRUG INTERACTIONS     VILAZODONE + FETZIMA + ADDERALL + SEROQUEL may result in increased risk for serotonin syndrome.      VILAZODONE + FETZIMA may enhance the antiplatelet effect of other Agents with Antiplatelet Properties.      LAMOTRIGINE + ACETAMINOPHEN may result in decreased lamotrigine serum levels.     ADDERALL + LISINOPRIL:  amphetamines may diminish the antihypertensive effect of antihypertensive agents.     MANAGEMENT:  Monitoring for adverse effects, routine vitals and using lowest therapeutic dose of [psychotropics]     RISK  STATEMENT for SAFETY     Mariaelena Jean Baptiste did not appear to be an imminent safety risk to self or others.    TREATMENT RISK STATEMENT: The risks, benefits, alternatives and potential adverse effects have been discussed and are understood by the pt. The pt understands the risks of using street drugs or alcohol. There are no medical contraindications, the pt agrees to treatment with the ability to do so. The pt knows to call the clinic for any problems or to access emergency care if needed.  Medical and substance use concerns are documented above.  Psychotropic drug interaction check was done, including changes made today.    PROVIDER: Robert Ibanez DO, MPH    Patient staffed in clinic with Dr. Lunsford who will sign the note.  Supervisor is Dr. Byrne.

## 2022-01-05 ENCOUNTER — APPOINTMENT (OUTPATIENT)
Dept: CT IMAGING | Facility: CLINIC | Age: 52
End: 2022-01-05
Attending: EMERGENCY MEDICINE
Payer: MEDICARE

## 2022-01-05 ENCOUNTER — HOSPITAL ENCOUNTER (EMERGENCY)
Facility: CLINIC | Age: 52
Discharge: HOME | End: 2022-01-05
Attending: EMERGENCY MEDICINE | Admitting: EMERGENCY MEDICINE
Payer: MEDICARE

## 2022-01-05 ENCOUNTER — MYC MEDICAL ADVICE (OUTPATIENT)
Dept: INTERNAL MEDICINE | Facility: CLINIC | Age: 52
End: 2022-01-05

## 2022-01-05 VITALS
OXYGEN SATURATION: 100 % | HEART RATE: 97 BPM | TEMPERATURE: 98 F | WEIGHT: 187 LBS | BODY MASS INDEX: 26.45 KG/M2 | RESPIRATION RATE: 16 BRPM | DIASTOLIC BLOOD PRESSURE: 97 MMHG | SYSTOLIC BLOOD PRESSURE: 146 MMHG

## 2022-01-05 DIAGNOSIS — R55 NEAR SYNCOPE: Primary | ICD-10-CM

## 2022-01-05 DIAGNOSIS — R19.7 DIARRHEA, UNSPECIFIED TYPE: ICD-10-CM

## 2022-01-05 DIAGNOSIS — E86.0 DEHYDRATION: ICD-10-CM

## 2022-01-05 DIAGNOSIS — E87.6 HYPOKALEMIA: ICD-10-CM

## 2022-01-05 DIAGNOSIS — R55 SYNCOPE, UNSPECIFIED SYNCOPE TYPE: ICD-10-CM

## 2022-01-05 DIAGNOSIS — I95.1 ORTHOSTATIC HYPOTENSION: ICD-10-CM

## 2022-01-05 LAB
ALBUMIN SERPL-MCNC: 3.6 G/DL (ref 3.4–5)
ALBUMIN UR-MCNC: NEGATIVE MG/DL
ALP SERPL-CCNC: 135 U/L (ref 40–150)
ALT SERPL W P-5'-P-CCNC: 30 U/L (ref 0–70)
ANION GAP SERPL CALCULATED.3IONS-SCNC: 8 MMOL/L (ref 3–14)
APPEARANCE UR: CLEAR
AST SERPL W P-5'-P-CCNC: 24 U/L (ref 0–45)
ATRIAL RATE - MUSE: 96 BPM
BASOPHILS # BLD AUTO: 0.2 10E3/UL (ref 0–0.2)
BASOPHILS NFR BLD AUTO: 1 %
BILIRUB SERPL-MCNC: 0.3 MG/DL (ref 0.2–1.3)
BILIRUB UR QL STRIP: NEGATIVE
BUN SERPL-MCNC: 22 MG/DL (ref 7–30)
CALCIUM SERPL-MCNC: 8.6 MG/DL (ref 8.5–10.1)
CHLORIDE BLD-SCNC: 107 MMOL/L (ref 94–109)
CO2 SERPL-SCNC: 24 MMOL/L (ref 20–32)
COLOR UR AUTO: NORMAL
CREAT SERPL-MCNC: 0.87 MG/DL (ref 0.52–1.25)
D DIMER PPP FEU-MCNC: >20 UG/ML FEU (ref 0–0.5)
DIASTOLIC BLOOD PRESSURE - MUSE: NORMAL MMHG
EOSINOPHIL # BLD AUTO: 0.2 10E3/UL (ref 0–0.7)
EOSINOPHIL NFR BLD AUTO: 1 %
ERYTHROCYTE [DISTWIDTH] IN BLOOD BY AUTOMATED COUNT: 12.6 % (ref 10–15)
FLUAV RNA SPEC QL NAA+PROBE: NEGATIVE
FLUBV RNA RESP QL NAA+PROBE: NEGATIVE
GFR SERPL CREATININE-BSD FRML MDRD: 80 ML/MIN/1.73M2
GLUCOSE BLD-MCNC: 132 MG/DL (ref 70–99)
GLUCOSE UR STRIP-MCNC: NEGATIVE MG/DL
HCT VFR BLD AUTO: 42.4 % (ref 35–53)
HGB BLD-MCNC: 13.5 G/DL (ref 11.7–17.7)
HGB UR QL STRIP: NEGATIVE
IMM GRANULOCYTES # BLD: 0.1 10E3/UL
IMM GRANULOCYTES NFR BLD: 1 %
INTERPRETATION ECG - MUSE: NORMAL
KETONES UR STRIP-MCNC: NEGATIVE MG/DL
LACTATE SERPL-SCNC: 1.7 MMOL/L (ref 0.7–2)
LEUKOCYTE ESTERASE UR QL STRIP: NEGATIVE
LYMPHOCYTES # BLD AUTO: 2 10E3/UL (ref 0.8–5.3)
LYMPHOCYTES NFR BLD AUTO: 13 %
MAGNESIUM SERPL-MCNC: 2.8 MG/DL (ref 1.6–2.3)
MCH RBC QN AUTO: 29 PG (ref 26.5–33)
MCHC RBC AUTO-ENTMCNC: 31.8 G/DL (ref 31.5–36.5)
MCV RBC AUTO: 91 FL (ref 78–100)
MONOCYTES # BLD AUTO: 1 10E3/UL (ref 0–1.3)
MONOCYTES NFR BLD AUTO: 6 %
NEUTROPHILS # BLD AUTO: 12.4 10E3/UL (ref 1.6–8.3)
NEUTROPHILS NFR BLD AUTO: 78 %
NITRATE UR QL: NEGATIVE
NRBC # BLD AUTO: 0 10E3/UL
NRBC BLD AUTO-RTO: 0 /100
P AXIS - MUSE: 51 DEGREES
PH UR STRIP: 5 [PH] (ref 5–7)
PLATELET # BLD AUTO: 567 10E3/UL (ref 150–450)
POTASSIUM BLD-SCNC: 3 MMOL/L (ref 3.4–5.3)
PR INTERVAL - MUSE: 136 MS
PROT SERPL-MCNC: 7.5 G/DL (ref 6.8–8.8)
QRS DURATION - MUSE: 74 MS
QT - MUSE: 380 MS
QTC - MUSE: 480 MS
R AXIS - MUSE: -7 DEGREES
RBC # BLD AUTO: 4.66 10E6/UL (ref 3.8–5.9)
RBC URINE: <1 /HPF
SARS-COV-2 RNA RESP QL NAA+PROBE: NEGATIVE
SODIUM SERPL-SCNC: 139 MMOL/L (ref 133–144)
SP GR UR STRIP: 1.03 (ref 1–1.03)
SYSTOLIC BLOOD PRESSURE - MUSE: NORMAL MMHG
T AXIS - MUSE: 67 DEGREES
T4 FREE SERPL-MCNC: 1.1 NG/DL
TROPONIN I SERPL HS-MCNC: 7 NG/L
TSH SERPL DL<=0.005 MIU/L-ACNC: 6.15 MU/L (ref 0.4–4)
UROBILINOGEN UR STRIP-MCNC: NORMAL MG/DL
VENTRICULAR RATE- MUSE: 96 BPM
WBC # BLD AUTO: 16 10E3/UL (ref 4–11)
WBC URINE: 0 /HPF

## 2022-01-05 PROCEDURE — 80053 COMPREHEN METABOLIC PANEL: CPT | Performed by: EMERGENCY MEDICINE

## 2022-01-05 PROCEDURE — 83605 ASSAY OF LACTIC ACID: CPT | Performed by: EMERGENCY MEDICINE

## 2022-01-05 PROCEDURE — 96360 HYDRATION IV INFUSION INIT: CPT | Mod: 59

## 2022-01-05 PROCEDURE — C9803 HOPD COVID-19 SPEC COLLECT: HCPCS

## 2022-01-05 PROCEDURE — 85025 COMPLETE CBC W/AUTO DIFF WBC: CPT | Performed by: EMERGENCY MEDICINE

## 2022-01-05 PROCEDURE — 250N000013 HC RX MED GY IP 250 OP 250 PS 637: Performed by: EMERGENCY MEDICINE

## 2022-01-05 PROCEDURE — 85379 FIBRIN DEGRADATION QUANT: CPT | Performed by: EMERGENCY MEDICINE

## 2022-01-05 PROCEDURE — 96361 HYDRATE IV INFUSION ADD-ON: CPT

## 2022-01-05 PROCEDURE — 36415 COLL VENOUS BLD VENIPUNCTURE: CPT | Performed by: EMERGENCY MEDICINE

## 2022-01-05 PROCEDURE — 87636 SARSCOV2 & INF A&B AMP PRB: CPT | Performed by: EMERGENCY MEDICINE

## 2022-01-05 PROCEDURE — 84439 ASSAY OF FREE THYROXINE: CPT | Performed by: EMERGENCY MEDICINE

## 2022-01-05 PROCEDURE — 82040 ASSAY OF SERUM ALBUMIN: CPT | Performed by: EMERGENCY MEDICINE

## 2022-01-05 PROCEDURE — G1004 CDSM NDSC: HCPCS

## 2022-01-05 PROCEDURE — 84484 ASSAY OF TROPONIN QUANT: CPT | Performed by: EMERGENCY MEDICINE

## 2022-01-05 PROCEDURE — 84443 ASSAY THYROID STIM HORMONE: CPT | Performed by: EMERGENCY MEDICINE

## 2022-01-05 PROCEDURE — 93005 ELECTROCARDIOGRAM TRACING: CPT

## 2022-01-05 PROCEDURE — 250N000011 HC RX IP 250 OP 636: Performed by: EMERGENCY MEDICINE

## 2022-01-05 PROCEDURE — 250N000009 HC RX 250: Performed by: EMERGENCY MEDICINE

## 2022-01-05 PROCEDURE — 99285 EMERGENCY DEPT VISIT HI MDM: CPT | Mod: 25

## 2022-01-05 PROCEDURE — 258N000003 HC RX IP 258 OP 636: Performed by: EMERGENCY MEDICINE

## 2022-01-05 PROCEDURE — 81001 URINALYSIS AUTO W/SCOPE: CPT | Performed by: EMERGENCY MEDICINE

## 2022-01-05 PROCEDURE — 71275 CT ANGIOGRAPHY CHEST: CPT | Mod: ME

## 2022-01-05 PROCEDURE — 83735 ASSAY OF MAGNESIUM: CPT | Performed by: EMERGENCY MEDICINE

## 2022-01-05 RX ORDER — IOPAMIDOL 755 MG/ML
70 INJECTION, SOLUTION INTRAVASCULAR ONCE
Status: COMPLETED | OUTPATIENT
Start: 2022-01-05 | End: 2022-01-05

## 2022-01-05 RX ORDER — POTASSIUM CHLORIDE 1.5 G/1.58G
40 POWDER, FOR SOLUTION ORAL ONCE
Status: COMPLETED | OUTPATIENT
Start: 2022-01-05 | End: 2022-01-05

## 2022-01-05 RX ADMIN — IOPAMIDOL 70 ML: 755 INJECTION, SOLUTION INTRAVENOUS at 06:49

## 2022-01-05 RX ADMIN — SODIUM CHLORIDE 1000 ML: 9 INJECTION, SOLUTION INTRAVENOUS at 08:49

## 2022-01-05 RX ADMIN — POTASSIUM CHLORIDE 40 MEQ: 1.5 POWDER, FOR SOLUTION ORAL at 05:32

## 2022-01-05 RX ADMIN — SODIUM CHLORIDE 94 ML: 900 INJECTION INTRAVENOUS at 06:49

## 2022-01-05 RX ADMIN — SODIUM CHLORIDE 1000 ML: 9 INJECTION, SOLUTION INTRAVENOUS at 05:33

## 2022-01-05 ASSESSMENT — ENCOUNTER SYMPTOMS
DIARRHEA: 0
DIZZINESS: 1
SORE THROAT: 0

## 2022-01-05 NOTE — ED TRIAGE NOTES
Patient arrived via EMS after having episodes of tachycardia and hypotension, followed by a syncopal episode. Patient was placed on levo en route due to low MAP of 50. Patient was incontinent of stool due to syncope.

## 2022-01-05 NOTE — ED PROVIDER NOTES
"  History   Chief Complaint:  Dizziness      HPI   Mariaelena Jean Baptiste is a 51 year old adult with history of hypertension, anxiety, an depression who presents with dizziness and low blood pressure. The patient reports that she woke up this morning feeling \"weird.\" She has a history of autonomic nervous system disfunction, so she measured her blood pressure, which was recorded at 82/50. EMS were contacted at this time. On EMS arrival, the patient lost continence of her bowel and bladder. She never fully lost consciousness at this time, but does say that she became dizzy. The episode occurred while she was laying down and not after standing up. A full meal was consumed last night. Levo was started by EMS en route due to low MAP of 50. There is currently some mild discomfort in her chest, but she denies outright chest pain. The last time that the patient experienced an episode of autonomic nervous system disfunction was in November of 2020. Patient denies drug or alcohol use, recent changes in medications, congestion, sore throat, and diarrhea.     Review of Systems   HENT: Negative for congestion and sore throat.    Cardiovascular: Negative for chest pain.   Gastrointestinal: Negative for diarrhea.   Neurological: Positive for dizziness.   All other systems reviewed and are negative.        Allergies:  No known drug allergies     Medications:  Adderall  Flexeril  Unisom  Lamictal  Fetzima  Lisinopril  Melatonin  Quetiapine  Vilazodone    Past Medical History:     Anxiety  Chronic osteoarthritis  Depressive disorder  Dissociative identity disorder  GERD  Hypertension  Migraines  PTSD  Social phobia  Tobacco abuse  Nasal septal deviation  Suicidal ideation       Past Surgical History:    Cholecystectomy  Colonoscopy  Tubal ligation  Septoplasty  Skin graft     Family History:    Depression, father  Hypertension, father  Substance abuse, father  Cancer, father  Depression, sister    Social History:  Arrives via EMS "   Unaccompanied in ED     Physical Exam     Patient Vitals for the past 24 hrs:   BP Temp Temp src Pulse Resp SpO2 Weight   01/05/22 0630 118/67 -- -- 95 14 -- --   01/05/22 0600 103/84 -- -- 106 28 -- --   01/05/22 0500 -- -- -- 98 25 98 % --   01/05/22 0445 104/70 -- -- 93 11 -- --   01/05/22 0412 133/76 98  F (36.7  C) Temporal 97 18 100 % 84.8 kg (187 lb)       Physical Exam  General: Patient is alert and normal appearing.  HEENT: Head atraumatic    Eyes: pupils equal and reactive. Conjunctiva clear   Nares: patent   Oropharynx: no lesions, uvula midline, no palatal draping, normal voice, no trismus  Neck: Supple without lymphadenopathy, no meningismus  Chest: Heart regular rate and rhythm.   Lungs: Equal clear to auscultation with no wheeze or rales  Abdomen: Soft, non tender, nondistended, normal bowel sounds  Back: No costovertebral angle tenderness, no midline C, T or L spine tenderness  Neuro: Grossly nonfocal, normal speech, strength equal bilaterally, CN 2-12 intact  Extremities: No deformities, equal radial and DP pulses. No clubbing, cyanosis.  No edema  Skin: Warm and dry with no rash.       Emergency Department Course   ECG  ECG obtained at 0441, ECG read at 0448  Normal sinus rhythm  Low voltage QRS  Inferior infarct, age undetermined  Abnormal ECG   No significant changes as compared to prior, dated 11/16/2021.  Rate 96 bpm. [place] interval 136 ms. QRS duration 74 ms. QT/QTc 380/480 ms. P-R-T axes 51 -7 67.     Imaging:  CT Chest Pulmonary Embolism w Contrast    (Results Pending)     Report per radiology    Laboratory:  Labs Ordered and Resulted from Time of ED Arrival to Time of ED Departure   COMPREHENSIVE METABOLIC PANEL - Abnormal       Result Value    Sodium 139      Potassium 3.0 (*)     Chloride 107      Carbon Dioxide (CO2) 24      Anion Gap 8      Urea Nitrogen 22      Creatinine 0.87      Calcium 8.6      Glucose 132 (*)     Alkaline Phosphatase 135      AST 24      ALT 30      Protein Total  7.5      Albumin 3.6      Bilirubin Total 0.3      GFR Estimate 80     TSH WITH FREE T4 REFLEX - Abnormal    TSH 6.15 (*)    MAGNESIUM - Abnormal    Magnesium 2.8 (*)    D DIMER QUANTITATIVE - Abnormal    D-Dimer Quantitative >20.00 (*)    CBC WITH PLATELETS AND DIFFERENTIAL - Abnormal    WBC Count 16.0 (*)     RBC Count 4.66      Hemoglobin 13.5      Hematocrit 42.4      MCV 91      MCH 29.0      MCHC 31.8      RDW 12.6      Platelet Count 567 (*)     % Neutrophils 78      % Lymphocytes 13      % Monocytes 6      % Eosinophils 1      % Basophils 1      % Immature Granulocytes 1      NRBCs per 100 WBC 0      Absolute Neutrophils 12.4 (*)     Absolute Lymphocytes 2.0      Absolute Monocytes 1.0      Absolute Eosinophils 0.2      Absolute Basophils 0.2      Absolute Immature Granulocytes 0.1      Absolute NRBCs 0.0     TROPONIN I - Normal    Troponin I High Sensitivity 7     LACTIC ACID WHOLE BLOOD - Normal    Lactic Acid 1.7     INFLUENZA A/B & SARS-COV2 PCR MULTIPLEX - Normal    Influenza A PCR Negative      Influenza B PCR Negative      SARS CoV2 PCR Negative     T4 FREE    Free T4 1.10     ROUTINE UA WITH MICROSCOPIC REFLEX TO CULTURE   ENTERIC BACTERIA AND VIRUS PANEL BY KEANU STOOL      Emergency Department Course:    Reviewed:  I reviewed nursing notes, vitals, past medical history and Care Everywhere    Assessments:  0411 I obtained history and examined the patient as noted above.   0500 I rechecked the patient and explained findings.   0600 I rechecked the patient and explained findings.      Interventions:  Medications   0.9% sodium chloride BOLUS (1,000 mLs Intravenous New Bag 1/5/22 0533)   0.9% sodium chloride BOLUS (has no administration in time range)   potassium chloride (KLOR-CON) Packet 40 mEq (40 mEq Oral Given 1/5/22 0532)     Disposition:  Patient will be sent out to my partner Dr. Alonso to follow-up on the CT PE study.  If this is negative and her symptoms improve on getting up to walk she could  go home.  If she continues to feel lightheaded admission will be necessary and if she has a pulmonary embolism admission will be necessary as well    Impression & Plan     Medical Decision Making:  Patient is a 51-year-old female with history of autonomic dysfunction with syncopal episodes in the past who began having symptoms of lightheadedness and near syncope tonight.  She did have one episode of bowel and bladder incontinence after EMS arrival.  She was found to be hypotensive and intermittently tachycardic and then bradycardic with EMS and therefore was given 2 doses of verapamil and started on Levophed prior to arrival.  I immediately stopped the verapamil.  She was quite orthostatic with systolic blood pressures dropping from 110 while lying to 80 with standing.  2 L of IV fluid were administered.  Patient did have several more episodes of diarrhea while here in the emergency department.  She has no recent antibiotics to suggest C. difficile.  She states in the past when she has had these autonomic dysfunction episodes she has had some episodes of diarrhea.  She was seen by cardiology for this and told to continue to stay well-hydrated.  Patient is afebrile here.  Following fluids her blood pressure is improved quite significantly.  Given the syncope and hypotension and tachycardia D-dimer was ordered and is noted to be greater than 20.  As a result of this CT pulmonary embolism study has been ordered.  That is currently pending at this time.  She has no swelling of her legs to suggest DVT.  I will sign the patient out to my partner Dr. Alonso to follow-up on the CT study.  If there is any abnormality she will likely need to be admitted.  If the CT is negative and she feels improved after fluids and her vitals have remained stable she could be considered for discharge with plan for continued hydration on her own at home.  If she has persistent diarrhea here in concern for continued dehydration observation  admission will be necessary.  Patient is comfortable with this plan.  EKG without acute ischemic changes and troponin is negative.  TSH was elevated but free T4 was normal and I do not think this is causing her symptoms at this time.  Patient will be signed out to my partner for further work-up and final disposition.  All patient questions and concerns addressed            Diagnosis:    ICD-10-CM    1. Syncope, unspecified syncope type  R55    2. Orthostatic hypotension  I95.1    3. Dehydration  E86.0    4. Hypokalemia  E87.6    5. Diarrhea, unspecified type  R19.7        Discharge Medications:  New Prescriptions    No medications on file     Scribe Disclosure:  Anuj ACEVEDO, am serving as a scribe at 4:13 AM on 1/5/2022 to document services personally performed by Mirna Wodoward MD based on my observations and the provider's statements to me.              Mirna Woodward MD  01/05/22 0650

## 2022-01-05 NOTE — ED NOTES
Bed: ED25  Expected date:   Expected time:   Means of arrival:   Comments:  [facility] 51F Syncopal, tachy, ETA11

## 2022-01-05 NOTE — ED NOTES
"[facility]  ED Nurse Handoff Report    ED Chief complaint: Syncope      ED Diagnosis:   Final diagnoses:   Syncope, unspecified syncope type   Orthostatic hypotension   Dehydration   Hypokalemia   Diarrhea, unspecified type       Code Status: Full Code    Allergies: No Known Allergies    Patient Story: Patient arrived via EMS after having episodes of tachycardia and hypotension, followed by a syncopal episode. Patient was placed on levo en route due to low MAP of 50. Patient was incontinent of stool due to syncope. Patient was also given two \"doses\" of Verapamil by medics with no improvement in symptoms per medic report.   Focused Assessment:  Patient is alert and oriented x 4, calm and cooperative. VS are stable, skin is warm and dry, respirations are even and non-labored on room air. Patient denied chest pain, shortness of breath, dizziness, and nausea. Orthostatic hypotension noted, see flow sheets.       Treatments and/or interventions provided:   Medications   0.9% sodium chloride BOLUS (1,000 mLs Intravenous New Bag 1/5/22 0533)   0.9% sodium chloride BOLUS (has no administration in time range)   iopamidol (ISOVUE-370) solution 70 mL (has no administration in time range)   Saline Flush (has no administration in time range)   potassium chloride (KLOR-CON) Packet 40 mEq (40 mEq Oral Given 1/5/22 0532)       Patient's response to treatments and/or interventions: hemodynamically stable.    To be done/followed up on inpatient unit:  Monitor    Does this patient have any cognitive concerns?: None    Activity level - Baseline/Home:  Independent  Activity Level - Current:   Stand with Assist    Patient's Preferred language: English   Needed?: No    Isolation: None  Infection: Not Applicable  Patient tested for COVID 19 prior to admission: YES  Bariatric?: No    Vital Signs:   Vitals:    01/05/22 0412   BP: 133/76   Pulse: 97   Resp: 18   Temp: 98  F (36.7  C)   TempSrc: Temporal   SpO2: " 100%   Weight: 84.8 kg (187 lb)       Cardiac Rhythm:     Was the PSS-3 completed:   Yes  What interventions are required if any?               Family Comments: No family present at this time.    OBS brochure/video discussed/provided to patient/family: Yes              Name of person given brochure if not patient: NA              Relationship to patient: NA    For the majority of the shift this patient's behavior was Green.   Behavioral interventions performed were  information and reassurance provided.  .    ED NURSE PHONE NUMBER: [phone]

## 2022-01-05 NOTE — ED NOTES
Patient able to pass ambulation trial. Walked down the [place] and back with no light headedness or dizziness. They feel steady on their feet and safe to go home.

## 2022-01-07 NOTE — TELEPHONE ENCOUNTER
Called and spoke with patient.  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  When she woke up she felt shaky and unwell.  She thought she might have had a nightmare so she ate a few altoids (one of her tricks for waking up), but did not have any improvement.  She then checked her BP and it was low.  She laid down and raised her legs and repeated her BP and it was lower.  At that point she called the paramedics    Per ED notes, she was having alternating tachy and lucinda rhythms (no further detail on the rhythms given).  She was given verapamil x2 by EMS and also started on levophed.  In the ED, the verapamil and levophed were discontinued.  She was noted to be orthostatic by VS and improved with IVF.    She reports eating and drinking normally that day (see ilustrum message with details).  She had diarrhea after the episode, but none prior.    Will order ziopatch given reported tachy/lucinda arrhythmia.  Encouraged her to keep well hydrated and check BP daily.  She will send me her readings.  We have a follow-up appointment scheduled later this month

## 2022-01-10 NOTE — TELEPHONE ENCOUNTER
Spoke to patient, provider placed zio patch monitor. Warm transfer patient to imaging to schedule.

## 2022-01-11 ENCOUNTER — ANCILLARY PROCEDURE (OUTPATIENT)
Dept: CARDIOLOGY | Facility: CLINIC | Age: 52
End: 2022-01-11
Attending: INTERNAL MEDICINE
Payer: MEDICARE

## 2022-01-11 DIAGNOSIS — R55 NEAR SYNCOPE: ICD-10-CM

## 2022-01-11 PROCEDURE — 93248 EXT ECG>7D<15D REV&INTERPJ: CPT | Performed by: INTERNAL MEDICINE

## 2022-01-11 PROCEDURE — 93246 EXT ECG>7D<15D RECORDING: CPT

## 2022-01-11 NOTE — PROGRESS NOTES
Per Dr. Bradford, patient to have 14 day Zio Patch monitor placed.  Diagnosis: Near Syncope  Monitor placed: Yes  Patient Instructed: Yes  Patient verbalized understanding: Yes  Holter # [id number]

## 2022-01-21 NOTE — TELEPHONE ENCOUNTER
Surgery performed: septoplasty, turbinate reduction    Medication last refilled: none    Needed before follow up: none    Additional info: ED on 1/5/22 due to syncopal episode relative patient's autonomic dysfunction.    ONI OLIVEIRA LPN on 1/21/2022 at 8:36 AM

## 2022-01-24 ENCOUNTER — PRE VISIT (OUTPATIENT)
Dept: OTOLARYNGOLOGY | Facility: CLINIC | Age: 52
End: 2022-01-24

## 2022-01-24 ENCOUNTER — OFFICE VISIT (OUTPATIENT)
Dept: OTOLARYNGOLOGY | Facility: CLINIC | Age: 52
End: 2022-01-24
Payer: MEDICARE

## 2022-01-24 VITALS
TEMPERATURE: 98 F | WEIGHT: 184 LBS | HEART RATE: 56 BPM | DIASTOLIC BLOOD PRESSURE: 86 MMHG | BODY MASS INDEX: 26.34 KG/M2 | SYSTOLIC BLOOD PRESSURE: 136 MMHG | HEIGHT: 70 IN

## 2022-01-24 DIAGNOSIS — Z98.890 POSTOPERATIVE STATE: Primary | ICD-10-CM

## 2022-01-24 PROCEDURE — 99024 POSTOP FOLLOW-UP VISIT: CPT | Performed by: OTOLARYNGOLOGY

## 2022-01-24 ASSESSMENT — MIFFLIN-ST. JEOR: SCORE: 1529.87

## 2022-01-24 ASSESSMENT — PAIN SCALES - GENERAL: PAINLEVEL: NO PAIN (0)
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
## 2022-01-24 NOTE — LETTER
1/24/2022       RE: Mariaelena Jean Baptiste  [address]  [place] [address]     Dear Colleague,    Thank you for referring your patient, Mariaelena Jean Baptiste, to the [facility] at [facility]. Please see a copy of my visit note below.    HISTORY OF PRESENT ILLNESS:  Mariaelena is back to see us again today.  She is doing well, breathing through her nose.  She still some mild congestion on the left side.      PHYSICAL EXAMINATION: Exam shows some persistent left vestibular stenosis, but for the most part, everything looks quite good.  She is happy with how things are.     ASSESSMENT AND PLAN: I will have her come back in three months for recheck.  She will let us know if she has any problems before that.          Again, thank you for allowing me to participate in the care of your patient.      Sincerely,    Merlin Mendoza MD      
Upper sorbian

## 2022-01-24 NOTE — NURSING NOTE
"Chief Complaint   Patient presents with     RECHECK     6 week follow up    Blood pressure 136/86, pulse 56, temperature 98  F (36.7  C), temperature source Temporal, height 1.778 m (5' 10\"), weight 83.5 kg (184 lb), not currently breastfeeding. Angela Escamilla, EMT  "

## 2022-01-24 NOTE — PROGRESS NOTES
HISTORY OF PRESENT ILLNESS:  Mariaelena is back to see us again today.  She is doing well, breathing through her nose.  She still some mild congestion on the left side.      PHYSICAL EXAMINATION: Exam shows some persistent left vestibular stenosis, but for the most part, everything looks quite good.  She is happy with how things are.     ASSESSMENT AND PLAN: I will have her come back in three months for recheck.  She will let us know if she has any problems before that.

## 2022-01-24 NOTE — PATIENT INSTRUCTIONS
1.  You were seen in the ENT Clinic today by Dr. Mendoza.     2.  Plan is to return to clinic in 3 months.    If you have any questions or concerns after your appointment, please call the clinic.   - Clinic phone: [phone]. Press option #1 for scheduling related needs. Press option #3 for Nurse advice.   -Eunice Johns LPN (Dr. Mendoza' nurse) [phone]    Jennifer Chow RN  [facility]  Department of Otolaryngology

## 2022-01-28 ENCOUNTER — OFFICE VISIT (OUTPATIENT)
Dept: INTERNAL MEDICINE | Facility: CLINIC | Age: 52
End: 2022-01-28
Payer: MEDICARE

## 2022-01-28 ENCOUNTER — MYC MEDICAL ADVICE (OUTPATIENT)
Dept: PSYCHIATRY | Facility: CLINIC | Age: 52
End: 2022-01-28

## 2022-01-28 ENCOUNTER — TELEPHONE (OUTPATIENT)
Dept: PSYCHIATRY | Facility: CLINIC | Age: 52
End: 2022-01-28

## 2022-01-28 ENCOUNTER — LAB (OUTPATIENT)
Dept: LAB | Facility: CLINIC | Age: 52
End: 2022-01-28
Payer: MEDICARE

## 2022-01-28 VITALS
DIASTOLIC BLOOD PRESSURE: 82 MMHG | HEIGHT: 70 IN | RESPIRATION RATE: 16 BRPM | HEART RATE: 113 BPM | SYSTOLIC BLOOD PRESSURE: 136 MMHG | WEIGHT: 188 LBS | BODY MASS INDEX: 26.92 KG/M2 | OXYGEN SATURATION: 99 %

## 2022-01-28 DIAGNOSIS — R79.89 ELEVATED D-DIMER: ICD-10-CM

## 2022-01-28 DIAGNOSIS — Z79.899 OTHER LONG TERM (CURRENT) DRUG THERAPY: ICD-10-CM

## 2022-01-28 DIAGNOSIS — Z13.220 LIPID SCREENING: ICD-10-CM

## 2022-01-28 DIAGNOSIS — R79.89 ELEVATED D-DIMER: Primary | ICD-10-CM

## 2022-01-28 DIAGNOSIS — G90.9 AUTONOMIC DYSFUNCTION: ICD-10-CM

## 2022-01-28 DIAGNOSIS — Z13.1 SCREENING FOR DIABETES MELLITUS: ICD-10-CM

## 2022-01-28 DIAGNOSIS — R00.1 BRADYCARDIA, UNSPECIFIED: ICD-10-CM

## 2022-01-28 DIAGNOSIS — R79.1 ABNORMAL COAGULATION PROFILE: ICD-10-CM

## 2022-01-28 DIAGNOSIS — Z51.81 ENCOUNTER FOR THERAPEUTIC DRUG MONITORING: ICD-10-CM

## 2022-01-28 LAB
ALBUMIN SERPL-MCNC: 3.9 G/DL (ref 3.4–5)
ALP SERPL-CCNC: 74 U/L (ref 40–150)
ALT SERPL W P-5'-P-CCNC: 29 U/L (ref 0–70)
ANION GAP SERPL CALCULATED.3IONS-SCNC: 6 MMOL/L (ref 3–14)
APTT PPP: 27 SECONDS (ref 22–38)
AST SERPL W P-5'-P-CCNC: 23 U/L (ref 0–45)
BILIRUB SERPL-MCNC: 0.2 MG/DL (ref 0.2–1.3)
BUN SERPL-MCNC: 20 MG/DL (ref 7–30)
CALCIUM SERPL-MCNC: 9.6 MG/DL (ref 8.5–10.1)
CHLORIDE BLD-SCNC: 108 MMOL/L (ref 94–109)
CHOLEST SERPL-MCNC: 202 MG/DL
CO2 SERPL-SCNC: 26 MMOL/L (ref 20–32)
CREAT SERPL-MCNC: 0.83 MG/DL (ref 0.52–1.25)
D DIMER PPP FEU-MCNC: 0.3 UG/ML FEU (ref 0–0.5)
FASTING STATUS PATIENT QL REPORTED: YES
GFR SERPL CREATININE-BSD FRML MDRD: 85 ML/MIN/1.73M2
GLUCOSE BLD-MCNC: 99 MG/DL (ref 70–99)
HBA1C MFR BLD: 5.3 % (ref 0–5.6)
HDLC SERPL-MCNC: 86 MG/DL
INR PPP: 0.92 (ref 0.85–1.15)
LDLC SERPL CALC-MCNC: 106 MG/DL
NONHDLC SERPL-MCNC: 116 MG/DL
POTASSIUM BLD-SCNC: 4.5 MMOL/L (ref 3.4–5.3)
PROT SERPL-MCNC: 7.8 G/DL (ref 6.8–8.8)
SODIUM SERPL-SCNC: 140 MMOL/L (ref 133–144)
TRIGL SERPL-MCNC: 52 MG/DL

## 2022-01-28 PROCEDURE — 36415 COLL VENOUS BLD VENIPUNCTURE: CPT | Performed by: PATHOLOGY

## 2022-01-28 PROCEDURE — 80061 LIPID PANEL: CPT | Performed by: PATHOLOGY

## 2022-01-28 PROCEDURE — 85379 FIBRIN DEGRADATION QUANT: CPT | Performed by: INTERNAL MEDICINE

## 2022-01-28 PROCEDURE — 80053 COMPREHEN METABOLIC PANEL: CPT | Performed by: PATHOLOGY

## 2022-01-28 PROCEDURE — 99215 OFFICE O/P EST HI 40 MIN: CPT | Performed by: INTERNAL MEDICINE

## 2022-01-28 PROCEDURE — 85610 PROTHROMBIN TIME: CPT | Performed by: PATHOLOGY

## 2022-01-28 PROCEDURE — 83036 HEMOGLOBIN GLYCOSYLATED A1C: CPT | Performed by: PATHOLOGY

## 2022-01-28 PROCEDURE — 85730 THROMBOPLASTIN TIME PARTIAL: CPT | Performed by: PATHOLOGY

## 2022-01-28 ASSESSMENT — MIFFLIN-ST. JEOR: SCORE: 1548.01

## 2022-01-28 ASSESSMENT — PAIN SCALES - GENERAL: PAINLEVEL: NO PAIN (0)

## 2022-01-28 NOTE — PROGRESS NOTES
Assessment & Plan   Autonomic dysfunction  She understands this is likely a chronic condition, but would like more tools to help manage her symptoms when they occur.  She is eager to make any changse that will help  - continue compression, stay well hydrated, and liberal salt intake  - f/u with Dr. Campo    Bradycardia, unspecified  Noted in ED visit.  Awaiting results of ziopatch    Elevated d-dimer  Abnormal coagulation profile   Markedly elevated ddimer noted in ED with negative CTA.  Suspect related to acute event, will check coags and consider further eval only if abnormal  - INR  - Comprehensive metabolic panel  - Partial thromboplastin time  - D dimer quantitative    Screening for diabetes mellitus  - Hemoglobin A1c    Lipid screening  - Lipid panel reflex to direct LDL Fasting    HM:  As above    RTC: Return in about 6 months (around 7/28/2022).  I spent a total of 40 minutes on the day of the visit.   Time was spent doing chart review, history and exam, documentation and further activities as noted above.    Thalia Bradford MD  Securely message with the Vocera Web Console (learn more here)    Chief Complaint   Mariaelena Jean Baptiste is a 51 year old adult presents for   Chief Complaint   Patient presents with     Hospital F/U     Patient comes for a HFU.        History of Present Illness   No further episodes since last visit.  She purchased some high waisted compression  They are warm which makes it harder to wear  She wonders if it will be helpful given that at least some of her episodes have happened in the evening/night.  She has not been back to the cat rescue.    Sent in the ziopatch a few days ago    Medications and allergies were reviewed by me today.     Social History   History   Smoking Status     Former Smoker     Packs/day: 1.00     Years: 20.00     Types: Cigarettes, Other     Start date: 5/1/1995     Quit date: 10/23/2017   Smokeless Tobacco     Former User     Comment: E-cig       PHQ-2  "( 1999 Pfizer) 1/24/2022 12/13/2021   Q1: Little interest or pleasure in doing things 0 0   Q2: Feeling down, depressed or hopeless 0 0   PHQ-2 Score 0 0   PHQ-2 Total Score (12-17 Years)- Positive if 3 or more points; Administer PHQ-A if positive - -     PHQ-9 SCORE 9/1/2021 9/2/2021 9/2/2021   PHQ-9 Total Score MyChart - - -   PHQ-9 Total Score 6 6 8     Past Medical History   Patient Active Problem List   Diagnosis     Suicidal ideation     Major depression, recurrent (H)     Tobacco abuse     Hx of psychiatric care     Scar     Nasal septal deviation       Review of Systems   5 point ROS completed and negative except noted above, including Gen, CV, Resp, GI, MS    Physical Exam   /82 (BP Location: Right arm, Patient Position: Sitting, Cuff Size: Adult Regular)   Pulse 113   Resp 16   Ht 1.778 m (5' 10\")   Wt 85.3 kg (188 lb)   SpO2 99%   BMI 26.98 kg/m    Gen: no distress, comfortable, pleasant   Eyes: anicteric, normal extra-ocular movements   Cardiovascular: regular rate and rhythm, normal S1 and S2, no murmurs, rubs or gallops, peripheral pulses full and symmetric   Skin: no concerning lesions, no jaundice   Psychological: appropriate mood     "

## 2022-01-28 NOTE — TELEPHONE ENCOUNTER
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     She has tried and failed the following medications:  - tricyclic antidepressants  - monoamine oxidase inhibitors  - selective serotonin reuptake inhibitors: citalopram, fluoxetine, sertraline  - serotonin-norepinephrine reuptake inhibitors: duloxetine, venlafaxine  - serotonin modulator and stimulator: vortioxetine  In addition, she has also undergone treatment with electroconvulsive therapy.     Rationale:  adjunct treatment for treatment resistant depression     Ms. Jean Baptiste has been taking Adderall 10 mg twice a day for treatment-resistant depression. She has been taking this medication since February 2016 as adjunctive treatment for severe depression that has not responded to numerous medication trials. At the time this medication was initiated, she was on an inpatient unit for suicidal ideation. She has been maintained on this medication since then, and has been stable. The addition of this medication is what has helped lift the severity of Ms. Jean Baptiste's depression from severe to moderate.     While Adderall does not carry FDA approval at this time for treatment-resistant depression, the American Psychiatric Association does recognize the validity of its use as adjunctive therapy in combination with antidepressants, mood stabilizers, and antipsychotics.     Currently, her depression is stable on a combination of transcranial magnetic stimulation and  medications. She is taking levomilnacipran (a serotonin norepinephrine reuptake inhibitor), vilazodone (a serotonin modulator and stimulator), lamotrigine (a voltage-gated sodium and calcium-channel blocker), quetiapine (a weak dopamine antagonist, serotonin antagonist, and norepinephrine reuptake inhibitor), and amphetamine-dextroamphetamine (dopamine and norepinephrine reuptake inhibitor). Together, these medications have acted synergistically to increase the level of available serotonin, norepinephrine, and dopamine in the appropriate parts of the brain to help with elevating her mood and keeping suicidal thoughts at bay. As a result, she has been able to maintain employment and live productively.     It is also important to note that since Adderall was added to her medication regimen in 2016, she has not been hospitalized psychiatrically. Prior to the initiation of this medication, she required admission for stabilization and safety one to two times a year. With inpatient psychiatric hospitalization costing $4,200 per day, this is a significant cost that insurance is not having to cover.     Will route to the PA Specialty Team for assistance. Please see related encounters dated 9/3/21 and 9/14/21.

## 2022-01-28 NOTE — TELEPHONE ENCOUNTER
Medication: Adderall 10 mg tabs  Insurance Company: [employer] - Phone [phone] Fax [phone]      Tried submitting P/A request to [employer] and get the message back to have pharmacy submit to primary first. I've called pharmacy and asked that they process rx to both rx as a COB (coordination of benefits) to [facility] first, then [employer] second. I'll revisit this shortly and try again.

## 2022-01-28 NOTE — TELEPHONE ENCOUNTER
Insurance coverage:  1. Medicare  2. Medicaid ([facility]) - ID [id number], PMI [id number]

## 2022-01-28 NOTE — NURSING NOTE
Mariaelena Jean Baptiste is a 51 year old adult patient that presents today in clinic for the following:    Chief Complaint   Patient presents with     Hospital F/U     Patient comes for a HFU.     The patient's allergies and medications were reviewed as noted. A set of vitals were recorded as noted without incident. The patient does not have any other questions for the provider.    Lamin Best, EMT at 8:34 AM on 1/28/2022

## 2022-01-28 NOTE — TELEPHONE ENCOUNTER
Called the pharmacy to find out if patient's secondary insurance will cover Adderall since Medicare will not cover it with the diagnosis that's associated with it. (They will only cover ADHD or narcolepsy.) The pharmacist said that Medicare declined to pay, and they did run it through [employer] which is secondary. [employer] declined to cover as well, and indicated that they want the primary coverage to pay first.    Please see related telephone encounter dated 9/03/21 and 9/14/21.    Opened a telephone encounter for a prior authorization and routed to the PA Team for assistance.

## 2022-01-31 NOTE — PROGRESS NOTES
"VIDEO VISIT  Mariaelena Jean Baptiste is a 50 year old patient who is being evaluated via a billable video visit.      The patient has been notified of following:   \"We have found that certain health care needs can be provided without the need for an in-person physical exam. This service lets us provide the care you need with a video conversation. If a prescription is necessary we can send it directly to your pharmacy. If lab work is needed we can place an order for that and you can then stop by our lab to have the test done at a later time. Insurers are generally covering virtual visits as they would in-office visits so billing should not be different than normal.  If for some reason you do get billed incorrectly, you should contact the billing office to correct it and that number is in the AVS .    Patient has given verbal consent for video visit?: Yes   How would you like to obtain your AVS?: [employer]  AVS SmartPhrase [PsychAVS] has been placed in 'Patient Instructions': Yes      Video- Visit Details  Type of service:  video visit for medication management  Time of service:    Date:  02/01/2022    Video Start Time:  10:45AM      Video End Time:  11:15AM    Reason for video visit:  Patient unable to travel due to Covid-19  Originating Site (patient location):  [facility]   Location- Patient's home  Distant Site (provider location):  [facility]  Mode of Communication:  Video Conference via [employer]  Consent:  Patient has given verbal consent for video visit?: Yes            [facility]  Psychiatry Clinic  MEDICAL PROGRESS NOTE     CARE TEAM:  PCP- Thalia Bradford, Psychotherapist- Dr. Julieta Gill, Specialty: Dr. Marie at [facility]    Mariaelena Jean Baptiste is a 51 year old who prefers the name Mariaelena and uses pronouns she, her, they.      DIAGNOSIS     MDD, recurrent, moderate (treatment-resistant; h/o severe episodes)  BPD  PTSD  DID  Unspecified Eating Disorder, in remission  Insomnia, " likely circadian rhythm disorder      ASSESSMENT   Mariaelena reports she has had some recent stressors in her life, including a recent ED visit due to autonomic dysfunction. She has been coping well - however has some anxiety about her health. She has an upcoming appointment with cardiac EP and has been working with her PCP as well. As of now, there is no concern that her medications are contributing to her autonomic dysfunction. Patient continues going to therapy. She had no safety concerns today. Provided refills - no medication changes were made.     [place]MP was checked today:  Indicates taking controlled medication as prescribed.     PLAN                                                                                                                1) Meds-  - Continue quetiapine 50 mg PRN nightly for sleep and treatment resistant depression (patient reports she uses this every night)  - Continue Adderall IR 10 mg BID (qAM + 2pm) for augmentation of depression treatment  - Continue levomilnacipran  mg QDAY for depression  - Continue vilazodone 40 mg QDAY for depression  - Continue lamotrigine 150 mg QDAY for depression and mood stabilization  - Continue melatonin 1 mg with dinner     Other:     - Resume light box daily in Fall - patient has new blue light box which she finds to be more helpful than white light    2) Psychotherapy- Continue biweekly therapy w/ DBT therapist Dr. Julieta Gill Psy D    3) Next due-  Labs- AP monitoring labs due Jan 2023  EKG- as needed  Rating Scales- PHQ-9 at every in-person visit, AIMS done 9/2/2021 - score 0    4) Referrals-  None    5) Dispo- RTC in 6-8 weeks       PERTINENT BACKGROUND                           [most recent eval 07/20/21]   Pertinent Background:  Mariaelena first experienced mental health issues as a young adult and has received treatment for treatment-resistant depression, BPD with severe self harm, and PTSD. Notably, both naltrexone and clozapine have reduced SIB  immensely, with return when taper off has been initiated in the past (although no return of SIB to date since d/c of clozapine). She does better when she uses a light box in the Fall/Winter, best started in September as she typically experiences return of depressive symptoms in October. A taper of Lamictal was associated with decline in mood and subsequent hospitalization in the past which is a common theme for her as medication changes, even small ones, tend to be very destabilizing. A TMS series through the [facility] in August 2016 was transformative in terms of ongoing remission of her depression for the following 2+ years. She often is not aware of reemerging depressive symptoms until they become severe.    Psych pertinent item history includes suicide attempts {2x], suicidal ideation, severe SIB [has required skin grafts and long term hospitalization at [place]], mutiple psychotropic trials, trauma hx, ECT, TMS, psych hosp (>5), and commitment.      SUBJECTIVE     - Mariaelena states things have been stressful - she recently had a syncopal episode leading to an ED visit  - She has autonomic dysfunction and has been working with her PCP - also has an appointment with cardiac EP   - Patient reports she is scared about her health - hopes she can find an answer to what is causing this  - She hopes she does not have to make changes to her psychiatric medications - it took many years to find the correct medications that have helped her significant depression  - States she is overall coping well - she stopped searching her symptoms on the Internet because that led to panic symptoms  - Patient has continued going to therapy which is a good source of support for her  - Denied any SI/SIB or HI - had no safety concerns today    RECENT PSYCH ROS:   Depression:  none  Elevated:  none  Psychosis:  none  Anxiety:  feeling fearful  Trauma Related:  none   Sleep: some sleep difficulty   Other: N/A    Adverse Effects: none  Pertinent  Negative Symptoms: No suicidal ideation, self-injurious behavior/urges or violent ideation  Recent Substance Use:     Tobacco- vapes nicotine     FAMILY and SOCIAL HISTORY                                 pt reported     Family Hx: Depression in her father and sister. Anxiety and CD issues in many family members. Paternal grandfather committed suicide in his 50s. No h/o BPAD or Schizophrenia.    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    Social/ Spiritual Support- DBT therapist, online friends, father and step mother in [place]; brother [place] (supportive, close with them), a few co workers and a few close friends from the feline rescue (Ahsan and Anika). Mariaelena grew up Latter-day - continues to believe in God but does not identify with a specific Sabianist.  Feels Safe at Home- yes     PSYCH and SUBSTANCE USE Critical Summary Points since July 2021 July 2021 - Transfer DA. No med changes. Patient was doing her 3rd series of TMS which was very helpful - noticed improved mood.     Sept 2021 - Patient completed TMS. Referred to pain management. No med changes.     November 2021 - No med changes    December 2021 - No med changes    February 2022 - No med changes     PAST MED TRIALS     Per chart review:  Numerous including TCAs, MAOI's, Prozac, Zoloft, Viibryd, Brintellix, Celexa, Effexor, Cymbalta, lithium, clonidine, Provigil, cytomel, naltrexone (for SIB), dextroamphetamine, lithium, lamictal, clozapine (used for self-harm urges and cross-titrated to Latuda in  2016 in anticipation of TMS).     Per discussion with  Dr. Matthews 2/2016:  Both naltrexone and clozapine have reduced SIB immensely, with return when taper off has been initiated in the past.      MEDICAL HISTORY and ALLERGY     ALLERGIES: Patient has no known allergies.    Patient Active Problem List   Diagnosis     Suicidal ideation     Major depression, recurrent (H)     Tobacco abuse     Hx of psychiatric care     Scar     Nasal septal deviation        MEDICAL REVIEW OF SYSTEMS   Contraception- s/p tubal ligation, otherwise did not discuss    A comprehensive review of systems was performed and is negative other than noted in the HPI.     MEDICATIONS     Current Outpatient Medications   Medication Sig Dispense Refill     amphetamine-dextroamphetamine (ADDERALL) 10 MG tablet Take 1 tablet (10 mg) by mouth 2 times daily 60 tablet 0     cyclobenzaprine (FLEXERIL) 5 MG tablet Take 1-2 tablets (5-10 mg) by mouth 3 times daily as needed for muscle spasms 90 tablet 0     doxylamine (UNISOM) 25 MG TABS tablet Take 25 mg by mouth nightly as needed       ibuprofen (ADVIL,MOTRIN) 800 MG tablet Take 400 mg by mouth as needed        lamoTRIgine (LAMICTAL) 150 MG tablet Take 1 tablet (150 mg) by mouth daily 30 tablet 2     levomilnacipran (FETZIMA ER) 120 MG 24 hr capsule Take 1 capsule (120 mg) by mouth daily 30 capsule 2     lisinopril (ZESTRIL) 10 MG tablet Take 1 tablet (10 mg) by mouth daily 90 tablet 3     MAGNESIUM OXIDE PO Take 250 mg by mouth daily        melatonin 5 MG tablet Take 5 mg by mouth nightly as needed for sleep       multivitamin, therapeutic (THERA-VIT) TABS tablet Take 1 tablet by mouth daily       QUEtiapine (SEROQUEL) 25 MG tablet Take 2 tablets (50 mg) by mouth At Bedtime 60 tablet 2     sodium chloride (OCEAN) 0.65 % nasal spray Spray 1-2 sprays in nostril every hour as needed for congestion (nasal dryness) Use in EACH nostril. 100 mL 3     triamcinolone (NASACORT) 55 MCG/ACT nasal aerosol Spray  2 sprays into both nostrils daily       vilazodone (VIIBRYD) 40 MG TABS tablet Take 1 tablet (40 mg) by mouth daily 30 tablet 2     VITAMIN D, CHOLECALCIFEROL, PO Take 1,000 Units by mouth 2 times daily         VITALS   There were no vitals taken for this visit.    MENTAL STATUS EXAM     Alertness: alert  and oriented  Appearance: well groomed  Behavior/Demeanor: cooperative and pleasant, with good  eye contact   Speech: normal and regular rate and rhythm  Language: intact  Psychomotor: normal or unremarkable  Mood: anxious and worried  Affect: full range; congruent to: mood- yes, content- yes  Thought Process/Associations: unremarkable  Thought Content:  Reports none;  Denies suicidal ideation  Perception:  Reports none;  Denies hallucinations  Insight: good  Judgment: good  Cognition: does  appear grossly intact; formal cognitive testing was not done  Gait and Station: unremarkable     LABS and DATA     PHQ9 TODAY = NA  PHQ 9/1/2021 9/2/2021 9/2/2021   PHQ-9 Total Score 6 6 8   Q9: Thoughts of better off dead/self-harm past 2 weeks Not at all Not at all Not at all       Recent Labs   Lab Test 01/28/22 0933 01/05/22 0444 11/16/21  1607 01/13/21  1149   GLC 99 132*   < >  --    A1C 5.3  --   --  5.1    < > = values in this interval not displayed.     Recent Labs   Lab Test 01/28/22 0933 01/13/21  1149   CHOL 202* 206*   TRIG 52 42   * 118*   HDL 86 80     Recent Labs   Lab Test 01/28/22 0933 01/05/22 0444   AST 23 24   ALT 29 30   ALKPHOS 74 135     Recent Labs   Lab Test 01/05/22 0444 11/16/21  1607 06/05/20  1051 05/20/20  0217 04/15/19  1401   WBC 16.0* 7.4 5.7   < > 7.5   ANEU  --   --  3.6  --  5.2   HGB 13.5 13.2 14.6   < > 12.5   * 441 452*   < > 445    < > = values in this interval not displayed.       ECG 5/2020 QTc = 462 ms     PSYCHOTROPIC DRUG INTERACTIONS     VILAZODONE + FETZIMA + ADDERALL + SEROQUEL may result in increased risk for serotonin syndrome.      VILAZODONE + FETZIMA may  enhance the antiplatelet effect of other Agents with Antiplatelet Properties.      LAMOTRIGINE + ACETAMINOPHEN may result in decreased lamotrigine serum levels.     ADDERALL + LISINOPRIL:  amphetamines may diminish the antihypertensive effect of antihypertensive agents.     MANAGEMENT:  Monitoring for adverse effects, routine vitals and using lowest therapeutic dose of [psychotropics]     RISK STATEMENT for SAFETY     Mariaelena Jean Baptiste did not appear to be an imminent safety risk to self or others.    TREATMENT RISK STATEMENT: The risks, benefits, alternatives and potential adverse effects have been discussed and are understood by the pt. The pt understands the risks of using street drugs or alcohol. There are no medical contraindications, the pt agrees to treatment with the ability to do so. The pt knows to call the clinic for any problems or to access emergency care if needed.  Medical and substance use concerns are documented above.  Psychotropic drug interaction check was done, including changes made today.    PROVIDER: Robert Ibanez DO, MPH    Patient staffed in clinic with Dr. Lunsford who will sign the note.  Supervisor is Dr. Byrne.

## 2022-02-01 ENCOUNTER — VIRTUAL VISIT (OUTPATIENT)
Dept: PSYCHIATRY | Facility: CLINIC | Age: 52
End: 2022-02-01
Attending: PSYCHIATRY & NEUROLOGY
Payer: MEDICARE

## 2022-02-01 DIAGNOSIS — F33.1 MODERATE EPISODE OF RECURRENT MAJOR DEPRESSIVE DISORDER (H): ICD-10-CM

## 2022-02-01 PROCEDURE — 99214 OFFICE O/P EST MOD 30 MIN: CPT | Mod: 95 | Performed by: STUDENT IN AN ORGANIZED HEALTH CARE EDUCATION/TRAINING PROGRAM

## 2022-02-01 RX ORDER — VILAZODONE HYDROCHLORIDE 40 MG/1
40 TABLET ORAL DAILY
Qty: 30 TABLET | Refills: 2 | Status: SHIPPED | OUTPATIENT
Start: 2022-02-01 | End: 2022-05-24

## 2022-02-01 RX ORDER — DEXTROAMPHETAMINE SACCHARATE, AMPHETAMINE ASPARTATE, DEXTROAMPHETAMINE SULFATE AND AMPHETAMINE SULFATE 2.5; 2.5; 2.5; 2.5 MG/1; MG/1; MG/1; MG/1
10 TABLET ORAL 2 TIMES DAILY
Qty: 60 TABLET | Refills: 0 | Status: SHIPPED | OUTPATIENT
Start: 2022-02-01 | End: 2022-05-24

## 2022-02-01 RX ORDER — LAMOTRIGINE 150 MG/1
150 TABLET ORAL DAILY
Qty: 30 TABLET | Refills: 2 | Status: SHIPPED | OUTPATIENT
Start: 2022-02-01 | End: 2022-05-24

## 2022-02-01 RX ORDER — QUETIAPINE FUMARATE 25 MG/1
50 TABLET, FILM COATED ORAL AT BEDTIME
Qty: 60 TABLET | Refills: 2 | Status: SHIPPED | OUTPATIENT
Start: 2022-02-01 | End: 2022-05-24

## 2022-02-01 ASSESSMENT — PAIN SCALES - GENERAL: PAINLEVEL: NO PAIN (0)

## 2022-02-01 NOTE — PATIENT INSTRUCTIONS
**For crisis resources, please see the information at the end of this document**   Patient Education    Thank you for coming to the [facility] [place] [facility].    Lab Testing:  If you had lab testing today and your results are reassuring or normal they will be mailed to you or sent through NPR within 7 days. If the lab tests need quick action we will call you with the results. The phone number we will call with results is # [phone] (home) . If this is not the best number please call our clinic and change the number.    Medication Refills:  If you need any refills please call your pharmacy and they will contact us. Our fax number for refills is [phone]. Please allow three business for refill processing. If you need to  your refill at a new pharmacy, please contact the new pharmacy directly. The new pharmacy will help you get your medications transferred.     Scheduling:  If you have any concerns about today's visit or wish to schedule another appointment please call our office during normal business hours [phone] (8-5:00 M-F)    Contact Us:  Please call [phone] during business hours (8-5:00 M-F).  If after clinic hours, or on the weekend, please call  [phone].    Financial Assistance [phone]  [facility] Billing [phone]  Central Billing Office, [facility]: [phone]  [place] Billing [phone]  Medical Records [phone]  [place] Patient Bill of Rights [email or web address]       MENTAL HEALTH CRISIS NUMBERS:  For a medical emergency please call  911 or go to the nearest ER.     [facility]:   [facility] -[phone]   Crisis Residence [facility] -[phone]   Walk-In Counseling Center [facility] -[phone]   COPE 24/7 [place] Mobile Team -[phone] (adults)/[phone] (child)  CHILD: [facility] needs assessment team -  [phone]      [facility]:   [facility] - [phone]   Walk-in counseling [facility] - [phone]   Walk-in counseling [facility] - [phone]   Crisis Residence [facility]idence - [phone]  Urgent Care Adult Mental Frjbzv-[phone] mobile unit/ 24/7 crisis line    National Crisis Numbers:   National Suicide Prevention Lifeline: 6-861-953-TALK (299-223-3780)  Poison Control Center - 3-370-487-1010  [employer]/resources for a list of additional resources (SOS)  Trans Lifeline a hotline for transgender people 1-418-851-2579  The [employer] Project a hotline for LGBT youth 6-390-539-5191  Crisis Text Line: For any crisis 24/7   To: 379587  see www.crisistextline.org  - IF MAKING A CALL FEELS TOO HARD, send a text!         Again thank you for choosing [facility] and please let us know how we can best partner with you to improve you and your family's health.    You may be receiving a survey regarding this appointment. We would love to have your feedback, both positive and negative. The survey is done by an external company, so your answers are anonymous.

## 2022-02-01 NOTE — PROGRESS NOTES
"VIDEO VISIT  Mariaelena Jean Baptiste is a 51 year old patient that has consented to receive services via billable video visit.      The patient has been notified of following:   \"This video visit will be conducted via a call between you and your physician/provider. We have found that certain health care needs can be provided without the need for an in-person physical exam. This service lets us provide the care you need with a video conversation. If a prescription is necessary we can send it directly to your pharmacy. If lab work is needed we can place an order for that and you can then stop by our lab to have the test done at a later time. Insurers are generally covering virtual visits as they would in-office visits so billing should not be different than normal.  If for some reason you do get billed incorrectly, you should contact the billing office to correct it and that number is in the AVS .    Patient will join video visit via:  [employer] (Patient / guardian confirmed to join via [employer])    If patient attempts to join the video via [employer] at appointment start time, but is unable to, they would prefer that the provider send them a video invitation via:   Send to preferred e-mail: [email or web address]      How would patient like to obtain AVS?:  MyChart    "

## 2022-02-02 NOTE — TELEPHONE ENCOUNTER
Per Mariaelena, the pharmacy told her that they were able to get Adderall covered. No further action needed at this time.

## 2022-02-02 NOTE — TELEPHONE ENCOUNTER
Received the following message via [employer] from the patient:    Crow Schwartz!     Just got off the phone with the pharmacy regarding the adderall, and somehow, whatever the PA team and you did worked! I think he called it an STL, said that the way it's being billed was changed, and that it is now being covered. Woot!     Thank you :)  Mariaelena

## 2022-02-03 NOTE — TELEPHONE ENCOUNTER
Mariaelena explained that the Rx for Adderall needs to be submitted SDL by pharmacy (Submit Direct Link) which means they submit the Rx manually. This will bypass automatic denial of coverage. She was able to  her medication today with a $1 copay.

## 2022-02-07 ENCOUNTER — VIRTUAL VISIT (OUTPATIENT)
Dept: PALLIATIVE MEDICINE | Facility: CLINIC | Age: 52
End: 2022-02-07
Attending: PHYSICAL MEDICINE & REHABILITATION
Payer: MEDICARE

## 2022-02-07 DIAGNOSIS — M47.812 CERVICAL SPONDYLOSIS WITHOUT MYELOPATHY: ICD-10-CM

## 2022-02-07 DIAGNOSIS — G89.29 CHRONIC UPPER BACK PAIN: ICD-10-CM

## 2022-02-07 DIAGNOSIS — M54.9 CHRONIC UPPER BACK PAIN: ICD-10-CM

## 2022-02-07 PROCEDURE — 96156 HLTH BHV ASSMT/REASSESSMENT: CPT | Mod: 95 | Performed by: PSYCHOLOGIST

## 2022-02-13 ENCOUNTER — HEALTH MAINTENANCE LETTER (OUTPATIENT)
Age: 52
End: 2022-02-13

## 2022-02-24 ENCOUNTER — VIRTUAL VISIT (OUTPATIENT)
Dept: CARDIOLOGY | Facility: CLINIC | Age: 52
End: 2022-02-24
Attending: INTERNAL MEDICINE
Payer: MEDICARE

## 2022-02-24 DIAGNOSIS — R55 NEAR SYNCOPE: ICD-10-CM

## 2022-02-24 DIAGNOSIS — R55 SYNCOPE AND COLLAPSE: Primary | ICD-10-CM

## 2022-02-24 PROCEDURE — 99214 OFFICE O/P EST MOD 30 MIN: CPT | Mod: 95 | Performed by: INTERNAL MEDICINE

## 2022-02-24 NOTE — PROGRESS NOTES
Mariaelena is a 51 year old who is being evaluated via a billable video visit.      HPI:   Mariaelena is a pleasant 51-year-old woman who was kindly referred by Dr.Rebecca Bradford for evaluation of  2 collapse events that by history both were almost certainly vasovagal in nature.   She has subsequently  had some near events.    In reviewing her history: The says that she is never really fully blacked out but has someone near fainting experiences.  With the worst of these she has sensation of some difficulty with breathing in her neck.  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  It is unlikely that her work environment has anything to do with her symptoms.  We discussed this at some length and agreed that she would continue to wear a mask at work since she does have allergies to cats which she tries to treat prophylactically with inhalers before going to work     Mariaelena is aware of the need for salt and electrolyte fluid intake.  No further testing or intervention is recommended at this time.    PAST MEDICAL HISTORY:  Past Medical History:   Diagnosis Date     Anxiety 1988     Chronic osteoarthritis      Depressive disorder      Dissociative identity disorder (H)      Gastro-oesophageal reflux disease      Hypertension      Migraines      PTSD (post-traumatic stress disorder)      Social phobia        CURRENT MEDICATIONS:  Current Outpatient Medications   Medication Sig Dispense Refill     amphetamine-dextroamphetamine (ADDERALL) 10 MG tablet Take 1 tablet (10 mg) by mouth 2 times daily 60 tablet 0     cyclobenzaprine (FLEXERIL) 5 MG tablet Take 1-2 tablets (5-10 mg) by mouth 3 times daily as needed for muscle spasms 90 tablet 0     doxylamine (UNISOM) 25 MG TABS tablet Take 25 mg by mouth nightly as needed       ibuprofen (ADVIL,MOTRIN) 800 MG tablet Take 400 mg by mouth as needed        lamoTRIgine (LAMICTAL) 150 MG tablet Take 1 tablet (150 mg) by mouth daily  30 tablet 2     levomilnacipran (FETZIMA ER) 120 MG 24 hr capsule Take 1 capsule (120 mg) by mouth daily 30 capsule 2     lisinopril (ZESTRIL) 10 MG tablet Take 1 tablet (10 mg) by mouth daily 90 tablet 3     MAGNESIUM OXIDE PO Take 250 mg by mouth daily        melatonin 5 MG tablet Take 5 mg by mouth nightly as needed for sleep       multivitamin, therapeutic (THERA-VIT) TABS tablet Take 1 tablet by mouth daily       QUEtiapine (SEROQUEL) 25 MG tablet Take 2 tablets (50 mg) by mouth At Bedtime 60 tablet 2     sodium chloride (OCEAN) 0.65 % nasal spray Spray 1-2 sprays in nostril every hour as needed for congestion (nasal dryness) Use in EACH nostril. 100 mL 3     triamcinolone (NASACORT) 55 MCG/ACT nasal aerosol Spray 2 sprays into both nostrils daily       vilazodone (VIIBRYD) 40 MG TABS tablet Take 1 tablet (40 mg) by mouth daily 30 tablet 2     VITAMIN D, CHOLECALCIFEROL, PO Take 1,000 Units by mouth 2 times daily          PAST SURGICAL HISTORY:  Past Surgical History:   Procedure Laterality Date     CHOLECYSTECTOMY       COLONOSCOPY N/A 04/14/2021    Procedure: COLONOSCOPY, WITH POLYPECTOMY;  Surgeon: Rickie Mccarthy MD;  Location: [facility] OR     LAPAROSCOPIC TUBAL LIGATION  01/01/1999     rectal prolapse repair       SEPTOPLASTY N/A 11/29/2021    Procedure: Septoplasty, turbinate reduction;  Surgeon: Merlin Mendoza MD;  Location: [facility] OR     SKIN GRAFT Bilateral     4 times, most recent in 2001       ALLERGIES:   No Known Allergies    FAMILY HISTORY:  Family History   Problem Relation Age of Onset     Depression Father      Hypertension Father      Substance Abuse Father      Cancer Father         non hodgkins lymphoma     Depression Sister      Cancer Maternal Grandmother         breast cancer (mastectomy in 40's), melanoma, leukemia     Melanoma Maternal Grandmother      Cancer Maternal Grandfather         leukemia     Substance Abuse Maternal Grandfather      Cancer Paternal Grandmother         lung  cancer, nonsmoker     Substance Abuse Brother      Substance Abuse Sister      Skin Cancer No family hx of        SOCIAL HISTORY:  Social History     Tobacco Use     Smoking status: Former Smoker     Packs/day: 1.00     Years: 20.00     Pack years: 20.00     Types: Cigarettes, Other     Start date: 1995     Quit date: 10/23/2017     Years since quittin.3     Smokeless tobacco: Former User     Tobacco comment: E-cig    Vaping Use     Vaping Use: Every day   Substance Use Topics     Alcohol use: No     Drug use: No       ROS:   Constitutional: No fever, chills, or sweats. Weight stable.   ENT: No visual disturbance, ear ache, epistaxis, sore throat.   Cardiovascular: As per HPI.   Respiratory: No cough, hemoptysis.    GI: No nausea, vomiting,    : No hematuria.   Integument: Negative.   Psychiatric: Negative.   Hematologic:   no easy bleeding.  Neuro: Negative.   Endocrinology: No significant heat or cold intolerance   Musculoskeletal: No myalgia.    Exam:  There were no vitals taken for this visit.  GENERAL APPEARANCE: healthy, alert and no distress  HEENT:  no central cyanosis  NECK:  JVP not elevated  RESPIRATORY:- no rales, rhonchi or wheezes, no use of accessory muscles, no retractions, respirations are unlabored, normal respiratory rate  NEURO: alert and oriented to person/place/time, normal speech, gait and affect  SKIN: no ecchymoses, no rashes    Labs:  CBC RESULTS:   Lab Results   Component Value Date    WBC 16.0 (H) 2022    WBC 5.7 2020    RBC 4.66 2022    RBC 5.05 2020    HGB 13.5 2022    HGB 14.6 2020    HCT 42.4 2022    HCT 46.7 2020    MCV 91 2022    MCV 93 2020    MCH 29.0 2022    MCH 28.9 2020    MCHC 31.8 2022    MCHC 31.3 (L) 2020    RDW 12.6 2022    RDW 12.5 2020     (H) 2022     (H) 2020       BMP RESULTS:  Lab Results   Component Value Date     2022    NA  138 06/05/2020    POTASSIUM 4.5 01/28/2022    POTASSIUM 4.4 06/05/2020    CHLORIDE 108 01/28/2022    CHLORIDE 107 06/05/2020    CO2 26 01/28/2022    CO2 25 06/05/2020    ANIONGAP 6 01/28/2022    ANIONGAP 6 06/05/2020    GLC 99 01/28/2022    GLC 94 06/05/2020    BUN 20 01/28/2022    BUN 15 06/05/2020    CR 0.83 01/28/2022    CR 0.77 06/05/2020    GFRESTIMATED 85 01/28/2022    GFRESTIMATED >90 06/05/2020    GFRESTBLACK >90 06/05/2020    ARUN 9.6 01/28/2022    ARUN 9.0 06/05/2020        INR RESULTS:  Lab Results   Component Value Date    INR 0.92 01/28/2022       Procedures:  PULMONARY FUNCTION TESTS:   No flowsheet data found.      ECHOCARDIOGRAM:   No results found for this or any previous visit (from the past 8760 hour(s)).      Assessment and Plan:   1.  Probable vasovagal events without complete loss of consciousness  2.  Intermittent respiratory symptoms of uncertain etiology    Plan  1.  Continue routine follow-up with Dr. Thalia Bradford  2.  Continue hydration/increased salt recommendations as before  3.  Follow-up at 1 year    Total elapsed time today with chart review, visit, and documentation 30 minutes    The video on 2: 30 p.m.; off 2: 50 p.m.  Platform Doximity  Patient at home; clinic [facility] heart    I very much appreciated the opportunity to see and assess Mariaelena Jean Baptiste in the clinic today. Please do not hesitate to contact my office if you have any questions or concerns.      Brii Campo MD  Cardiac Arrhythmia Service  [facility]  [phone]      CC  BRII CAMPO

## 2022-02-24 NOTE — NURSING NOTE
Chief Complaint   Patient presents with     Follow Up     Had a recent severe episode due to autonomic dysfunction disorder. Has questions on how to control symptoms, episodes have been very bad.      Daria Lai, Visit Facilitator/MA.

## 2022-02-24 NOTE — LETTER
2/24/2022      RE: Mariaelena Jean Baptiste  [address]  [place] [address]       Dear Colleague,    Thank you for the opportunity to participate in the care of your patient, Mariaelena Jean Baptiste, at the [facility]ART [facility] [place] [facility]. Please see a copy of my visit note below.    Mariaelena is a 51 year old who is being evaluated via a billable video visit.      HPI:   Mariaelena is a pleasant 51-year-old woman who was kindly referred by Dr.Rebecca Bradford for evaluation of  2 collapse events that by history both were almost certainly vasovagal in nature.   She has subsequently  had some near events.    In reviewing her history: The says that she is never really fully blacked out but has someone near fainting experiences.  With the worst of these she has sensation of some difficulty with breathing in her neck.  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  It is unlikely that her work environment has anything to do with her symptoms.  We discussed this at some length and agreed that she would continue to wear a mask at work since she does have allergies to cats which she tries to treat prophylactically with inhalers before going to work     Mariaelena is aware of the need for salt and electrolyte fluid intake.  No further testing or intervention is recommended at this time.    PAST MEDICAL HISTORY:  Past Medical History:   Diagnosis Date     Anxiety 1988     Chronic osteoarthritis      Depressive disorder      Dissociative identity disorder (H)      Gastro-oesophageal reflux disease      Hypertension      Migraines      PTSD (post-traumatic stress disorder)      Social phobia        CURRENT MEDICATIONS:  Current Outpatient Medications   Medication Sig Dispense Refill     amphetamine-dextroamphetamine (ADDERALL) 10 MG tablet Take 1 tablet (10 mg) by mouth 2 times daily 60 tablet 0      cyclobenzaprine (FLEXERIL) 5 MG tablet Take 1-2 tablets (5-10 mg) by mouth 3 times daily as needed for muscle spasms 90 tablet 0     doxylamine (UNISOM) 25 MG TABS tablet Take 25 mg by mouth nightly as needed       ibuprofen (ADVIL,MOTRIN) 800 MG tablet Take 400 mg by mouth as needed        lamoTRIgine (LAMICTAL) 150 MG tablet Take 1 tablet (150 mg) by mouth daily 30 tablet 2     levomilnacipran (FETZIMA ER) 120 MG 24 hr capsule Take 1 capsule (120 mg) by mouth daily 30 capsule 2     lisinopril (ZESTRIL) 10 MG tablet Take 1 tablet (10 mg) by mouth daily 90 tablet 3     MAGNESIUM OXIDE PO Take 250 mg by mouth daily        melatonin 5 MG tablet Take 5 mg by mouth nightly as needed for sleep       multivitamin, therapeutic (THERA-VIT) TABS tablet Take 1 tablet by mouth daily       QUEtiapine (SEROQUEL) 25 MG tablet Take 2 tablets (50 mg) by mouth At Bedtime 60 tablet 2     sodium chloride (OCEAN) 0.65 % nasal spray Spray 1-2 sprays in nostril every hour as needed for congestion (nasal dryness) Use in EACH nostril. 100 mL 3     triamcinolone (NASACORT) 55 MCG/ACT nasal aerosol Spray 2 sprays into both nostrils daily       vilazodone (VIIBRYD) 40 MG TABS tablet Take 1 tablet (40 mg) by mouth daily 30 tablet 2     VITAMIN D, CHOLECALCIFEROL, PO Take 1,000 Units by mouth 2 times daily          PAST SURGICAL HISTORY:  Past Surgical History:   Procedure Laterality Date     CHOLECYSTECTOMY       COLONOSCOPY N/A 04/14/2021    Procedure: COLONOSCOPY, WITH POLYPECTOMY;  Surgeon: Rickie Mccarthy MD;  Location: [facility] OR     LAPAROSCOPIC TUBAL LIGATION  01/01/1999     rectal prolapse repair       SEPTOPLASTY N/A 11/29/2021    Procedure: Septoplasty, turbinate reduction;  Surgeon: Merlin Mendoza MD;  Location: [facility] OR     SKIN GRAFT Bilateral     4 times, most recent in 2001       ALLERGIES:   No Known Allergies    FAMILY HISTORY:  Family History   Problem Relation Age of Onset     Depression Father      Hypertension Father       Substance Abuse Father      Cancer Father         non hodgkins lymphoma     Depression Sister      Cancer Maternal Grandmother         breast cancer (mastectomy in 40's), melanoma, leukemia     Melanoma Maternal Grandmother      Cancer Maternal Grandfather         leukemia     Substance Abuse Maternal Grandfather      Cancer Paternal Grandmother         lung cancer, nonsmoker     Substance Abuse Brother      Substance Abuse Sister      Skin Cancer No family hx of        SOCIAL HISTORY:  Social History     Tobacco Use     Smoking status: Former Smoker     Packs/day: 1.00     Years: 20.00     Pack years: 20.00     Types: Cigarettes, Other     Start date: 1995     Quit date: 10/23/2017     Years since quittin.3     Smokeless tobacco: Former User     Tobacco comment: E-cig    Vaping Use     Vaping Use: Every day   Substance Use Topics     Alcohol use: No     Drug use: No       ROS:   Constitutional: No fever, chills, or sweats. Weight stable.   ENT: No visual disturbance, ear ache, epistaxis, sore throat.   Cardiovascular: As per HPI.   Respiratory: No cough, hemoptysis.    GI: No nausea, vomiting,    : No hematuria.   Integument: Negative.   Psychiatric: Negative.   Hematologic:   no easy bleeding.  Neuro: Negative.   Endocrinology: No significant heat or cold intolerance   Musculoskeletal: No myalgia.    Exam:  There were no vitals taken for this visit.  GENERAL APPEARANCE: healthy, alert and no distress  HEENT:  no central cyanosis  NECK:  JVP not elevated  RESPIRATORY:- no rales, rhonchi or wheezes, no use of accessory muscles, no retractions, respirations are unlabored, normal respiratory rate  NEURO: alert and oriented to person/place/time, normal speech, gait and affect  SKIN: no ecchymoses, no rashes    Labs:  CBC RESULTS:   Lab Results   Component Value Date    WBC 16.0 (H) 2022    WBC 5.7 2020    RBC 4.66 2022    RBC 5.05 2020    HGB 13.5 2022    HGB 14.6 2020     HCT 42.4 01/05/2022    HCT 46.7 06/05/2020    MCV 91 01/05/2022    MCV 93 06/05/2020    MCH 29.0 01/05/2022    MCH 28.9 06/05/2020    MCHC 31.8 01/05/2022    MCHC 31.3 (L) 06/05/2020    RDW 12.6 01/05/2022    RDW 12.5 06/05/2020     (H) 01/05/2022     (H) 06/05/2020       BMP RESULTS:  Lab Results   Component Value Date     01/28/2022     06/05/2020    POTASSIUM 4.5 01/28/2022    POTASSIUM 4.4 06/05/2020    CHLORIDE 108 01/28/2022    CHLORIDE 107 06/05/2020    CO2 26 01/28/2022    CO2 25 06/05/2020    ANIONGAP 6 01/28/2022    ANIONGAP 6 06/05/2020    GLC 99 01/28/2022    GLC 94 06/05/2020    BUN 20 01/28/2022    BUN 15 06/05/2020    CR 0.83 01/28/2022    CR 0.77 06/05/2020    GFRESTIMATED 85 01/28/2022    GFRESTIMATED >90 06/05/2020    GFRESTBLACK >90 06/05/2020    ARUN 9.6 01/28/2022    ARUN 9.0 06/05/2020        INR RESULTS:  Lab Results   Component Value Date    INR 0.92 01/28/2022       Procedures:  PULMONARY FUNCTION TESTS:   No flowsheet data found.      ECHOCARDIOGRAM:   No results found for this or any previous visit (from the past 8760 hour(s)).      Assessment and Plan:   1.  Probable vasovagal events without complete loss of consciousness  2.  Intermittent respiratory symptoms of uncertain etiology    Plan  1.  Continue routine follow-up with Dr. Thalia Bradford  2.  Continue hydration/increased salt recommendations as before  3.  Follow-up at 1 year    Total elapsed time today with chart review, visit, and documentation 30 minutes    The video on 2: 30 p.m.; off 2: 50 p.m.  Platform Doximity  Patient at home; clinic [facility] heart    I very much appreciated the opportunity to see and assess Mariaelena Jean Baptiste in the clinic today. Please do not hesitate to contact my office if you have any questions or concerns.              Please do not hesitate to contact me if you have any questions/concerns.     Sincerely,     Ihsan Campo MD

## 2022-02-24 NOTE — PATIENT INSTRUCTIONS
You were seen in the Electrophysiology Clinic today by: Dr Campo    Plan:       Follow up visit:    1 year with Dr Campo        Your Care Team:  EP Cardiology   Telephone Number     Nurse Line  Mar Mackey RN  [phone]     For scheduling appts or procedures:    Magdalena Mccann   [phone]   For the Device Clinic (Pacemakers, ICDs, Loop Recorders)    During business hours: [phone]  After business hours:   [phone]- select option 4 and ask for job code [id number].     On-call cardiologist for after hours or on weekends: [phone], option #4, and ask to speak to the on-call cardiologist.     Cardiovascular Clinic:   [address] [place] [address]. [place] [address]      As always, Thank you for trusting us with your health care needs!

## 2022-03-14 ENCOUNTER — HOSPITAL ENCOUNTER (OUTPATIENT)
Dept: MAMMOGRAPHY | Facility: CLINIC | Age: 52
Discharge: HOME | End: 2022-03-14
Attending: INTERNAL MEDICINE | Admitting: INTERNAL MEDICINE
Payer: MEDICARE

## 2022-03-14 DIAGNOSIS — R92.8 BI-RADS CATEGORY 3 MAMMOGRAM RESULT: ICD-10-CM

## 2022-03-14 PROCEDURE — 77062 BREAST TOMOSYNTHESIS BI: CPT

## 2022-03-16 ENCOUNTER — VIRTUAL VISIT (OUTPATIENT)
Dept: PHARMACY | Facility: CLINIC | Age: 52
End: 2022-03-16

## 2022-03-16 DIAGNOSIS — G47.9 SLEEP DISORDER, UNSPECIFIED: ICD-10-CM

## 2022-03-16 DIAGNOSIS — Z78.9 TAKES DIETARY SUPPLEMENTS: ICD-10-CM

## 2022-03-16 DIAGNOSIS — I10 BENIGN ESSENTIAL HYPERTENSION: Primary | ICD-10-CM

## 2022-03-16 DIAGNOSIS — R09.81 NASAL CONGESTION: ICD-10-CM

## 2022-03-16 DIAGNOSIS — F33.1 MODERATE EPISODE OF RECURRENT MAJOR DEPRESSIVE DISORDER (H): ICD-10-CM

## 2022-03-16 DIAGNOSIS — M62.838 MUSCLE SPASM: ICD-10-CM

## 2022-03-16 PROCEDURE — 99605 MTMS BY PHARM NP 15 MIN: CPT | Performed by: PHARMACIST

## 2022-03-16 RX ORDER — ACETAMINOPHEN 500 MG
500-1000 TABLET ORAL EVERY 6 HOURS PRN
COMMUNITY

## 2022-03-16 NOTE — Clinical Note
Mariaelena Ferrer Dr. brought up a concern about her heart rate being elevated around 100 bpm resting. She also states that she can feel it pounding. Blood pressure has been good lately. I am thinking it could be related to her adderall, antidepressant, and nicotine use, but wanted to check with you for more history on this. I would rather try to discuss decreasing nicotine with her versus pharmacotherapy such as a beta blocker for blood pressure.    What are your thoughts on this?    Ihsan

## 2022-03-16 NOTE — PROGRESS NOTES
Medication Therapy Management (MTM) Encounter    ASSESSMENT:                            Medication Adherence/Access: No issues identified    Hypertension: Stable.  Meeting goal of less than 130/80 mmHg.    Patient's resting heart rate consistently around or above 100 could potentially be due to to the use of Adderall.  Antidepressants also can potentially increase heart rate.  She reports that she does have an awareness of her pounding heart.  I will bring up these palpitations with Dr. Bradford.  Potential options could be decreasing Adderall or potential consideration of a beta-blocker for blood pressure.     Muscle Spasms/Pain: Stable.     Depression: Stable.     Allergies/Congestion: Stable.     Sleep issues: Stable on current therapy.     Supplements: Stable.     PLAN:                            1. I will discuss your heart rate with Dr. Bradford. It could also be due to your Adderall, antidepressants, or nicotine use.    Follow-up: Return in about 3 months (around 6/16/2022) for Follow up, with me, using a video visit.    SUBJECTIVE/OBJECTIVE:                          Mariaelena Jean Baptiste is a 51 year old adult contacted via secure video for a follow-up visit.  Today's visit is a follow-up MTM visit from 12/16/21     Reason for visit: hypertension follow-up.    Allergies/ADRs: Reviewed in chart  Past Medical History: Reviewed in chart  Tobacco: Mariaelena Jean Baptiste reports that Mariaelena Jean Baptiste quit smoking about 4 years ago. Mariaelena Jean Baptiste's smoking use included cigarettes and other. Mariaelena Jean Baptiste started smoking about 26 years ago. Mariaelena Jean Baptiste has a 20.00 pack-year smoking history. Mariaelena Jean Baptiste has quit using smokeless tobacco.  Alcohol: none      Medication Adherence/Access: no issues reported    Hypertension: Current medications include lisinopril 10 mg (has been on it before and just restarted it about a week and a half ago).  Patient does self-monitor blood pressure. Home BP monitoring in range of 125-135's  systolic over 80-90's diastolic. Patient reports the following medication side effects: orthostatic lightheadedness. Most recent reading was 125/80 mmHg. Reports that her resting heart rate is at least 100 bpm all the time. Reports that she does feel her heart beat/palpitations.   BP Readings from Last 3 Encounters:   01/28/22 136/82   01/24/22 136/86   01/05/22 (!) 146/97        Muscle Spasms/Pain: Currently taking cyclobenzaprine 5 mg takes a couple times per week, and ibuprofen 400 mg as needed (for headaches or upper back pain), and acetaminophen 1000 daily. Has chronic upper back/neck muscle spasms. Does have some drowsiness with the cyclobenzapine 10 mg will make her a little drowsy.     Depression: Currently taking lamotrigine 150 mg daily, quetiapine 50 mg daily, Fetzima  mg daily, and the Viibryd 40 mg daily. Also takes Adderall 10 mg twice a day to help with energy levels. Reports that these medications have been working well for her.  Classifies depression as stable currently. Gets TMS therapy every year or two.      PHQ 8/31/2021 9/1/2021 9/2/2021   PHQ-9 Total Score 7 6 6   Q9: Thoughts of better off dead/self-harm past 2 weeks Not at all Not at all Not at all   Some encounter information is confidential and restricted. Go to Review Flowsheets activity to see all data.       Allergies/Congestion: Uses Nasacort 55 mcg 2 sprays into the nostrils daily to help relieve symptoms of cat allergies and this does help. Uses Ocean 0.65% nasal spray for nasal dryness post surgery (hasnt needed to use much lately). No concerns or questions at this time.      Sleep issues: Reports issues with sleeping so she uses doxylamine 25 mg a couple times per week. Uses quetiapine for this as well but some nights needs more than that to get to sleep. Reports that the biggest issue is getting to sleep and falling back to sleep if she wakes up. Has been using melatonin 5 mg more frequently.  She has gone through sleep  hygiene training. Has cut back caffeine intake over the past year and uses a blue light filter on her phone.    Supplements: Currently takes vitamin D 2000 units once a day, a multivitamin daily, and magnesium oxide 250 mg once daily.      Vitamin D Deficiency Screening Results:  Lab Results   Component Value Date    VITDT 34 12/05/2018    VITDT 32 08/24/2014       Today's Vitals: There were no vitals taken for this visit.  ----------------      I spent 15 minutes with this patient today. All changes were made via collaborative practice agreement with Thalia Bradford MD. A copy of the visit note was provided to the patient's provider(s).    The patient was sent via [employer] a summary of these recommendations.     Ihsan Zabala, Pharm. D., BCACP  Medication Therapy Management Pharmacist  Direct Voicemail: [phone]      Telemedicine Visit Details  Type of service:  Telephone visit  Start Time: 11:00 AM  End Time: 11:15 Am  Originating Location (patient location): Home  Distant Location (provider location):  [facility] PRIMARY CARE CLINIC     Medication Therapy Recommendations  No medication therapy recommendations to display

## 2022-03-16 NOTE — PATIENT INSTRUCTIONS
Recommendations from today's MTM visit:                                                       1. I will discuss your heart rate with Dr. Bradford. It could also be due to your Adderall, antidepressants, or nicotine use.    Follow-up: No follow-ups on file.    It was great to speak with you today.  I value your experience and would be very thankful for your time with providing feedback on our clinic survey. You may receive a survey via email or text message in the next few days.     To schedule another MTM appointment, please call the clinic directly or you may call the MTM scheduling line at [phone] or toll-free at [phone].     My Clinical Pharmacist's contact information:                                                      Please feel free to contact me with any questions or concerns you have.      Ihsan Zabala, Pharm. D., [facility]  Medication Therapy Management Pharmacist  Direct Voicemail: [phone]

## 2022-04-29 ENCOUNTER — TELEPHONE (OUTPATIENT)
Dept: PALLIATIVE MEDICINE | Facility: CLINIC | Age: 52
End: 2022-04-29
Payer: MEDICARE

## 2022-04-29 NOTE — TELEPHONE ENCOUNTER
Called to discuss transfer of care from Dr Jennings.  Advised that ok to schedule follow up if needed otherwise can follow up with PCP. States that is ok with returning to PCP. Advised that if has future pain management needs to have new referral placed. Pt agreeable.         Michelle MATA, RN Care Coordinator  [facility]  Pain [facility]

## 2022-05-03 DIAGNOSIS — M54.9 CHRONIC UPPER BACK PAIN: ICD-10-CM

## 2022-05-03 DIAGNOSIS — G89.29 CHRONIC UPPER BACK PAIN: ICD-10-CM

## 2022-05-03 NOTE — TELEPHONE ENCOUNTER
Received fax request from [facility] pharmacy requesting refill(s) for cyclobenzaprine (FLEXERIL) 5 MG tablet    Last refilled on 10/26/21    Pt last seen on 10/15/21  Next appt scheduled for : none    Will facilitate refill.     No pertinent past medical history

## 2022-05-04 RX ORDER — CYCLOBENZAPRINE HCL 5 MG
5-10 TABLET ORAL 3 TIMES DAILY PRN
Qty: 90 TABLET | Refills: 0 | Status: SHIPPED | OUTPATIENT
Start: 2022-05-04

## 2022-05-04 NOTE — TELEPHONE ENCOUNTER
Spoke to patient. She did not remember that this was the plan and is fine with that. She would like to request one last Rx of this medication and then will transfer to her pcp.     Routing to provider to advise.     Ladan Wilson, [facility] [place]

## 2022-05-04 NOTE — TELEPHONE ENCOUNTER
Sent a NetPaymentt message to Mariaelena to let them know their  medication cyclobenzaprine (FLEXERIL) 5 MG tablet    With a fill date of 05/04/2022 and start date of 05/04/2022 has been filled and sent over to the pharmacy [id number] [address].       Ysabel Jones MA  [facility] [place]

## 2022-05-04 NOTE — TELEPHONE ENCOUNTER
Please have patient request this through their PCP if they're still using this medication. They have agreed to transfer care back to PCP.    Epi Jennings DO  [facility]

## 2022-05-04 NOTE — TELEPHONE ENCOUNTER
Flexeril refill approved.    Epi Jennings [facility]

## 2022-05-17 ASSESSMENT — ANXIETY QUESTIONNAIRES
2. NOT BEING ABLE TO STOP OR CONTROL WORRYING: SEVERAL DAYS
4. TROUBLE RELAXING: SEVERAL DAYS
GAD7 TOTAL SCORE: 8
7. FEELING AFRAID AS IF SOMETHING AWFUL MIGHT HAPPEN: SEVERAL DAYS
6. BECOMING EASILY ANNOYED OR IRRITABLE: SEVERAL DAYS
8. IF YOU CHECKED OFF ANY PROBLEMS, HOW DIFFICULT HAVE THESE MADE IT FOR YOU TO DO YOUR WORK, TAKE CARE OF THINGS AT HOME, OR GET ALONG WITH OTHER PEOPLE?: SOMEWHAT DIFFICULT
GAD7 TOTAL SCORE: 8
7. FEELING AFRAID AS IF SOMETHING AWFUL MIGHT HAPPEN: SEVERAL DAYS
5. BEING SO RESTLESS THAT IT IS HARD TO SIT STILL: SEVERAL DAYS
GAD7 TOTAL SCORE: 8
3. WORRYING TOO MUCH ABOUT DIFFERENT THINGS: SEVERAL DAYS
1. FEELING NERVOUS, ANXIOUS, OR ON EDGE: MORE THAN HALF THE DAYS

## 2022-05-23 ENCOUNTER — MYC MEDICAL ADVICE (OUTPATIENT)
Dept: INTERNAL MEDICINE | Facility: CLINIC | Age: 52
End: 2022-05-23
Payer: MEDICARE

## 2022-05-24 ENCOUNTER — VIRTUAL VISIT (OUTPATIENT)
Dept: PSYCHIATRY | Facility: CLINIC | Age: 52
End: 2022-05-24
Attending: PSYCHIATRY & NEUROLOGY
Payer: MEDICARE

## 2022-05-24 DIAGNOSIS — F33.1 MODERATE EPISODE OF RECURRENT MAJOR DEPRESSIVE DISORDER (H): ICD-10-CM

## 2022-05-24 PROCEDURE — 99214 OFFICE O/P EST MOD 30 MIN: CPT | Mod: GC | Performed by: STUDENT IN AN ORGANIZED HEALTH CARE EDUCATION/TRAINING PROGRAM

## 2022-05-24 RX ORDER — DEXTROAMPHETAMINE SACCHARATE, AMPHETAMINE ASPARTATE, DEXTROAMPHETAMINE SULFATE AND AMPHETAMINE SULFATE 2.5; 2.5; 2.5; 2.5 MG/1; MG/1; MG/1; MG/1
10 TABLET ORAL 2 TIMES DAILY
Qty: 60 TABLET | Refills: 0 | Status: SHIPPED | OUTPATIENT
Start: 2022-05-24 | End: 2022-06-21

## 2022-05-24 RX ORDER — LAMOTRIGINE 150 MG/1
150 TABLET ORAL DAILY
Qty: 30 TABLET | Refills: 2 | Status: SHIPPED | OUTPATIENT
Start: 2022-05-24 | End: 2022-09-12

## 2022-05-24 RX ORDER — VILAZODONE HYDROCHLORIDE 40 MG/1
40 TABLET ORAL DAILY
Qty: 30 TABLET | Refills: 2 | Status: SHIPPED | OUTPATIENT
Start: 2022-05-24 | End: 2022-09-12

## 2022-05-24 RX ORDER — QUETIAPINE FUMARATE 25 MG/1
75 TABLET, FILM COATED ORAL AT BEDTIME
Qty: 90 TABLET | Refills: 2 | Status: SHIPPED | OUTPATIENT
Start: 2022-05-24 | End: 2022-09-12

## 2022-05-24 ASSESSMENT — ANXIETY QUESTIONNAIRES: GAD7 TOTAL SCORE: 8

## 2022-05-24 ASSESSMENT — PATIENT HEALTH QUESTIONNAIRE - PHQ9
SUM OF ALL RESPONSES TO PHQ QUESTIONS 1-9: 7
10. IF YOU CHECKED OFF ANY PROBLEMS, HOW DIFFICULT HAVE THESE PROBLEMS MADE IT FOR YOU TO DO YOUR WORK, TAKE CARE OF THINGS AT HOME, OR GET ALONG WITH OTHER PEOPLE: SOMEWHAT DIFFICULT
SUM OF ALL RESPONSES TO PHQ QUESTIONS 1-9: 7

## 2022-05-24 NOTE — PROGRESS NOTES
Video- Visit Details  Type of service:  video visit for medication management  Time of service:    Date:  05/24/2022    Video Start Time:  10:45AM      Video End Time:  11:15AM    Reason for video visit:  Patient unable to travel due to Covid-19  Originating Site (patient location):  [facility] [place]   Location- Patient's home  Distant Site (provider location):  [facility]  Mode of Communication:  Video Conference via AmWell  Consent:  Patient has given verbal consent for video visit?: Yes            [facility]  Psychiatry Clinic  MEDICAL PROGRESS NOTE     CARE TEAM:  PCP- Thalia Bradford, Psychotherapist- Dr. Julieta Gill, Specialty: Dr. Marie at [facility]    Mariaelena Jean Baptiste is a 51 year old who prefers the name Mariaelena and uses pronouns she, her, they.      DIAGNOSIS     MDD, recurrent, moderate (treatment-resistant; h/o severe episodes)  BPD  PTSD  DID  Unspecified Eating Disorder, in remission  Insomnia, likely circadian rhythm disorder      ASSESSMENT     Mariaelena reports she has had some recent stressors in her life, including having multiple teeth extracted and difficulty with a medical diagnosis. Patient has had difficulty sleeping due to anxiety so her quetiapine was increased at bedtime. Patient had no safety concerns today.    [place] was [place] today:  Indicates taking controlled medication as prescribed.     PLAN                                                                                                                1) Meds-  - Increase to quetiapine 75 mg nightly for sleep and treatment resistant depression  - Continue Adderall IR 10 mg BID (qAM + 2pm) for augmentation of depression treatment  - Continue levomilnacipran  mg QDAY for depression  - Continue vilazodone 40 mg QDAY for depression  - Continue lamotrigine 150 mg QDAY for depression and mood stabilization  - Continue melatonin 1 mg with dinner     Other:     - Resume light box daily in Fall -  patient has new blue light box which she finds to be more helpful than white light    2) Psychotherapy- Continue biweekly therapy w/ DBT therapist Dr. Julieta Gill Psy D    3) Next due-  Labs- AP monitoring labs due Jan 2023  EKG- as needed, last done 1/2022 QTc 480  Rating Scales- PHQ-9 at every in-person visit, AIMS done 9/2/2021 - score 0    4) Referrals-  None    5) Dispo- RTC in 6-8 weeks       PERTINENT BACKGROUND                           [most recent eval 07/20/21]   Pertinent Background:  Mariaelena first experienced mental health issues as a young adult and has received treatment for treatment-resistant depression, BPD with severe self harm, and PTSD. Notably, both naltrexone and clozapine have reduced SIB immensely, with return when taper off has been initiated in the past (although no return of SIB to date since d/c of clozapine). She does better when she uses a light box in the Fall/Winter, best started in September as she typically experiences return of depressive symptoms in October. A taper of Lamictal was associated with decline in mood and subsequent hospitalization in the past which is a common theme for her as medication changes, even small ones, tend to be very destabilizing. A TMS series through the [facility] in August 2016 was transformative in terms of ongoing remission of her depression for the following 2+ years. She often is not aware of reemerging depressive symptoms until they become severe.    Psych pertinent item history includes suicide attempts {2x], suicidal ideation, severe SIB [has required skin grafts and long term hospitalization at [facility]], mutiple psychotropic trials, trauma hx, ECT, TMS, psych hosp (>5), and commitment.      SUBJECTIVE     - Mariaelnea states she has had a lot of anxiety the last few months - she had multiple teeth extracted so she can get dentures and she reports she does not like dentists and so this was a source of a lot anxiety  - Says she has been dealing with a  misdiagnosis of autonomic dysfuntion - thinks it may have been related to lidocaine toxicity and would like to talk to pharmacy - advised patient that would reach out to PharmD team about this  - Patient says in some ways, she is relieved that she may not have a chronic condition of autonomic dysfunction, but has had difficulty sleeping due to increased anxiety in dealing with this and the stress of having a major dental procedure  - Patient requested an increase in quetiapine for sleep - counseled patient risks of serotonin syndrome including palpitations, increased sweating, muscle stiffness, and fevers - advised patient she should seek immediate medical attention if she has these symptoms and she agreed  - Denied any SI/SIB or HI - had no safety concerns today    RECENT PSYCH ROS:   Depression:  none  Elevated:  none  Psychosis:  none  Anxiety:  excessive worry and nervous/overwhelmed  Trauma Related:  none   Sleep: trouble falling asleep   Other: N/A    Adverse Effects: none  Pertinent Negative Symptoms: No suicidal ideation, self-injurious behavior/urges or violent ideation  Recent Substance Use:     Tobacco- vapes nicotine     FAMILY and SOCIAL HISTORY                                 pt reported     Family Hx: Depression in her father and sister. Anxiety and CD issues in many family members. Paternal grandfather committed suicide in his 50s. No h/o BPAD or Schizophrenia.    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    Social/ Spiritual Support- DBT therapist, online friends,  father and step mother in [place]; brother [place] sister [place] in the [place] (supportive, close with them), a few co workers and a few close friends from the feline rescue (Ahsan and Anika). Mariaelena grew up Amish - continues to believe in God but does not identify with a specific Judaism.  Feels Safe at Home- yes     PSYCH and SUBSTANCE USE Critical Summary Points since July 2021 July 2021 - Transfer DA. No med changes. Patient was doing her 3rd series of TMS which was very helpful - noticed improved mood.     Sept 2021 - Patient completed TMS. Referred to pain management. No med changes.     November 2021 - No med changes    December 2021 - No med changes    February 2022 - No med changes    May 2022 - Increased to quetiapine 75 mg at bedtime in order to target sleep difficulty     PAST MED TRIALS     Per chart review:  Numerous including TCAs, MAOI's, Prozac, Zoloft, Viibryd, Brintellix, Celexa, Effexor, Cymbalta, lithium, clonidine, Provigil, cytomel, naltrexone (for SIB), dextroamphetamine, lithium, lamictal, clozapine (used for self-harm urges and cross-titrated to Latuda in 2016 in anticipation of TMS).     Per discussion with  Dr. Matthews 2/2016:  Both naltrexone and clozapine have reduced SIB immensely, with return when taper off has been initiated in the past.      MEDICAL HISTORY and ALLERGY     ALLERGIES: Patient has no known allergies.    Patient Active Problem List   Diagnosis     Suicidal ideation     Major depression, recurrent (H)     Tobacco abuse     Hx of psychiatric care     Scar     Nasal septal deviation        MEDICAL REVIEW OF SYSTEMS   Contraception- s/p tubal ligation, otherwise did not discuss    A comprehensive review of systems was performed and is negative other than noted in the HPI.     MEDICATIONS     Current Outpatient Medications   Medication Sig Dispense Refill     acetaminophen (TYLENOL) 500 MG tablet Take 500-1,000 mg by mouth every 6 hours as needed for mild pain        amphetamine-dextroamphetamine (ADDERALL) 10 MG tablet Take 1 tablet (10 mg) by mouth 2 times daily 60 tablet 0     cyclobenzaprine (FLEXERIL) 5 MG tablet Take 1-2 tablets (5-10 mg) by mouth 3 times daily as needed for muscle spasms 90 tablet 0     doxylamine (UNISOM) 25 MG TABS tablet Take 25 mg by mouth nightly as needed       ibuprofen (ADVIL,MOTRIN) 800 MG tablet Take 400 mg by mouth as needed        lamoTRIgine (LAMICTAL) 150 MG tablet Take 1 tablet (150 mg) by mouth daily 30 tablet 2     levomilnacipran (FETZIMA ER) 120 MG 24 hr capsule Take 1 capsule (120 mg) by mouth daily 30 capsule 2     lisinopril (ZESTRIL) 10 MG tablet Take 1 tablet (10 mg) by mouth daily 90 tablet 3     MAGNESIUM OXIDE PO Take 250 mg by mouth daily        melatonin 5 MG tablet Take 5 mg by mouth nightly as needed for sleep       multivitamin, therapeutic (THERA-VIT) TABS tablet Take 1 tablet by mouth daily       QUEtiapine (SEROQUEL) 25 MG tablet Take 2 tablets (50 mg) by mouth At Bedtime 60 tablet 2     sodium chloride (OCEAN) 0.65 % nasal spray Spray 1-2 sprays in nostril every hour as needed for congestion (nasal dryness) Use in EACH nostril. 100 mL 3     triamcinolone (NASACORT) 55 MCG/ACT nasal aerosol Spray 2 sprays into both nostrils daily       vilazodone (VIIBRYD) 40 MG TABS tablet Take 1 tablet (40 mg) by mouth daily 30 tablet 2     VITAMIN D, CHOLECALCIFEROL, PO Take 1,000 Units by mouth 2 times daily         VITALS   There were no vitals taken for this visit.    MENTAL STATUS EXAM     Alertness: alert  and oriented  Appearance: well groomed  Behavior/Demeanor: cooperative and pleasant, with good  eye contact   Speech: normal and regular rate and rhythm  Language: intact  Psychomotor: normal or unremarkable  Mood: anxious and worried  Affect: full range; congruent to: mood- yes, content- yes  Thought Process/Associations: unremarkable  Thought Content:  Reports none;  Denies suicidal ideation  Perception:  Reports none;   Denies hallucinations  Insight: good  Judgment: good  Cognition: does  appear grossly intact; formal cognitive testing was not done  Gait and Station: unremarkable     LABS and DATA     PHQ9 TODAY = NA  PHQ 9/2/2021 9/2/2021 5/24/2022   PHQ-9 Total Score 6 8 7   Q9: Thoughts of better off dead/self-harm past 2 weeks Not at all Not at all Not at all       Recent Labs   Lab Test 01/28/22  0933 01/05/22  0444 11/16/21  1607 01/13/21  1149   GLC 99 132*   < >  --    A1C 5.3  --   --  5.1    < > = values in this interval not displayed.     Recent Labs   Lab Test 01/28/22  0933 01/13/21  1149   CHOL 202* 206*   TRIG 52 42   * 118*   HDL 86 80     Recent Labs   Lab Test 01/28/22  0933 01/05/22  0444   AST 23 24   ALT 29 30   ALKPHOS 74 135     Recent Labs   Lab Test 01/05/22  0444 11/16/21  1607 06/05/20  1051 05/20/20  0217 04/15/19  1401   WBC 16.0* 7.4 5.7   < > 7.5   ANEU  --   --  3.6  --  5.2   HGB 13.5 13.2 14.6   < > 12.5   * 441 452*   < > 445    < > = values in this interval not displayed.       ECG 1/2022 QTc = 480 ms     PSYCHOTROPIC DRUG INTERACTIONS     VILAZODONE + FETZIMA + ADDERALL + SEROQUEL may result in increased risk for serotonin syndrome.      VILAZODONE + FETZIMA may enhance the antiplatelet effect of other Agents with Antiplatelet Properties.      LAMOTRIGINE + ACETAMINOPHEN may result in decreased lamotrigine serum levels.     ADDERALL + LISINOPRIL:  amphetamines may diminish the antihypertensive effect of antihypertensive agents.     MANAGEMENT:  Monitoring for adverse effects, routine vitals and using lowest therapeutic dose of [psychotropics]     RISK STATEMENT for SAFETY     Mariaelena Jean Baptiste did not appear to be an imminent safety risk to self or others.    TREATMENT RISK STATEMENT: The risks, benefits, alternatives and potential adverse effects have been discussed and are understood by the pt. The pt understands the risks of using street drugs or alcohol. There are no medical  contraindications, the pt agrees to treatment with the ability to do so. The pt knows to call the clinic for any problems or to access emergency care if needed.  Medical and substance use concerns are documented above.  Psychotropic drug interaction check was done, including changes made today.    PROVIDER: Robert Ibanez DO, MPH    Patient staffed in clinic with Dr. Lunsford who will sign the note.  Supervisor is Dr. Byrne.            Answers for HPI/ROS submitted by the patient on 5/24/2022  If you checked off any problems, how difficult have these problems made it for you to do your work, take care of things at home, or get along with other people?: Somewhat difficult  PHQ9 TOTAL SCORE: 7  IBIS 7 TOTAL SCORE: 8

## 2022-05-24 NOTE — NURSING NOTE
Patient denies any changes since echeck-in regarding medication and allergies and states all information entered during echeck-in remains accurate.  Jane Merchant VF

## 2022-05-24 NOTE — PROGRESS NOTES
Mariaelena Jean Baptiste is a 51 year old who has consented to receive services via billable video visit.      Pt will join video visit via: [employer]  If there are problems joining the visit, send backup video invite via: Text to preferred phone: [phone]      Originating Location (patient location): Patient's home  Distant Location (provider location): [facility] MENTAL [facility] & ADDI[facility] [place] [facility]    Will anyone else be joining the video visit? No    How would you prefer to obtain AVS?: Tekmi  Answers for HPI/ROS submitted by the patient on 5/24/2022  If you checked off any problems, how difficult have these problems made it for you to do your work, take care of things at home, or get along with other people?: Somewhat difficult  PHQ9 TOTAL SCORE: 7  IBIS 7 TOTAL SCORE: 8

## 2022-05-24 NOTE — PATIENT INSTRUCTIONS
**For crisis resources, please see the information at the end of this document**   Patient Education      - suspect Serotonin Syndrome:   confusion   tremor  severe headache  sweats  fever   funny feeling in muscles  need to pace / restlessness   severe nausea, diarrhea     Thank you for coming to the [facility]ENT[facility] HEALTH & ADDI[facility] [place] CL[facility].    Lab Testing:  If you had lab testing today and your results are reassuring or normal they will be mailed to you or sent through betaworks within 7 days. If the lab tests need quick action we will call you with the results. The phone number we will call with results is # [phone]. If this is not the best number please call our clinic and change the number.     Medication Refills:  If you need any refills please call your pharmacy and they will contact us. Our fax number for refills is [phone].   Three business days of notice are needed for general medication refill requests.   Five business days of notice are needed for controlled substance refill requests.   If you need to change to a different pharmacy, please contact the new pharmacy directly. The new pharmacy will help you get your medications transferred.     Contact Us:  Please call [phone] during business hours (8-5:00 M-F).  If you have medication related questions after clinic hours, or on the weekend, please call [phone].    Financial Assistance [phone]  Medical Records [phone]       MENTAL HEALTH CRISIS RESOURCES:  For a emergency help, please call 911 or go to the nearest Emergency Department.     Emergency Walk-In Options:   EmPATH Unit @ [place] [facility] [place] ([place]): [phone] - Specialized mental health emergency area designed to be calming  [facility] ([place]): [phone]  [facility] ([place]): [phone]  [facility]): [phone]    [facility] Crisis Information:   [place]:  [phone]  [place]: [phone]  [place] ([place]) - Adult: [phone]     Child: [phone]  [place] - Adult: [phone]     Child: [phone]  [place]: [phone]  List of all [facility] resources:   https://[place].gov/dhs/people-we-serve/adults/health-care/mental-health/resources/crisis-contacts.jsp    National Crisis Information:   Crisis Text Line: Text  [place]  to 127454  National Suicide Prevention Lifeline: 2-262-718-TALK (1-888.785.4037)       For online chat options, visit https://suicidepreventionlifeline.org/chat/  Poison Control Center: 1-771.955.6172  Trans Lifeline: 1-851.368.9624 - Hotline for transgender people of all ages  The Liam Project: 6-271-665-5551 - Hotline for LGBT youth     For Non-Emergency Support:   Fast Tracker: Mental Health & Substance Use Disorder Resources -   https://www.SpiderSuiteckermn.org/

## 2022-06-21 ENCOUNTER — VIRTUAL VISIT (OUTPATIENT)
Dept: PSYCHIATRY | Facility: CLINIC | Age: 52
End: 2022-06-21
Attending: PSYCHIATRY & NEUROLOGY
Payer: MEDICARE

## 2022-06-21 DIAGNOSIS — F33.1 MODERATE EPISODE OF RECURRENT MAJOR DEPRESSIVE DISORDER (H): ICD-10-CM

## 2022-06-21 PROCEDURE — 99214 OFFICE O/P EST MOD 30 MIN: CPT | Mod: GC | Performed by: STUDENT IN AN ORGANIZED HEALTH CARE EDUCATION/TRAINING PROGRAM

## 2022-06-21 RX ORDER — DEXTROAMPHETAMINE SACCHARATE, AMPHETAMINE ASPARTATE, DEXTROAMPHETAMINE SULFATE AND AMPHETAMINE SULFATE 2.5; 2.5; 2.5; 2.5 MG/1; MG/1; MG/1; MG/1
10 TABLET ORAL 2 TIMES DAILY
Qty: 60 TABLET | Refills: 0 | Status: SHIPPED | OUTPATIENT
Start: 2022-06-21 | End: 2022-09-13

## 2022-06-21 NOTE — PATIENT INSTRUCTIONS
**For crisis resources, please see the information at the end of this document**   Patient Education    Thank you for coming to the [facility] MENTAL HEALTH & ADDICTION [place] CLIN[facility].     Lab Testing:  If you had lab testing today and your results are reassuring or normal they will be mailed to you or sent through Jounce within 7 days. If the lab tests need quick action we will call you with the results. The phone number we will call with results is # [phone]. If this is not the best number please call our clinic and change the number.     Medication Refills:  If you need any refills please call your pharmacy and they will contact us. Our fax number for refills is [phone].   Three business days of notice are needed for general medication refill requests.   Five business days of notice are needed for controlled substance refill requests.   If you need to change to a different pharmacy, please contact the new pharmacy directly. The new pharmacy will help you get your medications transferred.     Contact Us:  Please call [phone] during business hours (8-5:00 M-F).   If you have medication related questions after clinic hours, or on the weekend, please call [phone].     Financial Assistance [phone]   Medical Records [phone]       MENTAL HEALTH CRISIS RESOURCES:  For a emergency help, please call 911 or go to the nearest Emergency Department.     Emergency Walk-In Options:   EmPATH Unit @ [facility] ([place]): [phone] - Specialized mental health emergency area designed to be calming  [facility] ([place]): [phone]  [facility] ([place]): [phone]  [facility]): [phone]    [facility] Crisis Information:   [place]: [phone]  [place]: [phone]  [place] ([place]) - Adult: [phone]     Child: [phone]  [place] - Adult: [phone]     Child: [phone]  [place]:  [phone]  List of all [facility] resources:   https://[place].gov/dhs/people-we-serve/adults/health-care/mental-health/resources/crisis-contacts.jsp    National Crisis Information:   Crisis Text Line: Text  [place]  to 524158  National Suicide Prevention Lifeline: 3-889-963-TALK (1-905.118.7582)       For online chat options, visit https://suicidepreventionlifeline.org/chat/  Poison Control Center: 2-105-397-9008  Trans Lifeline: 9-964-711-9287 - Hotline for transgender people of all ages  The Liam Project: 7-450-565-0625 - Hotline for LGBT youth     For Non-Emergency Support:   Fast Tracker: Mental Health & Substance Use Disorder Resources -   https://www.ABC Liven.org/

## 2022-06-21 NOTE — PROGRESS NOTES
Video- Visit Details  Type of service:  video visit for medication management  Time of service:    Date:  06/21/2022    Video Start Time:  9:15AM      Video End Time:  9:45AM    Reason for video visit:  Patient unable to travel due to Covid-19  Originating Site (patient location):  [facility]   Location- Patient's home  Distant Site (provider location):  [facility]  Mode of Communication:  Video Conference via [employer]  Consent:  Patient has given verbal consent for video visit?: Yes            [facility]  Psychiatry Clinic  MEDICAL PROGRESS NOTE     CARE TEAM:  PCP- Thalia Bradford, Psychotherapist- Dr. Julieta Gill, Specialty: Dr. Marie at [facility]    Mariaelena Jean Baptiste is a 51 year old who prefers the name Mariaelena and uses pronouns she, her, they.      DIAGNOSIS     MDD, recurrent, moderate (treatment-resistant; h/o severe episodes)  BPD  PTSD  DID  Unspecified Eating Disorder, in remission  Insomnia, likely circadian rhythm disorder      ASSESSMENT     Mariaelena reports she continues to have increased anxiety due to her medical issues of autonomic dysfunction vs prolonged anaphylaxis, however the increased quetiapine at bedtime has helped her get better sleep. There does not seem to be a relationship between increasing the quetiapine dose and her hypotensive episode that led to an ED visit - pt regularly checks her BP and it has been wnl besides this one distinct episode since increasing the medication. Did advise patient that if she does notice a correlation, alternative therapies could be considered. Pt has been on quetiapine for ~6 years and this medication has been helpful for her depression and anxiety. For now, will keep medications the same. Pt had no safety concerns today.     [place]NPMP was checked today:  Indicates taking controlled medication as prescribed.     PLAN                                                                                                                 1) Meds-  - Continue quetiapine 75 mg nightly for sleep and treatment resistant depression  - Continue Adderall IR 10 mg BID (qAM + 2pm) for augmentation of depression treatment  - Continue levomilnacipran  mg QDAY for depression  - Continue vilazodone 40 mg QDAY for depression  - Continue lamotrigine 150 mg QDAY for depression and mood stabilization  - Continue melatonin 1 mg with dinner     Other:  - Benadryl 12.5 mg BID PRN (OTC)    Other:     - Resume light box daily in Fall - patient has new blue light box which she finds to be more helpful than white light    2) Psychotherapy- Continue biweekly therapy w/ DBT therapist Dr. Julieta Gill Psy D    3) Next due-  Labs- AP monitoring labs due Jan 2023  EKG- as needed, last done 1/2022 QTc 480  Rating Scales- PHQ-9 at every in-person visit, AIMS done 9/2/2021 - score 0    4) Referrals-  None    5) Dispo- RTC in 6-8 weeks       PERTINENT BACKGROUND                           [most recent eval 07/20/21]   Pertinent Background:  Mariaelena first experienced mental health issues as a young adult and has received treatment for treatment-resistant depression, BPD with severe self harm, and PTSD. Notably, both naltrexone and clozapine have reduced SIB immensely, with return when taper off has been initiated in the past (although no return of SIB to date since d/c of clozapine). She does better when she uses a light box in the Fall/Winter, best started in September as she typically experiences return of depressive symptoms in October. A taper of Lamictal was associated with decline in mood and subsequent hospitalization in the past which is a common theme for her as medication changes, even small ones, tend to be very destabilizing. A TMS series through the [facility] in August 2016 was transformative in terms of ongoing remission of her depression for the following 2+ years. She often is not aware of reemerging depressive symptoms until they become severe.    Psych  pertinent item history includes suicide attempts {2x], suicidal ideation, severe SIB [has required skin grafts and long term hospitalization at [facility]], mutiple psychotropic trials, trauma hx, ECT, TMS, psych hosp (>5), and commitment.      SUBJECTIVE     - Mariaelena states her anxiety has increased the last few weeks after having another episode of low BP, dizziness, heart palpitations, SOB, and nausea which led to an ED visit - but has improved since seeing her allergist who has advised that this may be delayed anaphylaxis rather than autonomic dysfunction  - Patient states that the increased in quetiapine has been helpful to fall asleep - says she has also been using OTC children's Benadryl which has also helped  - Reports she does not think this episode is related to the increase in quetiapine - reports she has been on quetiapine for 6 years and there hasn't been a recent change in  that corresponds with her episodes  - Advised patient that quetiapine could lead to hypotension and could worsen symptoms - pt understands and doesn't believes the medication is contributing to low BP as she takes her BP regularly and has not noticed a change besides the discrete hypotensive episodes she has had that have led to ED visits  - Pt has not started Singulair yet - would like to feel less anxious before having potential mental health side effects of this medication  - Denied any SI/SIB or HI - had no safety concerns today    RECENT PSYCH ROS:   Depression:  none  Elevated:  none  Psychosis:  none  Anxiety:  excessive worry and nervous/overwhelmed  Trauma Related:  none   Sleep: sleep improved with increased quetiapine   Other: N/A    Adverse Effects: none  Pertinent Negative Symptoms: No suicidal ideation, self-injurious behavior/urges or violent ideation  Recent Substance Use:     Tobacco- vapes nicotine     FAMILY and SOCIAL HISTORY                                 pt reported     Family Hx: Depression in her  father and sister. Anxiety and CD issues in many family members. Paternal grandfather committed suicide in his 50s. No h/o BPAD or Schizophrenia.    Social Hx:  Financial/ Work- SSDI + Mariaelena works part time as a  and food runner at [employer] (previously worked as a brunch cook at [employer] 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    Social/ Spiritual Support- DBT therapist, online friends, father and step mother in [place]; brother [place] (supportive, close with them), a few co workers and a few close friends from the feline rescue (Ahsan and Anika). Mariaelena grew up Muslim - continues to believe in God but does not identify with a specific Sikhism.  Feels Safe at Home- yes     PSYCH and SUBSTANCE USE Critical Summary Points since July 2021 July 2021 - Transfer DA. No med changes. Patient was doing her 3rd series of TMS which was very helpful - noticed improved mood.     Sept 2021 - Patient completed TMS. Referred to pain management. No med changes.     November 2021 - No med changes    December 2021 - No med changes    February 2022 - No med changes    May 2022 - Increased to quetiapine 75 mg at bedtime in order to target sleep difficulty    July 2022 - No change     PAST MED TRIALS     Per chart review:  Numerous including TCAs, MAOI's, Prozac, Zoloft, Viibryd, Brintellix, Celexa, Effexor, Cymbalta, lithium, clonidine, Provigil, cytomel, naltrexone (for SIB), dextroamphetamine, lithium, lamictal, clozapine (used for self-harm urges and cross-titrated to Latuda in 2016 in anticipation of TMS).     Per discussion with  Dr. Matthews 2/2016:  Both naltrexone and clozapine have reduced SIB  immensely, with return when taper off has been initiated in the past.      MEDICAL HISTORY and ALLERGY     ALLERGIES: Patient has no known allergies.    Patient Active Problem List   Diagnosis     Suicidal ideation     Major depression, recurrent (H)     Tobacco abuse     Hx of psychiatric care     Scar     Nasal septal deviation        MEDICAL REVIEW OF SYSTEMS   Contraception- s/p tubal ligation, otherwise did not discuss    A comprehensive review of systems was performed and is negative other than noted in the HPI.     MEDICATIONS     Current Outpatient Medications   Medication Sig Dispense Refill     acetaminophen (TYLENOL) 500 MG tablet Take 500-1,000 mg by mouth every 6 hours as needed for mild pain       amphetamine-dextroamphetamine (ADDERALL) 10 MG tablet Take 1 tablet (10 mg) by mouth 2 times daily 60 tablet 0     cyclobenzaprine (FLEXERIL) 5 MG tablet Take 1-2 tablets (5-10 mg) by mouth 3 times daily as needed for muscle spasms 90 tablet 0     doxylamine (UNISOM) 25 MG TABS tablet Take 25 mg by mouth nightly as needed       ibuprofen (ADVIL,MOTRIN) 800 MG tablet Take 400 mg by mouth as needed        lamoTRIgine (LAMICTAL) 150 MG tablet Take 1 tablet (150 mg) by mouth daily 30 tablet 2     levomilnacipran (FETZIMA ER) 120 MG 24 hr capsule Take 1 capsule (120 mg) by mouth daily 30 capsule 2     lisinopril (ZESTRIL) 10 MG tablet Take 1 tablet (10 mg) by mouth daily 90 tablet 3     MAGNESIUM OXIDE PO Take 250 mg by mouth daily        melatonin 5 MG tablet Take 5 mg by mouth nightly as needed for sleep       multivitamin, therapeutic (THERA-VIT) TABS tablet Take 1 tablet by mouth daily       QUEtiapine (SEROQUEL) 25 MG tablet Take 3 tablets (75 mg) by mouth At Bedtime 90 tablet 2     sodium chloride (OCEAN) 0.65 % nasal spray Spray 1-2 sprays in nostril every hour as needed for congestion (nasal dryness) Use in EACH nostril. 100 mL 3     triamcinolone (NASACORT) 55 MCG/ACT nasal aerosol Spray 2 sprays into  both nostrils daily       vilazodone (VIIBRYD) 40 MG TABS tablet Take 1 tablet (40 mg) by mouth daily 30 tablet 2     VITAMIN D, CHOLECALCIFEROL, PO Take 1,000 Units by mouth 2 times daily        VITALS   There were no vitals taken for this visit.    MENTAL STATUS EXAM     Alertness: alert  and oriented  Appearance: well groomed  Behavior/Demeanor: cooperative and pleasant, with good  eye contact   Speech: normal and regular rate and rhythm  Language: intact  Psychomotor: normal or unremarkable  Mood: anxious and worried  Affect: full range; congruent to: mood- yes, content- yes  Thought Process/Associations: unremarkable  Thought Content:  Reports none;  Denies suicidal ideation  Perception:  Reports none;  Denies hallucinations  Insight: good  Judgment: good  Cognition: does  appear grossly intact; formal cognitive testing was not done  Gait and Station: unremarkable     LABS and DATA     PHQ9 TODAY = NA  PHQ 9/2/2021 9/2/2021 5/24/2022   PHQ-9 Total Score 6 8 7   Q9: Thoughts of better off dead/self-harm past 2 weeks Not at all Not at all Not at all       Recent Labs   Lab Test 01/28/22  0933 01/05/22  0444 11/16/21  1607 01/13/21  1149   GLC 99 132*   < >  --    A1C 5.3  --   --  5.1    < > = values in this interval not displayed.     Recent Labs   Lab Test 01/28/22  0933 01/13/21  1149   CHOL 202* 206*   TRIG 52 42   * 118*   HDL 86 80     Recent Labs   Lab Test 01/28/22  0933 01/05/22  0444   AST 23 24   ALT 29 30   ALKPHOS 74 135     Recent Labs   Lab Test 01/05/22  0444 11/16/21  1607 06/05/20  1051 05/20/20  0217 04/15/19  1401   WBC 16.0* 7.4 5.7   < > 7.5   ANEU  --   --  3.6  --  5.2   HGB 13.5 13.2 14.6   < > 12.5   * 441 452*   < > 445    < > = values in this interval not displayed.       ECG 1/2022 QTc = 480 ms     PSYCHOTROPIC DRUG INTERACTIONS     VILAZODONE + FETZIMA + ADDERALL + SEROQUEL may result in increased risk for serotonin syndrome.      VILAZODONE + FETZIMA may enhance the  antiplatelet effect of other Agents with Antiplatelet Properties.      LAMOTRIGINE + ACETAMINOPHEN may result in decreased lamotrigine serum levels.     ADDERALL + LISINOPRIL:  amphetamines may diminish the antihypertensive effect of antihypertensive agents.     MANAGEMENT:  Monitoring for adverse effects, routine vitals and using lowest therapeutic dose of [psychotropics]     RISK STATEMENT for SAFETY     Mariaelena Jean Baptiste did not appear to be an imminent safety risk to self or others.    TREATMENT RISK STATEMENT: The risks, benefits, alternatives and potential adverse effects have been discussed and are understood by the pt. The pt understands the risks of using street drugs or alcohol. There are no medical contraindications, the pt agrees to treatment with the ability to do so. The pt knows to call the clinic for any problems or to access emergency care if needed.  Medical and substance use concerns are documented above.  Psychotropic drug interaction check was done, including changes made today.    PROVIDER: Robert Ibanez DO, MPH    Patient staffed in clinic with Dr. Lunsford who will sign the note.  Supervisor is Dr. Byrne.            Answers for HPI/ROS submitted by the patient on 5/24/2022  If you checked off any problems, how difficult have these problems made it for you to do your work, take care of things at home, or get along with other people?: Somewhat difficult  PHQ9 TOTAL SCORE: 7  IBIS 7 TOTAL SCORE: 8

## 2022-06-21 NOTE — PROGRESS NOTES
Mariaelena Jean Baptiste is a 51 year old who has consented to receive services via billable video visit.      Pt will join video visit via: [employer]  If there are problems joining the visit, send backup video invite via: Text to preferred phone: [phone]      Originating Location (patient location): Patient's home  Distant Location (provider location): [facility]    Will anyone else be joining the video visit? No    How would you prefer to obtain AVS?: Danielle

## 2022-07-14 ENCOUNTER — TELEPHONE (OUTPATIENT)
Dept: PSYCHIATRY | Facility: CLINIC | Age: 52
End: 2022-07-14

## 2022-07-14 DIAGNOSIS — F41.9 ANXIETY DUE TO INVASIVE PROCEDURE: Primary | ICD-10-CM

## 2022-07-14 RX ORDER — LORAZEPAM 1 MG/1
1 TABLET ORAL ONCE
Qty: 1 TABLET | Refills: 0 | Status: SHIPPED | OUTPATIENT
Start: 2022-07-14 | End: 2022-07-14

## 2022-07-14 NOTE — TELEPHONE ENCOUNTER
Mariaelena has upcoming dental procedure tomorrow and requested medication to get through the appointment. See MyChart encounter regarding details.     Called patient to discuss details. Mariaelena is getting a fitting for dentures. This is her final fitting which requires different type of material in her mouth. She had an appointment 2 weeks ago and couldn't complete the fitting due to extreme anxiety. She has not been able to eat solid foods for quite some time.    Patient is not currently on opioid medication. She will be getting a ride to and from the appointment. She has taken Ativan many years ago and recalls tolerating them without difficulty.     Ordered one tablet of Ativan 1 mg to be taken 30-60 min before procedure. Discussed risks and advised not to take opioid medication, use alcohol, or drive while taking this medication.     Robert Ibanez DO, MPH  PGY-4 Psychiatry Resident

## 2022-07-19 ENCOUNTER — MYC MEDICAL ADVICE (OUTPATIENT)
Dept: PSYCHIATRY | Facility: CLINIC | Age: 52
End: 2022-07-19

## 2022-07-19 NOTE — Clinical Note
Robert Maria!    Mariaelena is transferring to [facility] but they have not met yet. Are you okay with providing an ERICA letter? If so, are you able to sign electronically?    Thank you!  VV

## 2022-07-19 NOTE — LETTER
July 20, 2022      Re: Mariaelena Jean Baptiste  [address]  [place] [address]       To Whom It May Concern,    Mariaelena Jean Baptiste is currently under my care. I am familiar with their history and the functional limitations imposed by their diagnosis. Mariaelena Jean Baptiste meets the definition of disability under the Americans with Disabilities Act, the Fair Housing Act, and the Rehabilitation Act of 1973.    Due to this emotional/mental disability, Mariaelena has certain limitations related to coping with stress and anxiety. In order to help alleviate these difficulties, and to enhance the ability to live independently and to fully use and enjoy the housing unit where they live, I have prescribed Mariaelena the use of an Emotional Support Animal (ERICA) and this should be accommodated to the extent legally allowed.    There is a significant evidence base for the therapeutic benefits of ESAs in the treatment of certain mental health conditions, such as that for which Mariaelena is currently being treated.     This letter is in support of the use of an ERICA as an appropriate and evidence based medical intervention. The training, certification, and veterinary care of the animal is the responsibility of the animal's owner, who agrees to appropriate care and responsibility for the animal and its behaviors.       Sincerely,    Robert Ibanez DO, MPH  Electronically Signed 7/20/22  2:25 PM

## 2022-07-20 NOTE — TELEPHONE ENCOUNTER
Patient is requesting an ERICA letter for their cats. Drafted letter and routed to Dr. Homer Ibanez for review for appropriateness. Patient has been transferred to Dr. De Santiago but they have not had a chance to meet.

## 2022-07-22 NOTE — TELEPHONE ENCOUNTER
Robert Ugarte DO Valena, Victoria, JAVY Schwartz,     Thank you for drafting the ERICA letter. I have signed it and sent it to Mariaelena.     Thanks!   Robert

## 2022-09-06 ENCOUNTER — TELEPHONE (OUTPATIENT)
Dept: PSYCHIATRY | Facility: CLINIC | Age: 52
End: 2022-09-06

## 2022-09-06 NOTE — LETTER
September 22, 2022      RE: Mariaelena Jean Baptiste  [address]  [place] [address]         To Whom It May Concern,    Mariaelena Jean Baptiste is currently under my care in the [facility]. Due to their emotional/mental disability, Mariaelena has certain limitations related to coping with stress and anxiety. In order to help alleviate these difficulties, and to enhance the ability to live independently and to fully use and enjoy the housing unit where they live, I have prescribed Mariaelena the use of Emotional Support Animals (ESAs) and this should be accommodated to the extent legally allowed.     There is a significant evidence base for the therapeutic benefits of ESAs in the treatment of certain mental health conditions, such as that for which Mariaelena is currently being treated. Mariaelena Jean Baptiste meets the definition of disability under the Americans with Disabilities Act, the Fair Housing Act, and the Rehabilitation Act of 1973.     This letter is in support of the use of ESAs as an appropriate and evidence based medical intervention. The training, certification, and veterinary care is the responsibility of the animals owner, who agrees to appropriate care and responsibility for the animals and their behaviors.       Sincerely,      Paul De Santiago MD ___________________________        Arely Lunsford MD__________________________

## 2022-09-06 NOTE — TELEPHONE ENCOUNTER
7 faxed pages was received from [employer] requesting completion and signature on ERICA forms for 2 cats. The completed forms are being held in Psych until writer can speak to Dr De Santiago.Martha Arriaga LPN
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
## 2022-09-12 ENCOUNTER — VIRTUAL VISIT (OUTPATIENT)
Dept: PSYCHIATRY | Facility: CLINIC | Age: 52
End: 2022-09-12
Attending: PSYCHIATRY & NEUROLOGY
Payer: MEDICARE

## 2022-09-12 DIAGNOSIS — Z79.899 ENCOUNTER FOR LONG-TERM (CURRENT) USE OF MEDICATIONS: ICD-10-CM

## 2022-09-12 DIAGNOSIS — F33.1 MODERATE EPISODE OF RECURRENT MAJOR DEPRESSIVE DISORDER (H): Primary | ICD-10-CM

## 2022-09-12 DIAGNOSIS — G47.00 INSOMNIA, UNSPECIFIED TYPE: ICD-10-CM

## 2022-09-12 DIAGNOSIS — F43.10 POSTTRAUMATIC STRESS DISORDER: ICD-10-CM

## 2022-09-12 PROCEDURE — 90792 PSYCH DIAG EVAL W/MED SRVCS: CPT | Mod: 95 | Performed by: STUDENT IN AN ORGANIZED HEALTH CARE EDUCATION/TRAINING PROGRAM

## 2022-09-12 RX ORDER — FAMOTIDINE 20 MG/1
20 TABLET, FILM COATED ORAL DAILY
COMMUNITY
Start: 2022-07-01

## 2022-09-12 RX ORDER — VILAZODONE HYDROCHLORIDE 40 MG/1
40 TABLET ORAL DAILY
Qty: 30 TABLET | Refills: 2 | Status: SHIPPED | OUTPATIENT
Start: 2022-09-12 | End: 2022-12-08

## 2022-09-12 RX ORDER — MONTELUKAST SODIUM 10 MG/1
10 TABLET ORAL DAILY
COMMUNITY
Start: 2022-08-14

## 2022-09-12 RX ORDER — QUETIAPINE FUMARATE 25 MG/1
75 TABLET, FILM COATED ORAL AT BEDTIME
Qty: 90 TABLET | Refills: 2 | Status: SHIPPED | OUTPATIENT
Start: 2022-09-12 | End: 2022-12-08

## 2022-09-12 RX ORDER — CETIRIZINE HYDROCHLORIDE 10 MG/1
10 TABLET ORAL DAILY
COMMUNITY
Start: 2022-07-01

## 2022-09-12 ASSESSMENT — ANXIETY QUESTIONNAIRES
1. FEELING NERVOUS, ANXIOUS, OR ON EDGE: NEARLY EVERY DAY
4. TROUBLE RELAXING: NEARLY EVERY DAY
5. BEING SO RESTLESS THAT IT IS HARD TO SIT STILL: SEVERAL DAYS
GAD7 TOTAL SCORE: 14
7. FEELING AFRAID AS IF SOMETHING AWFUL MIGHT HAPPEN: MORE THAN HALF THE DAYS
7. FEELING AFRAID AS IF SOMETHING AWFUL MIGHT HAPPEN: MORE THAN HALF THE DAYS
GAD7 TOTAL SCORE: 14
3. WORRYING TOO MUCH ABOUT DIFFERENT THINGS: MORE THAN HALF THE DAYS
2. NOT BEING ABLE TO STOP OR CONTROL WORRYING: MORE THAN HALF THE DAYS
6. BECOMING EASILY ANNOYED OR IRRITABLE: SEVERAL DAYS
IF YOU CHECKED OFF ANY PROBLEMS ON THIS QUESTIONNAIRE, HOW DIFFICULT HAVE THESE PROBLEMS MADE IT FOR YOU TO DO YOUR WORK, TAKE CARE OF THINGS AT HOME, OR GET ALONG WITH OTHER PEOPLE: VERY DIFFICULT
8. IF YOU CHECKED OFF ANY PROBLEMS, HOW DIFFICULT HAVE THESE MADE IT FOR YOU TO DO YOUR WORK, TAKE CARE OF THINGS AT HOME, OR GET ALONG WITH OTHER PEOPLE?: VERY DIFFICULT
GAD7 TOTAL SCORE: 14

## 2022-09-12 ASSESSMENT — PATIENT HEALTH QUESTIONNAIRE - PHQ9
10. IF YOU CHECKED OFF ANY PROBLEMS, HOW DIFFICULT HAVE THESE PROBLEMS MADE IT FOR YOU TO DO YOUR WORK, TAKE CARE OF THINGS AT HOME, OR GET ALONG WITH OTHER PEOPLE: SOMEWHAT DIFFICULT
SUM OF ALL RESPONSES TO PHQ QUESTIONS 1-9: 9
SUM OF ALL RESPONSES TO PHQ QUESTIONS 1-9: 9

## 2022-09-12 NOTE — PROGRESS NOTES
Mariaelena Jean Baptiste is a 51 year old who has consented to receive services via billable video visit.      Pt will join video visit via: [email or web address]  If there are problems joining the visit, send backup video invite via: Text to preferred phone: [phone]      Originating Location (patient location): Patient's home  Distant Location (provider location): [facility] [place] [facility]    Will anyone else be joining the video visit? No  Answers for HPI/ROS submitted by the patient on 9/12/2022  If you checked off any problems, how difficult have these problems made it for you to do your work, take care of things at home, or get along with other people?: Somewhat difficult  PHQ9 TOTAL SCORE: 9  IBIS 7 TOTAL SCORE: 14       02343 Comprehensive

## 2022-09-12 NOTE — PATIENT INSTRUCTIONS
**For crisis resources, please see the information at the end of this document**   Patient Education    Thank you for coming to the [facility] [place] [facility].     Lab Testing:  If you had lab testing today and your results are reassuring or normal they will be mailed to you or sent through [employer] within 7 days. If the lab tests need quick action we will call you with the results. The phone number we will call with results is # [phone]. If this is not the best number please call our clinic and change the number.     Medication Refills:  If you need any refills please call your pharmacy and they will contact us. Our fax number for refills is [phone].   Three business days of notice are needed for general medication refill requests.   Five business days of notice are needed for controlled substance refill requests.   If you need to change to a different pharmacy, please contact the new pharmacy directly. The new pharmacy will help you get your medications transferred.     Contact Us:  Please call [phone] during business hours (8-5:00 M-F).   If you have medication related questions after clinic hours, or on the weekend, please call [phone].     Financial Assistance [phone]   Medical Records [phone]       MENTAL HEALTH CRISIS RESOURCES:  For a emergency help, please call 911 or go to the nearest Emergency Department.     Emergency Walk-In Options:   EmPATH Unit @ [facility] ([place]): [phone] - Specialized mental health emergency area designed to be calming  [facility] ([place]): [phone]  [facility] ([place]): [phone]  [facility]): [phone]    [facility] Crisis Information:   [place]: [phone]  [place]: [phone]  [place] ([place]) - Adult: [phone]     Child: [phone]  [place] - Adult: [phone]     Child: [phone]  [place]:  [phone]  List of all [facility] resources:   https://[place].gov/dhs/people-we-serve/adults/health-care/mental-health/resources/crisis-contacts.jsp    National Crisis Information:   Crisis Text Line: Text  [place]  to 885009  Suicide & Crisis Lifeline: 988  National Suicide Prevention Lifeline: 4-319-299-TALK (1-149.481.4398)       For online chat options, visit https://suicidepreventionlifeline.org/chat/  Poison Control Center: 7-306-330-6793  Trans Lifeline: 7-566-381-1045 - Hotline for transgender people of all ages  The [employer] Project: 1-145-767-2119 - Hotline for LGBT youth     For Non-Emergency Support:   Fast Tracker: Mental Health & Substance Use Disorder Resources -   https://www.[employer].org/

## 2022-09-13 RX ORDER — DEXTROAMPHETAMINE SACCHARATE, AMPHETAMINE ASPARTATE, DEXTROAMPHETAMINE SULFATE AND AMPHETAMINE SULFATE 2.5; 2.5; 2.5; 2.5 MG/1; MG/1; MG/1; MG/1
10 TABLET ORAL 2 TIMES DAILY
Qty: 60 TABLET | Refills: 0 | Status: SHIPPED | OUTPATIENT
Start: 2022-09-13 | End: 2022-12-08

## 2022-09-22 ENCOUNTER — TRANSFERRED RECORDS (OUTPATIENT)
Dept: HEALTH INFORMATION MANAGEMENT | Facility: CLINIC | Age: 52
End: 2022-09-22

## 2022-09-23 NOTE — TELEPHONE ENCOUNTER
UPDATE: Writer spoke to Delaney Chung who confirmed receipt of forms.Martha Arriaga LPN    Completed, signed forms, including requested letter, was returned to this writer and faxed to [employer] attn Delaney Chung at [phone]. The original copies was faxed to scanning and are being held in Psych until scanning is confirmed.Martha Arriaga LPN

## 2022-10-15 ENCOUNTER — HEALTH MAINTENANCE LETTER (OUTPATIENT)
Age: 52
End: 2022-10-15

## 2022-10-17 DIAGNOSIS — I10 BENIGN ESSENTIAL HYPERTENSION: ICD-10-CM

## 2022-10-21 RX ORDER — LISINOPRIL 10 MG/1
TABLET ORAL
Qty: 90 TABLET | Refills: 3 | OUTPATIENT
Start: 2022-10-21

## 2022-10-31 ENCOUNTER — TELEPHONE (OUTPATIENT)
Dept: CARDIOLOGY | Facility: CLINIC | Age: 52
End: 2022-10-31

## 2022-11-05 ENCOUNTER — TELEPHONE (OUTPATIENT)
Dept: CARDIOLOGY | Facility: CLINIC | Age: 52
End: 2022-11-05

## 2022-11-05 NOTE — TELEPHONE ENCOUNTER
Left Voicemail (2nd Attempt) and Sent Mychart (2nd Attempt) for the patient to call back and schedule the following:    Appointment type: Cardiology- Return EP  Provider: Alber  Return date: 02/24/23  Specialty phone number: [phone]  Additional appointment(s) needed: none  Additonal Notes: none    Daria Lai, Visit Facilitator/MA.

## 2022-12-08 ENCOUNTER — VIRTUAL VISIT (OUTPATIENT)
Dept: PSYCHIATRY | Facility: CLINIC | Age: 52
End: 2022-12-08
Attending: PSYCHIATRY & NEUROLOGY
Payer: MEDICARE

## 2022-12-08 ENCOUNTER — MYC REFILL (OUTPATIENT)
Dept: PSYCHIATRY | Facility: CLINIC | Age: 52
End: 2022-12-08

## 2022-12-08 DIAGNOSIS — G47.00 INSOMNIA, UNSPECIFIED TYPE: ICD-10-CM

## 2022-12-08 DIAGNOSIS — F33.1 MODERATE EPISODE OF RECURRENT MAJOR DEPRESSIVE DISORDER (H): Primary | ICD-10-CM

## 2022-12-08 DIAGNOSIS — F43.10 POSTTRAUMATIC STRESS DISORDER: ICD-10-CM

## 2022-12-08 DIAGNOSIS — F33.1 MODERATE EPISODE OF RECURRENT MAJOR DEPRESSIVE DISORDER (H): ICD-10-CM

## 2022-12-08 PROCEDURE — 99214 OFFICE O/P EST MOD 30 MIN: CPT | Mod: 95 | Performed by: STUDENT IN AN ORGANIZED HEALTH CARE EDUCATION/TRAINING PROGRAM

## 2022-12-08 RX ORDER — QUETIAPINE FUMARATE 25 MG/1
75 TABLET, FILM COATED ORAL AT BEDTIME
Qty: 90 TABLET | Refills: 2 | Status: SHIPPED | OUTPATIENT
Start: 2022-12-08

## 2022-12-08 RX ORDER — VILAZODONE HYDROCHLORIDE 40 MG/1
40 TABLET ORAL DAILY
Qty: 30 TABLET | Refills: 2 | Status: SHIPPED | OUTPATIENT
Start: 2022-12-08

## 2022-12-08 RX ORDER — DEXTROAMPHETAMINE SACCHARATE, AMPHETAMINE ASPARTATE, DEXTROAMPHETAMINE SULFATE AND AMPHETAMINE SULFATE 2.5; 2.5; 2.5; 2.5 MG/1; MG/1; MG/1; MG/1
10 TABLET ORAL 2 TIMES DAILY
Qty: 60 TABLET | Refills: 0 | Status: SHIPPED | OUTPATIENT
Start: 2022-12-08

## 2022-12-08 RX ORDER — DEXTROAMPHETAMINE SACCHARATE, AMPHETAMINE ASPARTATE, DEXTROAMPHETAMINE SULFATE AND AMPHETAMINE SULFATE 2.5; 2.5; 2.5; 2.5 MG/1; MG/1; MG/1; MG/1
10 TABLET ORAL 2 TIMES DAILY
Qty: 60 TABLET | Refills: 0 | Status: CANCELLED | OUTPATIENT
Start: 2022-12-08

## 2022-12-08 ASSESSMENT — PATIENT HEALTH QUESTIONNAIRE - PHQ9
10. IF YOU CHECKED OFF ANY PROBLEMS, HOW DIFFICULT HAVE THESE PROBLEMS MADE IT FOR YOU TO DO YOUR WORK, TAKE CARE OF THINGS AT HOME, OR GET ALONG WITH OTHER PEOPLE: SOMEWHAT DIFFICULT
SUM OF ALL RESPONSES TO PHQ QUESTIONS 1-9: 8
SUM OF ALL RESPONSES TO PHQ QUESTIONS 1-9: 8

## 2022-12-08 NOTE — TELEPHONE ENCOUNTER
Health Call Center    Phone Message    May a detailed message be left on voicemail: yes     Reason for Call: Medication Refill Request    Has the patient contacted the pharmacy for the refill? Yes   Name of medication being requested: Adderall 10mg, vilazodone 40mg, levomilnacipran 120mg, QUEtiapine 25mg  Provider who prescribed the medication: Dr. De Santiago  Pharmacy: [address] PHONE [phone] FAX [phone]  Date medication is needed: Pt has a few weeks left of each medication but noticed there are no refills left on any of them.      Action Taken: Message routed to:  Other: [facility] Psychiatry Nurse pool    Travel Screening: Not Applicable
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
## 2022-12-08 NOTE — TELEPHONE ENCOUNTER
Last Seen 9/12/22  RTC 6-8 weeks  Cancel 0  No-Show 0    Next Appt 12/8/22    Incoming Refill From patient request via MyChart    Medication Requested   #1 - amphetamine-dextroamphetamine (ADDERALL) 10 MG tablet    Directions   Route: Take 1 tablet (10 mg) by mouth 2 times daily - Oral    Qty 60    Last Refill 9/13/22    Medication Refill Pended Per Refill Protocol    Medication Requested   #2 - levomilnacipran (FETZIMA ER) 120 MG 24 hr capsule    Directions   Route: Take 1 capsule (120 mg) by mouth daily - Oral    Qty 30    Last Refill 9/12/22    Medication Refill Pended Per Refill Protocol    Medication Requested   #3 - vilazodone (VIIBRYD) 40 MG TABS tablet    Directions   Route: Take 1 tablet (40 mg) by mouth daily - Oral    Qty 30    Last Refill 9/12/22    Medication Refill Pended Per Refill Protocol    Medication Requested   #4 - QUEtiapine (SEROQUEL) 25 MG tablet    Directions   Route: Take 3 tablets (75 mg) by mouth At Bedtime - Oral    Qty 90    Last Refill 9/12/22    Medication Refill Pended Per Refill Protocol

## 2022-12-08 NOTE — TELEPHONE ENCOUNTER
Per [place]  amphetamine-dextroamphetamine (ADDERALL) 10 MG tablet 9/19 #60, 6/23 #60, 5/28 #60    Writer routed to provider for approval.

## 2022-12-08 NOTE — TELEPHONE ENCOUNTER
Writer called [id number] - [place], [place] - [address] [place] [address] AT [facility] and confirmed with the pharmacist that a fill of (ADDERALL) 10 MG tablet is on file and in the process of being dispensed.     Writer then called the pt and notified them of this. No further action needed at this time, duplicate refill request. Naveed Anders, EMT

## 2023-01-10 DIAGNOSIS — F43.10 POSTTRAUMATIC STRESS DISORDER: ICD-10-CM

## 2023-01-10 DIAGNOSIS — F33.1 MODERATE EPISODE OF RECURRENT MAJOR DEPRESSIVE DISORDER (H): ICD-10-CM

## 2023-01-10 RX ORDER — VILAZODONE HYDROCHLORIDE 40 MG/1
TABLET ORAL
Qty: 90 TABLET | OUTPATIENT
Start: 2023-01-10

## 2023-02-03 DIAGNOSIS — F43.10 POSTTRAUMATIC STRESS DISORDER: ICD-10-CM

## 2023-02-03 DIAGNOSIS — F33.1 MODERATE EPISODE OF RECURRENT MAJOR DEPRESSIVE DISORDER (H): ICD-10-CM

## 2023-02-06 RX ORDER — VILAZODONE HYDROCHLORIDE 40 MG/1
TABLET ORAL
Qty: 90 TABLET | OUTPATIENT
Start: 2023-02-06

## 2023-03-26 ENCOUNTER — HEALTH MAINTENANCE LETTER (OUTPATIENT)
Age: 53
End: 2023-03-26

## 2023-06-01 ENCOUNTER — HEALTH MAINTENANCE LETTER (OUTPATIENT)
Age: 53
End: 2023-06-01

## 2024-05-26 ENCOUNTER — HEALTH MAINTENANCE LETTER (OUTPATIENT)
Age: 54
End: 2024-05-26

## 2024-12-21 ENCOUNTER — HEALTH MAINTENANCE LETTER (OUTPATIENT)
Age: 54
End: 2024-12-21

## (undated) DEVICE — TUBING SUCTION 12"X1/4" N612

## (undated) DEVICE — SPONGE COTTONOID 2X1/2" 80-1406

## (undated) DEVICE — ESU GROUND PAD ADULT W/CORD E7507

## (undated) DEVICE — SU ETHILON 3-0 PS-1 18" 1663H

## (undated) DEVICE — SU CHROMIC 3-0 FS-2 27" 636

## (undated) DEVICE — GLOVE PROTEXIS POWDER FREE SMT 7.5  2D72PT75X

## (undated) DEVICE — KIT ENDO FIRST STEP DISINFECTANT 200ML W/POUCH EP-4

## (undated) DEVICE — SYR 03ML LL W/O NDL 309657

## (undated) DEVICE — BLADE KNIFE SURG 15 371115

## (undated) DEVICE — GOWN IMPERVIOUS 2XL BLUE

## (undated) DEVICE — SPECIMEN CONTAINER 3OZ W/FORMALIN 59901

## (undated) DEVICE — KIT ENDO TURNOVER/PROCEDURE CARRY-ON 101822

## (undated) DEVICE — SUCTION MANIFOLD NEPTUNE 2 SYS 4 PORT 0702-020-000

## (undated) DEVICE — SOL WATER IRRIG 500ML BOTTLE 2F7113

## (undated) DEVICE — SU CHROMIC 4-0 J-1 18" 724G

## (undated) DEVICE — SOL NACL 0.9% IRRIG 500ML BOTTLE 2F7123

## (undated) DEVICE — SUCTION MANIFOLD NEPTUNE 2 SYS 1 PORT 702-025-000

## (undated) DEVICE — SU PLAIN 4-0 SC-1 18" 1824H

## (undated) DEVICE — LINEN TOWEL PACK X5 5464

## (undated) DEVICE — PACK ENT ENDOSCOPY CUSTOM ASC

## (undated) DEVICE — ESU COBLATOR REFLEX ULTRA 45DEG W/CABLE EICA4845-01

## (undated) DEVICE — RX SURGIFLO HEMOSTATIC MATRIX W/THROMBIN 8ML 2994

## (undated) DEVICE — DRSG STERI STRIP 1/2X4" R1547

## (undated) DEVICE — NDL 25GA 2"  8881200441

## (undated) RX ORDER — ONDANSETRON 2 MG/ML
INJECTION INTRAMUSCULAR; INTRAVENOUS
Status: DISPENSED
Start: 2021-11-29

## (undated) RX ORDER — ACETAMINOPHEN 325 MG/1
TABLET ORAL
Status: DISPENSED
Start: 2021-11-29

## (undated) RX ORDER — BETAMETHASONE SODIUM PHOSPHATE AND BETAMETHASONE ACETATE 3; 3 MG/ML; MG/ML
INJECTION, SUSPENSION INTRA-ARTICULAR; INTRALESIONAL; INTRAMUSCULAR; SOFT TISSUE
Status: DISPENSED
Start: 2021-04-21

## (undated) RX ORDER — LIDOCAINE HYDROCHLORIDE AND EPINEPHRINE 10; 10 MG/ML; UG/ML
INJECTION, SOLUTION INFILTRATION; PERINEURAL
Status: DISPENSED
Start: 2021-11-29

## (undated) RX ORDER — FENTANYL CITRATE 50 UG/ML
INJECTION, SOLUTION INTRAMUSCULAR; INTRAVENOUS
Status: DISPENSED
Start: 2021-04-14

## (undated) RX ORDER — LIDOCAINE HYDROCHLORIDE 10 MG/ML
INJECTION, SOLUTION EPIDURAL; INFILTRATION; INTRACAUDAL; PERINEURAL
Status: DISPENSED
Start: 2021-04-21

## (undated) RX ORDER — FENTANYL CITRATE 50 UG/ML
INJECTION, SOLUTION INTRAMUSCULAR; INTRAVENOUS
Status: DISPENSED
Start: 2021-11-29

## (undated) RX ORDER — MUPIROCIN 20 MG/G
OINTMENT TOPICAL
Status: DISPENSED
Start: 2021-11-29

## (undated) RX ORDER — OXYMETAZOLINE HYDROCHLORIDE 0.05 G/100ML
SPRAY NASAL
Status: DISPENSED
Start: 2021-11-29